# Patient Record
Sex: FEMALE | Race: WHITE | NOT HISPANIC OR LATINO | Employment: UNEMPLOYED | URBAN - METROPOLITAN AREA
[De-identification: names, ages, dates, MRNs, and addresses within clinical notes are randomized per-mention and may not be internally consistent; named-entity substitution may affect disease eponyms.]

---

## 2023-03-20 PROBLEM — M79.641 PAIN OF RIGHT HAND: Status: ACTIVE | Noted: 2023-03-20

## 2023-03-20 PROBLEM — N89.8 VAGINAL DISCHARGE: Status: ACTIVE | Noted: 2023-03-20

## 2023-03-20 PROBLEM — S83.90XA KNEE SPRAIN: Status: ACTIVE | Noted: 2023-03-20

## 2023-03-20 PROBLEM — M25.551 RIGHT HIP PAIN: Status: ACTIVE | Noted: 2023-03-20

## 2023-03-20 PROBLEM — R51.9 HEADACHE: Status: ACTIVE | Noted: 2023-03-20

## 2023-03-20 PROBLEM — R10.12 ABDOMINAL PAIN, LEFT UPPER QUADRANT: Status: ACTIVE | Noted: 2023-03-20

## 2023-03-20 PROBLEM — J30.9 ALLERGIC RHINITIS: Status: ACTIVE | Noted: 2023-03-20

## 2023-03-20 PROBLEM — J40 BRONCHITIS: Status: ACTIVE | Noted: 2023-03-20

## 2023-03-20 PROBLEM — R29.898 WEAKNESS OF RIGHT HAND: Status: ACTIVE | Noted: 2023-03-20

## 2023-03-20 PROBLEM — R05.3 CHRONIC COUGH: Status: ACTIVE | Noted: 2023-03-20

## 2023-03-20 PROBLEM — M25.531 RIGHT WRIST PAIN: Status: ACTIVE | Noted: 2023-03-20

## 2023-03-20 PROBLEM — G89.29 CHRONIC PAIN OF BOTH KNEES: Status: ACTIVE | Noted: 2023-03-20

## 2023-03-20 PROBLEM — T50.905A DRUG REACTION: Status: ACTIVE | Noted: 2023-03-20

## 2023-03-20 PROBLEM — R11.10 INTRACTABLE VOMITING: Status: ACTIVE | Noted: 2023-03-20

## 2023-03-20 PROBLEM — R10.84 GENERALIZED ABDOMINAL PAIN: Status: ACTIVE | Noted: 2023-03-20

## 2023-03-20 PROBLEM — I67.5 MOYAMOYA SYNDROME: Status: ACTIVE | Noted: 2023-03-20

## 2023-03-20 PROBLEM — L70.9 ACNE: Status: ACTIVE | Noted: 2023-03-20

## 2023-03-20 PROBLEM — M25.572 LEFT ANKLE PAIN: Status: ACTIVE | Noted: 2023-03-20

## 2023-03-20 PROBLEM — B35.6 TINEA CRURIS: Status: ACTIVE | Noted: 2023-03-20

## 2023-03-20 PROBLEM — M19.90 OSTEOARTHRITIS: Status: ACTIVE | Noted: 2023-03-20

## 2023-03-20 PROBLEM — G89.29 CHRONIC BACK PAIN: Status: ACTIVE | Noted: 2023-03-20

## 2023-03-20 PROBLEM — N93.8 DUB (DYSFUNCTIONAL UTERINE BLEEDING): Status: ACTIVE | Noted: 2023-03-20

## 2023-03-20 PROBLEM — R87.619 ATYPICAL GLANDULAR CELLS OF UNDETERMINED SIGNIFICANCE (AGUS) ON CERVICAL PAP SMEAR: Status: ACTIVE | Noted: 2023-03-20

## 2023-03-20 PROBLEM — S69.91XA INJURY OF FINGER OF RIGHT HAND: Status: ACTIVE | Noted: 2023-03-20

## 2023-03-20 PROBLEM — M54.9 BACK PAIN WITH SCIATICA: Status: ACTIVE | Noted: 2023-03-20

## 2023-03-20 PROBLEM — R10.13 EPIGASTRIC PAIN: Status: ACTIVE | Noted: 2023-03-20

## 2023-03-20 PROBLEM — R53.82 CHRONIC FATIGUE: Status: ACTIVE | Noted: 2023-03-20

## 2023-03-20 PROBLEM — F32.2 DEPRESSION, MAJOR, SINGLE EPISODE, SEVERE (MULTI): Status: ACTIVE | Noted: 2023-03-20

## 2023-03-20 PROBLEM — Z78.9 KNOWN MEDICAL PROBLEMS: Status: ACTIVE | Noted: 2023-03-20

## 2023-03-20 PROBLEM — S83.419A KNEE MCL SPRAIN: Status: ACTIVE | Noted: 2023-03-20

## 2023-03-20 PROBLEM — G89.29 CHRONIC RIGHT SHOULDER PAIN: Status: ACTIVE | Noted: 2023-03-20

## 2023-03-20 PROBLEM — R63.4 ABNORMAL WEIGHT LOSS: Status: ACTIVE | Noted: 2023-03-20

## 2023-03-20 PROBLEM — K90.829 SHORT BOWEL SYNDROME: Status: ACTIVE | Noted: 2023-03-20

## 2023-03-20 PROBLEM — R39.9 SYMPTOMS INVOLVING URINARY SYSTEM: Status: ACTIVE | Noted: 2023-03-20

## 2023-03-20 PROBLEM — E55.9 VITAMIN D DEFICIENCY: Status: ACTIVE | Noted: 2023-03-20

## 2023-03-20 PROBLEM — M25.361 OTHER INSTABILITY, RIGHT KNEE: Status: ACTIVE | Noted: 2023-03-20

## 2023-03-20 PROBLEM — M25.372 LEFT ANKLE INSTABILITY: Status: ACTIVE | Noted: 2023-03-20

## 2023-03-20 PROBLEM — K13.79 LESION OF PALATE: Status: ACTIVE | Noted: 2023-03-20

## 2023-03-20 PROBLEM — H54.62 VISION LOSS, LEFT EYE: Status: ACTIVE | Noted: 2023-03-20

## 2023-03-20 PROBLEM — M25.561 CHRONIC PAIN OF BOTH KNEES: Status: ACTIVE | Noted: 2023-03-20

## 2023-03-20 PROBLEM — G43.909 MIGRAINE WITHOUT STATUS MIGRAINOSUS, NOT INTRACTABLE: Status: ACTIVE | Noted: 2023-03-20

## 2023-03-20 PROBLEM — G47.00 INSOMNIA: Status: ACTIVE | Noted: 2023-03-20

## 2023-03-20 PROBLEM — M79.672 LEFT FOOT PAIN: Status: ACTIVE | Noted: 2023-03-20

## 2023-03-20 PROBLEM — R19.7 DIARRHEA: Status: ACTIVE | Noted: 2023-03-20

## 2023-03-20 PROBLEM — M54.9 CHRONIC BACK PAIN: Status: ACTIVE | Noted: 2023-03-20

## 2023-03-20 PROBLEM — K90.829 SHORT GUT SYNDROME: Status: ACTIVE | Noted: 2023-03-20

## 2023-03-20 PROBLEM — I69.951: Status: ACTIVE | Noted: 2023-03-20

## 2023-03-20 PROBLEM — R53.1 WEAKNESS GENERALIZED: Status: ACTIVE | Noted: 2023-03-20

## 2023-03-20 PROBLEM — N39.0 LOWER URINARY TRACT INFECTIOUS DISEASE: Status: ACTIVE | Noted: 2023-03-20

## 2023-03-20 PROBLEM — R30.0 DYSURIA: Status: ACTIVE | Noted: 2023-03-20

## 2023-03-20 PROBLEM — M25.562 CHRONIC PAIN OF BOTH KNEES: Status: ACTIVE | Noted: 2023-03-20

## 2023-03-20 PROBLEM — M54.30 BACK PAIN WITH SCIATICA: Status: ACTIVE | Noted: 2023-03-20

## 2023-03-20 PROBLEM — I69.30 HISTORY OF STROKE WITH CURRENT RESIDUAL EFFECTS: Status: ACTIVE | Noted: 2023-03-20

## 2023-03-20 PROBLEM — R53.1 WEAKNESS OF RIGHT SIDE OF BODY: Status: ACTIVE | Noted: 2023-03-20

## 2023-03-20 PROBLEM — M25.511 CHRONIC RIGHT SHOULDER PAIN: Status: ACTIVE | Noted: 2023-03-20

## 2023-03-20 PROBLEM — K62.5 RECTAL BLEEDING: Status: ACTIVE | Noted: 2023-03-20

## 2023-03-20 PROBLEM — D50.0 IRON DEFICIENCY ANEMIA DUE TO CHRONIC BLOOD LOSS: Status: ACTIVE | Noted: 2023-03-20

## 2023-03-20 PROBLEM — R10.2 PAIN, PELVIC, FEMALE: Status: ACTIVE | Noted: 2023-03-20

## 2023-03-20 PROBLEM — R21 RASH: Status: ACTIVE | Noted: 2023-03-20

## 2023-03-20 PROBLEM — K21.9 ESOPHAGEAL REFLUX: Status: ACTIVE | Noted: 2023-03-20

## 2023-03-20 RX ORDER — LEVONORGESTREL 52 MG/1
INTRAUTERINE DEVICE INTRAUTERINE
COMMUNITY

## 2023-03-20 RX ORDER — KETOCONAZOLE 20 MG/G
CREAM TOPICAL
COMMUNITY
End: 2023-12-08 | Stop reason: SDUPTHER

## 2023-03-20 RX ORDER — CHOLESTYRAMINE 4 G/9G
POWDER, FOR SUSPENSION ORAL
COMMUNITY
Start: 2021-03-26 | End: 2024-05-24 | Stop reason: WASHOUT

## 2023-03-20 RX ORDER — FLUTICASONE PROPIONATE AND SALMETEROL 100; 50 UG/1; UG/1
POWDER RESPIRATORY (INHALATION)
COMMUNITY
Start: 2022-04-20 | End: 2024-05-24 | Stop reason: SDUPTHER

## 2023-03-20 RX ORDER — TRAZODONE HYDROCHLORIDE 150 MG/1
1 TABLET ORAL DAILY
COMMUNITY
Start: 2016-05-02

## 2023-03-20 RX ORDER — ACETAMINOPHEN 325 MG/1
TABLET ORAL
COMMUNITY
Start: 2017-10-17 | End: 2023-12-08

## 2023-03-20 RX ORDER — IBUPROFEN 800 MG/1
1 TABLET ORAL 3 TIMES DAILY PRN
COMMUNITY
Start: 2022-03-25

## 2023-03-20 RX ORDER — SERTRALINE HYDROCHLORIDE 50 MG/1
50 TABLET, FILM COATED ORAL DAILY
COMMUNITY

## 2023-03-20 RX ORDER — ACETAMINOPHEN 500 MG
1 TABLET ORAL DAILY
COMMUNITY
Start: 2020-03-06 | End: 2023-07-24

## 2023-03-20 RX ORDER — FLUTICASONE PROPIONATE AND SALMETEROL 100; 50 UG/1; UG/1
1 POWDER RESPIRATORY (INHALATION) EVERY 12 HOURS
COMMUNITY
Start: 2022-02-10 | End: 2023-12-08

## 2023-03-20 RX ORDER — ASPIRIN 81 MG/1
1 TABLET ORAL DAILY
COMMUNITY
Start: 2021-03-26 | End: 2024-05-24 | Stop reason: SDUPTHER

## 2023-03-20 RX ORDER — FLUTICASONE PROPIONATE 50 MCG
SPRAY, SUSPENSION (ML) NASAL
COMMUNITY
Start: 2020-10-07

## 2023-03-28 ENCOUNTER — OFFICE VISIT (OUTPATIENT)
Dept: PRIMARY CARE | Facility: CLINIC | Age: 33
End: 2023-03-28
Payer: MEDICAID

## 2023-03-28 VITALS
BODY MASS INDEX: 38.32 KG/M2 | HEART RATE: 71 BPM | WEIGHT: 230 LBS | HEIGHT: 65 IN | DIASTOLIC BLOOD PRESSURE: 74 MMHG | SYSTOLIC BLOOD PRESSURE: 100 MMHG

## 2023-03-28 DIAGNOSIS — H69.91 EUSTACHIAN TUBE DYSFUNCTION, RIGHT: ICD-10-CM

## 2023-03-28 DIAGNOSIS — M54.50 CHRONIC BILATERAL LOW BACK PAIN WITHOUT SCIATICA: ICD-10-CM

## 2023-03-28 DIAGNOSIS — G89.29 CHRONIC BILATERAL LOW BACK PAIN WITHOUT SCIATICA: ICD-10-CM

## 2023-03-28 DIAGNOSIS — M25.561 PATELLOFEMORAL ARTHRALGIA OF RIGHT KNEE: Primary | ICD-10-CM

## 2023-03-28 DIAGNOSIS — R30.0 DYSURIA: ICD-10-CM

## 2023-03-28 LAB
POC APPEARANCE, URINE: ABNORMAL
POC BILIRUBIN, URINE: ABNORMAL
POC BLOOD, URINE: NEGATIVE
POC COLOR, URINE: YELLOW
POC GLUCOSE, URINE: NEGATIVE MG/DL
POC KETONES, URINE: ABNORMAL MG/DL
POC LEUKOCYTES, URINE: NEGATIVE
POC NITRITE,URINE: NEGATIVE
POC PH, URINE: 5.5 PH
POC PROTEIN, URINE: NEGATIVE MG/DL
POC SPECIFIC GRAVITY, URINE: >=1.03
POC UROBILINOGEN, URINE: 0.2 EU/DL

## 2023-03-28 PROCEDURE — 81003 URINALYSIS AUTO W/O SCOPE: CPT | Performed by: INTERNAL MEDICINE

## 2023-03-28 PROCEDURE — 99214 OFFICE O/P EST MOD 30 MIN: CPT | Performed by: INTERNAL MEDICINE

## 2023-03-28 PROCEDURE — 1036F TOBACCO NON-USER: CPT | Performed by: INTERNAL MEDICINE

## 2023-03-28 RX ORDER — METHYLPREDNISOLONE 4 MG/1
TABLET ORAL
Qty: 21 TABLET | Refills: 0 | Status: SHIPPED | OUTPATIENT
Start: 2023-03-28 | End: 2023-04-04

## 2023-03-28 RX ORDER — ARM BRACE
1 EACH MISCELLANEOUS DAILY
Qty: 1 EACH | Refills: 0 | Status: SHIPPED | OUTPATIENT
Start: 2023-03-28

## 2023-03-28 ASSESSMENT — ENCOUNTER SYMPTOMS
COUGH: 1
BACK PAIN: 1

## 2023-03-28 NOTE — PROGRESS NOTES
Subjective   Patient ID: Windy Lovelace is a 32 y.o. female who presents for Cough, right knee pain, and Back Pain.  Back pain, has been intermittent  She gets more pain when she sits on her couch, she cannot lean back  No injury, she fell in the shower, hit her back against the shower head/faucet, that was years ago  No numbness, just pain in the middle of her back and lower back  No urniary issues  Bowels have been regular.    Squeezing when she breaths or sleeps  Has noted the squeaking since her last cold 2/3/23      She is coughing a lot, she is hacking when she laughs  Cough is not productive  No hx of asthma  No fevers but had a cold a few months ago    Also with knee injury, right knee, her knee snapped, without injury  She was getting out of a car and heard and felt a snap in her knee, right  Was harder to walk after that, she has had a prior injury to the knee  She tore the medial muscle on the knee about 10 years ago.  She has a knee brace, but has trouble getting it on.      Cough    Back Pain        Review of Systems   Respiratory:  Positive for cough.    Musculoskeletal:  Positive for back pain.       Objective     Visit Vitals  /74   Pulse 71        Physical Exam  Constitutional:       Appearance: Normal appearance.   HENT:      Right Ear: Ear canal normal.      Ears:      Comments: Left TM partially occluded by cerumen    Right TM is dull and restracted, fluid/clear noted     Nose:      Comments: Turbs are congested and occlusive on the right  Cardiovascular:      Rate and Rhythm: Normal rate and regular rhythm.      Heart sounds: No murmur heard.  Pulmonary:      Effort: Pulmonary effort is normal. No respiratory distress.      Breath sounds: No stridor. No wheezing, rhonchi or rales.   Abdominal:      Palpations: Abdomen is soft.      Tenderness: There is no abdominal tenderness.   Musculoskeletal:      Comments: Right knee with some crepitans on extension  Neg kacie  No effusion or  baker cyst  Tender over the lateral knee  Negative drawer and no pain with MCL/LCL strain    Lower back with tender ropiness, more on the left lumbar paraspinals  Negative CVA tenderness  Tender in the lower lumber L4/L5 with percussion over the midline  No rash  Negative SLR but very tight hamstrings at 50 degrees and pain in lower back  No foot drop   Neurological:      Mental Status: She is alert.         Assessment/Plan   Problem List Items Addressed This Visit          Nervous    Dysuria    Relevant Orders    POCT UA Automated manually resulted (Completed)       Other    Chronic back pain    Relevant Medications    methylPREDNISolone (Medrol Dospak) 4 mg tablets    Other Relevant Orders    XR lumbar spine 6+ views including oblique flexion extension    Referral to Physical Therapy     Other Visit Diagnoses       Patellofemoral arthralgia of right knee    -  Primary    Relevant Medications    leg brace (Knee Support Brace) misc    methylPREDNISolone (Medrol Dospak) 4 mg tablets    Other Relevant Orders    XR knee right 1-2 views    Referral to Physical Therapy    Eustachian tube dysfunction, right        Relevant Medications    methylPREDNISolone (Medrol Dospak) 4 mg tablets          Problem List Items Addressed This Visit    None       Right knee pain, MRI in 2022 is consistent with patellofemoral disorder  Get knee brace from [facility]  Address: [address], [place] [address]  Hours:   Open · Closes 5 PM  Phone: [phone]  Chandu Brown is the orthotic specialist who can hopefully order a brace that you can get off and on  Go to physical therapy    Lower back pain, likely due to tight hamstrings, go to physical therapy (and continue home exercises when done)  Take medrol norbert  Get lower back xrays due to prior injury    Squeeking when laughing and at night  Oxygen in 100% and lungs are clear  You have right nasal congestion and blocked right ear  Take medrol norbert to decongest    There are no Patient  Instructions on file for this visit.     For all testing, if you have not received results within 5 business days, please call the office.    All results will be available as well on MyChart. Make sure you have signed up.

## 2023-03-28 NOTE — PATIENT INSTRUCTIONS
Right knee pain, MRI in 2022 is consistent with patellofemoral disorder  Get knee brace from [facility]  Address: [address], [place] [address]  Hours:   Open · Closes 5 PM  Phone: [phone]  Chandu Brown is the orthotic specialist who can hopefully order a brace that you can get off and on  Go to physical therapy    Lower back pain, likely due to tight hamstrings, go to physical therapy (and continue home exercises when done)  Take medrol norbert  Get lower back xrays due to prior injury    Squeeking when laughing and at night  Oxygen in 100% and lungs are clear  You have right nasal congestion and blocked right ear  Take medrol norbert to decongest

## 2023-07-24 DIAGNOSIS — R79.89 OTHER SPECIFIED ABNORMAL FINDINGS OF BLOOD CHEMISTRY: ICD-10-CM

## 2023-07-24 RX ORDER — ACETAMINOPHEN 500 MG
50 TABLET ORAL DAILY
Qty: 30 CAPSULE | Refills: 5 | Status: SHIPPED | OUTPATIENT
Start: 2023-07-24 | End: 2023-12-11 | Stop reason: SDUPTHER

## 2023-08-07 PROBLEM — H52.13 MYOPIA, BILATERAL: Status: ACTIVE | Noted: 2021-08-31

## 2023-08-07 PROBLEM — F43.10 POST TRAUMATIC STRESS DISORDER: Status: ACTIVE | Noted: 2021-05-28

## 2023-08-07 PROBLEM — H50.111 EXOTROPIA OF RIGHT EYE: Status: ACTIVE | Noted: 2021-08-31

## 2023-08-07 PROBLEM — F41.1 ANXIETY STATE: Status: ACTIVE | Noted: 2021-05-28

## 2023-08-07 PROBLEM — Z98.890 H/O ILEOSTOMY: Status: ACTIVE | Noted: 2019-09-11

## 2023-08-07 PROBLEM — H53.021 REFRACTIVE AMBLYOPIA OF RIGHT EYE: Status: ACTIVE | Noted: 2021-08-31

## 2023-08-07 PROBLEM — Z93.2 S/P ILEOSTOMY (MULTI): Status: ACTIVE | Noted: 2019-09-18

## 2023-08-07 PROBLEM — F70 MILD INTELLECTUAL DISABILITIES: Status: ACTIVE | Noted: 2021-05-28

## 2023-08-07 RX ORDER — PRAZOSIN HYDROCHLORIDE 1 MG/1
3 CAPSULE ORAL
COMMUNITY
Start: 2023-02-20

## 2023-08-07 NOTE — PROGRESS NOTES
Subjective   Patient ID: Windy Lovelace is a 32 y.o. female who presents for Knee pain    HPI     Review of Systems    Objective   There were no vitals taken for this visit.    Physical Exam    Assessment/Plan

## 2023-08-11 ENCOUNTER — OFFICE VISIT (OUTPATIENT)
Dept: PRIMARY CARE | Facility: CLINIC | Age: 33
End: 2023-08-11
Payer: MEDICAID

## 2023-08-11 VITALS
HEART RATE: 99 BPM | OXYGEN SATURATION: 96 % | DIASTOLIC BLOOD PRESSURE: 88 MMHG | WEIGHT: 238 LBS | SYSTOLIC BLOOD PRESSURE: 141 MMHG | BODY MASS INDEX: 39.61 KG/M2

## 2023-08-11 DIAGNOSIS — M25.561 PATELLOFEMORAL ARTHRALGIA OF RIGHT KNEE: Primary | ICD-10-CM

## 2023-08-11 DIAGNOSIS — M20.021 ACQUIRED BOUTONNIERE DEFORMITY OF FINGER OF RIGHT HAND: ICD-10-CM

## 2023-08-11 DIAGNOSIS — M79.641 PAIN OF RIGHT HAND: ICD-10-CM

## 2023-08-11 DIAGNOSIS — K21.9 GASTROESOPHAGEAL REFLUX DISEASE WITHOUT ESOPHAGITIS: ICD-10-CM

## 2023-08-11 DIAGNOSIS — R29.898 WEAKNESS OF RIGHT HAND: ICD-10-CM

## 2023-08-11 PROCEDURE — 1036F TOBACCO NON-USER: CPT | Performed by: INTERNAL MEDICINE

## 2023-08-11 PROCEDURE — 99214 OFFICE O/P EST MOD 30 MIN: CPT | Performed by: INTERNAL MEDICINE

## 2023-08-11 RX ORDER — OMEPRAZOLE 20 MG/1
20 TABLET, DELAYED RELEASE ORAL DAILY
Qty: 30 TABLET | Refills: 11 | Status: SHIPPED | COMMUNITY
Start: 2023-08-11 | End: 2024-05-24 | Stop reason: WASHOUT

## 2023-08-11 ASSESSMENT — ENCOUNTER SYMPTOMS: COUGH: 1

## 2023-08-11 NOTE — PROGRESS NOTES
Subjective   Patient ID: Windy Lovelace is a 32 y.o. female who presents for Knee Pain (Right knee pain ) and Cough.    She still needs a knee brace and the knee brace comes off and needs to have staff have her help getting it on  She cannot get over her calf and over her knee  Some of the staff does not know how to put it on.   Orthopedics did not get her a knee brace, she needs to be fitted for a brace and instruct her on how to put.   She has not been to PT in a long time.   She was going every week     Her right pinky got fractured it.     She has a cough, every so often she gets a cough, she starts hacking  She does cough at night  Does not get phlegm in her throat, dry cough  Has been almost 1/2 a year  No runny nose or congestion  She still gets heart burn,   No acid taste in her mouth, but very often  She does wheeze when she sleeps    Knee Pain     Cough         Review of Systems   Respiratory:  Positive for cough.        Objective   /88   Pulse 99   Wt 108 kg (238 lb)   SpO2 96%   BMI 39.61 kg/m²     Physical Exam  HENT:      Nose: Nose normal.   Pulmonary:      Effort: Pulmonary effort is normal.      Breath sounds: Normal breath sounds.   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 Nagel again do to increased laxity of the 5th finger    Cough is from reflux, restart omeprazole 20mg daily

## 2023-08-11 NOTE — PATIENT INSTRUCTIONS
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 Nagel again do to increased laxity of the 5th finger    Cough is from reflux, restart omeprazole 20mg daily

## 2023-12-08 ENCOUNTER — LAB (OUTPATIENT)
Dept: LAB | Facility: LAB | Age: 33
End: 2023-12-08
Payer: MEDICAID

## 2023-12-08 ENCOUNTER — OFFICE VISIT (OUTPATIENT)
Dept: PRIMARY CARE | Facility: CLINIC | Age: 33
End: 2023-12-08
Payer: MEDICAID

## 2023-12-08 VITALS
HEIGHT: 65 IN | BODY MASS INDEX: 38.32 KG/M2 | SYSTOLIC BLOOD PRESSURE: 150 MMHG | HEART RATE: 90 BPM | DIASTOLIC BLOOD PRESSURE: 105 MMHG | WEIGHT: 230 LBS

## 2023-12-08 DIAGNOSIS — Z00.00 HEALTH CARE MAINTENANCE: ICD-10-CM

## 2023-12-08 DIAGNOSIS — R10.84 GENERALIZED ABDOMINAL PAIN: ICD-10-CM

## 2023-12-08 DIAGNOSIS — K90.821 SHORT BOWEL SYNDROME WITH COLON IN CONTINUITY: ICD-10-CM

## 2023-12-08 DIAGNOSIS — B35.6 TINEA CRURIS: ICD-10-CM

## 2023-12-08 DIAGNOSIS — R10.84 GENERALIZED ABDOMINAL PAIN: Primary | ICD-10-CM

## 2023-12-08 LAB
ALBUMIN SERPL BCP-MCNC: 4.4 G/DL (ref 3.4–5)
ALP SERPL-CCNC: 83 U/L (ref 33–110)
ALT SERPL W P-5'-P-CCNC: 55 U/L (ref 7–45)
ANION GAP SERPL CALC-SCNC: 17 MMOL/L (ref 10–20)
APPEARANCE UR: ABNORMAL
AST SERPL W P-5'-P-CCNC: 26 U/L (ref 9–39)
BASOPHILS # BLD AUTO: 0.05 X10*3/UL (ref 0–0.1)
BASOPHILS NFR BLD AUTO: 0.5 %
BILIRUB SERPL-MCNC: 0.8 MG/DL (ref 0–1.2)
BILIRUB UR STRIP.AUTO-MCNC: NEGATIVE MG/DL
BUN SERPL-MCNC: 8 MG/DL (ref 6–23)
CALCIUM SERPL-MCNC: 9.1 MG/DL (ref 8.6–10.3)
CAOX CRY #/AREA UR COMP ASSIST: ABNORMAL /HPF
CHLORIDE SERPL-SCNC: 106 MMOL/L (ref 98–107)
CHOLEST SERPL-MCNC: 135 MG/DL (ref 0–199)
CHOLESTEROL/HDL RATIO: 3.9
CO2 SERPL-SCNC: 21 MMOL/L (ref 21–32)
COLOR UR: YELLOW
CREAT SERPL-MCNC: 0.94 MG/DL (ref 0.5–1.05)
EOSINOPHIL # BLD AUTO: 0.21 X10*3/UL (ref 0–0.7)
EOSINOPHIL NFR BLD AUTO: 2.1 %
ERYTHROCYTE [DISTWIDTH] IN BLOOD BY AUTOMATED COUNT: 16.1 % (ref 11.5–14.5)
FERRITIN SERPL-MCNC: 257 NG/ML (ref 8–150)
GFR SERPL CREATININE-BSD FRML MDRD: 81 ML/MIN/1.73M*2
GLUCOSE SERPL-MCNC: 73 MG/DL (ref 74–99)
GLUCOSE UR STRIP.AUTO-MCNC: NEGATIVE MG/DL
HCT VFR BLD AUTO: 45.2 % (ref 36–46)
HDLC SERPL-MCNC: 34.4 MG/DL
HGB BLD-MCNC: 15.2 G/DL (ref 12–16)
IMM GRANULOCYTES # BLD AUTO: 0.02 X10*3/UL (ref 0–0.7)
IMM GRANULOCYTES NFR BLD AUTO: 0.2 % (ref 0–0.9)
IRON SATN MFR SERPL: 16 % (ref 25–45)
IRON SERPL-MCNC: 75 UG/DL (ref 35–150)
KETONES UR STRIP.AUTO-MCNC: NEGATIVE MG/DL
LDLC SERPL CALC-MCNC: 67 MG/DL
LEUKOCYTE ESTERASE UR QL STRIP.AUTO: NEGATIVE
LYMPHOCYTES # BLD AUTO: 3.09 X10*3/UL (ref 1.2–4.8)
LYMPHOCYTES NFR BLD AUTO: 30.4 %
MCH RBC QN AUTO: 32.6 PG (ref 26–34)
MCHC RBC AUTO-ENTMCNC: 33.6 G/DL (ref 32–36)
MCV RBC AUTO: 97 FL (ref 80–100)
MONOCYTES # BLD AUTO: 0.51 X10*3/UL (ref 0.1–1)
MONOCYTES NFR BLD AUTO: 5 %
MUCOUS THREADS #/AREA URNS AUTO: ABNORMAL /LPF
NEUTROPHILS # BLD AUTO: 6.29 X10*3/UL (ref 1.2–7.7)
NEUTROPHILS NFR BLD AUTO: 61.8 %
NITRITE UR QL STRIP.AUTO: NEGATIVE
NON HDL CHOLESTEROL: 101 MG/DL (ref 0–149)
NRBC BLD-RTO: 0 /100 WBCS (ref 0–0)
PH UR STRIP.AUTO: 5 [PH]
PLATELET # BLD AUTO: 342 X10*3/UL (ref 150–450)
POC APPEARANCE, URINE: CLEAR
POC BILIRUBIN, URINE: ABNORMAL
POC BLOOD, URINE: NEGATIVE
POC COLOR, URINE: YELLOW
POC GLUCOSE, URINE: NEGATIVE MG/DL
POC KETONES, URINE: ABNORMAL MG/DL
POC LEUKOCYTES, URINE: NEGATIVE
POC NITRITE,URINE: NEGATIVE
POC PH, URINE: 5.5 PH
POC PROTEIN, URINE: ABNORMAL MG/DL
POC SPECIFIC GRAVITY, URINE: >=1.03
POC UROBILINOGEN, URINE: 0.2 EU/DL
POTASSIUM SERPL-SCNC: 3.8 MMOL/L (ref 3.5–5.3)
PROT SERPL-MCNC: 7.1 G/DL (ref 6.4–8.2)
PROT UR STRIP.AUTO-MCNC: ABNORMAL MG/DL
RBC # BLD AUTO: 4.66 X10*6/UL (ref 4–5.2)
RBC # UR STRIP.AUTO: NEGATIVE /UL
RBC #/AREA URNS AUTO: ABNORMAL /HPF
SODIUM SERPL-SCNC: 140 MMOL/L (ref 136–145)
SP GR UR STRIP.AUTO: 1.03
SQUAMOUS #/AREA URNS AUTO: ABNORMAL /HPF
TIBC SERPL-MCNC: 460 UG/DL (ref 240–445)
TRIGL SERPL-MCNC: 168 MG/DL (ref 0–149)
TSH SERPL-ACNC: 2.73 MIU/L (ref 0.44–3.98)
UIBC SERPL-MCNC: 385 UG/DL (ref 110–370)
UROBILINOGEN UR STRIP.AUTO-MCNC: <2 MG/DL
VLDL: 34 MG/DL (ref 0–40)
WBC # BLD AUTO: 10.2 X10*3/UL (ref 4.4–11.3)
WBC #/AREA URNS AUTO: ABNORMAL /HPF

## 2023-12-08 PROCEDURE — 83550 IRON BINDING TEST: CPT

## 2023-12-08 PROCEDURE — 83540 ASSAY OF IRON: CPT

## 2023-12-08 PROCEDURE — 84443 ASSAY THYROID STIM HORMONE: CPT

## 2023-12-08 PROCEDURE — 80061 LIPID PANEL: CPT

## 2023-12-08 PROCEDURE — 1036F TOBACCO NON-USER: CPT | Performed by: INTERNAL MEDICINE

## 2023-12-08 PROCEDURE — 82728 ASSAY OF FERRITIN: CPT

## 2023-12-08 PROCEDURE — 82607 VITAMIN B-12: CPT

## 2023-12-08 PROCEDURE — 80053 COMPREHEN METABOLIC PANEL: CPT

## 2023-12-08 PROCEDURE — 81001 URINALYSIS AUTO W/SCOPE: CPT

## 2023-12-08 PROCEDURE — 85025 COMPLETE CBC W/AUTO DIFF WBC: CPT

## 2023-12-08 PROCEDURE — 99214 OFFICE O/P EST MOD 30 MIN: CPT | Performed by: INTERNAL MEDICINE

## 2023-12-08 PROCEDURE — 82306 VITAMIN D 25 HYDROXY: CPT

## 2023-12-08 PROCEDURE — 81003 URINALYSIS AUTO W/O SCOPE: CPT | Performed by: INTERNAL MEDICINE

## 2023-12-08 PROCEDURE — 36415 COLL VENOUS BLD VENIPUNCTURE: CPT

## 2023-12-08 RX ORDER — KETOCONAZOLE 20 MG/G
CREAM TOPICAL 2 TIMES DAILY
Qty: 60 G | Refills: 1 | Status: SHIPPED | OUTPATIENT
Start: 2023-12-08

## 2023-12-08 NOTE — PROGRESS NOTES
"Subjective   Patient ID: Windy Lovelace is a 33 y.o. female who presents for Anorexia and medication compliance .    Sister Aracelis on phone for the visit  For the last 1 1/2 month  Has had a drastic change in habits, stopped all of her medications  She did so because she gags if she doesn't take with food  She used to eat 3 meals a day, in the last month she is only eating 1-2 meals per day and in excess  She drinks dr pepper, she drinks instead of water, she has 3-4 regular diet pepper  She doesn't drink water, thinks it tastes like pencil lead, she has filtered water where she lives, they got her spin drift, sparkling with natural juice.   She lives in a condo and has supervision with remote  She lost a lot of weight.   She has been gagging  She sleeps during the day and up all night.   She did sleep for about 8 hours.   She prefers to sleep during the day, because her flash backs are worse during the night and has night terrors, but is not taking her medicine for night terrors.     She is surprised by her change in her food intake, could there be any   Wants her checked her for diabetes.     She moved about 1 year.   She was doing very well for the first 10months and only in the last 2 months.  Bowels feel like rocks, and hurts all the time, doesn't take metamucil or miralax  It hurts /feels like rocks but her bowels are soft. And loose. She uses a bidet, since she cannot get her hand back to wipe  She is not taking any of her medication.     Her eyes bother her with flourenscent lights.     She has been eating in the middle of the night.   There is not a lot of food that she can eat, she eats chips and snacky stuff in the middle of the night.          Review of Systems    Objective   BP (!) 150/105   Pulse 90   Ht 1.651 m (5' 5\")   Wt 104 kg (230 lb)   BMI 38.27 kg/m²     Physical Exam  Constitutional:       Appearance: Normal appearance.   HENT:      Mouth/Throat:      Mouth: Mucous membranes are moist. "      Comments: Mucosa pink and moist, no lesions  Neck:      Vascular: No carotid bruit.   Cardiovascular:      Rate and Rhythm: Normal rate and regular rhythm.      Heart sounds: No murmur heard.  Pulmonary:      Effort: Pulmonary effort is normal.      Breath sounds: Normal breath sounds.   Abdominal:      Comments: Abdomen with normal bs, not distended  No pain when listening to bs/pressing with stethoscope but with palpation pt states pain is diffuse with no PMI. No masses. No guarding, but pt c/o rebound pain   No rigidity   Skin:     Coloration: Skin is not jaundiced or pale.   Neurological:      Mental Status: She is alert and oriented to person, place, and time.   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

## 2023-12-08 NOTE — PROGRESS NOTES
Subjective   Patient ID: Windy Lovelace is a 33 y.o. female who presents for Anorexia.    HPI     Review of Systems    Objective   There were no vitals taken for this visit.    Physical Exam    Assessment/Plan

## 2023-12-08 NOTE — PATIENT INSTRUCTIONS
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

## 2023-12-09 LAB
25(OH)D3 SERPL-MCNC: 10 NG/ML (ref 30–100)
VIT B12 SERPL-MCNC: 166 PG/ML (ref 211–911)

## 2023-12-11 DIAGNOSIS — R79.89 OTHER SPECIFIED ABNORMAL FINDINGS OF BLOOD CHEMISTRY: ICD-10-CM

## 2023-12-11 DIAGNOSIS — E55.9 VITAMIN D DEFICIENCY: Primary | ICD-10-CM

## 2023-12-11 DIAGNOSIS — E53.8 VITAMIN B12 DEFICIENCY: ICD-10-CM

## 2023-12-11 RX ORDER — ACETAMINOPHEN 500 MG
2000 TABLET ORAL DAILY
Qty: 30 CAPSULE | Refills: 3 | Status: SHIPPED | OUTPATIENT
Start: 2023-12-11 | End: 2024-05-24 | Stop reason: SDUPTHER

## 2023-12-14 ENCOUNTER — CLINICAL SUPPORT (OUTPATIENT)
Dept: PRIMARY CARE | Facility: CLINIC | Age: 33
End: 2023-12-14
Payer: MEDICAID

## 2023-12-14 DIAGNOSIS — E53.8 B12 DEFICIENCY: ICD-10-CM

## 2023-12-14 PROCEDURE — 96372 THER/PROPH/DIAG INJ SC/IM: CPT | Performed by: INTERNAL MEDICINE

## 2023-12-14 RX ORDER — CYANOCOBALAMIN 1000 UG/ML
1000 INJECTION, SOLUTION INTRAMUSCULAR; SUBCUTANEOUS ONCE
Status: COMPLETED | OUTPATIENT
Start: 2023-12-14 | End: 2023-12-14

## 2023-12-14 RX ADMIN — CYANOCOBALAMIN 1000 MCG: 1000 INJECTION, SOLUTION INTRAMUSCULAR; SUBCUTANEOUS at 10:06

## 2024-01-05 ENCOUNTER — CLINICAL SUPPORT (OUTPATIENT)
Dept: PRIMARY CARE | Facility: CLINIC | Age: 34
End: 2024-01-05
Payer: MEDICAID

## 2024-01-05 DIAGNOSIS — E53.8 B12 DEFICIENCY: ICD-10-CM

## 2024-01-05 PROCEDURE — 96372 THER/PROPH/DIAG INJ SC/IM: CPT | Performed by: INTERNAL MEDICINE

## 2024-01-05 RX ADMIN — CYANOCOBALAMIN 1000 MCG: 1000 INJECTION, SOLUTION INTRAMUSCULAR; SUBCUTANEOUS at 09:21

## 2024-01-08 ENCOUNTER — HOSPITAL ENCOUNTER (OUTPATIENT)
Dept: RADIOLOGY | Facility: HOSPITAL | Age: 34
Discharge: HOME | End: 2024-01-08
Payer: MEDICAID

## 2024-01-08 DIAGNOSIS — R10.84 GENERALIZED ABDOMINAL PAIN: ICD-10-CM

## 2024-01-08 PROCEDURE — 74177 CT ABD & PELVIS W/CONTRAST: CPT

## 2024-01-08 PROCEDURE — 74177 CT ABD & PELVIS W/CONTRAST: CPT | Performed by: RADIOLOGY

## 2024-01-08 PROCEDURE — 2550000001 HC RX 255 CONTRASTS: Performed by: INTERNAL MEDICINE

## 2024-01-08 RX ADMIN — IOHEXOL 75 ML: 350 INJECTION, SOLUTION INTRAVENOUS at 11:02

## 2024-01-15 ENCOUNTER — APPOINTMENT (OUTPATIENT)
Dept: PRIMARY CARE | Facility: CLINIC | Age: 34
End: 2024-01-15
Payer: MEDICAID

## 2024-01-16 ENCOUNTER — CLINICAL SUPPORT (OUTPATIENT)
Dept: PRIMARY CARE | Facility: CLINIC | Age: 34
End: 2024-01-16
Payer: MEDICAID

## 2024-01-16 DIAGNOSIS — E53.8 B12 DEFICIENCY: ICD-10-CM

## 2024-01-16 PROCEDURE — 96372 THER/PROPH/DIAG INJ SC/IM: CPT | Performed by: INTERNAL MEDICINE

## 2024-01-16 RX ADMIN — CYANOCOBALAMIN 1000 MCG: 1000 INJECTION, SOLUTION INTRAMUSCULAR; SUBCUTANEOUS at 12:44

## 2024-01-17 RX ORDER — CYANOCOBALAMIN 1000 UG/ML
1000 INJECTION, SOLUTION INTRAMUSCULAR; SUBCUTANEOUS ONCE
Status: COMPLETED | OUTPATIENT
Start: 2024-01-17 | End: 2024-01-16

## 2024-01-22 ENCOUNTER — CLINICAL SUPPORT (OUTPATIENT)
Dept: PRIMARY CARE | Facility: CLINIC | Age: 34
End: 2024-01-22
Payer: MEDICAID

## 2024-01-22 ENCOUNTER — TELEPHONE (OUTPATIENT)
Dept: PRIMARY CARE | Facility: CLINIC | Age: 34
End: 2024-01-22

## 2024-01-22 DIAGNOSIS — E53.8 B12 DEFICIENCY: ICD-10-CM

## 2024-01-22 PROCEDURE — 96372 THER/PROPH/DIAG INJ SC/IM: CPT | Performed by: INTERNAL MEDICINE

## 2024-01-22 RX ADMIN — CYANOCOBALAMIN 1000 MCG: 1000 INJECTION, SOLUTION INTRAMUSCULAR; SUBCUTANEOUS at 12:09

## 2024-01-29 ENCOUNTER — CLINICAL SUPPORT (OUTPATIENT)
Dept: PRIMARY CARE | Facility: CLINIC | Age: 34
End: 2024-01-29
Payer: MEDICAID

## 2024-01-29 DIAGNOSIS — E53.8 B12 DEFICIENCY: ICD-10-CM

## 2024-01-29 PROCEDURE — 96372 THER/PROPH/DIAG INJ SC/IM: CPT | Performed by: INTERNAL MEDICINE

## 2024-01-29 RX ADMIN — CYANOCOBALAMIN 1000 MCG: 1000 INJECTION, SOLUTION INTRAMUSCULAR; SUBCUTANEOUS at 12:05

## 2024-02-08 RX ORDER — CYANOCOBALAMIN 1000 UG/ML
1000 INJECTION, SOLUTION INTRAMUSCULAR; SUBCUTANEOUS ONCE
Status: COMPLETED | OUTPATIENT
Start: 2024-02-08 | End: 2024-01-22

## 2024-02-08 RX ORDER — CYANOCOBALAMIN 1000 UG/ML
1000 INJECTION, SOLUTION INTRAMUSCULAR; SUBCUTANEOUS ONCE
Status: COMPLETED | OUTPATIENT
Start: 2024-02-08 | End: 2024-03-18

## 2024-02-26 ENCOUNTER — CLINICAL SUPPORT (OUTPATIENT)
Dept: PRIMARY CARE | Facility: CLINIC | Age: 34
End: 2024-02-26
Payer: MEDICAID

## 2024-02-26 PROCEDURE — 96372 THER/PROPH/DIAG INJ SC/IM: CPT | Performed by: INTERNAL MEDICINE

## 2024-02-26 RX ADMIN — CYANOCOBALAMIN 1000 MCG: 1000 INJECTION, SOLUTION INTRAMUSCULAR; SUBCUTANEOUS at 11:20

## 2024-02-26 NOTE — PROGRESS NOTES
Subjective   Patient ID: Windy Lovelace is a 33 y.o. female who presents for B12 Injection.    HPI     Review of Systems    Objective   There were no vitals taken for this visit.    Physical Exam    Assessment/Plan

## 2024-03-18 ENCOUNTER — APPOINTMENT (OUTPATIENT)
Dept: OBSTETRICS AND GYNECOLOGY | Facility: CLINIC | Age: 34
End: 2024-03-18
Payer: MEDICAID

## 2024-03-18 ENCOUNTER — CLINICAL SUPPORT (OUTPATIENT)
Dept: PRIMARY CARE | Facility: CLINIC | Age: 34
End: 2024-03-18
Payer: MEDICAID

## 2024-03-18 PROCEDURE — 96372 THER/PROPH/DIAG INJ SC/IM: CPT | Performed by: INTERNAL MEDICINE

## 2024-03-18 RX ADMIN — CYANOCOBALAMIN 1000 MCG: 1000 INJECTION, SOLUTION INTRAMUSCULAR; SUBCUTANEOUS at 11:00

## 2024-03-21 ENCOUNTER — OFFICE VISIT (OUTPATIENT)
Dept: OBSTETRICS AND GYNECOLOGY | Facility: CLINIC | Age: 34
End: 2024-03-21
Payer: MEDICAID

## 2024-03-21 VITALS
DIASTOLIC BLOOD PRESSURE: 62 MMHG | SYSTOLIC BLOOD PRESSURE: 108 MMHG | HEIGHT: 65 IN | WEIGHT: 232 LBS | BODY MASS INDEX: 38.65 KG/M2

## 2024-03-21 DIAGNOSIS — Z12.4 CERVICAL CANCER SCREENING: ICD-10-CM

## 2024-03-21 DIAGNOSIS — Z01.419 WELL WOMAN EXAM: Primary | ICD-10-CM

## 2024-03-21 DIAGNOSIS — Z30.431 SURVEILLANCE OF (INTRAUTERINE) CONTRACEPTIVE DEVICE: ICD-10-CM

## 2024-03-21 DIAGNOSIS — R87.612 LGSIL OF CERVIX OF UNDETERMINED SIGNIFICANCE: ICD-10-CM

## 2024-03-21 PROBLEM — K90.821 SHORT BOWEL SYNDROME WITH COLON IN CONTINUITY: Status: ACTIVE | Noted: 2023-03-20

## 2024-03-21 PROBLEM — N64.4 BREAST PAIN: Status: RESOLVED | Noted: 2024-03-21 | Resolved: 2024-03-21

## 2024-03-21 PROBLEM — Z86.69 HISTORY OF NEURODEVELOPMENTAL DISORDER: Status: ACTIVE | Noted: 2024-03-21

## 2024-03-21 PROBLEM — F99 INSOMNIA DUE TO OTHER MENTAL DISORDER: Status: ACTIVE | Noted: 2022-01-24

## 2024-03-21 PROBLEM — M25.539 PAIN IN WRIST: Status: ACTIVE | Noted: 2023-03-20

## 2024-03-21 PROBLEM — M79.673 PAIN OF FOOT: Status: ACTIVE | Noted: 2023-03-20

## 2024-03-21 PROBLEM — N93.9 ABNORMAL UTERINE BLEEDING: Status: ACTIVE | Noted: 2023-03-20

## 2024-03-21 PROBLEM — K59.00 CONSTIPATION: Status: ACTIVE | Noted: 2023-03-20

## 2024-03-21 PROBLEM — F51.05 INSOMNIA DUE TO OTHER MENTAL DISORDER: Status: ACTIVE | Noted: 2022-01-24

## 2024-03-21 PROBLEM — M25.879 ANKLE IMPINGEMENT SYNDROME: Status: RESOLVED | Noted: 2024-03-21 | Resolved: 2024-03-21

## 2024-03-21 PROBLEM — R53.83 FATIGUE: Status: ACTIVE | Noted: 2023-03-20

## 2024-03-21 PROBLEM — H54.7 VISUAL IMPAIRMENT: Status: ACTIVE | Noted: 2023-03-20

## 2024-03-21 PROBLEM — R69 DISORDER: Status: ACTIVE | Noted: 2023-03-20

## 2024-03-21 PROBLEM — J02.9 PHARYNGITIS: Status: ACTIVE | Noted: 2023-03-20

## 2024-03-21 PROBLEM — M20.021 BOUTONNIERE DEFORMITY OF FINGER OF RIGHT HAND: Status: RESOLVED | Noted: 2024-03-21 | Resolved: 2024-03-21

## 2024-03-21 PROBLEM — J39.2 LESION OF OROPHARYNX: Status: ACTIVE | Noted: 2023-03-20

## 2024-03-21 PROBLEM — Z86.73 HISTORY OF CEREBROVASCULAR ACCIDENT: Status: RESOLVED | Noted: 2024-03-21 | Resolved: 2024-03-21

## 2024-03-21 PROBLEM — M25.369 INSTABILITY OF KNEE JOINT: Status: ACTIVE | Noted: 2023-03-20

## 2024-03-21 PROBLEM — Z97.5 PRESENCE OF INTRAUTERINE CONTRACEPTIVE DEVICE: Status: ACTIVE | Noted: 2024-03-21

## 2024-03-21 PROCEDURE — 88175 CYTOPATH C/V AUTO FLUID REDO: CPT

## 2024-03-21 PROCEDURE — 87624 HPV HI-RISK TYP POOLED RSLT: CPT

## 2024-03-21 PROCEDURE — 1036F TOBACCO NON-USER: CPT | Performed by: OBSTETRICS & GYNECOLOGY

## 2024-03-21 ASSESSMENT — ENCOUNTER SYMPTOMS
CARDIOVASCULAR NEGATIVE: 1
NEUROLOGICAL NEGATIVE: 1
RESPIRATORY NEGATIVE: 1
MUSCULOSKELETAL NEGATIVE: 1
GASTROINTESTINAL NEGATIVE: 1
CONSTITUTIONAL NEGATIVE: 1
PSYCHIATRIC NEGATIVE: 1

## 2024-03-21 NOTE — PROGRESS NOTES
"Subjective   Windy Lovelace is a 33 y.o. female who is here for a routine exam. Patient is amenorrheic on Mirena IUD. No intermenstrual bleeding, spotting, or discharge. Patient is not currently sexually active ( passed 2 years ago).    Current contraception: IUD  History of abnormal Pap smear: yes - LGSIL   Family history of uterine or ovarian cancer: no  Regular self breast exam: no  History of abnormal mammogram: no  Family history of breast cancer: no  History of abnormal lipids: no  Menstrual History:  OB History          0    Para   0    Term   0       0    AB   0    Living   0         SAB   0    IAB   0    Ectopic   0    Multiple   0    Live Births   0                No LMP recorded.       Review of Systems   Constitutional: Negative.    Respiratory: Negative.     Cardiovascular: Negative.    Gastrointestinal: Negative.    Genitourinary: Negative.    Musculoskeletal: Negative.    Neurological: Negative.    Psychiatric/Behavioral: Negative.         Objective   /62   Ht 1.651 m (5' 5\")   Wt 105 kg (232 lb)   BMI 38.61 kg/m²     General:   alert, appears stated age, and cooperative   Heart: regular rate and rhythm, S1, S2 normal, no murmur, click, rub or gallop   Lungs: clear to auscultation bilaterally   Abdomen: soft, non-tender, without masses or organomegaly   Pelvic: Vulva normal in appearance without discoloration, rashes, lesions. Vagina is negative for blood, discharge, lesions. Uterus is small, mobile, non tender. There is no cervical motion tenderness, adnexal masses/tenderness. IUD strings are visualized.     Breast: No masses, skin changes, discoloration, tenderness, or nipple drainage bilaterally     Neck: Normal ROM. Thyroid normal size. No masses/tenderness     Lymph: No LAD   Psych: Normal mood/affect     Assessment/Plan   Problem List Items Addressed This Visit    None  Visit Diagnoses       Well woman exam    -  Primary    Breast and pelvic exam " normal  Precautions given  RTO 1 year RA    Cervical cancer screening        Pap/cotest pending 3/21/24    LGSIL of cervix of undetermined significance        Repeat pap pending 3/21/24    Surveillance of (intrauterine) contraceptive device        IUD in correct position

## 2024-03-21 NOTE — PATIENT INSTRUCTIONS
Routine Gynecologic Visit:  You were seen today for a routine gyn visit with normal findings on exam  You were due for a pap smear today. You should still continue to get annual breast and pelvic exams in the office.  Continue self breast exams/monitoring at home and call for appointment in the office if there are ever masses, skin discoloration, dimpling/puckering of the skin, or nipple drainage when you are not pregnant  Your IUD is in the correct placement without complication. Continue to use condoms with any new partners to protect against STD's and have routine STD screening done at your gynecologic visits if you have had a new partner in the last year or have new symptoms of pelvic pain, discharge, vulvar rashes. STD screening was not done today  If you are having any gynecologic issues in the next year you should call the office. [phone] ([place]) or [phone] ([place])

## 2024-04-02 RX ORDER — CYANOCOBALAMIN 1000 UG/ML
1000 INJECTION, SOLUTION INTRAMUSCULAR; SUBCUTANEOUS ONCE
Status: COMPLETED | OUTPATIENT
Start: 2024-04-02 | End: 2024-01-05

## 2024-04-05 LAB

## 2024-04-22 ENCOUNTER — CLINICAL SUPPORT (OUTPATIENT)
Dept: PRIMARY CARE | Facility: CLINIC | Age: 34
End: 2024-04-22
Payer: MEDICAID

## 2024-04-22 DIAGNOSIS — E53.8 B12 DEFICIENCY: ICD-10-CM

## 2024-04-22 PROCEDURE — 96372 THER/PROPH/DIAG INJ SC/IM: CPT | Performed by: INTERNAL MEDICINE

## 2024-04-22 RX ORDER — CYANOCOBALAMIN 1000 UG/ML
1000 INJECTION, SOLUTION INTRAMUSCULAR; SUBCUTANEOUS ONCE
Status: COMPLETED | OUTPATIENT
Start: 2024-04-22 | End: 2024-04-22

## 2024-04-22 RX ADMIN — CYANOCOBALAMIN 1000 MCG: 1000 INJECTION, SOLUTION INTRAMUSCULAR; SUBCUTANEOUS at 10:53

## 2024-05-07 ENCOUNTER — LAB (OUTPATIENT)
Dept: LAB | Facility: LAB | Age: 34
End: 2024-05-07
Payer: MEDICARE

## 2024-05-07 DIAGNOSIS — K90.821 SHORT BOWEL SYNDROME WITH COLON IN CONTINUITY: ICD-10-CM

## 2024-05-07 DIAGNOSIS — Z86.69 HISTORY OF NEURODEVELOPMENTAL DISORDER: ICD-10-CM

## 2024-05-07 DIAGNOSIS — D50.0 IRON DEFICIENCY ANEMIA DUE TO CHRONIC BLOOD LOSS: ICD-10-CM

## 2024-05-07 DIAGNOSIS — E53.8 B12 DEFICIENCY: ICD-10-CM

## 2024-05-07 DIAGNOSIS — E55.9 VITAMIN D DEFICIENCY: ICD-10-CM

## 2024-05-07 NOTE — PROGRESS NOTES
Orders Placed This Encounter   Procedures    CBC and Auto Differential     Standing Status:   Future     Standing Expiration Date:   5/7/2025     Order Specific Question:   Release result to MyChart     Answer:   Immediate [1]    Comprehensive metabolic panel     Standing Status:   Future     Standing Expiration Date:   5/7/2025     Order Specific Question:   Release result to MyChart     Answer:   Immediate [1]    Lipid panel     Standing Status:   Future     Standing Expiration Date:   5/7/2025     Order Specific Question:   Release result to MyChart     Answer:   Immediate [1]    Tsh With Reflex To Free T4 If Abnormal     Standing Status:   Future     Standing Expiration Date:   5/7/2025     Order Specific Question:   Release result to MyChart     Answer:   Immediate [1]    Vitamin D 25-Hydroxy,Total (for eval of Vitamin D levels)     Standing Status:   Future     Standing Expiration Date:   5/7/2025     Order Specific Question:   Release result to MyChart     Answer:   Immediate [1]    Iron and TIBC     Standing Status:   Future     Standing Expiration Date:   5/7/2025     Order Specific Question:   Release result to MyChart     Answer:   Immediate [1]    Ferritin     Standing Status:   Future     Standing Expiration Date:   5/7/2025     Order Specific Question:   Release result to MyChart     Answer:   Immediate [1]    Vitamin B12     Standing Status:   Future     Standing Expiration Date:   5/7/2025     Order Specific Question:   Release result to MyChart     Answer:   Immediate [1]

## 2024-05-09 ENCOUNTER — LAB (OUTPATIENT)
Dept: LAB | Facility: LAB | Age: 34
End: 2024-05-09
Payer: MEDICAID

## 2024-05-09 ENCOUNTER — APPOINTMENT (OUTPATIENT)
Dept: PRIMARY CARE | Facility: CLINIC | Age: 34
End: 2024-05-09
Payer: MEDICAID

## 2024-05-09 DIAGNOSIS — E53.8 B12 DEFICIENCY: ICD-10-CM

## 2024-05-09 DIAGNOSIS — E55.9 VITAMIN D DEFICIENCY: ICD-10-CM

## 2024-05-09 DIAGNOSIS — D50.0 IRON DEFICIENCY ANEMIA DUE TO CHRONIC BLOOD LOSS: ICD-10-CM

## 2024-05-09 DIAGNOSIS — K90.821 SHORT BOWEL SYNDROME WITH COLON IN CONTINUITY: ICD-10-CM

## 2024-05-09 DIAGNOSIS — Z86.69 HISTORY OF NEURODEVELOPMENTAL DISORDER: ICD-10-CM

## 2024-05-09 LAB
25(OH)D3 SERPL-MCNC: 11 NG/ML (ref 30–100)
ALBUMIN SERPL BCP-MCNC: 4.6 G/DL (ref 3.4–5)
ALP SERPL-CCNC: 95 U/L (ref 33–110)
ALT SERPL W P-5'-P-CCNC: 93 U/L (ref 7–45)
ANION GAP SERPL CALC-SCNC: 15 MMOL/L (ref 10–20)
AST SERPL W P-5'-P-CCNC: 40 U/L (ref 9–39)
BASOPHILS # BLD AUTO: 0.05 X10*3/UL (ref 0–0.1)
BASOPHILS NFR BLD AUTO: 0.7 %
BILIRUB SERPL-MCNC: 0.8 MG/DL (ref 0–1.2)
BUN SERPL-MCNC: 11 MG/DL (ref 6–23)
CALCIUM SERPL-MCNC: 9.1 MG/DL (ref 8.6–10.3)
CHLORIDE SERPL-SCNC: 107 MMOL/L (ref 98–107)
CHOLEST SERPL-MCNC: 152 MG/DL (ref 0–199)
CHOLESTEROL/HDL RATIO: 4.5
CO2 SERPL-SCNC: 23 MMOL/L (ref 21–32)
CREAT SERPL-MCNC: 1.06 MG/DL (ref 0.5–1.05)
EGFRCR SERPLBLD CKD-EPI 2021: 70 ML/MIN/1.73M*2
EOSINOPHIL # BLD AUTO: 0.14 X10*3/UL (ref 0–0.7)
EOSINOPHIL NFR BLD AUTO: 2 %
ERYTHROCYTE [DISTWIDTH] IN BLOOD BY AUTOMATED COUNT: 15.5 % (ref 11.5–14.5)
FERRITIN SERPL-MCNC: 168 NG/ML (ref 8–150)
GLUCOSE SERPL-MCNC: 87 MG/DL (ref 74–99)
HCT VFR BLD AUTO: 47.7 % (ref 36–46)
HDLC SERPL-MCNC: 33.6 MG/DL
HGB BLD-MCNC: 15.3 G/DL (ref 12–16)
IMM GRANULOCYTES # BLD AUTO: 0.01 X10*3/UL (ref 0–0.7)
IMM GRANULOCYTES NFR BLD AUTO: 0.1 % (ref 0–0.9)
IRON SATN MFR SERPL: 20 % (ref 25–45)
IRON SERPL-MCNC: 97 UG/DL (ref 35–150)
LDLC SERPL CALC-MCNC: 83 MG/DL
LYMPHOCYTES # BLD AUTO: 2.17 X10*3/UL (ref 1.2–4.8)
LYMPHOCYTES NFR BLD AUTO: 31.3 %
MCH RBC QN AUTO: 27.3 PG (ref 26–34)
MCHC RBC AUTO-ENTMCNC: 32.1 G/DL (ref 32–36)
MCV RBC AUTO: 85 FL (ref 80–100)
MONOCYTES # BLD AUTO: 0.48 X10*3/UL (ref 0.1–1)
MONOCYTES NFR BLD AUTO: 6.9 %
NEUTROPHILS # BLD AUTO: 4.09 X10*3/UL (ref 1.2–7.7)
NEUTROPHILS NFR BLD AUTO: 59 %
NON HDL CHOLESTEROL: 118 MG/DL (ref 0–149)
NRBC BLD-RTO: 0 /100 WBCS (ref 0–0)
PLATELET # BLD AUTO: 320 X10*3/UL (ref 150–450)
POTASSIUM SERPL-SCNC: 4 MMOL/L (ref 3.5–5.3)
PROT SERPL-MCNC: 7.1 G/DL (ref 6.4–8.2)
RBC # BLD AUTO: 5.61 X10*6/UL (ref 4–5.2)
SODIUM SERPL-SCNC: 141 MMOL/L (ref 136–145)
T4 FREE SERPL-MCNC: 0.99 NG/DL (ref 0.61–1.12)
TIBC SERPL-MCNC: 478 UG/DL (ref 240–445)
TRIGL SERPL-MCNC: 178 MG/DL (ref 0–149)
TSH SERPL-ACNC: 5.14 MIU/L (ref 0.44–3.98)
UIBC SERPL-MCNC: 381 UG/DL (ref 110–370)
VIT B12 SERPL-MCNC: 344 PG/ML (ref 211–911)
VLDL: 36 MG/DL (ref 0–40)
WBC # BLD AUTO: 6.9 X10*3/UL (ref 4.4–11.3)

## 2024-05-09 PROCEDURE — 84443 ASSAY THYROID STIM HORMONE: CPT

## 2024-05-09 PROCEDURE — 84439 ASSAY OF FREE THYROXINE: CPT

## 2024-05-09 PROCEDURE — 80061 LIPID PANEL: CPT

## 2024-05-09 PROCEDURE — 82306 VITAMIN D 25 HYDROXY: CPT

## 2024-05-09 PROCEDURE — 82607 VITAMIN B-12: CPT

## 2024-05-09 PROCEDURE — 82728 ASSAY OF FERRITIN: CPT

## 2024-05-09 PROCEDURE — 36415 COLL VENOUS BLD VENIPUNCTURE: CPT

## 2024-05-09 PROCEDURE — 80053 COMPREHEN METABOLIC PANEL: CPT

## 2024-05-09 PROCEDURE — 83540 ASSAY OF IRON: CPT

## 2024-05-09 PROCEDURE — 83550 IRON BINDING TEST: CPT

## 2024-05-09 PROCEDURE — 85025 COMPLETE CBC W/AUTO DIFF WBC: CPT

## 2024-05-24 ENCOUNTER — OFFICE VISIT (OUTPATIENT)
Dept: PRIMARY CARE | Facility: CLINIC | Age: 34
End: 2024-05-24
Payer: MEDICARE

## 2024-05-24 ENCOUNTER — HOME HEALTH ADMISSION (OUTPATIENT)
Dept: HOME HEALTH SERVICES | Facility: HOME HEALTH | Age: 34
End: 2024-05-24
Payer: MEDICARE

## 2024-05-24 VITALS — WEIGHT: 232 LBS | BODY MASS INDEX: 38.65 KG/M2 | HEIGHT: 65 IN

## 2024-05-24 DIAGNOSIS — I69.30 HISTORY OF STROKE WITH CURRENT RESIDUAL EFFECTS: Primary | ICD-10-CM

## 2024-05-24 DIAGNOSIS — R05.3 CHRONIC COUGH: ICD-10-CM

## 2024-05-24 DIAGNOSIS — R79.89 OTHER SPECIFIED ABNORMAL FINDINGS OF BLOOD CHEMISTRY: ICD-10-CM

## 2024-05-24 DIAGNOSIS — E53.8 B12 DEFICIENCY: ICD-10-CM

## 2024-05-24 DIAGNOSIS — M25.561 PATELLOFEMORAL ARTHRALGIA OF RIGHT KNEE: ICD-10-CM

## 2024-05-24 DIAGNOSIS — E55.9 VITAMIN D DEFICIENCY: ICD-10-CM

## 2024-05-24 DIAGNOSIS — R74.8 ELEVATED LIVER ENZYMES: ICD-10-CM

## 2024-05-24 DIAGNOSIS — E03.8 SUBCLINICAL HYPOTHYROIDISM: ICD-10-CM

## 2024-05-24 PROBLEM — N87.0 CERVICAL INTRAEPITHELIAL NEOPLASIA GRADE 1: Status: ACTIVE | Noted: 2024-05-24

## 2024-05-24 PROCEDURE — 99214 OFFICE O/P EST MOD 30 MIN: CPT | Performed by: INTERNAL MEDICINE

## 2024-05-24 PROCEDURE — 1036F TOBACCO NON-USER: CPT | Performed by: INTERNAL MEDICINE

## 2024-05-24 PROCEDURE — 96372 THER/PROPH/DIAG INJ SC/IM: CPT | Performed by: INTERNAL MEDICINE

## 2024-05-24 RX ORDER — ASPIRIN 81 MG/1
81 TABLET ORAL DAILY
Qty: 30 TABLET | Refills: 5 | Status: SHIPPED | OUTPATIENT
Start: 2024-05-24

## 2024-05-24 RX ORDER — ACETAMINOPHEN 500 MG
2000 TABLET ORAL DAILY
Qty: 30 CAPSULE | Refills: 5 | Status: SHIPPED | OUTPATIENT
Start: 2024-05-24

## 2024-05-24 RX ORDER — FLUTICASONE PROPIONATE AND SALMETEROL 100; 50 UG/1; UG/1
1 POWDER RESPIRATORY (INHALATION) EVERY 12 HOURS
Qty: 60 EACH | Refills: 5 | Status: SHIPPED | OUTPATIENT
Start: 2024-05-24

## 2024-05-24 RX ORDER — CYANOCOBALAMIN 1000 UG/ML
1000 INJECTION, SOLUTION INTRAMUSCULAR; SUBCUTANEOUS ONCE
Status: COMPLETED | OUTPATIENT
Start: 2024-05-24 | End: 2024-05-24

## 2024-05-24 RX ADMIN — CYANOCOBALAMIN 1000 MCG: 1000 INJECTION, SOLUTION INTRAMUSCULAR; SUBCUTANEOUS at 11:43

## 2024-05-24 ASSESSMENT — PATIENT HEALTH QUESTIONNAIRE - PHQ9
2. FEELING DOWN, DEPRESSED OR HOPELESS: NOT AT ALL
1. LITTLE INTEREST OR PLEASURE IN DOING THINGS: NOT AT ALL
SUM OF ALL RESPONSES TO PHQ9 QUESTIONS 1 AND 2: 0

## 2024-05-24 NOTE — PROGRESS NOTES
"Subjective   Patient ID: Windy Lovelace is a 33 y.o. female who presents for Med Refill.    Has not been on the questran, only having diarrhea if drinks milk    She cannot open the back door to the patio, due to handle is too hard to open to her patio. It is a slider and the door is too hard to open. The door is heavy for her. Her sister is going to get the whole door changed out.   Just needs someone to see that she cannot get the door open   She goes out to her appt.   The staff does her groceries  Leaves the house for counseling and doctors appt, hair appts and music lessons and getting her nails done    Is now in a new place for her, has been there for 1 1/2 years  Her shower is a flat bed shower and has railings, but she cannot hold on the rails when she gets dizzy, needs a shower bench.         Med Refill         Review of Systems    Objective   Ht 1.651 m (5' 5\")   Wt 105 kg (232 lb)   BMI 38.61 kg/m²     Physical Exam  Constitutional:       Appearance: Normal appearance.   Neck:      Vascular: No carotid bruit.   Cardiovascular:      Rate and Rhythm: Normal rate and regular rhythm.      Heart sounds: No murmur heard.  Pulmonary:      Effort: Pulmonary effort is normal.      Breath sounds: Normal breath sounds.   Musculoskeletal:      Comments: Atrophy and spasticity noted in the right arm and forearm   Lymphadenopathy:      Cervical: No cervical adenopathy.   Neurological:      Mental Status: She is alert.         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  Continue monthly b12 shots  Recheck levels and appt in " 6 months    Vitamin d deficiency, levels still low- has not been taking, restart vitamin d3 2000units daily  Recheck level in 6months    Elevated liver functions,   Hepatitis studies negative for viral hepatitis   Will check celiac profile and other testing in 6 months  Ct shows no liver lesions, just fatty liver, work on weight loss  Does not drink alcohol    Restart aspirin 81mg daily    Recheck in 6 months, get labs 2-3 days prior to appt, so they can be reviewed     Chronic cough, was better when on advair, likely cough variant asthma, resume advair 1 puff twice daily

## 2024-05-24 NOTE — PROGRESS NOTES
"Subjective   Patient ID: Windy Lovelace is a 33 y.o. female who presents for Med Refill.    HPI     Review of Systems    Objective   Ht 1.651 m (5' 5\")   Wt 105 kg (232 lb)   BMI 38.61 kg/m²     Physical Exam    Assessment/Plan          "

## 2024-05-24 NOTE — PATIENT INSTRUCTIONS
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  Continue monthly b12 shots  Recheck levels and appt in 6 months    Vitamin d deficiency, levels still low- has not been taking, restart vitamin d3 2000units daily  Recheck level in 6months    Elevated liver functions,   Hepatitis studies negative for viral hepatitis   Will check celiac profile and other testing in 6 months  Ct shows no liver lesions, just fatty liver, work on weight loss  Does not drink alcohol    Restart aspirin 81mg daily    Recheck in 6 months, get labs 2-3 days prior to appt, so they can be reviewed

## 2024-05-25 ENCOUNTER — DOCUMENTATION (OUTPATIENT)
Dept: HOME HEALTH SERVICES | Facility: HOME HEALTH | Age: 34
End: 2024-05-25
Payer: MEDICARE

## 2024-05-25 NOTE — HH CARE COORDINATION
Home Care received a Referral for Physical Therapy and Occupational Therapy. We have processed the referral for a Start of Care on 24-48 HOURS .     If you have any questions or concerns, please feel free to contact us at [phone]. Follow the prompts, enter your five digit zip code, and you will be directed to your care team on [facility].

## 2024-05-29 ENCOUNTER — HOME CARE VISIT (OUTPATIENT)
Dept: HOME HEALTH SERVICES | Facility: HOME HEALTH | Age: 34
End: 2024-05-29
Payer: MEDICARE

## 2024-05-29 VITALS
DIASTOLIC BLOOD PRESSURE: 80 MMHG | HEART RATE: 72 BPM | SYSTOLIC BLOOD PRESSURE: 120 MMHG | TEMPERATURE: 98.8 F | OXYGEN SATURATION: 97 %

## 2024-05-29 PROCEDURE — 1090000002 HH PPS REVENUE DEBIT

## 2024-05-29 PROCEDURE — 1090000001 HH PPS REVENUE CREDIT

## 2024-05-29 PROCEDURE — 169592 NO-PAY CLAIM PROCEDURE

## 2024-05-29 PROCEDURE — G0151 HHCP-SERV OF PT,EA 15 MIN: HCPCS | Mod: HHH

## 2024-05-29 PROCEDURE — 0023 HH SOC

## 2024-05-29 SDOH — HEALTH STABILITY: PHYSICAL HEALTH: EXERCISE COMMENTS: INSTRUCTED IN QUAD SETS, SAQ FOR RIGHT KNEE 10 REPS 2X/DAY

## 2024-05-29 ASSESSMENT — ACTIVITIES OF DAILY LIVING (ADL)
AMBULATION ASSISTANCE: INDEPENDENT
PHYSICAL TRANSFERS ASSESSED: 1
CURRENT_FUNCTION: INDEPENDENT
AMBULATION ASSISTANCE ON FLAT SURFACES: 1
ENTERING_EXITING_HOME: SUPERVISION
AMBULATION ASSISTANCE: 1
AMBULATION_DISTANCE/DURATION_TOLERATED: HOUSHOLD

## 2024-05-29 ASSESSMENT — ENCOUNTER SYMPTOMS
HIGHEST PAIN SEVERITY IN PAST 24 HOURS: 5/10
MUSCLE WEAKNESS: 1
LOWEST PAIN SEVERITY IN PAST 24 HOURS: 0/10
PERSON REPORTING PAIN: PATIENT
OCCASIONAL FEELINGS OF UNSTEADINESS: 0
PAIN LOCATION: RIGHT HAND
PAIN: 1

## 2024-05-29 NOTE — HOME HEALTH
Patient is a 33 yr old who saw her Dr for check up and blood work per patient. Home PT/OT was ordered due to patient unable to open sliding door. Patient also complains of right wrist and knee pain. Patient states she cant hold her phone long because wrist begins to hurt. Patient is wearing a right knee brace and right wrist brace. PMH early life brain injury, patellofemoral arthralgia, poor vision, small bowel obstruction surgery approx 5 yrs ago, injury to right knee 10 yrs ago. PFS Patient lives in [facility] alone however has help from [facility] 8am - 4pm caregiver at home,  4 pm - 10 pm remote monitoring, 10pm to 8 am caregiver at home. Patient moved into this home about a 1.5 yrs ago and is unable to open patio door. Patient has case manage through [facility], Daja Stone [phone], ext [phone] in which she is aware of the need for different door. Aracelis, sister, states Daja needs evaluation stating why she needs a different door. PT 1w2 for HEP for right knee. OT to assess wrist and door.

## 2024-05-29 NOTE — Clinical Note
Morgan Zaidi    I saw patient today for homecare. Can you verify some meds. Sister stated patient is not taking Aspirin and what is correct TRazodone mg 50 or 150. Thank you

## 2024-05-30 ENCOUNTER — HOME CARE VISIT (OUTPATIENT)
Dept: HOME HEALTH SERVICES | Facility: HOME HEALTH | Age: 34
End: 2024-05-30
Payer: MEDICARE

## 2024-05-30 VITALS
HEART RATE: 63 BPM | SYSTOLIC BLOOD PRESSURE: 120 MMHG | TEMPERATURE: 97.1 F | OXYGEN SATURATION: 92 % | DIASTOLIC BLOOD PRESSURE: 64 MMHG

## 2024-05-30 PROCEDURE — G0152 HHCP-SERV OF OT,EA 15 MIN: HCPCS | Mod: HHH

## 2024-05-30 PROCEDURE — 1090000002 HH PPS REVENUE DEBIT

## 2024-05-30 PROCEDURE — 1090000001 HH PPS REVENUE CREDIT

## 2024-05-30 ASSESSMENT — ACTIVITIES OF DAILY LIVING (ADL)
PREPARING MEALS: NEEDS ASSISTANCE
BATHING_CURRENT_FUNCTION: STAND BY ASSIST
DRESSING_LB_CURRENT_FUNCTION: STAND BY ASSIST
CURRENT_FUNCTION: INDEPENDENT
TOILETING: 1
TOILETING: INDEPENDENT
PHYSICAL TRANSFERS ASSESSED: 1
BATHING ASSESSED: 1
DRESSING_UB_CURRENT_FUNCTION: STAND BY ASSIST

## 2024-05-30 ASSESSMENT — ENCOUNTER SYMPTOMS
PAIN LOCATION - PAIN SEVERITY: 5/10
PERSON REPORTING PAIN: PATIENT
PAIN LOCATION - PAIN QUALITY: ACHE
PAIN LOCATION - RELIEVING FACTORS: POSITIONING
PAIN LOCATION - PAIN DURATION: WITH ACTIVITY
PAIN: 1
PAIN LOCATION - PAIN FREQUENCY: WITH ACTIVITY
PAIN LOCATION: RIGHT HAND

## 2024-05-31 ENCOUNTER — HOME CARE VISIT (OUTPATIENT)
Dept: HOME HEALTH SERVICES | Facility: HOME HEALTH | Age: 34
End: 2024-05-31
Payer: MEDICARE

## 2024-05-31 PROCEDURE — 1090000002 HH PPS REVENUE DEBIT

## 2024-05-31 PROCEDURE — 1090000001 HH PPS REVENUE CREDIT

## 2024-06-01 PROCEDURE — 1090000001 HH PPS REVENUE CREDIT

## 2024-06-01 PROCEDURE — 1090000002 HH PPS REVENUE DEBIT

## 2024-06-02 PROCEDURE — 1090000001 HH PPS REVENUE CREDIT

## 2024-06-02 PROCEDURE — 1090000002 HH PPS REVENUE DEBIT

## 2024-06-03 PROCEDURE — 1090000001 HH PPS REVENUE CREDIT

## 2024-06-03 PROCEDURE — 1090000002 HH PPS REVENUE DEBIT

## 2024-06-03 ASSESSMENT — ACTIVITIES OF DAILY LIVING (ADL): OASIS_M1830: 03

## 2024-06-04 PROCEDURE — 1090000002 HH PPS REVENUE DEBIT

## 2024-06-04 PROCEDURE — 1090000001 HH PPS REVENUE CREDIT

## 2024-06-05 ENCOUNTER — APPOINTMENT (OUTPATIENT)
Dept: RADIOLOGY | Facility: HOSPITAL | Age: 34
End: 2024-06-05
Payer: MEDICARE

## 2024-06-05 ENCOUNTER — APPOINTMENT (OUTPATIENT)
Dept: CARDIOLOGY | Facility: HOSPITAL | Age: 34
End: 2024-06-05
Payer: MEDICARE

## 2024-06-05 ENCOUNTER — HOSPITAL ENCOUNTER (EMERGENCY)
Facility: HOSPITAL | Age: 34
Discharge: HOME | End: 2024-06-05
Attending: INTERNAL MEDICINE
Payer: MEDICARE

## 2024-06-05 VITALS
HEIGHT: 67 IN | SYSTOLIC BLOOD PRESSURE: 102 MMHG | DIASTOLIC BLOOD PRESSURE: 90 MMHG | WEIGHT: 232 LBS | TEMPERATURE: 97.6 F | RESPIRATION RATE: 17 BRPM | OXYGEN SATURATION: 98 % | BODY MASS INDEX: 36.41 KG/M2 | HEART RATE: 81 BPM

## 2024-06-05 DIAGNOSIS — R10.9 ABDOMINAL PAIN, UNSPECIFIED ABDOMINAL LOCATION: Primary | ICD-10-CM

## 2024-06-05 LAB
ABO GROUP (TYPE) IN BLOOD: NORMAL
ALBUMIN SERPL BCP-MCNC: 4.5 G/DL (ref 3.4–5)
ALP SERPL-CCNC: 106 U/L (ref 33–110)
ALT SERPL W P-5'-P-CCNC: 119 U/L (ref 7–45)
ANION GAP SERPL CALC-SCNC: 14 MMOL/L (ref 10–20)
ANTIBODY SCREEN: NORMAL
AST SERPL W P-5'-P-CCNC: 65 U/L (ref 9–39)
ATRIAL RATE: 64 BPM
B-HCG SERPL-ACNC: <2 MIU/ML
BASOPHILS # BLD AUTO: 0.05 X10*3/UL (ref 0–0.1)
BASOPHILS NFR BLD AUTO: 0.5 %
BILIRUB SERPL-MCNC: 0.8 MG/DL (ref 0–1.2)
BUN SERPL-MCNC: 9 MG/DL (ref 6–23)
CALCIUM SERPL-MCNC: 9.3 MG/DL (ref 8.6–10.3)
CHLORIDE SERPL-SCNC: 107 MMOL/L (ref 98–107)
CO2 SERPL-SCNC: 19 MMOL/L (ref 21–32)
CREAT SERPL-MCNC: 0.97 MG/DL (ref 0.5–1.05)
EGFRCR SERPLBLD CKD-EPI 2021: 78 ML/MIN/1.73M*2
EOSINOPHIL # BLD AUTO: 0.12 X10*3/UL (ref 0–0.7)
EOSINOPHIL NFR BLD AUTO: 1.3 %
ERYTHROCYTE [DISTWIDTH] IN BLOOD BY AUTOMATED COUNT: 15.1 % (ref 11.5–14.5)
GLUCOSE SERPL-MCNC: 110 MG/DL (ref 74–99)
HCT VFR BLD AUTO: 48.6 % (ref 36–46)
HGB BLD-MCNC: 16.1 G/DL (ref 12–16)
IMM GRANULOCYTES # BLD AUTO: 0.02 X10*3/UL (ref 0–0.7)
IMM GRANULOCYTES NFR BLD AUTO: 0.2 % (ref 0–0.9)
LACTATE SERPL-SCNC: 1.7 MMOL/L (ref 0.4–2)
LIPASE SERPL-CCNC: 13 U/L (ref 9–82)
LYMPHOCYTES # BLD AUTO: 1.91 X10*3/UL (ref 1.2–4.8)
LYMPHOCYTES NFR BLD AUTO: 20.7 %
MCH RBC QN AUTO: 27.9 PG (ref 26–34)
MCHC RBC AUTO-ENTMCNC: 33.1 G/DL (ref 32–36)
MCV RBC AUTO: 84 FL (ref 80–100)
MONOCYTES # BLD AUTO: 0.36 X10*3/UL (ref 0.1–1)
MONOCYTES NFR BLD AUTO: 3.9 %
NEUTROPHILS # BLD AUTO: 6.75 X10*3/UL (ref 1.2–7.7)
NEUTROPHILS NFR BLD AUTO: 73.4 %
NRBC BLD-RTO: 0 /100 WBCS (ref 0–0)
P OFFSET: 205 MS
P ONSET: 145 MS
PLATELET # BLD AUTO: 307 X10*3/UL (ref 150–450)
POTASSIUM SERPL-SCNC: 3.9 MMOL/L (ref 3.5–5.3)
PR INTERVAL: 148 MS
PROT SERPL-MCNC: 7.8 G/DL (ref 6.4–8.2)
Q ONSET: 219 MS
QRS COUNT: 11 BEATS
QRS DURATION: 86 MS
QT INTERVAL: 408 MS
QTC CALCULATION(BAZETT): 420 MS
QTC FREDERICIA: 416 MS
R AXIS: 52 DEGREES
RBC # BLD AUTO: 5.78 X10*6/UL (ref 4–5.2)
RH FACTOR (ANTIGEN D): NORMAL
SODIUM SERPL-SCNC: 136 MMOL/L (ref 136–145)
T AXIS: 25 DEGREES
T OFFSET: 423 MS
VENTRICULAR RATE: 64 BPM
WBC # BLD AUTO: 9.2 X10*3/UL (ref 4.4–11.3)

## 2024-06-05 PROCEDURE — 93005 ELECTROCARDIOGRAM TRACING: CPT

## 2024-06-05 PROCEDURE — 36415 COLL VENOUS BLD VENIPUNCTURE: CPT | Performed by: PHYSICIAN ASSISTANT

## 2024-06-05 PROCEDURE — 74177 CT ABD & PELVIS W/CONTRAST: CPT

## 2024-06-05 PROCEDURE — 1090000002 HH PPS REVENUE DEBIT

## 2024-06-05 PROCEDURE — 1090000001 HH PPS REVENUE CREDIT

## 2024-06-05 PROCEDURE — 96374 THER/PROPH/DIAG INJ IV PUSH: CPT | Mod: 59

## 2024-06-05 PROCEDURE — 83690 ASSAY OF LIPASE: CPT | Performed by: INTERNAL MEDICINE

## 2024-06-05 PROCEDURE — 96375 TX/PRO/DX INJ NEW DRUG ADDON: CPT

## 2024-06-05 PROCEDURE — 2550000001 HC RX 255 CONTRASTS: Performed by: INTERNAL MEDICINE

## 2024-06-05 PROCEDURE — 99285 EMERGENCY DEPT VISIT HI MDM: CPT | Mod: 25

## 2024-06-05 PROCEDURE — 80053 COMPREHEN METABOLIC PANEL: CPT | Performed by: INTERNAL MEDICINE

## 2024-06-05 PROCEDURE — 83605 ASSAY OF LACTIC ACID: CPT | Performed by: INTERNAL MEDICINE

## 2024-06-05 PROCEDURE — 85025 COMPLETE CBC W/AUTO DIFF WBC: CPT | Performed by: INTERNAL MEDICINE

## 2024-06-05 PROCEDURE — 84702 CHORIONIC GONADOTROPIN TEST: CPT | Performed by: INTERNAL MEDICINE

## 2024-06-05 PROCEDURE — 86901 BLOOD TYPING SEROLOGIC RH(D): CPT | Performed by: INTERNAL MEDICINE

## 2024-06-05 PROCEDURE — 74177 CT ABD & PELVIS W/CONTRAST: CPT | Performed by: RADIOLOGY

## 2024-06-05 PROCEDURE — 2500000004 HC RX 250 GENERAL PHARMACY W/ HCPCS (ALT 636 FOR OP/ED): Performed by: INTERNAL MEDICINE

## 2024-06-05 RX ORDER — FAMOTIDINE 10 MG/ML
20 INJECTION INTRAVENOUS ONCE
Status: COMPLETED | OUTPATIENT
Start: 2024-06-05 | End: 2024-06-05

## 2024-06-05 RX ORDER — MORPHINE SULFATE 4 MG/ML
4 INJECTION, SOLUTION INTRAMUSCULAR; INTRAVENOUS ONCE
Status: COMPLETED | OUTPATIENT
Start: 2024-06-05 | End: 2024-06-05

## 2024-06-05 RX ORDER — OMEPRAZOLE 20 MG/1
20 CAPSULE, DELAYED RELEASE ORAL DAILY
Qty: 30 CAPSULE | Refills: 0 | Status: SHIPPED | OUTPATIENT
Start: 2024-06-05 | End: 2024-06-12 | Stop reason: SDUPTHER

## 2024-06-05 RX ORDER — ONDANSETRON 4 MG/1
4 TABLET, ORALLY DISINTEGRATING ORAL EVERY 8 HOURS PRN
Qty: 15 TABLET | Refills: 0 | Status: SHIPPED | OUTPATIENT
Start: 2024-06-05

## 2024-06-05 RX ORDER — ONDANSETRON HYDROCHLORIDE 2 MG/ML
4 INJECTION, SOLUTION INTRAVENOUS ONCE
Status: COMPLETED | OUTPATIENT
Start: 2024-06-05 | End: 2024-06-05

## 2024-06-05 RX ADMIN — IOHEXOL 75 ML: 350 INJECTION, SOLUTION INTRAVENOUS at 15:14

## 2024-06-05 RX ADMIN — SODIUM CHLORIDE 1000 ML: 9 INJECTION, SOLUTION INTRAVENOUS at 12:12

## 2024-06-05 RX ADMIN — ONDANSETRON 4 MG: 2 INJECTION INTRAMUSCULAR; INTRAVENOUS at 12:13

## 2024-06-05 RX ADMIN — MORPHINE SULFATE 4 MG: 4 INJECTION, SOLUTION INTRAMUSCULAR; INTRAVENOUS at 12:12

## 2024-06-05 RX ADMIN — FAMOTIDINE 20 MG: 10 INJECTION, SOLUTION INTRAVENOUS at 12:13

## 2024-06-05 ASSESSMENT — PAIN SCALES - GENERAL
PAINLEVEL_OUTOF10: 5 - MODERATE PAIN
PAINLEVEL_OUTOF10: 9
PAINLEVEL_OUTOF10: 4

## 2024-06-05 ASSESSMENT — PAIN DESCRIPTION - DESCRIPTORS
DESCRIPTORS: ACHING
DESCRIPTORS: ACHING
DESCRIPTORS: ACHING;THROBBING;CRAMPING

## 2024-06-05 ASSESSMENT — PAIN DESCRIPTION - LOCATION
LOCATION: ABDOMEN

## 2024-06-05 ASSESSMENT — COLUMBIA-SUICIDE SEVERITY RATING SCALE - C-SSRS
2. HAVE YOU ACTUALLY HAD ANY THOUGHTS OF KILLING YOURSELF?: NO
6. HAVE YOU EVER DONE ANYTHING, STARTED TO DO ANYTHING, OR PREPARED TO DO ANYTHING TO END YOUR LIFE?: NO
1. IN THE PAST MONTH, HAVE YOU WISHED YOU WERE DEAD OR WISHED YOU COULD GO TO SLEEP AND NOT WAKE UP?: NO

## 2024-06-05 ASSESSMENT — PAIN - FUNCTIONAL ASSESSMENT
PAIN_FUNCTIONAL_ASSESSMENT: 0-10
PAIN_FUNCTIONAL_ASSESSMENT: 0-10

## 2024-06-05 ASSESSMENT — PAIN DESCRIPTION - PROGRESSION: CLINICAL_PROGRESSION: GRADUALLY IMPROVING

## 2024-06-05 NOTE — DISCHARGE INSTRUCTIONS
You were seen today for abdominal pain and vomiting.  Your evaluation was not concerning for an emergency at this time.  You should start taking a daily antacid.  We gave you an as needed nausea medicine.  Follow-up with GI if symptoms persist.  Please see the attached information sheet for information about your condition, how to care for your condition at home, and reasons to return to the emergency department. Take any prescriptions written today as prescribed. You should call your primary care provider within 24 hours to tell them about today's visit, including any new medications or medication changes, as he or she may want to see you in the office for further evaluation. If you do not have a primary care provider, call  [phone] for an appointment. We offer in-person office visits as well as virtual options. Please do not hesitate to call  515 or return to the emergency department with any new or unresolved concerns or symptoms. Thank you for choosing [facility] for your care.

## 2024-06-05 NOTE — ED PROVIDER NOTES
HPI     CC: Abdominal Pain     HPI: Windy Lovelace is a 33 y.o. 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  Symptoms started 3 days ago.  She has not vomited.  Last bowel movement was yesterday.  She denies urinary symptoms, fever, chest pain, or shortness of breath.    ROS: 10-point review of systems was performed and is otherwise negative except as noted in HPI.    Limitations to history: N/A    Independent Historians: N/A    External Records Reviewed: Multiple prior outside hospital notes    Past Medical History: Noncontributory except per HPI     Past Surgical History: Noncontributory except per HPI     Family History: Reviewed and noncontributory     Social History:  Denies tobacco. Denies ETOH. Denies illicit drugs.    Social Determinants Affecting Care: N/A    Allergies   Allergen Reactions    Bupropion Unknown and Hives    Ketamine Unknown, Anaphylaxis and Rash    Augmentin [Amoxicillin-Pot Clavulanate] Hives    Penicillin G Hives    Penicillins Unknown and Hives    Wellbutrin [Bupropion Hcl] Unknown       Home Meds:   Current Outpatient Medications   Medication Instructions    aspirin 81 mg, oral, Daily    cholecalciferol (VITAMIN D-3) 50 mcg, oral, Daily    fluticasone (Flonase) 50 mcg/actuation nasal spray USE 2 SPRAYS IN EACH NOSTRIL ONCE DAILY AS NEEDED FOR CONGESTION    fluticasone propion-salmeteroL (Advair Diskus) 100-50 mcg/dose diskus inhaler 1 puff, inhalation, Every 12 hours, Rinse mouth with water after use to reduce aftertaste and incidence of candidiasis. Do not swallow.    ibuprofen 800 mg tablet 1 tablet, oral, 3 times daily PRN     "ketoconazole (NIZOral) 2 % cream Topical, 2 times daily, Apply sparingly to affected areas twice daily (perirectal area)    leg brace (Knee Support Brace) misc 1 Units, miscellaneous, Daily    levonorgestrel (Mirena) 21 mcg/24 hours (8 yrs) 52 mg IUD Mirena (52 MG) 20 MCG/24HR IUD   Refills: 0       Active    omeprazole (PRILOSEC) 20 mg, oral, Daily, Do not crush or chew.    ondansetron ODT (ZOFRAN-ODT) 4 mg, oral, Every 8 hours PRN    prazosin (MINIPRESS) 3 mg, oral    sertraline (ZOLOFT) 50 mg, oral, Daily    traZODone (Desyrel) 150 mg tablet 1 tablet, oral, Daily, As directed        Physical Exam     ED Triage Vitals [06/05/24 1104]   Temperature Heart Rate Respirations BP   36.4 °C (97.6 °F) 88 18 109/65      Pulse Ox Temp src Heart Rate Source Patient Position   99 % -- -- --      BP Location FiO2 (%)     -- --         Heart Rate:  [66-88]   Temperature:  [36.4 °C (97.6 °F)]   Respirations:  [18]   BP: ()/(65-90)   Height:  [170 cm (5' 6.93\")]   Weight:  [105 kg (232 lb)]   Pulse Ox:  [96 %-99 %]      Physical Exam  Vitals and nursing note reviewed.     CONSTITUTIONAL: Well appearing, well nourished, in no acute distress.   HENMT: Head atraumatic. Airway patent. Nasal mucosa clear. Mouth with normal mucosa, clear oropharynx. Uvula midline. Neck supple.    EYES: Clear bilaterally, pupils equally round and reactive to light.   CARDIOVASCULAR: Normal rate, regular rhythm.  Heart sounds S1, S2.  No murmurs, rubs or gallops. Normal pulses. Capillary refill < 2 sec.   RESPIRATORY: No increased work of breathing. Breath sounds clear and equal bilaterally.  GASTROINTESTINAL: Abdomen soft, non-distended, TTP RUQ and epigastrium, no guarding or rebound. Normal bowel sounds. No palpable masses.  GENITOURINARY:  No CVA tenderness.  MUSCULOSKELETAL: Spine appears normal, range of motion is not limited, no muscle or joint tenderness. No edema.   NEUROLOGICAL: Alert and oriented, no asymmetry, moving all extremities " equally.  SKIN: Warm, dry and intact. No rash or notable lesions.  PSYCHIATRIC: Normal mood and affect.  HEME/LYMPH: No adenopathy or splenomegaly.    Diagnostic Results      ECG: ECGs read and interpreted by me. See ED Course, below, for interpretation.    Labs Reviewed   CBC WITH AUTO DIFFERENTIAL - Abnormal       Result Value    WBC 9.2      nRBC 0.0      RBC 5.78 (*)     Hemoglobin 16.1 (*)     Hematocrit 48.6 (*)     MCV 84      MCH 27.9      MCHC 33.1      RDW 15.1 (*)     Platelets 307      Neutrophils % 73.4      Immature Granulocytes %, Automated 0.2      Lymphocytes % 20.7      Monocytes % 3.9      Eosinophils % 1.3      Basophils % 0.5      Neutrophils Absolute 6.75      Immature Granulocytes Absolute, Automated 0.02      Lymphocytes Absolute 1.91      Monocytes Absolute 0.36      Eosinophils Absolute 0.12      Basophils Absolute 0.05     COMPREHENSIVE METABOLIC PANEL - Abnormal    Glucose 110 (*)     Sodium 136      Potassium 3.9      Chloride 107      Bicarbonate 19 (*)     Anion Gap 14      Urea Nitrogen 9      Creatinine 0.97      eGFR 78      Calcium 9.3      Albumin 4.5      Alkaline Phosphatase 106      Total Protein 7.8      AST 65 (*)     Bilirubin, Total 0.8       (*)    LIPASE - Normal    Lipase 13      Narrative:     Venipuncture immediately after or during the administration of Metamizole may lead to falsely low results. Testing should be performed immediately prior to Metamizole dosing.   LACTATE - Normal    Lactate 1.7      Narrative:     Venipuncture immediately after or during the administration of Metamizole may lead to falsely low results. Testing should be performed immediately  prior to Metamizole dosing.   TYPE AND SCREEN    ABO TYPE A      Rh TYPE NEG      ANTIBODY SCREEN NEG      Narrative:     Review your Rh Negative female patient's potential need for Rh Immune Globulin (RhIg)administration.   URINALYSIS WITH REFLEX MICROSCOPIC         CT abdomen pelvis w IV contrast    Final Result   1. Postoperative changes related to prior bowel surgery. Correlation   with surgical history recommended.   2. No CT evidence for acute intra-abdominal or intrapelvic   abnormalities are identified. No CT findings to explain patient's   symptoms.   3. Additional detailed findings as above.             Signed by: Demi Wayne 6/5/2024 3:50 PM   Dictation workstation:   [id number]                    Migue Coma Scale Score: 15                  Procedure  Procedures    ED Course & MDM   Assessment/Plan:   Windy Lovelace is a 33 y.o. 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  She is hemodynamically stable and well-appearing with tenderness in the right upper quadrant and epigastrium raising question of cholecystitis, gastritis, bowel obstruction, or other occult intra-abdominal process.  Workup was initiated with ECG, labs, CTAP.  Treatment was initiated with IV Zofran, fluids, Pepcid, and morphine. See below for details of ED course and ultimate disposition.    Medications   sodium chloride 0.9 % bolus 1,000 mL (0 mL intravenous Stopped 6/5/24 1242)   famotidine PF (Pepcid) injection 20 mg (20 mg intravenous Given 6/5/24 1213)   morphine injection 4 mg (4 mg intravenous Given 6/5/24 1212)   ondansetron (Zofran) injection 4 mg (4 mg intravenous Given 6/5/24 1213)   iohexol (OMNIPaque) 350 mg iodine/mL solution 75 mL (75 mL intravenous Given 6/5/24 1514)        ED Course as of 06/05/24 1627   Wed Jun 05, 2024   1306 ECG read interpreted by me.  Sinus rhythm with PACs, rate 64.  Normal axis.  Normal intervals.  No ST or T wave derangements. [CG]   1306 Labs are notable for CBC without leukocytosis, mildly elevated hemoglobin 16.1, normal platelets, CMP with low bicarb of 19 but normal anion gap and  normal lactate 1.7, mildly elevated AST 65 and , normal lipase. [CG]   1556 CTAP no acute process.  [CG]   1626 Patient reevaluated.  She is sitting up in bed eating a turkey sandwich.  She states that she feels better.  She was discharged with a prescription for an antacid and Zofran.  She was given a referral for gastroenterology.  Patient was discharged home with anticipatory guidance and strict return precautions. [CG]      ED Course User Index  [CG] Kaye Carter MD         Diagnoses as of 06/05/24 1627   Abdominal pain, unspecified abdominal location       Disposition:   DISCHARGE.  The patient was discharged with both verbal and written anticipatory guidance and strict return precautions. Discharge diagnosis, instructions and plan were discussed and understood. I emphasized the importance of following up with their primary care provider within 24-48 hours and with any referrals in the timeframe recommended. At the time of discharge the patient was comfortable and was in no apparent distress. Patient is aware of diagnostic uncertainty and was notified though testing is negative here, there is a very small chance that pathology may be missed.  The patient understands these risks and the patient/family understood to call 911 or return immediately to the emergency department if the symptoms worsen or if they have any additional concerns.      FOLLOW UP  Primary care provider in 1-2 days.      ED Prescriptions       Medication Sig Dispense Start Date End Date Auth. Provider    omeprazole (PriLOSEC) 20 mg DR capsule Take 1 capsule (20 mg) by mouth once daily. Do not crush or chew. 30 capsule 6/5/2024 7/5/2024 Kaye Carter MD    ondansetron ODT (Zofran-ODT) 4 mg disintegrating tablet Take 1 tablet (4 mg) by mouth every 8 hours if needed for nausea or vomiting. 15 tablet 6/5/2024 -- MD Kaye Dyson MD  EM/IM/Peds    This note was dictated by speech recognition.  Minor errors in transcription may be present.     Kaye Carter MD  06/05/24 5334

## 2024-06-05 NOTE — ED TRIAGE NOTES
TRIAGE NOTE   I saw the patient as the Clinician in Triage and performed a brief history and physical exam, established acuity, and ordered appropriate tests to develop basic plan of care. Patient will be seen by an SONNY, resident and/or physician who will independently evaluate the patient. Please see subsequent provider notes for further details and disposition.     Brief HPI: In brief, Windy Lovelace is a 33 y.o. female that presents for abdominal pain and nausea that started Saturday.  Patient is concerned because she has had a bowel obstructions in the past that required surgery.  She reports she has not vomited but has continued to have nausea and severe abdominal pain.  Had a bowel movement yesterday which was normal.  Reports no fevers, chills, or urinary symptoms.    Past medical history includes intractable vomiting, short-bowel syndrome with colon incontinuity, history of ileostomy, depression, anxiety, history of stroke with right-sided deficits, moyamoya disease, intellectual disability    Focused Physical exam:   Diffusely tender abdomen without rebound tenderness or guarding.    Plan/MDM:   Will obtain labs and CT scan.    Please see subsequent provider note for further details and disposition     Michelle Cameron PA-C

## 2024-06-06 ENCOUNTER — HOME CARE VISIT (OUTPATIENT)
Dept: HOME HEALTH SERVICES | Facility: HOME HEALTH | Age: 34
End: 2024-06-06
Payer: MEDICARE

## 2024-06-06 PROCEDURE — 1090000002 HH PPS REVENUE DEBIT

## 2024-06-06 PROCEDURE — G0152 HHCP-SERV OF OT,EA 15 MIN: HCPCS | Mod: HHH

## 2024-06-06 PROCEDURE — 1090000001 HH PPS REVENUE CREDIT

## 2024-06-07 ENCOUNTER — HOME CARE VISIT (OUTPATIENT)
Dept: HOME HEALTH SERVICES | Facility: HOME HEALTH | Age: 34
End: 2024-06-07
Payer: MEDICARE

## 2024-06-07 VITALS
RESPIRATION RATE: 16 BRPM | TEMPERATURE: 98 F | HEART RATE: 66 BPM | OXYGEN SATURATION: 95 % | SYSTOLIC BLOOD PRESSURE: 110 MMHG | DIASTOLIC BLOOD PRESSURE: 70 MMHG

## 2024-06-07 PROCEDURE — 1090000002 HH PPS REVENUE DEBIT

## 2024-06-07 PROCEDURE — 1090000001 HH PPS REVENUE CREDIT

## 2024-06-07 PROCEDURE — G0151 HHCP-SERV OF PT,EA 15 MIN: HCPCS | Mod: HHH

## 2024-06-07 SDOH — HEALTH STABILITY: PHYSICAL HEALTH
EXERCISE COMMENTS: SUPINE ANKLE PUMPS, QUAD SETS, HEEL SLIDES, HIP ABDUCTION 5 TO 10 REPS  SITTING LAQ, HIP FLEXION 10 REPS

## 2024-06-07 ASSESSMENT — ENCOUNTER SYMPTOMS
PERSON REPORTING PAIN: PATIENT
DENIES PAIN: 1

## 2024-06-07 ASSESSMENT — ACTIVITIES OF DAILY LIVING (ADL)
AMBULATION_DISTANCE/DURATION_TOLERATED: HOUSEHOLD DISTANCES
AMBULATION ASSISTANCE: 1
AMBULATION ASSISTANCE ON FLAT SURFACES: 1
AMBULATION ASSISTANCE: INDEPENDENT

## 2024-06-08 ENCOUNTER — HOME CARE VISIT (OUTPATIENT)
Dept: HOME HEALTH SERVICES | Facility: HOME HEALTH | Age: 34
End: 2024-06-08
Payer: MEDICARE

## 2024-06-08 PROCEDURE — 1090000002 HH PPS REVENUE DEBIT

## 2024-06-08 PROCEDURE — 1090000001 HH PPS REVENUE CREDIT

## 2024-06-09 PROCEDURE — 1090000001 HH PPS REVENUE CREDIT

## 2024-06-09 PROCEDURE — 1090000002 HH PPS REVENUE DEBIT

## 2024-06-09 ASSESSMENT — ENCOUNTER SYMPTOMS
PAIN LOCATION: BACK
PAIN LOCATION - PAIN FREQUENCY: INTERMITTENT
PAIN LOCATION - PAIN SEVERITY: 4/10
PERSON REPORTING PAIN: PATIENT
PAIN LOCATION - PAIN QUALITY: ACHE
PAIN LOCATION - PAIN DURATION: INTERMITTENT
PAIN LOCATION - RELIEVING FACTORS: REST
PAIN: 1

## 2024-06-10 PROCEDURE — 1090000001 HH PPS REVENUE CREDIT

## 2024-06-10 PROCEDURE — G0180 MD CERTIFICATION HHA PATIENT: HCPCS | Performed by: INTERNAL MEDICINE

## 2024-06-10 PROCEDURE — 1090000002 HH PPS REVENUE DEBIT

## 2024-06-12 ENCOUNTER — APPOINTMENT (OUTPATIENT)
Dept: PRIMARY CARE | Facility: CLINIC | Age: 34
End: 2024-06-12
Payer: MEDICARE

## 2024-06-12 VITALS — BODY MASS INDEX: 35.47 KG/M2 | WEIGHT: 226 LBS | HEIGHT: 67 IN

## 2024-06-12 DIAGNOSIS — Z00.00 ROUTINE GENERAL MEDICAL EXAMINATION AT HEALTH CARE FACILITY: Primary | ICD-10-CM

## 2024-06-12 DIAGNOSIS — Z93.2 S/P ILEOSTOMY (MULTI): ICD-10-CM

## 2024-06-12 DIAGNOSIS — I69.30 SEQUELA OF CEREBROVASCULAR ACCIDENT: ICD-10-CM

## 2024-06-12 DIAGNOSIS — R10.9 ABDOMINAL PAIN, UNSPECIFIED ABDOMINAL LOCATION: ICD-10-CM

## 2024-06-12 DIAGNOSIS — G40.909 NONINTRACTABLE EPILEPSY WITHOUT STATUS EPILEPTICUS, UNSPECIFIED EPILEPSY TYPE (MULTI): ICD-10-CM

## 2024-06-12 DIAGNOSIS — F32.2 MAJOR DEPRESSIVE DISORDER, SINGLE EPISODE, SEVERE (MULTI): ICD-10-CM

## 2024-06-12 PROBLEM — R19.7 DIARRHEA: Status: ACTIVE | Noted: 2024-06-12

## 2024-06-12 PROCEDURE — G0439 PPPS, SUBSEQ VISIT: HCPCS | Performed by: INTERNAL MEDICINE

## 2024-06-12 PROCEDURE — 99213 OFFICE O/P EST LOW 20 MIN: CPT | Performed by: INTERNAL MEDICINE

## 2024-06-12 PROCEDURE — 1036F TOBACCO NON-USER: CPT | Performed by: INTERNAL MEDICINE

## 2024-06-12 RX ORDER — OMEPRAZOLE 20 MG/1
20 CAPSULE, DELAYED RELEASE ORAL DAILY
Qty: 30 CAPSULE | Refills: 11 | Status: SHIPPED | OUTPATIENT
Start: 2024-06-12 | End: 2025-06-07

## 2024-06-12 ASSESSMENT — PATIENT HEALTH QUESTIONNAIRE - PHQ9
1. LITTLE INTEREST OR PLEASURE IN DOING THINGS: NOT AT ALL
SUM OF ALL RESPONSES TO PHQ9 QUESTIONS 1 AND 2: 0
2. FEELING DOWN, DEPRESSED OR HOPELESS: NOT AT ALL

## 2024-06-12 ASSESSMENT — ACTIVITIES OF DAILY LIVING (ADL)
MANAGING_FINANCES: TOTAL CARE
TAKING_MEDICATION: TOTAL CARE
DOING_HOUSEWORK: TOTAL CARE
BATHING: INDEPENDENT
GROCERY_SHOPPING: TOTAL CARE
DRESSING: INDEPENDENT

## 2024-06-12 NOTE — PROGRESS NOTES
"Subjective   Reason for Visit: Windy Lovelace is an 33 y.o. female here for a Medicare Wellness visit.     Past Medical, Surgical, and Family History reviewed and updated in chart.    Reviewed all medications by prescribing practitioner or clinical pharmacist (such as prescriptions, OTCs, herbal therapies and supplements) and documented in the medical record.    She went to the emergency room because for 3 days she was having stomach pain and nausea. And pain was in the middle.   Bowels have been normal. She was not taking the omeprazole in the past.   She started omeprazole and zofran from the ER and her pain is gone    PT did come out to her house to do an evaluation. Has to go through her health insurance to get her patio door fixed,     [facility].         Patient Care Team:  Astrid Terry DO as PCP - General  Astrid Terry DO as PCP - [facility] Medicaid PCP     Review of Systems    Objective   Vitals:  Ht 1.7 m (5' 6.93\")   Wt 103 kg (226 lb)   BMI 35.47 kg/m²       Physical Exam  Constitutional:       Appearance: Normal appearance.   Cardiovascular:      Rate and Rhythm: Normal rate and regular rhythm.      Heart sounds: No murmur heard.  Abdominal:      Comments: Mild diffuse tenderness, no guarding or rebound   Neurological:      Mental Status: She is alert.         Assessment/Plan   Problem List Items Addressed This Visit    None  Visit Diagnoses       Routine general medical examination at health care facility    -  Primary          Abdominal pain, reviewed ct, post op changes  Pt with history of gastritis symptoms and was on PPI in the past  Continue omeprazole 20mg daily  Continue zofran twice daily only if needed, d/w pt is not every day unless symptoms    [facility] ([facility])    Late effect of prior CVA from [place], PT eval done for getting patio door changed since pt could not open. Sister is submitting to insurance to have changed       "

## 2024-06-13 ENCOUNTER — HOME CARE VISIT (OUTPATIENT)
Dept: HOME HEALTH SERVICES | Facility: HOME HEALTH | Age: 34
End: 2024-06-13
Payer: MEDICARE

## 2024-06-13 PROCEDURE — G0152 HHCP-SERV OF OT,EA 15 MIN: HCPCS | Mod: HHH

## 2024-06-14 LAB
ATRIAL RATE: 64 BPM
P OFFSET: 205 MS
P ONSET: 145 MS
PR INTERVAL: 148 MS
Q ONSET: 219 MS
QRS COUNT: 11 BEATS
QRS DURATION: 86 MS
QT INTERVAL: 408 MS
QTC CALCULATION(BAZETT): 420 MS
QTC FREDERICIA: 416 MS
R AXIS: 52 DEGREES
T AXIS: 25 DEGREES
T OFFSET: 423 MS
VENTRICULAR RATE: 64 BPM

## 2024-06-14 ASSESSMENT — ENCOUNTER SYMPTOMS
PERSON REPORTING PAIN: PATIENT
DENIES PAIN: 1

## 2024-06-15 ENCOUNTER — HOME CARE VISIT (OUTPATIENT)
Dept: HOME HEALTH SERVICES | Facility: HOME HEALTH | Age: 34
End: 2024-06-15
Payer: MEDICARE

## 2024-06-18 ENCOUNTER — HOME CARE VISIT (OUTPATIENT)
Dept: HOME HEALTH SERVICES | Facility: HOME HEALTH | Age: 34
End: 2024-06-18
Payer: MEDICARE

## 2024-06-18 PROCEDURE — G0152 HHCP-SERV OF OT,EA 15 MIN: HCPCS | Mod: HHH

## 2024-06-20 ENCOUNTER — HOME CARE VISIT (OUTPATIENT)
Dept: HOME HEALTH SERVICES | Facility: HOME HEALTH | Age: 34
End: 2024-06-20
Payer: MEDICARE

## 2024-06-20 ASSESSMENT — ENCOUNTER SYMPTOMS
PERSON REPORTING PAIN: PATIENT
DENIES PAIN: 1

## 2024-06-25 ENCOUNTER — HOME CARE VISIT (OUTPATIENT)
Dept: HOME HEALTH SERVICES | Facility: HOME HEALTH | Age: 34
End: 2024-06-25
Payer: MEDICARE

## 2024-06-25 PROCEDURE — G0152 HHCP-SERV OF OT,EA 15 MIN: HCPCS | Mod: HHH

## 2024-06-26 DIAGNOSIS — Q17.8 SPLIT EAR LOBE: ICD-10-CM

## 2024-06-26 ASSESSMENT — ENCOUNTER SYMPTOMS
DENIES PAIN: 1
PERSON REPORTING PAIN: PATIENT

## 2024-06-28 ENCOUNTER — HOME CARE VISIT (OUTPATIENT)
Dept: HOME HEALTH SERVICES | Facility: HOME HEALTH | Age: 34
End: 2024-06-28
Payer: MEDICARE

## 2024-06-28 PROCEDURE — G0152 HHCP-SERV OF OT,EA 15 MIN: HCPCS | Mod: HHH

## 2024-06-29 ASSESSMENT — ENCOUNTER SYMPTOMS
PAIN LOCATION - EXACERBATING FACTORS: INCREASED USE
PAIN LOCATION - PAIN SEVERITY: 3/10
PAIN: 1
PAIN LOCATION - PAIN DURATION: INTERMITTENT
PAIN LOCATION: RIGHT WRIST
PERSON REPORTING PAIN: PATIENT
PAIN LOCATION - PAIN FREQUENCY: INTERMITTENT
PAIN LOCATION - PAIN QUALITY: ACHE

## 2024-06-29 ASSESSMENT — ACTIVITIES OF DAILY LIVING (ADL)
OASIS_M1830: 03
HOME_HEALTH_OASIS: 01

## 2024-07-03 ENCOUNTER — APPOINTMENT (OUTPATIENT)
Dept: OTOLARYNGOLOGY | Facility: CLINIC | Age: 34
End: 2024-07-03
Payer: MEDICARE

## 2024-07-03 VITALS — WEIGHT: 231 LBS | HEIGHT: 66 IN | BODY MASS INDEX: 37.12 KG/M2

## 2024-07-03 DIAGNOSIS — Q17.8 SPLIT EAR LOBE: Primary | ICD-10-CM

## 2024-07-03 DIAGNOSIS — H61.119 ACQUIRED EAR DEFORMITY: ICD-10-CM

## 2024-07-03 PROCEDURE — 99203 OFFICE O/P NEW LOW 30 MIN: CPT | Performed by: PHYSICIAN ASSISTANT

## 2024-07-03 NOTE — PROGRESS NOTES
Facial Plastic & Reconstructive Surgery      Chief Complaint: Split earlobe    Referring Provider: Astrid Terry DO PCP    33 y.o. female presents today for surgical evaluation of left split earlobe.  States her earring ripped through a couple weeks ago.  States she bled a lot at that time, and it stopped spontaneously a few hours later.     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), Breast pain (03/21/2024), Candidiasis of skin and nail (06/25/2014), Cellulitis of face (10/10/2013), Headache, unspecified (04/29/2015), Hypomagnesemia (01/23/2014), Other cysts of oral region, not elsewhere classified (06/10/2016), Other enthesopathies, not elsewhere classified (10/29/2015), Other specified abnormal findings of blood chemistry (03/06/2020), Other specified joint disorders, left ankle and foot (11/02/2016), Other specified noninflammatory disorders of vagina (03/20/2018), Pain in right knee (11/12/2018), Pain in right knee (02/19/2018), Pain in right knee (02/20/2019), Pain, unspecified (10/17/2017), Pelvic and perineal pain (01/24/2018), Personal history of diseases of the skin and subcutaneous tissue (02/24/2016), Personal history of other diseases of the digestive system (10/06/2017), Personal history of other diseases of the digestive system (03/17/2015), Personal history of other diseases of the digestive system (05/24/2016), Personal history of other diseases of the female genital tract (08/31/2015), Personal history of other diseases of the musculoskeletal system and connective tissue (02/04/2014), Personal history of other diseases of the nervous system and sense organs (01/05/2015),  Personal history of other diseases of the respiratory system (08/29/2013), Personal history of other diseases of the respiratory system (02/03/2017), Personal history of other drug therapy (12/11/2017), Personal history of other endocrine, nutritional and metabolic disease (05/09/2019), Personal history of other mental and behavioral disorders (10/06/2017), Personal history of transient ischemic attack (TIA), and cerebral infarction without residual deficits (10/06/2017), Pleurodynia (01/23/2014), Pyuria (06/13/2017), Sprain of medial collateral ligament of right knee, initial encounter (11/02/2016), Sprain of unspecified ligament of left ankle, subsequent encounter (10/29/2015), Strain of muscle and tendon of unspecified wall of thorax, initial encounter (02/04/2014), Syncope and collapse (06/13/2017), Unspecified fall, initial encounter (01/23/2014), and Unspecified symptoms and signs involving the genitourinary system (01/24/2018).    Surgical History  She has a past surgical history that includes Other surgical history (06/16/2015); Other surgical history (10/06/2017); Other surgical history (07/23/2019); MR head angio w IV contrast (8/18/2015); US guided percutaneous peritoneal or retroperitoneal fluid collection drainage (4/10/2019); and US guided abscess drain (4/10/2019).     Social History  She reports that she has never smoked. She has never used smokeless tobacco. She reports that she does not drink alcohol and does not use drugs.    Family History  Family History   Problem Relation Name Age of Onset    Kidney cancer Mother      Liver cancer Mother      Pancreatic cancer Father      Glaucoma Other grandmother     Diabetes Other Grandfather         other type with arthropathy, with long term curent use of insulin    Diabetes Other aunt         other type with arthropathy, with long term curent use of insulin        Allergies  Bupropion, Ketamine, Augmentin [amoxicillin-pot clavulanate], Penicillin g,  Penicillins, and Wellbutrin [bupropion hcl]    Past, family, and social history obtained but not pertinent to current problem unless documented above.    All other systems have been reviewed and are negative for complaint unless documented above.    Physical Exam:  General: Well-developed and well-nourished in appearance.  Skin: No rashes or concerning lesions on the visible portions of the skin.  Eyes: Extraocular movements intact. Visual fields grossly normal.  Ears: Pinna are normal in shape and position. External canals are patent.  Nose: Dorsum is midline. Septum is midline and turbinates are normal on anterior  rhinoscopy.  Oral Cavity/Oropharynx: Dentition is intact. Mucous membranes moist. No masses or  lesions. Tonsils are symmetric and non-obstructive.  Neck: Midline trachea without masses or lesions. Thyroid is normal in size.  Lymphatics: No palpable cervical lymphadenopathy  Respiratory: No respiratory distress. Quiet breathing without stertor or stridor.  Cardiovascular: Regular rate and rhythm. Warm extremities with equal pulses.  Psych: Normal mood and affect. Judgement and insight appropriate.  Neuro: Alert and oriented. CN II-XII grossly intact. No focal deficits.  Musculoskeletal: Gait intact. Moves all extremities well without apparent deformities.    Assessment: 32yo female with left earlobe laceration.    Plan: [facility] left earlobe repair date to be planned with sister/facility for future date.     Janine Nunez PA-C

## 2024-07-15 NOTE — PROGRESS NOTES
Windy Lovelace is a 33 y.o. 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 Kaye Carter for abdominal pain. She has history of perforated ileum with resection in 2019 (s/p exploratory laparotomy with SBR and primary anastomosis for small bowel perforation 2/2 foreign body on 3/18/19; s/p anastomosic resection, ileocecectomy, end ileostomy 2/2 to anastomotic leak). Grandfather with CRC and crohn's disease.     Colonoscopy at [facility] 9/2020 due to bloody diarrhea. Anatomy consistent with side to end ileal anastomosis. Normal other than internal hemorrhoids. Random colon biopsies ***. Instructed to start cholestyramine.    She reports a *** history of ***. Associated with ***. Worse with ***. CT showed nothing acute. There was post-operative changes noted with surgical anastomotic sutures in the bowel loops in the central abdomen anteriorly suggestive of prior partial bowel excision. Midline ventral abdominal wall scarring also noted.    She has tried *** with *** improvement.     Denies nausea, vomiting, heartburn, early satiety, and bloating. There has been no change in bowels. She has *** bowel movements per day. No rectal bleeding, hematochezia, or melena. No unintentional weight loss.     She does not take NSAIDS regularly.     No prior EGD or colonoscopy.    Social history: -    Family history: Denies family history of colon cancer or other GI disorders or malignancy.    PSHx:  3/18 OSH: exploratory laparotomy; Small bowel resection; Abdominal washout; for bowel perforation 2/2 foreign body  3/26: Exploratory laparotomy; Intestinal anastomosis resection; Ileocecectomy; End ileostomy; Abdomen wound closure (skin only); DAV block   3/28: excisional debridement of abdominal wall with penrose drain placement; for soft tissue infection of  abdominal wall      Past Medical History:   Diagnosis Date    Abnormal levels of other serum enzymes 02/10/2014    Elevated liver enzymes    Acute candidiasis of vulva and vagina 09/07/2017    Yeast vaginitis    Amaurosis fugax 10/06/2017    Amaurosis fugax of left eye    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    Breast pain 03/21/2024    Candidiasis of skin and nail 06/25/2014    Cutaneous candidiasis    Cellulitis of face 10/10/2013    Facial cellulitis    Headache, unspecified 04/29/2015    Severe headache    Hypomagnesemia 01/23/2014    Hypomagnesemia    Other cysts of oral region, not elsewhere classified 06/10/2016    Cyst of mouth    Other enthesopathies, not elsewhere classified 10/29/2015    Tendonitis of wrist, left    Other specified abnormal findings of blood chemistry 03/06/2020    Low vitamin D level    Other specified joint disorders, left ankle and foot 11/02/2016    Impingement syndrome of left ankle    Other specified noninflammatory disorders of vagina 03/20/2018    Vaginal odor    Pain in right knee 11/12/2018    Right knee pain    Pain in right knee 02/19/2018    Acute pain of right knee    Pain in right knee 02/20/2019    Acute pain of right knee    Pain, unspecified 10/17/2017    Acute pain    Pelvic and perineal pain 01/24/2018    Pelvic cramping    Personal history of diseases of the skin and subcutaneous tissue 02/24/2016    History of urticaria    Personal history of other diseases of the digestive system 10/06/2017    History of hiatal hernia    Personal history of other diseases of the digestive system 03/17/2015    History of gastritis    Personal history of other diseases of the digestive system 05/24/2016    History of constipation    Personal history of other diseases of the female genital tract 08/31/2015    History of breast pain    Personal history of other diseases of the musculoskeletal system and connective tissue 02/04/2014    History of low back pain     Personal history of other diseases of the nervous system and sense organs 01/05/2015    History of conjunctivitis    Personal history of other diseases of the respiratory system 08/29/2013    History of pharyngitis    Personal history of other diseases of the respiratory system 02/03/2017    History of acute bronchitis    Personal history of other drug therapy 12/11/2017    History of influenza vaccination    Personal history of other endocrine, nutritional and metabolic disease 05/09/2019    History of dehydration    Personal history of other mental and behavioral disorders 10/06/2017    History of mental retardation    Personal history of transient ischemic attack (TIA), and cerebral infarction without residual deficits 10/06/2017    History of stroke    Pleurodynia 01/23/2014    Rib pain on right side    Pyuria 06/13/2017    Pyuria    Sprain of medial collateral ligament of right knee, initial encounter 11/02/2016    Sprain of medial collateral ligament of right knee, initial encounter    Sprain of unspecified ligament of left ankle, subsequent encounter 10/29/2015    Severe sprain of left ankle, subsequent encounter    Strain of muscle and tendon of unspecified wall of thorax, initial encounter 02/04/2014    Strain of thoracic region    Syncope and collapse 06/13/2017    Syncope and collapse    Unspecified fall, initial encounter 01/23/2014    Fall at home    Unspecified symptoms and signs involving the genitourinary system 01/24/2018    UTI symptoms     Past Surgical History:   Procedure Laterality Date    MR HEAD ANGIO W IV CONTRAST  8/18/2015    MR HEAD ANGIO W IV CONTRAST 8/18/2015 [facility] [place] ANCILLARY LEGACY    OTHER SURGICAL HISTORY  06/16/2015    History Of Prior Surgery    OTHER SURGICAL HISTORY  10/06/2017    Excision Of Lesion Face    OTHER SURGICAL HISTORY  07/23/2019    Small bowel resection    US GUIDED ABSCESS DRAIN  4/10/2019    US GUIDED ABSCESS DRAIN 4/10/2019 [facility] [place] INPATIENT LEGACY    US GUIDED  PERCUTANEOUS PERITONEAL OR RETROPERITONEAL FLUID COLLECTION DRAINAGE  4/10/2019    US GUIDED PERCUTANEOUS PERITONEAL OR RETROPERITONEAL FLUID COLLECTION DRAINAGE [place]10[place] [facility] [place]     Current Outpatient Medications   Medication Sig Dispense Refill    aspirin 81 mg EC tablet Take 1 tablet (81 mg) by mouth once daily. 30 tablet 5    cholecalciferol (Vitamin D-3) 50 mcg (2,000 unit) capsule Take 1 capsule (50 mcg) by mouth once daily. 30 capsule 5    fluticasone (Flonase) 50 mcg/actuation nasal spray USE 2 SPRAYS IN EACH NOSTRIL ONCE DAILY AS NEEDED FOR CONGESTION      fluticasone propion-salmeteroL (Advair Diskus) 100-50 mcg/dose diskus inhaler Inhale 1 puff every 12 hours. Rinse mouth with water after use to reduce aftertaste and incidence of candidiasis. Do not swallow. 60 each 5    ibuprofen 800 mg tablet Take 1 tablet (800 mg) by mouth 3 times a day as needed.      ketoconazole (NIZOral) 2 % cream Apply topically 2 times a day. Apply sparingly to affected areas twice daily (perirectal area) (Patient not taking: Reported on 5/29/2024) 60 g 1    leg brace (Knee Support Brace) misc 1 Units once daily. 1 each 0    levonorgestrel (Mirena) 21 mcg/24 hours (8 yrs) 52 mg IUD Mirena (52 MG) 20 MCG/24HR IUD   Refills: 0       Active      omeprazole (PriLOSEC) 20 mg DR capsule Take 1 capsule (20 mg) by mouth once daily. Do not crush or chew. 30 capsule 11    ondansetron ODT (Zofran-ODT) 4 mg disintegrating tablet Take 1 tablet (4 mg) by mouth every 8 hours if needed for nausea or vomiting. 15 tablet 0    prazosin (Minipress) 1 mg capsule Take 3 capsules (3 mg) by mouth.      prazosin (Minipress) 5 mg capsule Take 1 capsule (5 mg) by mouth once daily at bedtime.      sertraline (Zoloft) 50 mg tablet Take 1 tablet (50 mg) by mouth once daily.      traZODone (Desyrel) 150 mg tablet Take 1 tablet (150 mg) by mouth once daily. As directed       No current facility-administered medications for this visit.      Allergies   Allergen Reactions    Bupropion Unknown and Hives    Ketamine Unknown, Anaphylaxis and Rash    Augmentin [Amoxicillin-Pot Clavulanate] Hives    Penicillin G Hives    Penicillins Unknown and Hives    Wellbutrin [Bupropion Hcl] Unknown       Family History   Problem Relation Name Age of Onset    Kidney cancer Mother      Liver cancer Mother      Pancreatic cancer Father      Glaucoma Other grandmother     Diabetes Other Grandfather         other type with arthropathy, with long term curent use of insulin    Diabetes Other aunt         other type with arthropathy, with long term curent use of insulin       Review of Systems  Review of Systems negative except as noted in HPI.    Objective     There were no vitals taken for this visit.     Physical Exam  Constitutional:  No acute distress. Normal appearance. Not ill-appearing.  HENT:  Head normocephalic and atraumatic. Conjunctivae normal.  Cardiovascular:  Normal rate. Regular rhythm.  Pulmonary:  Pulmonary effort normal. No respiratory distress. Breath sounds clear.  Abdominal:  Abdomen is flat and soft. There is no distension. No tenderness or guarding.  Skin: Dry.  Neurological:  Alert and oriented.  Psychiatric:  Mood and affect normal.    Assessment/Plan     33 y.o. female with history of *** who presents today for clinic visit for *** history of ***.    Recommendations  1.  2. Follow up    Electronically signed by: Idania Michel CNP on 7/15/2024 at 2:51 PM

## 2024-07-18 ENCOUNTER — CLINICAL SUPPORT (OUTPATIENT)
Dept: PRIMARY CARE | Facility: CLINIC | Age: 34
End: 2024-07-18
Payer: MEDICARE

## 2024-07-18 DIAGNOSIS — E53.8 B12 DEFICIENCY: Primary | ICD-10-CM

## 2024-07-18 PROCEDURE — 96372 THER/PROPH/DIAG INJ SC/IM: CPT | Performed by: INTERNAL MEDICINE

## 2024-07-18 RX ORDER — CYANOCOBALAMIN 1000 UG/ML
1000 INJECTION, SOLUTION INTRAMUSCULAR; SUBCUTANEOUS ONCE
Status: COMPLETED | OUTPATIENT
Start: 2024-07-18 | End: 2024-07-18

## 2024-07-23 ENCOUNTER — APPOINTMENT (OUTPATIENT)
Dept: GASTROENTEROLOGY | Facility: HOSPITAL | Age: 34
End: 2024-07-23
Payer: MEDICARE

## 2024-08-06 NOTE — PROGRESS NOTES
Facial Plastic & Reconstructive Surgery  MSO Earlobe repair    Ms. Lovelace is a 33 year old female with split left earlobe. Presents today for left earlobe repair.    Procedure Note:    Procedure: Ear Lobe Cleft Repair  Preop dx: Earlobe cleft  Postop dx: Same  Procedure:  Repair left earlobe cleft   Surgeon: Khanh Terry    Details:  Informed consent was obtained prior to procedure. Left earlobe injected with 1.5 cc 1 % lido with 1:100,000 epi. The cleft edges were freshened with sharp excision of scar tissue. A standard Z-plasty repair designed and executed meticulously over a 0.25 square cm defect.  5-0 fast was used for closure in a combination of running and interrupted fashion.  The patient tolerated the procedure well.     Plan: Rx Mupirocin TID for two weeks; may wash ear tomorrow.  Tylenol 1000mg every 6 hours as needed for pain.            Facial Plastic & Reconstructive Surgery    7/3/24  Chief Complaint: Split earlobe  Referring Provider: Astrid Terry DO PCP    33 y.o. female presents today for surgical evaluation of left split earlobe.  States her earring ripped through a couple weeks ago.  States she bled a lot at that time, and it stopped spontaneously a few hours later.     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), Breast pain (03/21/2024), Candidiasis of skin and nail (06/25/2014), Cellulitis of face (10/10/2013), Headache, unspecified (04/29/2015), Hypomagnesemia (01/23/2014), Other cysts of oral region, not elsewhere classified (06/10/2016), Other enthesopathies, not elsewhere classified (10/29/2015), Other specified abnormal findings of blood chemistry (03/06/2020),  Other specified joint disorders, left ankle and foot (11/02/2016), Other specified noninflammatory disorders of vagina (03/20/2018), Pain in right knee (11/12/2018), Pain in right knee (02/19/2018), Pain in right knee (02/20/2019), Pain, unspecified (10/17/2017), Pelvic and perineal pain (01/24/2018), Personal history of diseases of the skin and subcutaneous tissue (02/24/2016), Personal history of other diseases of the digestive system (10/06/2017), Personal history of other diseases of the digestive system (03/17/2015), Personal history of other diseases of the digestive system (05/24/2016), Personal history of other diseases of the female genital tract (08/31/2015), Personal history of other diseases of the musculoskeletal system and connective tissue (02/04/2014), Personal history of other diseases of the nervous system and sense organs (01/05/2015), Personal history of other diseases of the respiratory system (08/29/2013), Personal history of other diseases of the respiratory system (02/03/2017), Personal history of other drug therapy (12/11/2017), Personal history of other endocrine, nutritional and metabolic disease (05/09/2019), Personal history of other mental and behavioral disorders (10/06/2017), Personal history of transient ischemic attack (TIA), and cerebral infarction without residual deficits (10/06/2017), Pleurodynia (01/23/2014), Pyuria (06/13/2017), Sprain of medial collateral ligament of right knee, initial encounter (11/02/2016), Sprain of unspecified ligament of left ankle, subsequent encounter (10/29/2015), Strain of muscle and tendon of unspecified wall of thorax, initial encounter (02/04/2014), Syncope and collapse (06/13/2017), Unspecified fall, initial encounter (01/23/2014), and Unspecified symptoms and signs involving the genitourinary system (01/24/2018).    Surgical History  She has a past surgical history that includes Other surgical history (06/16/2015); Other surgical history  (10/06/2017); Other surgical history (07/23/2019); MR head angio w IV contrast (8/18/2015); US guided percutaneous peritoneal or retroperitoneal fluid collection drainage (4/10/2019); and US guided abscess drain (4/10/2019).     Social History  She reports that she has never smoked. She has never used smokeless tobacco. She reports that she does not drink alcohol and does not use drugs.    Family History  Family History   Problem Relation Name Age of Onset    Kidney cancer Mother      Liver cancer Mother      Pancreatic cancer Father      Glaucoma Other grandmother     Diabetes Other Grandfather         other type with arthropathy, with long term curent use of insulin    Diabetes Other aunt         other type with arthropathy, with long term curent use of insulin        Allergies  Bupropion, Ketamine, Augmentin [amoxicillin-pot clavulanate], Penicillin g, Penicillins, and Wellbutrin [bupropion hcl]    Past, family, and social history obtained but not pertinent to current problem unless documented above.    All other systems have been reviewed and are negative for complaint unless documented above.    Physical Exam:  General: Well-developed and well-nourished in appearance.  Skin: No rashes or concerning lesions on the visible portions of the skin.  Eyes: Extraocular movements intact. Visual fields grossly normal.  Ears: Pinna are normal in shape and position. External canals are patent.  Nose: Dorsum is midline. Septum is midline and turbinates are normal on anterior  rhinoscopy.  Oral Cavity/Oropharynx: Dentition is intact. Mucous membranes moist. No masses or  lesions. Tonsils are symmetric and non-obstructive.  Neck: Midline trachea without masses or lesions. Thyroid is normal in size.  Lymphatics: No palpable cervical lymphadenopathy  Respiratory: No respiratory distress. Quiet breathing without stertor or stridor.  Cardiovascular: Regular rate and rhythm. Warm extremities with equal pulses.  Psych: Normal mood  and affect. Judgement and insight appropriate.  Neuro: Alert and oriented. CN II-XII grossly intact. No focal deficits.  Musculoskeletal: Gait intact. Moves all extremities well without apparent deformities.    Assessment: 32yo female with left earlobe laceration.    Plan: MSO left earlobe repair date to be planned with sister/facility for future date.     Janine Nunez PA-C

## 2024-08-07 ENCOUNTER — APPOINTMENT (OUTPATIENT)
Dept: OTOLARYNGOLOGY | Facility: CLINIC | Age: 34
End: 2024-08-07
Payer: MEDICARE

## 2024-08-07 VITALS — BODY MASS INDEX: 38.47 KG/M2 | WEIGHT: 239.4 LBS | HEIGHT: 66 IN

## 2024-08-07 DIAGNOSIS — S01.312S EAR LOBE LACERATION, LEFT, SEQUELA: Primary | ICD-10-CM

## 2024-08-07 DIAGNOSIS — H61.23 EXCESSIVE CERUMEN IN BOTH EAR CANALS: ICD-10-CM

## 2024-08-07 PROCEDURE — 15004 WOUND PREP F/N/HF/G: CPT | Performed by: OTOLARYNGOLOGY

## 2024-08-07 PROCEDURE — 14060 TIS TRNFR E/N/E/L 10 SQ CM/<: CPT | Performed by: OTOLARYNGOLOGY

## 2024-08-15 ENCOUNTER — TELEPHONE (OUTPATIENT)
Dept: OTOLARYNGOLOGY | Facility: CLINIC | Age: 34
End: 2024-08-15
Payer: MEDICARE

## 2024-08-21 ENCOUNTER — APPOINTMENT (OUTPATIENT)
Dept: OTOLARYNGOLOGY | Facility: CLINIC | Age: 34
End: 2024-08-21
Payer: MEDICARE

## 2024-08-21 VITALS — WEIGHT: 239 LBS | HEIGHT: 66 IN | BODY MASS INDEX: 38.41 KG/M2

## 2024-08-21 DIAGNOSIS — H93.8X9 SWELLING OF EAR, UNSPECIFIED LATERALITY: Primary | ICD-10-CM

## 2024-08-21 PROCEDURE — 99024 POSTOP FOLLOW-UP VISIT: CPT | Performed by: OTOLARYNGOLOGY

## 2024-08-21 NOTE — PROGRESS NOTES
H/o of earlobe repair at last visit has opened up.    Will plan for repeat reconstruction following healing in 6-8 weeks

## 2024-08-22 ENCOUNTER — APPOINTMENT (OUTPATIENT)
Dept: PRIMARY CARE | Facility: CLINIC | Age: 34
End: 2024-08-22
Payer: MEDICARE

## 2024-08-22 DIAGNOSIS — E53.8 B12 DEFICIENCY: ICD-10-CM

## 2024-08-22 DIAGNOSIS — H61.22 IMPACTED CERUMEN OF LEFT EAR: ICD-10-CM

## 2024-08-22 PROCEDURE — 96372 THER/PROPH/DIAG INJ SC/IM: CPT | Performed by: INTERNAL MEDICINE

## 2024-08-22 PROCEDURE — 69209 REMOVE IMPACTED EAR WAX UNI: CPT | Performed by: INTERNAL MEDICINE

## 2024-08-22 RX ORDER — CYANOCOBALAMIN 1000 UG/ML
1000 INJECTION, SOLUTION INTRAMUSCULAR; SUBCUTANEOUS ONCE
Status: COMPLETED | OUTPATIENT
Start: 2024-08-22 | End: 2024-08-22

## 2024-08-22 NOTE — PROGRESS NOTES
Patient here today for B12 injection    Ear lavaged with warm water for cerumen impaction. A large amount of dark yellow/brown cerumen was removed using warm water in the left ear. TM's intact and WNL b/l. Patient tolerated procedure well without any complications.

## 2024-09-04 ENCOUNTER — OFFICE VISIT (OUTPATIENT)
Dept: GASTROENTEROLOGY | Facility: CLINIC | Age: 34
End: 2024-09-04
Payer: MEDICARE

## 2024-09-04 VITALS
SYSTOLIC BLOOD PRESSURE: 124 MMHG | TEMPERATURE: 96.8 F | HEART RATE: 94 BPM | DIASTOLIC BLOOD PRESSURE: 87 MMHG | WEIGHT: 237 LBS | HEIGHT: 66 IN | BODY MASS INDEX: 38.09 KG/M2

## 2024-09-04 DIAGNOSIS — R10.84 CHRONIC GENERALIZED ABDOMINAL PAIN: Primary | ICD-10-CM

## 2024-09-04 DIAGNOSIS — K62.89 RECTAL PAIN: ICD-10-CM

## 2024-09-04 DIAGNOSIS — G89.29 CHRONIC GENERALIZED ABDOMINAL PAIN: Primary | ICD-10-CM

## 2024-09-04 PROCEDURE — 99204 OFFICE O/P NEW MOD 45 MIN: CPT | Performed by: NURSE PRACTITIONER

## 2024-09-04 PROCEDURE — 99214 OFFICE O/P EST MOD 30 MIN: CPT | Performed by: NURSE PRACTITIONER

## 2024-09-04 PROCEDURE — 3008F BODY MASS INDEX DOCD: CPT | Performed by: NURSE PRACTITIONER

## 2024-09-04 RX ORDER — TRAZODONE HYDROCHLORIDE 100 MG/1
TABLET ORAL
COMMUNITY
Start: 2024-09-03 | End: 2024-09-04

## 2024-09-04 RX ORDER — IBUPROFEN 200 MG
200 TABLET ORAL AS NEEDED
COMMUNITY

## 2024-09-04 RX ORDER — SERTRALINE HYDROCHLORIDE 100 MG/1
1 TABLET, FILM COATED ORAL
COMMUNITY
Start: 2024-08-02

## 2024-09-04 RX ORDER — DICYCLOMINE HYDROCHLORIDE 10 MG/1
10 CAPSULE ORAL 4 TIMES DAILY PRN
Qty: 120 CAPSULE | Refills: 2 | Status: SHIPPED | OUTPATIENT
Start: 2024-09-04

## 2024-09-04 RX ORDER — SERTRALINE HYDROCHLORIDE 100 MG/1
TABLET, FILM COATED ORAL
COMMUNITY
Start: 2024-08-19 | End: 2024-09-04

## 2024-09-04 ASSESSMENT — ENCOUNTER SYMPTOMS
HEMATURIA: 0
FATIGUE: 0
FEVER: 0
COUGH: 0
MYALGIAS: 0
PHOTOPHOBIA: 0
DYSURIA: 0
ARTHRALGIAS: 0
AGITATION: 0
HALLUCINATIONS: 0
NERVOUS/ANXIOUS: 0
SORE THROAT: 0
HEADACHES: 0
WEAKNESS: 0
WHEEZING: 0
DIAPHORESIS: 0
CHILLS: 0
FREQUENCY: 0
PALPITATIONS: 0
ADENOPATHY: 0
BACK PAIN: 0
FLANK PAIN: 0
SHORTNESS OF BREATH: 0
JOINT SWELLING: 0
EYE PAIN: 0
LIGHT-HEADEDNESS: 0
NUMBNESS: 0
DIZZINESS: 0

## 2024-09-04 ASSESSMENT — PAIN SCALES - GENERAL: PAINLEVEL: 0-NO PAIN
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
## 2024-09-04 NOTE — PATIENT INSTRUCTIONS
Thanks for coming to the GI clinic.     Stop omeprazole.     Stop ondansetron.     Start dicyclomine 10 mg 4 times a day as needed for abdominal pain.     Follow up in 6-8 weeks.

## 2024-09-13 ENCOUNTER — APPOINTMENT (OUTPATIENT)
Dept: RADIOLOGY | Facility: HOSPITAL | Age: 34
End: 2024-09-13
Payer: MEDICARE

## 2024-09-13 ENCOUNTER — APPOINTMENT (OUTPATIENT)
Dept: CARDIOLOGY | Facility: HOSPITAL | Age: 34
End: 2024-09-13
Payer: MEDICARE

## 2024-09-13 ENCOUNTER — HOSPITAL ENCOUNTER (INPATIENT)
Facility: HOSPITAL | Age: 34
End: 2024-09-13
Attending: EMERGENCY MEDICINE | Admitting: INTERNAL MEDICINE
Payer: MEDICARE
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
## 2024-09-13 DIAGNOSIS — S82.891A CLOSED FRACTURE OF RIGHT ANKLE, INITIAL ENCOUNTER: Primary | ICD-10-CM

## 2024-09-13 DIAGNOSIS — S93.04XA ANKLE DISLOCATION, RIGHT, INITIAL ENCOUNTER: ICD-10-CM

## 2024-09-13 LAB
ABO GROUP (TYPE) IN BLOOD: NORMAL
ALBUMIN SERPL BCP-MCNC: 3.9 G/DL (ref 3.4–5)
ALP SERPL-CCNC: 90 U/L (ref 33–110)
ALT SERPL W P-5'-P-CCNC: 70 U/L (ref 7–45)
ANION GAP SERPL CALC-SCNC: 14 MMOL/L (ref 10–20)
ANTIBODY SCREEN: NORMAL
APTT PPP: 29 SECONDS (ref 27–38)
AST SERPL W P-5'-P-CCNC: 27 U/L (ref 9–39)
B-HCG SERPL-ACNC: <2 MIU/ML
BASOPHILS # BLD AUTO: 0.03 X10*3/UL (ref 0–0.1)
BASOPHILS NFR BLD AUTO: 0.2 %
BILIRUB SERPL-MCNC: 0.4 MG/DL (ref 0–1.2)
BUN SERPL-MCNC: 8 MG/DL (ref 6–23)
CALCIUM SERPL-MCNC: 8.8 MG/DL (ref 8.6–10.3)
CHLORIDE SERPL-SCNC: 107 MMOL/L (ref 98–107)
CO2 SERPL-SCNC: 21 MMOL/L (ref 21–32)
CREAT SERPL-MCNC: 0.93 MG/DL (ref 0.5–1.05)
EGFRCR SERPLBLD CKD-EPI 2021: 82 ML/MIN/1.73M*2
EOSINOPHIL # BLD AUTO: 0.02 X10*3/UL (ref 0–0.7)
EOSINOPHIL NFR BLD AUTO: 0.1 %
ERYTHROCYTE [DISTWIDTH] IN BLOOD BY AUTOMATED COUNT: 13.8 % (ref 11.5–14.5)
GLUCOSE SERPL-MCNC: 144 MG/DL (ref 74–99)
HCT VFR BLD AUTO: 47.3 % (ref 36–46)
HGB BLD-MCNC: 15.5 G/DL (ref 12–16)
IMM GRANULOCYTES # BLD AUTO: 0.06 X10*3/UL (ref 0–0.7)
IMM GRANULOCYTES NFR BLD AUTO: 0.4 % (ref 0–0.9)
INR PPP: 1 (ref 0.9–1.1)
LYMPHOCYTES # BLD AUTO: 1.26 X10*3/UL (ref 1.2–4.8)
LYMPHOCYTES NFR BLD AUTO: 8.7 %
MCH RBC QN AUTO: 27.8 PG (ref 26–34)
MCHC RBC AUTO-ENTMCNC: 32.8 G/DL (ref 32–36)
MCV RBC AUTO: 85 FL (ref 80–100)
MONOCYTES # BLD AUTO: 0.5 X10*3/UL (ref 0.1–1)
MONOCYTES NFR BLD AUTO: 3.4 %
NEUTROPHILS # BLD AUTO: 12.66 X10*3/UL (ref 1.2–7.7)
NEUTROPHILS NFR BLD AUTO: 87.2 %
NRBC BLD-RTO: 0 /100 WBCS (ref 0–0)
PLATELET # BLD AUTO: 279 X10*3/UL (ref 150–450)
POTASSIUM SERPL-SCNC: 3.8 MMOL/L (ref 3.5–5.3)
PROT SERPL-MCNC: 7.4 G/DL (ref 6.4–8.2)
PROTHROMBIN TIME: 11.2 SECONDS (ref 9.8–12.8)
RBC # BLD AUTO: 5.57 X10*6/UL (ref 4–5.2)
RH FACTOR (ANTIGEN D): NORMAL
SODIUM SERPL-SCNC: 138 MMOL/L (ref 136–145)
WBC # BLD AUTO: 14.5 X10*3/UL (ref 4.4–11.3)

## 2024-09-13 PROCEDURE — 72131 CT LUMBAR SPINE W/O DYE: CPT

## 2024-09-13 PROCEDURE — 72128 CT CHEST SPINE W/O DYE: CPT

## 2024-09-13 PROCEDURE — 73600 X-RAY EXAM OF ANKLE: CPT | Mod: RT

## 2024-09-13 PROCEDURE — 85025 COMPLETE CBC W/AUTO DIFF WBC: CPT

## 2024-09-13 PROCEDURE — 72125 CT NECK SPINE W/O DYE: CPT

## 2024-09-13 PROCEDURE — 73590 X-RAY EXAM OF LOWER LEG: CPT | Mod: RIGHT SIDE | Performed by: RADIOLOGY

## 2024-09-13 PROCEDURE — 70450 CT HEAD/BRAIN W/O DYE: CPT | Performed by: RADIOLOGY

## 2024-09-13 PROCEDURE — 2500000004 HC RX 250 GENERAL PHARMACY W/ HCPCS (ALT 636 FOR OP/ED)

## 2024-09-13 PROCEDURE — 74176 CT ABD & PELVIS W/O CONTRAST: CPT

## 2024-09-13 PROCEDURE — 73700 CT LOWER EXTREMITY W/O DYE: CPT | Mod: RIGHT SIDE | Performed by: RADIOLOGY

## 2024-09-13 PROCEDURE — 73620 X-RAY EXAM OF FOOT: CPT | Mod: RIGHT SIDE | Performed by: RADIOLOGY

## 2024-09-13 PROCEDURE — 2500000004 HC RX 250 GENERAL PHARMACY W/ HCPCS (ALT 636 FOR OP/ED): Performed by: INTERNAL MEDICINE

## 2024-09-13 PROCEDURE — 2500000005 HC RX 250 GENERAL PHARMACY W/O HCPCS

## 2024-09-13 PROCEDURE — 73590 X-RAY EXAM OF LOWER LEG: CPT | Mod: RT

## 2024-09-13 PROCEDURE — 74176 CT ABD & PELVIS W/O CONTRAST: CPT | Performed by: RADIOLOGY

## 2024-09-13 PROCEDURE — 72128 CT CHEST SPINE W/O DYE: CPT | Performed by: RADIOLOGY

## 2024-09-13 PROCEDURE — 96376 TX/PRO/DX INJ SAME DRUG ADON: CPT

## 2024-09-13 PROCEDURE — 84702 CHORIONIC GONADOTROPIN TEST: CPT

## 2024-09-13 PROCEDURE — 86901 BLOOD TYPING SEROLOGIC RH(D): CPT

## 2024-09-13 PROCEDURE — 2500000001 HC RX 250 WO HCPCS SELF ADMINISTERED DRUGS (ALT 637 FOR MEDICARE OP): Performed by: INTERNAL MEDICINE

## 2024-09-13 PROCEDURE — 96375 TX/PRO/DX INJ NEW DRUG ADDON: CPT

## 2024-09-13 PROCEDURE — 73600 X-RAY EXAM OF ANKLE: CPT | Mod: RIGHT SIDE | Performed by: RADIOLOGY

## 2024-09-13 PROCEDURE — 85730 THROMBOPLASTIN TIME PARTIAL: CPT

## 2024-09-13 PROCEDURE — 99223 1ST HOSP IP/OBS HIGH 75: CPT | Performed by: INTERNAL MEDICINE

## 2024-09-13 PROCEDURE — 72125 CT NECK SPINE W/O DYE: CPT | Performed by: RADIOLOGY

## 2024-09-13 PROCEDURE — 99221 1ST HOSP IP/OBS SF/LOW 40: CPT | Performed by: STUDENT IN AN ORGANIZED HEALTH CARE EDUCATION/TRAINING PROGRAM

## 2024-09-13 PROCEDURE — 71250 CT THORAX DX C-: CPT | Performed by: RADIOLOGY

## 2024-09-13 PROCEDURE — 36415 COLL VENOUS BLD VENIPUNCTURE: CPT

## 2024-09-13 PROCEDURE — 0QSJXZZ REPOSITION RIGHT FIBULA, EXTERNAL APPROACH: ICD-10-PCS

## 2024-09-13 PROCEDURE — 93005 ELECTROCARDIOGRAM TRACING: CPT

## 2024-09-13 PROCEDURE — 73620 X-RAY EXAM OF FOOT: CPT | Mod: RT

## 2024-09-13 PROCEDURE — 2500000004 HC RX 250 GENERAL PHARMACY W/ HCPCS (ALT 636 FOR OP/ED): Performed by: HOSPITALIST

## 2024-09-13 PROCEDURE — 73700 CT LOWER EXTREMITY W/O DYE: CPT | Mod: RT

## 2024-09-13 PROCEDURE — 99285 EMERGENCY DEPT VISIT HI MDM: CPT

## 2024-09-13 PROCEDURE — 70450 CT HEAD/BRAIN W/O DYE: CPT

## 2024-09-13 PROCEDURE — 80053 COMPREHEN METABOLIC PANEL: CPT

## 2024-09-13 PROCEDURE — 96374 THER/PROPH/DIAG INJ IV PUSH: CPT

## 2024-09-13 PROCEDURE — 72131 CT LUMBAR SPINE W/O DYE: CPT | Performed by: RADIOLOGY

## 2024-09-13 PROCEDURE — 1100000001 HC PRIVATE ROOM DAILY

## 2024-09-13 PROCEDURE — 71045 X-RAY EXAM CHEST 1 VIEW: CPT

## 2024-09-13 RX ORDER — PANTOPRAZOLE SODIUM 40 MG/1
40 TABLET, DELAYED RELEASE ORAL
Status: DISCONTINUED | OUTPATIENT
Start: 2024-09-14 | End: 2024-09-19 | Stop reason: HOSPADM

## 2024-09-13 RX ORDER — MORPHINE SULFATE 2 MG/ML
2 INJECTION, SOLUTION INTRAMUSCULAR; INTRAVENOUS EVERY 4 HOURS PRN
Status: DISCONTINUED | OUTPATIENT
Start: 2024-09-13 | End: 2024-09-19 | Stop reason: HOSPADM

## 2024-09-13 RX ORDER — FENTANYL CITRATE 50 UG/ML
INJECTION, SOLUTION INTRAMUSCULAR; INTRAVENOUS
Status: COMPLETED
Start: 2024-09-13 | End: 2024-09-13

## 2024-09-13 RX ORDER — ONDANSETRON HYDROCHLORIDE 2 MG/ML
4 INJECTION, SOLUTION INTRAVENOUS ONCE
Status: COMPLETED | OUTPATIENT
Start: 2024-09-13 | End: 2024-09-13

## 2024-09-13 RX ORDER — SERTRALINE HYDROCHLORIDE 50 MG/1
100 TABLET, FILM COATED ORAL
Status: DISCONTINUED | OUTPATIENT
Start: 2024-09-14 | End: 2024-09-19 | Stop reason: HOSPADM

## 2024-09-13 RX ORDER — FORMOTEROL FUMARATE DIHYDRATE 20 UG/2ML
20 SOLUTION RESPIRATORY (INHALATION)
Status: DISCONTINUED | OUTPATIENT
Start: 2024-09-13 | End: 2024-09-13

## 2024-09-13 RX ORDER — ASPIRIN 81 MG/1
81 TABLET ORAL DAILY
Status: DISCONTINUED | OUTPATIENT
Start: 2024-09-13 | End: 2024-09-19 | Stop reason: HOSPADM

## 2024-09-13 RX ORDER — PRAZOSIN HYDROCHLORIDE 1 MG/1
5 CAPSULE ORAL NIGHTLY
Status: DISCONTINUED | OUTPATIENT
Start: 2024-09-13 | End: 2024-09-19 | Stop reason: HOSPADM

## 2024-09-13 RX ORDER — FENTANYL CITRATE 50 UG/ML
50 INJECTION, SOLUTION INTRAMUSCULAR; INTRAVENOUS ONCE
Status: COMPLETED | OUTPATIENT
Start: 2024-09-13 | End: 2024-09-13

## 2024-09-13 RX ORDER — ONDANSETRON HYDROCHLORIDE 2 MG/ML
4 INJECTION, SOLUTION INTRAVENOUS EVERY 6 HOURS PRN
Status: DISCONTINUED | OUTPATIENT
Start: 2024-09-13 | End: 2024-09-19 | Stop reason: HOSPADM

## 2024-09-13 RX ORDER — HYDROMORPHONE HYDROCHLORIDE 1 MG/ML
1 INJECTION, SOLUTION INTRAMUSCULAR; INTRAVENOUS; SUBCUTANEOUS ONCE
Status: COMPLETED | OUTPATIENT
Start: 2024-09-13 | End: 2024-09-13

## 2024-09-13 RX ORDER — LIDOCAINE HYDROCHLORIDE 10 MG/ML
10 INJECTION, SOLUTION INFILTRATION; PERINEURAL ONCE
Status: COMPLETED | OUTPATIENT
Start: 2024-09-13 | End: 2024-09-13

## 2024-09-13 RX ORDER — OMEPRAZOLE 20 MG/1
20 CAPSULE, DELAYED RELEASE ORAL DAILY
COMMUNITY

## 2024-09-13 RX ORDER — ONDANSETRON HYDROCHLORIDE 2 MG/ML
4 INJECTION, SOLUTION INTRAVENOUS ONCE
Status: DISCONTINUED | OUTPATIENT
Start: 2024-09-13 | End: 2024-09-13

## 2024-09-13 RX ORDER — ENOXAPARIN SODIUM 100 MG/ML
40 INJECTION SUBCUTANEOUS EVERY 24 HOURS
Status: DISCONTINUED | OUTPATIENT
Start: 2024-09-13 | End: 2024-09-19 | Stop reason: HOSPADM

## 2024-09-13 RX ORDER — ACETAMINOPHEN 325 MG/1
975 TABLET ORAL ONCE
Status: COMPLETED | OUTPATIENT
Start: 2024-09-13 | End: 2024-09-13

## 2024-09-13 RX ORDER — FLUTICASONE FUROATE AND VILANTEROL 100; 25 UG/1; UG/1
1 POWDER RESPIRATORY (INHALATION)
Status: DISCONTINUED | OUTPATIENT
Start: 2024-09-13 | End: 2024-09-13 | Stop reason: CLARIF

## 2024-09-13 RX ORDER — FORMOTEROL FUMARATE DIHYDRATE 20 UG/2ML
20 SOLUTION RESPIRATORY (INHALATION) 2 TIMES DAILY PRN
Status: DISCONTINUED | OUTPATIENT
Start: 2024-09-13 | End: 2024-09-15

## 2024-09-13 RX ORDER — BUDESONIDE 0.5 MG/2ML
0.5 INHALANT ORAL 2 TIMES DAILY PRN
Status: DISCONTINUED | OUTPATIENT
Start: 2024-09-13 | End: 2024-09-15

## 2024-09-13 RX ORDER — TRAZODONE HYDROCHLORIDE 50 MG/1
100 TABLET ORAL NIGHTLY
Status: DISCONTINUED | OUTPATIENT
Start: 2024-09-13 | End: 2024-09-19 | Stop reason: HOSPADM

## 2024-09-13 RX ORDER — BUDESONIDE 0.5 MG/2ML
0.5 INHALANT ORAL
Status: DISCONTINUED | OUTPATIENT
Start: 2024-09-13 | End: 2024-09-13

## 2024-09-13 RX ORDER — OXYCODONE AND ACETAMINOPHEN 5; 325 MG/1; MG/1
1 TABLET ORAL EVERY 6 HOURS PRN
Status: DISCONTINUED | OUTPATIENT
Start: 2024-09-13 | End: 2024-09-14

## 2024-09-13 RX ORDER — FLUTICASONE PROPIONATE 50 MCG
2 SPRAY, SUSPENSION (ML) NASAL DAILY PRN
Status: DISCONTINUED | OUTPATIENT
Start: 2024-09-13 | End: 2024-09-19 | Stop reason: HOSPADM

## 2024-09-13 RX ORDER — METHOCARBAMOL 100 MG/ML
1000 INJECTION, SOLUTION INTRAMUSCULAR; INTRAVENOUS ONCE
Status: COMPLETED | OUTPATIENT
Start: 2024-09-13 | End: 2024-09-13

## 2024-09-13 SDOH — SOCIAL STABILITY: SOCIAL INSECURITY: DO YOU FEEL UNSAFE GOING BACK TO THE PLACE WHERE YOU ARE LIVING?: NO

## 2024-09-13 SDOH — SOCIAL STABILITY: SOCIAL INSECURITY: HAVE YOU HAD THOUGHTS OF HARMING ANYONE ELSE?: NO

## 2024-09-13 SDOH — SOCIAL STABILITY: SOCIAL INSECURITY: WERE YOU ABLE TO COMPLETE ALL THE BEHAVIORAL HEALTH SCREENINGS?: YES

## 2024-09-13 SDOH — SOCIAL STABILITY: SOCIAL INSECURITY: ABUSE: ADULT

## 2024-09-13 SDOH — SOCIAL STABILITY: SOCIAL INSECURITY: HAS ANYONE EVER THREATENED TO HURT YOUR FAMILY OR YOUR PETS?: NO

## 2024-09-13 SDOH — SOCIAL STABILITY: SOCIAL INSECURITY: ARE YOU OR HAVE YOU BEEN THREATENED OR ABUSED PHYSICALLY, EMOTIONALLY, OR SEXUALLY BY ANYONE?: NO

## 2024-09-13 SDOH — SOCIAL STABILITY: SOCIAL INSECURITY: ARE THERE ANY APPARENT SIGNS OF INJURIES/BEHAVIORS THAT COULD BE RELATED TO ABUSE/NEGLECT?: NO

## 2024-09-13 SDOH — SOCIAL STABILITY: SOCIAL INSECURITY: DOES ANYONE TRY TO KEEP YOU FROM HAVING/CONTACTING OTHER FRIENDS OR DOING THINGS OUTSIDE YOUR HOME?: NO

## 2024-09-13 SDOH — SOCIAL STABILITY: SOCIAL INSECURITY: DO YOU FEEL ANYONE HAS EXPLOITED OR TAKEN ADVANTAGE OF YOU FINANCIALLY OR OF YOUR PERSONAL PROPERTY?: NO

## 2024-09-13 ASSESSMENT — ENCOUNTER SYMPTOMS
CHILLS: 0
SHORTNESS OF BREATH: 0
CONSTIPATION: 0
APPETITE CHANGE: 0
NUMBNESS: 0
DIARRHEA: 0
AGITATION: 0
SEIZURES: 0
VOMITING: 0
COLOR CHANGE: 0
DIZZINESS: 0
EYE PAIN: 0
BACK PAIN: 0
CHEST TIGHTNESS: 0
ACTIVITY CHANGE: 0
MYALGIAS: 0
FEVER: 0
SPEECH DIFFICULTY: 0
JOINT SWELLING: 1
COUGH: 0
DYSURIA: 0
NECK PAIN: 0
NAUSEA: 0
PALPITATIONS: 0
HEADACHES: 0
ABDOMINAL PAIN: 0
DIFFICULTY URINATING: 0
CONFUSION: 0
EYE DISCHARGE: 0

## 2024-09-13 ASSESSMENT — ACTIVITIES OF DAILY LIVING (ADL)
HEARING - LEFT EAR: FUNCTIONAL
TOILETING: INDEPENDENT
GROOMING: INDEPENDENT
FEEDING YOURSELF: INDEPENDENT
ADEQUATE_TO_COMPLETE_ADL: YES
WALKS IN HOME: INDEPENDENT
PATIENT'S MEMORY ADEQUATE TO SAFELY COMPLETE DAILY ACTIVITIES?: YES
HEARING - RIGHT EAR: FUNCTIONAL
JUDGMENT_ADEQUATE_SAFELY_COMPLETE_DAILY_ACTIVITIES: YES
BATHING: INDEPENDENT
LACK_OF_TRANSPORTATION: NO
DRESSING YOURSELF: INDEPENDENT

## 2024-09-13 ASSESSMENT — COGNITIVE AND FUNCTIONAL STATUS - GENERAL
DAILY ACTIVITIY SCORE: 18
MOBILITY SCORE: 12
PERSONAL GROOMING: A LITTLE
HELP NEEDED FOR BATHING: A LITTLE
WALKING IN HOSPITAL ROOM: TOTAL
MOVING FROM LYING ON BACK TO SITTING ON SIDE OF FLAT BED WITH BEDRAILS: A LITTLE
MOVING TO AND FROM BED TO CHAIR: A LITTLE
MOVING FROM LYING ON BACK TO SITTING ON SIDE OF FLAT BED WITH BEDRAILS: A LITTLE
HELP NEEDED FOR BATHING: A LITTLE
PATIENT BASELINE BEDBOUND: NO
MOBILITY SCORE: 12
TURNING FROM BACK TO SIDE WHILE IN FLAT BAD: A LITTLE
TOILETING: A LOT
WALKING IN HOSPITAL ROOM: TOTAL
TOILETING: A LOT
STANDING UP FROM CHAIR USING ARMS: TOTAL
TURNING FROM BACK TO SIDE WHILE IN FLAT BAD: A LITTLE
CLIMB 3 TO 5 STEPS WITH RAILING: TOTAL
STANDING UP FROM CHAIR USING ARMS: TOTAL
CLIMB 3 TO 5 STEPS WITH RAILING: TOTAL
DRESSING REGULAR LOWER BODY CLOTHING: A LITTLE
MOVING TO AND FROM BED TO CHAIR: A LITTLE
DRESSING REGULAR UPPER BODY CLOTHING: A LITTLE
DAILY ACTIVITIY SCORE: 18
PERSONAL GROOMING: A LITTLE
DRESSING REGULAR LOWER BODY CLOTHING: A LITTLE
DRESSING REGULAR UPPER BODY CLOTHING: A LITTLE

## 2024-09-13 ASSESSMENT — PAIN DESCRIPTION - LOCATION
LOCATION: ANKLE
LOCATION: FOOT
LOCATION: HEAD

## 2024-09-13 ASSESSMENT — LIFESTYLE VARIABLES
AUDIT-C TOTAL SCORE: 0
HOW MANY STANDARD DRINKS CONTAINING ALCOHOL DO YOU HAVE ON A TYPICAL DAY: PATIENT DOES NOT DRINK
HOW OFTEN DO YOU HAVE A DRINK CONTAINING ALCOHOL: NEVER
HOW OFTEN DO YOU HAVE 6 OR MORE DRINKS ON ONE OCCASION: NEVER
HOW MANY STANDARD DRINKS CONTAINING ALCOHOL DO YOU HAVE ON A TYPICAL DAY: PATIENT DOES NOT DRINK
HOW OFTEN DO YOU HAVE 6 OR MORE DRINKS ON ONE OCCASION: NEVER
HOW OFTEN DO YOU HAVE A DRINK CONTAINING ALCOHOL: NEVER
SKIP TO QUESTIONS 9-10: 1
AUDIT-C TOTAL SCORE: 0
SKIP TO QUESTIONS 9-10: 1
AUDIT-C TOTAL SCORE: 0
AUDIT-C TOTAL SCORE: 0

## 2024-09-13 ASSESSMENT — PAIN DESCRIPTION - PROGRESSION
CLINICAL_PROGRESSION: NOT CHANGED
CLINICAL_PROGRESSION: RAPIDLY IMPROVING

## 2024-09-13 ASSESSMENT — PAIN SCALES - GENERAL
PAINLEVEL_OUTOF10: 10 - WORST POSSIBLE PAIN
PAINLEVEL_OUTOF10: 10 - WORST POSSIBLE PAIN
PAINLEVEL_OUTOF10: 4
PAINLEVEL_OUTOF10: 2
PAINLEVEL_OUTOF10: 3
PAINLEVEL_OUTOF10: 3

## 2024-09-13 ASSESSMENT — PAIN DESCRIPTION - FREQUENCY: FREQUENCY: CONSTANT/CONTINUOUS

## 2024-09-13 ASSESSMENT — PATIENT HEALTH QUESTIONNAIRE - PHQ9
2. FEELING DOWN, DEPRESSED OR HOPELESS: NOT AT ALL
SUM OF ALL RESPONSES TO PHQ9 QUESTIONS 1 & 2: 0
1. LITTLE INTEREST OR PLEASURE IN DOING THINGS: NOT AT ALL

## 2024-09-13 ASSESSMENT — PAIN - FUNCTIONAL ASSESSMENT
PAIN_FUNCTIONAL_ASSESSMENT: 0-10
PAIN_FUNCTIONAL_ASSESSMENT: 0-10

## 2024-09-13 ASSESSMENT — PAIN DESCRIPTION - DESCRIPTORS: DESCRIPTORS: ACHING

## 2024-09-13 ASSESSMENT — COLUMBIA-SUICIDE SEVERITY RATING SCALE - C-SSRS
1. IN THE PAST MONTH, HAVE YOU WISHED YOU WERE DEAD OR WISHED YOU COULD GO TO SLEEP AND NOT WAKE UP?: NO
6. HAVE YOU EVER DONE ANYTHING, STARTED TO DO ANYTHING, OR PREPARED TO DO ANYTHING TO END YOUR LIFE?: NO
2. HAVE YOU ACTUALLY HAD ANY THOUGHTS OF KILLING YOURSELF?: NO

## 2024-09-13 ASSESSMENT — PAIN DESCRIPTION - ORIENTATION
ORIENTATION: RIGHT

## 2024-09-13 ASSESSMENT — PAIN DESCRIPTION - PAIN TYPE: TYPE: ACUTE PAIN

## 2024-09-13 NOTE — H&P
History Of Present Illness  Windy Lovelace is a 33 y.o. female   With past medical history of strokes presented with Ankle Fracture.  Patient was found on the ground in the bathroom and stool.  Patient was trying to get into the shower, stumbled and had a fall.  Patient denies hitting head or any loss of consciousness. she denies having fever, chills or any other symptoms.     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), Breast pain (03/21/2024), Candidiasis of skin and nail (06/25/2014), Cellulitis of face (10/10/2013), Headache, unspecified (04/29/2015), Hypomagnesemia (01/23/2014), Other cysts of oral region, not elsewhere classified (06/10/2016), Other enthesopathies, not elsewhere classified (10/29/2015), Other specified abnormal findings of blood chemistry (03/06/2020), Other specified joint disorders, left ankle and foot (11/02/2016), Other specified noninflammatory disorders of vagina (03/20/2018), Pain in right knee (11/12/2018), Pain in right knee (02/19/2018), Pain in right knee (02/20/2019), Pain, unspecified (10/17/2017), Pelvic and perineal pain (01/24/2018), Personal history of diseases of the skin and subcutaneous tissue (02/24/2016), Personal history of other diseases of the digestive system (10/06/2017), Personal history of other diseases of the digestive system (03/17/2015), Personal history of other diseases of the digestive system (05/24/2016), Personal history of other diseases of the female genital tract (08/31/2015), Personal history of other diseases of the musculoskeletal system and connective tissue (02/04/2014), Personal history of other diseases of the nervous system and sense organs (01/05/2015), Personal history of other diseases of the respiratory system (08/29/2013), Personal  history of other diseases of the respiratory system (02/03/2017), Personal history of other drug therapy (12/11/2017), Personal history of other endocrine, nutritional and metabolic disease (05/09/2019), Personal history of other mental and behavioral disorders (10/06/2017), Personal history of transient ischemic attack (TIA), and cerebral infarction without residual deficits (10/06/2017), Pleurodynia (01/23/2014), Pyuria (06/13/2017), Sprain of medial collateral ligament of right knee, initial encounter (11/02/2016), Sprain of unspecified ligament of left ankle, subsequent encounter (10/29/2015), Strain of muscle and tendon of unspecified wall of thorax, initial encounter (02/04/2014), Syncope and collapse (06/13/2017), Unspecified fall, initial encounter (01/23/2014), and Unspecified symptoms and signs involving the genitourinary system (01/24/2018).    Surgical History  She has a past surgical history that includes Other surgical history (06/16/2015); Other surgical history (10/06/2017); Other surgical history (07/23/2019); MR head angio w IV contrast (8/18/2015); US guided percutaneous peritoneal or retroperitoneal fluid collection drainage (4/10/2019); and US guided abscess drain (4/10/2019).     Social History  She reports that she has never smoked. She has never used smokeless tobacco. She reports that she does not drink alcohol and does not use drugs.    Family History  Family History   Problem Relation Name Age of Onset    Kidney cancer Mother      Liver cancer Mother      Pancreatic cancer Father      Glaucoma Other grandmother     Diabetes Other Grandfather         other type with arthropathy, with long term curent use of insulin    Diabetes Other aunt         other type with arthropathy, with long term curent use of insulin        Allergies  Bupropion, Ketamine, Augmentin [amoxicillin-pot clavulanate], Penicillin g, and Penicillins    Review of Systems   Constitutional:  Negative for activity change,  appetite change, chills and fever.   HENT:  Negative for congestion.    Eyes:  Negative for pain and discharge.   Respiratory:  Negative for cough, chest tightness and shortness of breath.    Cardiovascular:  Negative for chest pain, palpitations and leg swelling.   Gastrointestinal:  Negative for abdominal pain, constipation, diarrhea, nausea and vomiting.   Endocrine: Negative for cold intolerance and heat intolerance.   Genitourinary:  Negative for difficulty urinating and dysuria.   Musculoskeletal:  Positive for joint swelling. Negative for back pain, myalgias and neck pain.   Skin:  Negative for color change and rash.   Neurological:  Negative for dizziness, seizures, speech difficulty, numbness and headaches.   Psychiatric/Behavioral:  Negative for agitation, behavioral problems and confusion.         Physical Exam   Constitutional: NAD, resting comfortably in bed  Skin: Warm and dry, no rashes   Eyes: EOMI, clear sclera   ENMT: MMM   HEENT: Neck supple without apparent injury, EOMI, MMM  Respiratory: NWOB on RA   CV: RRR per peripheral pulses, limbs wwp  GI: soft, non-distended   Lymph: No apparent LAD  Neuro: MCGARRY spontaneously, CNs II - XII grossly intact   Psych: Appropriate mood and behavior   Last Recorded Vitals  /83   Pulse 95   Temp 36.5 °C (97.7 °F) (Temporal)   Resp 18   Wt 108 kg (239 lb)   SpO2 92%     Relevant Results      Results for orders placed or performed during the hospital encounter of 09/13/24 (from the past 24 hour(s))   CBC and Auto Differential   Result Value Ref Range    WBC 14.5 (H) 4.4 - 11.3 x10*3/uL    nRBC 0.0 0.0 - 0.0 /100 WBCs    RBC 5.57 (H) 4.00 - 5.20 x10*6/uL    Hemoglobin 15.5 12.0 - 16.0 g/dL    Hematocrit 47.3 (H) 36.0 - 46.0 %    MCV 85 80 - 100 fL    MCH 27.8 26.0 - 34.0 pg    MCHC 32.8 32.0 - 36.0 g/dL    RDW 13.8 11.5 - 14.5 %    Platelets 279 150 - 450 x10*3/uL    Neutrophils % 87.2 40.0 - 80.0 %    Immature Granulocytes %, Automated 0.4 0.0 - 0.9 %     Lymphocytes % 8.7 13.0 - 44.0 %    Monocytes % 3.4 2.0 - 10.0 %    Eosinophils % 0.1 0.0 - 6.0 %    Basophils % 0.2 0.0 - 2.0 %    Neutrophils Absolute 12.66 (H) 1.20 - 7.70 x10*3/uL    Immature Granulocytes Absolute, Automated 0.06 0.00 - 0.70 x10*3/uL    Lymphocytes Absolute 1.26 1.20 - 4.80 x10*3/uL    Monocytes Absolute 0.50 0.10 - 1.00 x10*3/uL    Eosinophils Absolute 0.02 0.00 - 0.70 x10*3/uL    Basophils Absolute 0.03 0.00 - 0.10 x10*3/uL   Comprehensive metabolic panel   Result Value Ref Range    Glucose 144 (H) 74 - 99 mg/dL    Sodium 138 136 - 145 mmol/L    Potassium 3.8 3.5 - 5.3 mmol/L    Chloride 107 98 - 107 mmol/L    Bicarbonate 21 21 - 32 mmol/L    Anion Gap 14 10 - 20 mmol/L    Urea Nitrogen 8 6 - 23 mg/dL    Creatinine 0.93 0.50 - 1.05 mg/dL    eGFR 82 >60 mL/min/1.73m*2    Calcium 8.8 8.6 - 10.3 mg/dL    Albumin 3.9 3.4 - 5.0 g/dL    Alkaline Phosphatase 90 33 - 110 U/L    Total Protein 7.4 6.4 - 8.2 g/dL    AST 27 9 - 39 U/L    Bilirubin, Total 0.4 0.0 - 1.2 mg/dL    ALT 70 (H) 7 - 45 U/L   Type And Screen   Result Value Ref Range    ABO TYPE A     Rh TYPE NEG     ANTIBODY SCREEN NEG    Coagulation Screen   Result Value Ref Range    Protime 11.2 9.8 - 12.8 seconds    INR 1.0 0.9 - 1.1    aPTT 29 27 - 38 seconds   hCG, quantitative, pregnancy   Result Value Ref Range    HCG, Beta-Quantitative <2 <5 mIU/mL   ECG 12 Lead   Result Value Ref Range    Ventricular Rate 100 BPM    Atrial Rate 100 BPM    MT Interval 122 ms    QRS Duration 78 ms    QT Interval 364 ms    QTC Calculation(Bazett) 469 ms    P Axis 94 degrees    R Axis 59 degrees    T Axis 35 degrees    QRS Count 16 beats    Q Onset 218 ms    P Onset 157 ms    P Offset 178 ms    T Offset 400 ms    QTC Fredericia 431 ms     XR ankle right 2 views    Result Date: 9/13/2024  Interpreted By:  Braydon Conklin, STUDY: XR ANKLE RIGHT 2 VIEWS; ;  9/13/2024 4:54 pm   INDICATION: Signs/Symptoms:reduction.     COMPARISON: Right ankle radiographs  09/13/2024.   ACCESSION NUMBER(S): [id number]   ORDERING CLINICIAN: SHERMAN ALEXANDRE   FINDINGS: There has been interval reduction of the ankle fracture dislocation with ankle mortise now in anatomic alignment. Overlying casting material limits evaluation of fine osseous detail. There is markedly improved alignment of the previously seen distal fibula metaphysis fracture. There is minimally displaced posterior malleolus fracture.       Interval reduction of the ankle fracture/dislocation with ankle mortise now in anatomic alignment. Improved alignment of the distal fibula and posterior malleolus fractures.     MACRO: None   Signed by: Braydon Conklin 9/13/2024 5:11 PM Dictation workstation:   [id number]    ECG 12 Lead    Result Date: 9/13/2024  Normal sinus rhythm Normal ECG When compared with ECG of 05-JUN-2024 12:28, Premature atrial complexes are no longer Present Vent. rate has increased BY  36 BPM ST elevation now present in Inferior leads QT has lengthened    XR chest 1 view    Result Date: 9/13/2024  Interpreted By:  Blair Dennis, STUDY: XR CHEST 1 VIEW  9/13/2024 1:15 pm   INDICATION: Signs/Symptoms:preop   COMPARISON: 02/07/2022   ACCESSION NUMBER(S): [id number]   ORDERING CLINICIAN: SHERMAN ALEXANDRE   TECHNIQUE: A single AP portable radiograph of the chest was obtained.   FINDINGS: Multiple cardiac monitoring leads are seen over the chest.   No focal infiltrate, pleural effusion or pneumothorax is identified. The cardiac silhouette is within normal limits for size.       No focal infiltrate or pneumothorax is identified.   MACRO: None.   Signed by: Blair Dennis 9/13/2024 1:30 PM Dictation workstation:   [id number]    CT thoracic spine wo IV contrast    Result Date: 9/13/2024  Interpreted By:  Blair Dennis, STUDY: CT CHEST ABDOMEN PELVIS WO CONTRAST; CT LUMBAR SPINE WO IV CONTRAST; CT THORACIC SPINE WO IV CONTRAST; 9/13/2024 12:33 pm   INDICATION: Signs/Symptoms:fall, abdo pain and diarrhea;  Signs/Symptoms:fall; Signs/Symptoms:fall, upper t spine tenderness.   COMPARISON: None.   ACCESSION NUMBER(S): [id number]; [id number]; [id number]   ORDERING CLINICIAN: SHERMAN ALEXANDRE   TECHNIQUE: Contiguous axial CT images were obtained at 3mm slice thickness through the chest, abdomen and pelvis without intravenous contrast administration. Coronal and sagittal reconstructions at 3 mm slice thickness were then performed. Intravenous contrast was not given per the referring physician's request.   FINDINGS: The study is markedly limited by the lack of intravenous contrast and is essentially nondiagnostic for vascular solid organ injury.   CHEST:   CHEST WALL AND LOWER NECK: Evaluation of the visualized neck base demonstrates no gross mass or lymphadenopathy.   MEDIASTINUM AND ALLEY: There is no gross axillary or mediastinal lymphadenopathy identified. Evaluation of the alley is limited by the lack of intravenous contrast.   HEART: The heart is within normal limits for size. No pericardial effusion is identified.   VESSELS: The thoracic aorta is within normal limits for course and caliber, without evidence of aneurysm.   LUNG/PLEURA/LARGE AIRWAYS: There is no focal infiltrate, pleural effusion or pneumothorax identified. No discrete pulmonary nodules or masses are seen.   ABDOMEN:   LIVER: The liver is within normal limits for appearance, without evidence of focal masses.   BILE DUCTS: No definite intra or extrahepatic biliary dilatation is identified.   GALLBLADDER: The gallbladder is nondilated. No definite calcified gallstones are seen.   PANCREAS: The pancreas is within normal limits for appearance, without evidence of focal masses.   SPLEEN: The spleen is within normal limits for size. No focal splenic mass is seen.   ADRENAL GLANDS: No definite adrenal nodules or masses are seen bilaterally.   KIDNEYS AND URETERS: There is no hydronephrosis, hydroureter or renal/ ureteral calculus identified bilaterally. No  definite focal renal mass is seen, though evaluation is severely limited by the lack of intravenous contrast..   PELVIS:   BLADDER: The urinary bladder is grossly unremarkable for CT appearance.   REPRODUCTIVE ORGANS: A T-shaped intrauterine device is identified in situ within the endometrial canal. The uterus and ovaries are otherwise grossly unremarkable for CT appearance.   BOWEL: The colon and small bowel are within normal limits for course, caliber and appearance, without evidence of wall thickening or obstruction. The appendix is not visualized. No CT evidence of acute diverticulitis or appendicitis is seen.   VESSELS: The abdominal aorta is within normal limits for course, caliber and appearance, without evidence of aneurysm.   PERITONEUM/RETROPERITONEUM/LYMPH NODES: There is no free intraperitoneal air or free fluid identified. No gross mesenteric or retroperitoneal lymphadenopathy is identified.   BONE AND SOFT TISSUE: There is no evidence of acute fracture identified. No evidence of abdominal wall mass or hernia is identified.   THORACIC/LUMBAR SPINE: No CT evidence of acute fracture is seen.   Vertebral bodies are well aligned without evidence of subluxation.   Mild disc space narrowing and small marginal osteophytes are seen in the midthoracic spine.       CHEST: 1. No focal infiltrate, pulmonary nodule or lymphadenopathy identified.   ABDOMEN-PELVIS: 1. No evidence of bowel obstruction, free intraperitoneal air or abnormal intra-abdominal fluid collection.   THORACIC/LUMBAR SPINE: 1. No evidence of acute fracture.   MACRO: None   Signed by: Blair Dennis 9/13/2024 1:09 PM Dictation workstation:   [id number]    CT lumbar spine wo IV contrast    Result Date: 9/13/2024  Interpreted By:  Blair Dennis, STUDY: CT CHEST ABDOMEN PELVIS WO CONTRAST; CT LUMBAR SPINE WO IV CONTRAST; CT THORACIC SPINE WO IV CONTRAST; 9/13/2024 12:33 pm   INDICATION: Signs/Symptoms:fall, abdo pain and diarrhea;  Signs/Symptoms:fall; Signs/Symptoms:fall, upper t spine tenderness.   COMPARISON: None.   ACCESSION NUMBER(S): [id number]; [id number]; [id number]   ORDERING CLINICIAN: SHERMAN ALEXANDRE   TECHNIQUE: Contiguous axial CT images were obtained at 3mm slice thickness through the chest, abdomen and pelvis without intravenous contrast administration. Coronal and sagittal reconstructions at 3 mm slice thickness were then performed. Intravenous contrast was not given per the referring physician's request.   FINDINGS: The study is markedly limited by the lack of intravenous contrast and is essentially nondiagnostic for vascular solid organ injury.   CHEST:   CHEST WALL AND LOWER NECK: Evaluation of the visualized neck base demonstrates no gross mass or lymphadenopathy.   MEDIASTINUM AND ALLEY: There is no gross axillary or mediastinal lymphadenopathy identified. Evaluation of the alley is limited by the lack of intravenous contrast.   HEART: The heart is within normal limits for size. No pericardial effusion is identified.   VESSELS: The thoracic aorta is within normal limits for course and caliber, without evidence of aneurysm.   LUNG/PLEURA/LARGE AIRWAYS: There is no focal infiltrate, pleural effusion or pneumothorax identified. No discrete pulmonary nodules or masses are seen.   ABDOMEN:   LIVER: The liver is within normal limits for appearance, without evidence of focal masses.   BILE DUCTS: No definite intra or extrahepatic biliary dilatation is identified.   GALLBLADDER: The gallbladder is nondilated. No definite calcified gallstones are seen.   PANCREAS: The pancreas is within normal limits for appearance, without evidence of focal masses.   SPLEEN: The spleen is within normal limits for size. No focal splenic mass is seen.   ADRENAL GLANDS: No definite adrenal nodules or masses are seen bilaterally.   KIDNEYS AND URETERS: There is no hydronephrosis, hydroureter or renal/ ureteral calculus identified bilaterally. No  definite focal renal mass is seen, though evaluation is severely limited by the lack of intravenous contrast..   PELVIS:   BLADDER: The urinary bladder is grossly unremarkable for CT appearance.   REPRODUCTIVE ORGANS: A T-shaped intrauterine device is identified in situ within the endometrial canal. The uterus and ovaries are otherwise grossly unremarkable for CT appearance.   BOWEL: The colon and small bowel are within normal limits for course, caliber and appearance, without evidence of wall thickening or obstruction. The appendix is not visualized. No CT evidence of acute diverticulitis or appendicitis is seen.   VESSELS: The abdominal aorta is within normal limits for course, caliber and appearance, without evidence of aneurysm.   PERITONEUM/RETROPERITONEUM/LYMPH NODES: There is no free intraperitoneal air or free fluid identified. No gross mesenteric or retroperitoneal lymphadenopathy is identified.   BONE AND SOFT TISSUE: There is no evidence of acute fracture identified. No evidence of abdominal wall mass or hernia is identified.   THORACIC/LUMBAR SPINE: No CT evidence of acute fracture is seen.   Vertebral bodies are well aligned without evidence of subluxation.   Mild disc space narrowing and small marginal osteophytes are seen in the midthoracic spine.       CHEST: 1. No focal infiltrate, pulmonary nodule or lymphadenopathy identified.   ABDOMEN-PELVIS: 1. No evidence of bowel obstruction, free intraperitoneal air or abnormal intra-abdominal fluid collection.   THORACIC/LUMBAR SPINE: 1. No evidence of acute fracture.   MACRO: None   Signed by: Blair Dennis 9/13/2024 1:09 PM Dictation workstation:   [id number]    CT chest abdomen pelvis wo IV contrast    Result Date: 9/13/2024  Interpreted By:  Blair Dennis, STUDY: CT CHEST ABDOMEN PELVIS WO CONTRAST; CT LUMBAR SPINE WO IV CONTRAST; CT THORACIC SPINE WO IV CONTRAST; 9/13/2024 12:33 pm   INDICATION: Signs/Symptoms:fall, abdo pain and diarrhea;  Signs/Symptoms:fall; Signs/Symptoms:fall, upper t spine tenderness.   COMPARISON: None.   ACCESSION NUMBER(S): [id number]; [id number]; [id number]   ORDERING CLINICIAN: SHERMAN ALEXANDRE   TECHNIQUE: Contiguous axial CT images were obtained at 3mm slice thickness through the chest, abdomen and pelvis without intravenous contrast administration. Coronal and sagittal reconstructions at 3 mm slice thickness were then performed. Intravenous contrast was not given per the referring physician's request.   FINDINGS: The study is markedly limited by the lack of intravenous contrast and is essentially nondiagnostic for vascular solid organ injury.   CHEST:   CHEST WALL AND LOWER NECK: Evaluation of the visualized neck base demonstrates no gross mass or lymphadenopathy.   MEDIASTINUM AND ALLEY: There is no gross axillary or mediastinal lymphadenopathy identified. Evaluation of the alley is limited by the lack of intravenous contrast.   HEART: The heart is within normal limits for size. No pericardial effusion is identified.   VESSELS: The thoracic aorta is within normal limits for course and caliber, without evidence of aneurysm.   LUNG/PLEURA/LARGE AIRWAYS: There is no focal infiltrate, pleural effusion or pneumothorax identified. No discrete pulmonary nodules or masses are seen.   ABDOMEN:   LIVER: The liver is within normal limits for appearance, without evidence of focal masses.   BILE DUCTS: No definite intra or extrahepatic biliary dilatation is identified.   GALLBLADDER: The gallbladder is nondilated. No definite calcified gallstones are seen.   PANCREAS: The pancreas is within normal limits for appearance, without evidence of focal masses.   SPLEEN: The spleen is within normal limits for size. No focal splenic mass is seen.   ADRENAL GLANDS: No definite adrenal nodules or masses are seen bilaterally.   KIDNEYS AND URETERS: There is no hydronephrosis, hydroureter or renal/ ureteral calculus identified bilaterally. No  definite focal renal mass is seen, though evaluation is severely limited by the lack of intravenous contrast..   PELVIS:   BLADDER: The urinary bladder is grossly unremarkable for CT appearance.   REPRODUCTIVE ORGANS: A T-shaped intrauterine device is identified in situ within the endometrial canal. The uterus and ovaries are otherwise grossly unremarkable for CT appearance.   BOWEL: The colon and small bowel are within normal limits for course, caliber and appearance, without evidence of wall thickening or obstruction. The appendix is not visualized. No CT evidence of acute diverticulitis or appendicitis is seen.   VESSELS: The abdominal aorta is within normal limits for course, caliber and appearance, without evidence of aneurysm.   PERITONEUM/RETROPERITONEUM/LYMPH NODES: There is no free intraperitoneal air or free fluid identified. No gross mesenteric or retroperitoneal lymphadenopathy is identified.   BONE AND SOFT TISSUE: There is no evidence of acute fracture identified. No evidence of abdominal wall mass or hernia is identified.   THORACIC/LUMBAR SPINE: No CT evidence of acute fracture is seen.   Vertebral bodies are well aligned without evidence of subluxation.   Mild disc space narrowing and small marginal osteophytes are seen in the midthoracic spine.       CHEST: 1. No focal infiltrate, pulmonary nodule or lymphadenopathy identified.   ABDOMEN-PELVIS: 1. No evidence of bowel obstruction, free intraperitoneal air or abnormal intra-abdominal fluid collection.   THORACIC/LUMBAR SPINE: 1. No evidence of acute fracture.   MACRO: None   Signed by: Blair Dennis 9/13/2024 1:09 PM Dictation workstation:   [id number]    CT cervical spine wo IV contrast    Result Date: 9/13/2024  Interpreted By:  Blair Dennis, STUDY: CT CERVICAL SPINE WO IV CONTRAST; 9/13/2024 12:33 pm   INDICATION: Signs/Symptoms:fall.   COMPARISON: None.   ACCESSION NUMBER(S): [id number]   ORDERING CLINICIAN: SHERMAN ALEXANDRE   TECHNIQUE:  Contiguous axial CT images were obtained at  2 mm slice thickness through the cervical spine without contrast administration. The images were then reconstructed in the coronal and sagittal planes.   FINDINGS: There is no CT evidence of acute fracture identified. No prevertebral soft tissue swelling is identified.   ALIGNMENT: The vertebral bodies are well aligned without evidence of subluxation.   VERTEBRAE/DISC SPACES: The disc spaces and posterior facet joints are well preserved throughout without significant degenerative changes.   ADDITIONAL FINDINGS: Evaluation of the visualized soft tissues of the neck is limited by the lack of intravenous contrast.  Within this limitation, no gross mass or lymphadenopathy is identified.       1.  No CT evidence of acute fracture.   Signed by: Blair Dennis 9/13/2024 1:01 PM Dictation workstation:   [id number]    CT head wo IV contrast    Result Date: 9/13/2024  Interpreted By:  Blair Dennis, STUDY: CT HEAD WO IV CONTRAST; 9/13/2024 12:33 pm   INDICATION: Signs/Symptoms:fall.   COMPARISON: None.   ACCESSION NUMBER(S): [id number]   ORDERING CLINICIAN: SHERMAN ALEXANDRE   TECHNIQUE: Contiguous axial CT images were obtained through the head at 5 mm slice thickness without contrast administration.   FINDINGS: INTRACRANIAL: The ventricles, sulci and basal cisterns are within normal limits for size and configuration. The grey-white differentiation is intact. There is no mass effect or midline shift. There is no extraaxial fluid collection. There is no intracranial hemorrhage.  The calvarium is unremarkable.   EXTRACRANIAL: Visualized paranasal sinuses and mastoids are clear.       No evidence of acute cortical infarct or intracranial hemorrhage.   MACRO: None     Signed by: Blair Dennis 9/13/2024 12:59 PM Dictation workstation:   [id number]    XR foot right 1-2 views    Result Date: 9/13/2024  Interpreted By:  Gabo Rollins, STUDY: XR FOOT RIGHT 1-2 VIEWS; 9/13/2024 11:10 am    INDICATION: Signs/Symptoms:ankle fx. Found on ground. Ankle deformity.   COMPARISON: None.   ACCESSION NUMBER(S): [id number]   ORDERING CLINICIAN: SHERMAN ALEXANDRE   TECHNIQUE: Right foot two views, portable   FINDINGS: Partially included within the field of view is the fracture dislocation of the right ankle described on the tib fib and ankle series. Please see those reports. The osseous structures of the foot are intact.       Fracture dislocation right ankle. No fracture of the osseous structures of the foot.   MACRO: none   Signed by: Gabo Rollins 9/13/2024 11:52 AM Dictation workstation:   [id number]    XR tibia fibula right 2 views    Result Date: 9/13/2024  Interpreted By:  Gabo Rollins, STUDY: XR TIBIA FIBULA RIGHT 2 VIEWS; 9/13/2024 11:10 am   INDICATION: Signs/Symptoms:ankle fx. Found on ground. Ankle deformity.   COMPARISON: None.   ACCESSION NUMBER(S): [id number]   ORDERING CLINICIAN: SHERMAN ALEXANDRE   TECHNIQUE: Right tib fib two views   FINDINGS: Please see the separate ankle series report for further assessment.   The proximal and mid shaft of the tibia and fibula are intact. There is a spiral fracture of the distal fibula with medial angulation at the apex of the fracture site. There is an avulsed fracture fragment involving the medial malleolus. The tibia and fibula are displaced medially and anteriorly. There is suspicion of a vertical fracture through the posterior malleolus.       Trimalleolar and possible trimalleolar fracture dislocation of the right ankle as above.   MACRO: none   Signed by: Gabo Rollins 9/13/2024 11:51 AM Dictation workstation:   [id number]    XR ankle right 2 views    Result Date: 9/13/2024  Interpreted By:  Gabo Rollins, STUDY: XR ANKLE RIGHT 2 VIEWS; 9/13/2024 11:10 am   INDICATION: Signs/Symptoms:ankle fx. Ankle deformity. Found on ground.   COMPARISON: None.   ACCESSION NUMBER(S): [id number]   ORDERING CLINICIAN: SHERMAN ALEXANDRE   TECHNIQUE: Right ankle two  views   FINDINGS: There is a spiral fracture of the distal fibula with marked medial angulation at the apex of the fracture site and overriding of the fracture fragments. There is an associated avulsion fracture of the medial malleolus. There is marked widening of the ankle mortise medially with medial displacement of the tibia and fibula relative to the talar dome. There is also anterior angulation at the fibular fracture site with anterior subluxation of the tibia relative to the talar dome. There is suspicion of a vertical fracture through the posterior malleolus but this is difficult to assess definitively due to bony overlap.       Bimalleolar and possible trimalleolar fracture dislocation of the right ankle as described above.   MACRO: none   Signed by: Gabo Rollins 9/13/2024 11:50 AM Dictation workstation:   [id number]        Assessment/Plan   Assessment & Plan  Closed fracture of right ankle, initial encounter      # Right ankle fracture  -s/p closed reduction   -ortho following  -pain control  -PT/OT    # H/o strokes  -continue aspirin    # Anxiety  -continue home meds    # Insomnia  -continue trazodone    # DVT ppx       Shemar Diaz MD

## 2024-09-13 NOTE — ED TRIAGE NOTES
Pt arrives via EMS from home. Per EMS pt was found on the ground in the bathroom in stool. Pt fell trying to get into the shower, stumbled, and landed on the R ankle. Obvious ankle deformity placed in splint. Pt denies hitting head or LOC. Pt has hx of MRDD as well as past strokes. Her right side is weak d/t the stroke hx. 50 mcg of fentanyl was given by EMS. Caregiver at bedside. Caregiver informed us that she has GI issues and had a surgery in the past.

## 2024-09-13 NOTE — CARE PLAN
The patient's goals for the shift include      The clinical goals for the shift include to remain comfortable throughout the shift.    Over the shift, the patient did not make progress toward the following goals. Barriers to progression include right ankle fracture from s/p fall at home. Recommendations to address these barriers include pain meds as ordered, bedrest and .

## 2024-09-13 NOTE — PROGRESS NOTES
Pharmacy Medication History Review    Per [facility] pharmacy    Windy Lovelace is a 33 y.o. female admitted for No Principal Problem: There is no principal problem currently on the Problem List. Please update the Problem List and refresh.. Pharmacy reviewed the patient's [id number] medications and allergies for accuracy.    The list below reflectives the updated PTA list. Please review each medication in order reconciliation for additional clarification and justification.       The list below reflectives the updated allergy list. Please review each documented allergy for additional clarification and justification.  Allergies  Reviewed by Elena Sargent RN on 9/13/2024        Severity Reactions Comments    Bupropion High Unknown, Hives     Ketamine High Unknown, Anaphylaxis, Rash     Augmentin [amoxicillin-pot Clavulanate] Not Specified Hives     Penicillin G Not Specified Hives     Penicillins Not Specified Unknown, Hives             Below are additional concerns with the patient's PTA list.  Prior to Admission Medications   Prescriptions Last Dose Informant   aspirin 81 mg EC tablet 9/12/2024    Sig: Take 1 tablet (81 mg) by mouth once daily.   cholecalciferol (Vitamin D-3) 50 mcg (2,000 unit) capsule 9/12/2024    Sig: Take 1 capsule (50 mcg) by mouth once daily.   dicyclomine (Bentyl) 10 mg capsule     Sig: Take 1 capsule (10 mg) by mouth 4 times a day as needed (abdominal pain).   fluticasone (Flonase) 50 mcg/actuation nasal spray     Sig: Administer 2 sprays into each nostril once daily as needed for allergies.   fluticasone propion-salmeteroL (Advair Diskus) 100-50 mcg/dose diskus inhaler     Sig: Inhale 1 puff every 12 hours. Rinse mouth with water after use to reduce aftertaste and incidence of candidiasis. Do not swallow.   Patient taking differently: Inhale 1 puff 2 times a day as needed (wheezing/SOB). Rinse mouth with water after use to reduce aftertaste and incidence of  candidiasis. Do not swallow.    Takes as needed   ibuprofen 200 mg tablet     Sig: Take 1 tablet (200 mg) by mouth every 6 hours if needed for mild pain (1 - 3).   leg brace (Knee Support Brace) misc     Si Units once daily.   levonorgestrel (Mirena) 21 mcg/24 hours (8 yrs) 52 mg IUD     Sig: Mirena (52 MG) 20 MCG/24HR IUD   Refills: 0       Active   omeprazole (PriLOSEC) 20 mg DR capsule 2024    Sig: Take 1 capsule (20 mg) by mouth once daily. Do not crush or chew.   prazosin (Minipress) 5 mg capsule 2024    Sig: Take 1 capsule (5 mg) by mouth once daily at bedtime. Dosage unknown   sertraline (Zoloft) 100 mg tablet 2024    Sig: Take 1 tablet (100 mg) by mouth early in the morning..   traZODone (Desyrel) 100 mg tablet 2024    Sig: Take 1 tablet (100 mg) by mouth once daily at bedtime. Dosage unknown      Facility-Administered Medications: None        Osiris Sanchez

## 2024-09-13 NOTE — H&P (VIEW-ONLY)
History Of Present Illness  Windy Lovelace is a 33 y.o. female   With past medical history of strokes presented with Ankle Fracture.  Patient was found on the ground in the bathroom and stool.  Patient was trying to get into the shower, stumbled and had a fall.  Patient denies hitting head or any loss of consciousness. she denies having fever, chills or any other symptoms.     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), Breast pain (03/21/2024), Candidiasis of skin and nail (06/25/2014), Cellulitis of face (10/10/2013), Headache, unspecified (04/29/2015), Hypomagnesemia (01/23/2014), Other cysts of oral region, not elsewhere classified (06/10/2016), Other enthesopathies, not elsewhere classified (10/29/2015), Other specified abnormal findings of blood chemistry (03/06/2020), Other specified joint disorders, left ankle and foot (11/02/2016), Other specified noninflammatory disorders of vagina (03/20/2018), Pain in right knee (11/12/2018), Pain in right knee (02/19/2018), Pain in right knee (02/20/2019), Pain, unspecified (10/17/2017), Pelvic and perineal pain (01/24/2018), Personal history of diseases of the skin and subcutaneous tissue (02/24/2016), Personal history of other diseases of the digestive system (10/06/2017), Personal history of other diseases of the digestive system (03/17/2015), Personal history of other diseases of the digestive system (05/24/2016), Personal history of other diseases of the female genital tract (08/31/2015), Personal history of other diseases of the musculoskeletal system and connective tissue (02/04/2014), Personal history of other diseases of the nervous system and sense organs (01/05/2015), Personal history of other diseases of the respiratory system (08/29/2013), Personal  history of other diseases of the respiratory system (02/03/2017), Personal history of other drug therapy (12/11/2017), Personal history of other endocrine, nutritional and metabolic disease (05/09/2019), Personal history of other mental and behavioral disorders (10/06/2017), Personal history of transient ischemic attack (TIA), and cerebral infarction without residual deficits (10/06/2017), Pleurodynia (01/23/2014), Pyuria (06/13/2017), Sprain of medial collateral ligament of right knee, initial encounter (11/02/2016), Sprain of unspecified ligament of left ankle, subsequent encounter (10/29/2015), Strain of muscle and tendon of unspecified wall of thorax, initial encounter (02/04/2014), Syncope and collapse (06/13/2017), Unspecified fall, initial encounter (01/23/2014), and Unspecified symptoms and signs involving the genitourinary system (01/24/2018).    Surgical History  She has a past surgical history that includes Other surgical history (06/16/2015); Other surgical history (10/06/2017); Other surgical history (07/23/2019); MR head angio w IV contrast (8/18/2015); US guided percutaneous peritoneal or retroperitoneal fluid collection drainage (4/10/2019); and US guided abscess drain (4/10/2019).     Social History  She reports that she has never smoked. She has never used smokeless tobacco. She reports that she does not drink alcohol and does not use drugs.    Family History  Family History   Problem Relation Name Age of Onset    Kidney cancer Mother      Liver cancer Mother      Pancreatic cancer Father      Glaucoma Other grandmother     Diabetes Other Grandfather         other type with arthropathy, with long term curent use of insulin    Diabetes Other aunt         other type with arthropathy, with long term curent use of insulin        Allergies  Bupropion, Ketamine, Augmentin [amoxicillin-pot clavulanate], Penicillin g, and Penicillins    Review of Systems   Constitutional:  Negative for activity change,  appetite change, chills and fever.   HENT:  Negative for congestion.    Eyes:  Negative for pain and discharge.   Respiratory:  Negative for cough, chest tightness and shortness of breath.    Cardiovascular:  Negative for chest pain, palpitations and leg swelling.   Gastrointestinal:  Negative for abdominal pain, constipation, diarrhea, nausea and vomiting.   Endocrine: Negative for cold intolerance and heat intolerance.   Genitourinary:  Negative for difficulty urinating and dysuria.   Musculoskeletal:  Positive for joint swelling. Negative for back pain, myalgias and neck pain.   Skin:  Negative for color change and rash.   Neurological:  Negative for dizziness, seizures, speech difficulty, numbness and headaches.   Psychiatric/Behavioral:  Negative for agitation, behavioral problems and confusion.         Physical Exam   Constitutional: NAD, resting comfortably in bed  Skin: Warm and dry, no rashes   Eyes: EOMI, clear sclera   ENMT: MMM   HEENT: Neck supple without apparent injury, EOMI, MMM  Respiratory: NWOB on RA   CV: RRR per peripheral pulses, limbs wwp  GI: soft, non-distended   Lymph: No apparent LAD  Neuro: MCGARRY spontaneously, CNs II - XII grossly intact   Psych: Appropriate mood and behavior   Last Recorded Vitals  /83   Pulse 95   Temp 36.5 °C (97.7 °F) (Temporal)   Resp 18   Wt 108 kg (239 lb)   SpO2 92%     Relevant Results      Results for orders placed or performed during the hospital encounter of 09/13/24 (from the past 24 hour(s))   CBC and Auto Differential   Result Value Ref Range    WBC 14.5 (H) 4.4 - 11.3 x10*3/uL    nRBC 0.0 0.0 - 0.0 /100 WBCs    RBC 5.57 (H) 4.00 - 5.20 x10*6/uL    Hemoglobin 15.5 12.0 - 16.0 g/dL    Hematocrit 47.3 (H) 36.0 - 46.0 %    MCV 85 80 - 100 fL    MCH 27.8 26.0 - 34.0 pg    MCHC 32.8 32.0 - 36.0 g/dL    RDW 13.8 11.5 - 14.5 %    Platelets 279 150 - 450 x10*3/uL    Neutrophils % 87.2 40.0 - 80.0 %    Immature Granulocytes %, Automated 0.4 0.0 - 0.9 %     Lymphocytes % 8.7 13.0 - 44.0 %    Monocytes % 3.4 2.0 - 10.0 %    Eosinophils % 0.1 0.0 - 6.0 %    Basophils % 0.2 0.0 - 2.0 %    Neutrophils Absolute 12.66 (H) 1.20 - 7.70 x10*3/uL    Immature Granulocytes Absolute, Automated 0.06 0.00 - 0.70 x10*3/uL    Lymphocytes Absolute 1.26 1.20 - 4.80 x10*3/uL    Monocytes Absolute 0.50 0.10 - 1.00 x10*3/uL    Eosinophils Absolute 0.02 0.00 - 0.70 x10*3/uL    Basophils Absolute 0.03 0.00 - 0.10 x10*3/uL   Comprehensive metabolic panel   Result Value Ref Range    Glucose 144 (H) 74 - 99 mg/dL    Sodium 138 136 - 145 mmol/L    Potassium 3.8 3.5 - 5.3 mmol/L    Chloride 107 98 - 107 mmol/L    Bicarbonate 21 21 - 32 mmol/L    Anion Gap 14 10 - 20 mmol/L    Urea Nitrogen 8 6 - 23 mg/dL    Creatinine 0.93 0.50 - 1.05 mg/dL    eGFR 82 >60 mL/min/1.73m*2    Calcium 8.8 8.6 - 10.3 mg/dL    Albumin 3.9 3.4 - 5.0 g/dL    Alkaline Phosphatase 90 33 - 110 U/L    Total Protein 7.4 6.4 - 8.2 g/dL    AST 27 9 - 39 U/L    Bilirubin, Total 0.4 0.0 - 1.2 mg/dL    ALT 70 (H) 7 - 45 U/L   Type And Screen   Result Value Ref Range    ABO TYPE A     Rh TYPE NEG     ANTIBODY SCREEN NEG    Coagulation Screen   Result Value Ref Range    Protime 11.2 9.8 - 12.8 seconds    INR 1.0 0.9 - 1.1    aPTT 29 27 - 38 seconds   hCG, quantitative, pregnancy   Result Value Ref Range    HCG, Beta-Quantitative <2 <5 mIU/mL   ECG 12 Lead   Result Value Ref Range    Ventricular Rate 100 BPM    Atrial Rate 100 BPM    KS Interval 122 ms    QRS Duration 78 ms    QT Interval 364 ms    QTC Calculation(Bazett) 469 ms    P Axis 94 degrees    R Axis 59 degrees    T Axis 35 degrees    QRS Count 16 beats    Q Onset 218 ms    P Onset 157 ms    P Offset 178 ms    T Offset 400 ms    QTC Fredericia 431 ms     XR ankle right 2 views    Result Date: 9/13/2024  Interpreted By:  Braydon Conklin, STUDY: XR ANKLE RIGHT 2 VIEWS; ;  9/13/2024 4:54 pm   INDICATION: Signs/Symptoms:reduction.     COMPARISON: Right ankle radiographs  09/13/2024.   ACCESSION NUMBER(S): [id number]   ORDERING CLINICIAN: SHERMAN ALEXANDRE   FINDINGS: There has been interval reduction of the ankle fracture dislocation with ankle mortise now in anatomic alignment. Overlying casting material limits evaluation of fine osseous detail. There is markedly improved alignment of the previously seen distal fibula metaphysis fracture. There is minimally displaced posterior malleolus fracture.       Interval reduction of the ankle fracture/dislocation with ankle mortise now in anatomic alignment. Improved alignment of the distal fibula and posterior malleolus fractures.     MACRO: None   Signed by: Braydon Conklin 9/13/2024 5:11 PM Dictation workstation:   [id number]    ECG 12 Lead    Result Date: 9/13/2024  Normal sinus rhythm Normal ECG When compared with ECG of 05-JUN-2024 12:28, Premature atrial complexes are no longer Present Vent. rate has increased BY  36 BPM ST elevation now present in Inferior leads QT has lengthened    XR chest 1 view    Result Date: 9/13/2024  Interpreted By:  Blair Dennis, STUDY: XR CHEST 1 VIEW  9/13/2024 1:15 pm   INDICATION: Signs/Symptoms:preop   COMPARISON: 02/07/2022   ACCESSION NUMBER(S): [id number]   ORDERING CLINICIAN: SHERMAN ALEXANDRE   TECHNIQUE: A single AP portable radiograph of the chest was obtained.   FINDINGS: Multiple cardiac monitoring leads are seen over the chest.   No focal infiltrate, pleural effusion or pneumothorax is identified. The cardiac silhouette is within normal limits for size.       No focal infiltrate or pneumothorax is identified.   MACRO: None.   Signed by: Blair Dennis 9/13/2024 1:30 PM Dictation workstation:   [id number]    CT thoracic spine wo IV contrast    Result Date: 9/13/2024  Interpreted By:  Blair Dennis, STUDY: CT CHEST ABDOMEN PELVIS WO CONTRAST; CT LUMBAR SPINE WO IV CONTRAST; CT THORACIC SPINE WO IV CONTRAST; 9/13/2024 12:33 pm   INDICATION: Signs/Symptoms:fall, abdo pain and diarrhea;  Signs/Symptoms:fall; Signs/Symptoms:fall, upper t spine tenderness.   COMPARISON: None.   ACCESSION NUMBER(S): [id number]; [id number]; [id number]   ORDERING CLINICIAN: SHERMAN ALEXANDRE   TECHNIQUE: Contiguous axial CT images were obtained at 3mm slice thickness through the chest, abdomen and pelvis without intravenous contrast administration. Coronal and sagittal reconstructions at 3 mm slice thickness were then performed. Intravenous contrast was not given per the referring physician's request.   FINDINGS: The study is markedly limited by the lack of intravenous contrast and is essentially nondiagnostic for vascular solid organ injury.   CHEST:   CHEST WALL AND LOWER NECK: Evaluation of the visualized neck base demonstrates no gross mass or lymphadenopathy.   MEDIASTINUM AND ALLEY: There is no gross axillary or mediastinal lymphadenopathy identified. Evaluation of the alley is limited by the lack of intravenous contrast.   HEART: The heart is within normal limits for size. No pericardial effusion is identified.   VESSELS: The thoracic aorta is within normal limits for course and caliber, without evidence of aneurysm.   LUNG/PLEURA/LARGE AIRWAYS: There is no focal infiltrate, pleural effusion or pneumothorax identified. No discrete pulmonary nodules or masses are seen.   ABDOMEN:   LIVER: The liver is within normal limits for appearance, without evidence of focal masses.   BILE DUCTS: No definite intra or extrahepatic biliary dilatation is identified.   GALLBLADDER: The gallbladder is nondilated. No definite calcified gallstones are seen.   PANCREAS: The pancreas is within normal limits for appearance, without evidence of focal masses.   SPLEEN: The spleen is within normal limits for size. No focal splenic mass is seen.   ADRENAL GLANDS: No definite adrenal nodules or masses are seen bilaterally.   KIDNEYS AND URETERS: There is no hydronephrosis, hydroureter or renal/ ureteral calculus identified bilaterally. No  definite focal renal mass is seen, though evaluation is severely limited by the lack of intravenous contrast..   PELVIS:   BLADDER: The urinary bladder is grossly unremarkable for CT appearance.   REPRODUCTIVE ORGANS: A T-shaped intrauterine device is identified in situ within the endometrial canal. The uterus and ovaries are otherwise grossly unremarkable for CT appearance.   BOWEL: The colon and small bowel are within normal limits for course, caliber and appearance, without evidence of wall thickening or obstruction. The appendix is not visualized. No CT evidence of acute diverticulitis or appendicitis is seen.   VESSELS: The abdominal aorta is within normal limits for course, caliber and appearance, without evidence of aneurysm.   PERITONEUM/RETROPERITONEUM/LYMPH NODES: There is no free intraperitoneal air or free fluid identified. No gross mesenteric or retroperitoneal lymphadenopathy is identified.   BONE AND SOFT TISSUE: There is no evidence of acute fracture identified. No evidence of abdominal wall mass or hernia is identified.   THORACIC/LUMBAR SPINE: No CT evidence of acute fracture is seen.   Vertebral bodies are well aligned without evidence of subluxation.   Mild disc space narrowing and small marginal osteophytes are seen in the midthoracic spine.       CHEST: 1. No focal infiltrate, pulmonary nodule or lymphadenopathy identified.   ABDOMEN-PELVIS: 1. No evidence of bowel obstruction, free intraperitoneal air or abnormal intra-abdominal fluid collection.   THORACIC/LUMBAR SPINE: 1. No evidence of acute fracture.   MACRO: None   Signed by: Blair Dennis 9/13/2024 1:09 PM Dictation workstation:   [id number]    CT lumbar spine wo IV contrast    Result Date: 9/13/2024  Interpreted By:  Blair Dennis, STUDY: CT CHEST ABDOMEN PELVIS WO CONTRAST; CT LUMBAR SPINE WO IV CONTRAST; CT THORACIC SPINE WO IV CONTRAST; 9/13/2024 12:33 pm   INDICATION: Signs/Symptoms:fall, abdo pain and diarrhea;  Signs/Symptoms:fall; Signs/Symptoms:fall, upper t spine tenderness.   COMPARISON: None.   ACCESSION NUMBER(S): [id number]; [id number]; [id number]   ORDERING CLINICIAN: SHERMAN ALEXANDRE   TECHNIQUE: Contiguous axial CT images were obtained at 3mm slice thickness through the chest, abdomen and pelvis without intravenous contrast administration. Coronal and sagittal reconstructions at 3 mm slice thickness were then performed. Intravenous contrast was not given per the referring physician's request.   FINDINGS: The study is markedly limited by the lack of intravenous contrast and is essentially nondiagnostic for vascular solid organ injury.   CHEST:   CHEST WALL AND LOWER NECK: Evaluation of the visualized neck base demonstrates no gross mass or lymphadenopathy.   MEDIASTINUM AND ALLEY: There is no gross axillary or mediastinal lymphadenopathy identified. Evaluation of the alley is limited by the lack of intravenous contrast.   HEART: The heart is within normal limits for size. No pericardial effusion is identified.   VESSELS: The thoracic aorta is within normal limits for course and caliber, without evidence of aneurysm.   LUNG/PLEURA/LARGE AIRWAYS: There is no focal infiltrate, pleural effusion or pneumothorax identified. No discrete pulmonary nodules or masses are seen.   ABDOMEN:   LIVER: The liver is within normal limits for appearance, without evidence of focal masses.   BILE DUCTS: No definite intra or extrahepatic biliary dilatation is identified.   GALLBLADDER: The gallbladder is nondilated. No definite calcified gallstones are seen.   PANCREAS: The pancreas is within normal limits for appearance, without evidence of focal masses.   SPLEEN: The spleen is within normal limits for size. No focal splenic mass is seen.   ADRENAL GLANDS: No definite adrenal nodules or masses are seen bilaterally.   KIDNEYS AND URETERS: There is no hydronephrosis, hydroureter or renal/ ureteral calculus identified bilaterally. No  definite focal renal mass is seen, though evaluation is severely limited by the lack of intravenous contrast..   PELVIS:   BLADDER: The urinary bladder is grossly unremarkable for CT appearance.   REPRODUCTIVE ORGANS: A T-shaped intrauterine device is identified in situ within the endometrial canal. The uterus and ovaries are otherwise grossly unremarkable for CT appearance.   BOWEL: The colon and small bowel are within normal limits for course, caliber and appearance, without evidence of wall thickening or obstruction. The appendix is not visualized. No CT evidence of acute diverticulitis or appendicitis is seen.   VESSELS: The abdominal aorta is within normal limits for course, caliber and appearance, without evidence of aneurysm.   PERITONEUM/RETROPERITONEUM/LYMPH NODES: There is no free intraperitoneal air or free fluid identified. No gross mesenteric or retroperitoneal lymphadenopathy is identified.   BONE AND SOFT TISSUE: There is no evidence of acute fracture identified. No evidence of abdominal wall mass or hernia is identified.   THORACIC/LUMBAR SPINE: No CT evidence of acute fracture is seen.   Vertebral bodies are well aligned without evidence of subluxation.   Mild disc space narrowing and small marginal osteophytes are seen in the midthoracic spine.       CHEST: 1. No focal infiltrate, pulmonary nodule or lymphadenopathy identified.   ABDOMEN-PELVIS: 1. No evidence of bowel obstruction, free intraperitoneal air or abnormal intra-abdominal fluid collection.   THORACIC/LUMBAR SPINE: 1. No evidence of acute fracture.   MACRO: None   Signed by: Blair Dennis 9/13/2024 1:09 PM Dictation workstation:   [id number]    CT chest abdomen pelvis wo IV contrast    Result Date: 9/13/2024  Interpreted By:  Blair Dennis, STUDY: CT CHEST ABDOMEN PELVIS WO CONTRAST; CT LUMBAR SPINE WO IV CONTRAST; CT THORACIC SPINE WO IV CONTRAST; 9/13/2024 12:33 pm   INDICATION: Signs/Symptoms:fall, abdo pain and diarrhea;  Signs/Symptoms:fall; Signs/Symptoms:fall, upper t spine tenderness.   COMPARISON: None.   ACCESSION NUMBER(S): [id number]; [id number]; [id number]   ORDERING CLINICIAN: SHERMAN ALEXANDRE   TECHNIQUE: Contiguous axial CT images were obtained at 3mm slice thickness through the chest, abdomen and pelvis without intravenous contrast administration. Coronal and sagittal reconstructions at 3 mm slice thickness were then performed. Intravenous contrast was not given per the referring physician's request.   FINDINGS: The study is markedly limited by the lack of intravenous contrast and is essentially nondiagnostic for vascular solid organ injury.   CHEST:   CHEST WALL AND LOWER NECK: Evaluation of the visualized neck base demonstrates no gross mass or lymphadenopathy.   MEDIASTINUM AND ALLEY: There is no gross axillary or mediastinal lymphadenopathy identified. Evaluation of the alley is limited by the lack of intravenous contrast.   HEART: The heart is within normal limits for size. No pericardial effusion is identified.   VESSELS: The thoracic aorta is within normal limits for course and caliber, without evidence of aneurysm.   LUNG/PLEURA/LARGE AIRWAYS: There is no focal infiltrate, pleural effusion or pneumothorax identified. No discrete pulmonary nodules or masses are seen.   ABDOMEN:   LIVER: The liver is within normal limits for appearance, without evidence of focal masses.   BILE DUCTS: No definite intra or extrahepatic biliary dilatation is identified.   GALLBLADDER: The gallbladder is nondilated. No definite calcified gallstones are seen.   PANCREAS: The pancreas is within normal limits for appearance, without evidence of focal masses.   SPLEEN: The spleen is within normal limits for size. No focal splenic mass is seen.   ADRENAL GLANDS: No definite adrenal nodules or masses are seen bilaterally.   KIDNEYS AND URETERS: There is no hydronephrosis, hydroureter or renal/ ureteral calculus identified bilaterally. No  definite focal renal mass is seen, though evaluation is severely limited by the lack of intravenous contrast..   PELVIS:   BLADDER: The urinary bladder is grossly unremarkable for CT appearance.   REPRODUCTIVE ORGANS: A T-shaped intrauterine device is identified in situ within the endometrial canal. The uterus and ovaries are otherwise grossly unremarkable for CT appearance.   BOWEL: The colon and small bowel are within normal limits for course, caliber and appearance, without evidence of wall thickening or obstruction. The appendix is not visualized. No CT evidence of acute diverticulitis or appendicitis is seen.   VESSELS: The abdominal aorta is within normal limits for course, caliber and appearance, without evidence of aneurysm.   PERITONEUM/RETROPERITONEUM/LYMPH NODES: There is no free intraperitoneal air or free fluid identified. No gross mesenteric or retroperitoneal lymphadenopathy is identified.   BONE AND SOFT TISSUE: There is no evidence of acute fracture identified. No evidence of abdominal wall mass or hernia is identified.   THORACIC/LUMBAR SPINE: No CT evidence of acute fracture is seen.   Vertebral bodies are well aligned without evidence of subluxation.   Mild disc space narrowing and small marginal osteophytes are seen in the midthoracic spine.       CHEST: 1. No focal infiltrate, pulmonary nodule or lymphadenopathy identified.   ABDOMEN-PELVIS: 1. No evidence of bowel obstruction, free intraperitoneal air or abnormal intra-abdominal fluid collection.   THORACIC/LUMBAR SPINE: 1. No evidence of acute fracture.   MACRO: None   Signed by: Blair Dennis 9/13/2024 1:09 PM Dictation workstation:   [id number]    CT cervical spine wo IV contrast    Result Date: 9/13/2024  Interpreted By:  Blair Dennis, STUDY: CT CERVICAL SPINE WO IV CONTRAST; 9/13/2024 12:33 pm   INDICATION: Signs/Symptoms:fall.   COMPARISON: None.   ACCESSION NUMBER(S): [id number]   ORDERING CLINICIAN: SHERMAN ALEXANDRE   TECHNIQUE:  Contiguous axial CT images were obtained at  2 mm slice thickness through the cervical spine without contrast administration. The images were then reconstructed in the coronal and sagittal planes.   FINDINGS: There is no CT evidence of acute fracture identified. No prevertebral soft tissue swelling is identified.   ALIGNMENT: The vertebral bodies are well aligned without evidence of subluxation.   VERTEBRAE/DISC SPACES: The disc spaces and posterior facet joints are well preserved throughout without significant degenerative changes.   ADDITIONAL FINDINGS: Evaluation of the visualized soft tissues of the neck is limited by the lack of intravenous contrast.  Within this limitation, no gross mass or lymphadenopathy is identified.       1.  No CT evidence of acute fracture.   Signed by: Blair Dennis 9/13/2024 1:01 PM Dictation workstation:   [id number]    CT head wo IV contrast    Result Date: 9/13/2024  Interpreted By:  Blair Dennis, STUDY: CT HEAD WO IV CONTRAST; 9/13/2024 12:33 pm   INDICATION: Signs/Symptoms:fall.   COMPARISON: None.   ACCESSION NUMBER(S): [id number]   ORDERING CLINICIAN: SHERMAN ALEXANDRE   TECHNIQUE: Contiguous axial CT images were obtained through the head at 5 mm slice thickness without contrast administration.   FINDINGS: INTRACRANIAL: The ventricles, sulci and basal cisterns are within normal limits for size and configuration. The grey-white differentiation is intact. There is no mass effect or midline shift. There is no extraaxial fluid collection. There is no intracranial hemorrhage.  The calvarium is unremarkable.   EXTRACRANIAL: Visualized paranasal sinuses and mastoids are clear.       No evidence of acute cortical infarct or intracranial hemorrhage.   MACRO: None     Signed by: Blair Dennis 9/13/2024 12:59 PM Dictation workstation:   [id number]    XR foot right 1-2 views    Result Date: 9/13/2024  Interpreted By:  Gabo Rollins, STUDY: XR FOOT RIGHT 1-2 VIEWS; 9/13/2024 11:10 am    INDICATION: Signs/Symptoms:ankle fx. Found on ground. Ankle deformity.   COMPARISON: None.   ACCESSION NUMBER(S): [id number]   ORDERING CLINICIAN: SHERMAN ALEXANDRE   TECHNIQUE: Right foot two views, portable   FINDINGS: Partially included within the field of view is the fracture dislocation of the right ankle described on the tib fib and ankle series. Please see those reports. The osseous structures of the foot are intact.       Fracture dislocation right ankle. No fracture of the osseous structures of the foot.   MACRO: none   Signed by: Gabo Rollins 9/13/2024 11:52 AM Dictation workstation:   [id number]    XR tibia fibula right 2 views    Result Date: 9/13/2024  Interpreted By:  Gabo Rollins, STUDY: XR TIBIA FIBULA RIGHT 2 VIEWS; 9/13/2024 11:10 am   INDICATION: Signs/Symptoms:ankle fx. Found on ground. Ankle deformity.   COMPARISON: None.   ACCESSION NUMBER(S): [id number]   ORDERING CLINICIAN: SHERMAN ALEXANDRE   TECHNIQUE: Right tib fib two views   FINDINGS: Please see the separate ankle series report for further assessment.   The proximal and mid shaft of the tibia and fibula are intact. There is a spiral fracture of the distal fibula with medial angulation at the apex of the fracture site. There is an avulsed fracture fragment involving the medial malleolus. The tibia and fibula are displaced medially and anteriorly. There is suspicion of a vertical fracture through the posterior malleolus.       Trimalleolar and possible trimalleolar fracture dislocation of the right ankle as above.   MACRO: none   Signed by: Gabo Rollins 9/13/2024 11:51 AM Dictation workstation:   [id number]    XR ankle right 2 views    Result Date: 9/13/2024  Interpreted By:  Gabo Rollins, STUDY: XR ANKLE RIGHT 2 VIEWS; 9/13/2024 11:10 am   INDICATION: Signs/Symptoms:ankle fx. Ankle deformity. Found on ground.   COMPARISON: None.   ACCESSION NUMBER(S): [id number]   ORDERING CLINICIAN: SHERMAN ALEXANDRE   TECHNIQUE: Right ankle two  views   FINDINGS: There is a spiral fracture of the distal fibula with marked medial angulation at the apex of the fracture site and overriding of the fracture fragments. There is an associated avulsion fracture of the medial malleolus. There is marked widening of the ankle mortise medially with medial displacement of the tibia and fibula relative to the talar dome. There is also anterior angulation at the fibular fracture site with anterior subluxation of the tibia relative to the talar dome. There is suspicion of a vertical fracture through the posterior malleolus but this is difficult to assess definitively due to bony overlap.       Bimalleolar and possible trimalleolar fracture dislocation of the right ankle as described above.   MACRO: none   Signed by: Gabo Rollins 9/13/2024 11:50 AM Dictation workstation:   [id number]        Assessment/Plan   Assessment & Plan  Closed fracture of right ankle, initial encounter      # Right ankle fracture  -s/p closed reduction   -ortho following  -pain control  -PT/OT    # H/o strokes  -continue aspirin    # Anxiety  -continue home meds    # Insomnia  -continue trazodone    # DVT ppx       Shemar Diaz MD

## 2024-09-13 NOTE — ED PROVIDER NOTES
History of Present Illness     History provided by: Patient  Limitations to History:  Developmentally delayed  External Records Reviewed with Brief Summary: None    HPI:  Windy Lovelace is a 33 y.o. female who presents for evaluation after a fall at her group home.  She was in the bathroom when she slipped unwitnessed fell against the door and when Aikman and she was covered in feces, she had had what appeared to be a diarrheal bowel movement slipped in it.  She is unsure if she hit her head does have some abdominal tenderness and a right ankle deformity that is visible.  She is able to see feel and gently move her toes though she does have significant pain.  She is otherwise in her normal state of health with the exception of the diarrheal episode however she does have at baseline intermittent diarrhea.  No fevers chills chest pain.    Physical Exam   Triage vitals:  T 36.1 °C (97 °F)  HR 97  /52  RR 18  O2 99 % None (Room air)    General: Awake, alert, in no acute distress  Eyes: Gaze conjugate.  No scleral icterus or injection  HENT: Normo-cephalic, atraumatic. No stridor  CV: Regular rate, regular rhythm. Radial DP and PT pulses 2+ bilaterally  Resp: Breathing non-labored, speaking in full sentences.  Clear to auscultation bilaterally  GI: Soft, non-distended, mildly tender, surgical scars visible. No rebound or guarding.  : Deferred  MSK/Extremities: Gross bony deformity of the right ankle with intact movement of the toes, bilateral upper and left lower extremity with full range of motion.  No point C or L-spine tenderness of patient does have some mild T-spine tenderness.    Skin: Warm. Appropriate color  Neuro: Alert. Oriented. Face symmetric. Speech is fluent.  Gross strength and sensation intact in b/l UE and LEs  Psych: Appropriate mood and affect      Medical Decision Making & ED Course   Medical Decision Makin y.o. female presents for evaluation after a fall with diarrhea.  She is a  history of diarrhea, overall her CT imaging revealed no acute injuries of the head CTL spine chest abdomen pelvis and no signs of any colitis on imaging.  Patient does have a Tri malleoli right ankle fracture.  This was reduced at bedside with orthopedics.  Patient had pain control with IV analgesia, will obtain CT imaging of the right ankle at this time for further surgical planning.  Patient remains neurovascularly intact.  Labs overall for this patient were nonconcerning for any underlying metabolic process.  Patient lives in assisted living with minimal support will not be able to assist with any mobility restrictions and patient is nonweightbearing in the right lower extremity.  Patient admitted for PT OT and orthopedics will continue to follow.  ----       Social Determinants of Health which Significantly Impact Care:  Patient lives in group home with minimal assistance and not able to manage mobility limitations with ankle fracture  The following actions were taken to address these social determinants: Patient given referral to orthopedic    EKG Independent Interpretation: EKG interpreted by myself.  Sinus rhythm with heart rate of 100 borderline tachycardia NJ interval 122 MS QRS 78 MS QTc 469 MS within normal limits no acute ST segment elevations or T wave inversions concerning for acute ischemia  Independent Result Review and Interpretation: Results were independently reviewed and interpreted by myself. Please see ED course and MDM for full interpretation.    Chronic conditions affecting the patient's care: As documented in the MDM    The patient was discussed with the following consultants/services:  Orthopedics and hospitalist for admission    Care Considerations: As per [facility]    ED Course:  ED Course as of 09/13/24 1633   Fri Sep 13, 2024   1246 CBC and Auto Differential(!)  Patient has a reactive leukocytosis without anemia or thrombocytopenia [SC]   1246 Coagulation Screen  No coagulation abnormalities  [SC]   1246 Comprehensive metabolic panel(!)  No acute renal or electrolyte abnormality, patient does have an elevated ALT without a AST or bilirubin elevation. [SC]   1247 hCG, quantitative, pregnancy  Patient is not pregnant. [SC]   1247 XR foot right 1-2 views  X-rays of the right lower extremity concerning for trimalleolar fracture dislocation, orthopedics was consulted and will evaluate at bedside for support and dislocation reduction and splinting as well as operative management [SC]   1303 CT head wo IV contrast  No acute intracranial hemorrhage or skull or cervical fracture or malalignment [SC]   1403 CT chest abdomen pelvis wo IV contrast  No acute intrathoracic or intra-abdominal pathology no T or L-spine fracture or malalignment [SC]      ED Course User Index  [SC] Chasity Cooper DO         Diagnoses as of 09/13/24 1633   Closed fracture of right ankle, initial encounter     Disposition   Patient was admitted to medicine for PT OT and additional support in the setting of a right ankle fracture,  for which orthopedics will be following while hospitalized.    Procedures   Procedures    Patient seen and discussed with ED attending physician.    Chasity Cooper DO  Emergency Medicine     Chasity Cooper DO  Resident  09/15/24 [id number]

## 2024-09-13 NOTE — CONSULTS
Orthopaedic Problems/Injuries: R trimal ankle fx dislocation    33F (MRDD, prior CVA) p/w above after GLF.     Location: Painful at R ankle  Duration: Pain has been persistent since fall  Severity: 6/10  Worsened by movement/Palpation, improved with rest and pain medication  Open/Closed: closed, NVI: intact  Associated symptoms: no associated numbness/tingling/weakness    Other Injuries: none    Past medical history: per HPI; no history of blood clots  Past surgical history: per HPI, rest reviewed in EMR  Allergies: per EMR  Medications: per EMR  Anticoagulation: ASA  Social History: no smoking, no drinking, denies IVDU  Ambulatory status: independent  Family History:  Non-contributory to this patient's acute surgical issue.    12-point review of systems is negative other than what is mentioned above.    Constitutional: NAD, resting comfortably in bed  Skin: Warm and dry, no rashes   Eyes: EOMI, clear sclera   ENMT: MMM   HEENT: Neck supple without apparent injury, EOMI, MMM  Respiratory: NWOB on RA   CV: RRR per peripheral pulses, limbs wwp  GI: soft, non-distended   Lymph: No apparent LAD  Neuro: MCGARRY spontaneously, CNs II - XII grossly intact   Psych: Appropriate mood and behavior   MSK:   RLE:   -Skin intact, tender at site of injury with painful ROM.  -Motor intact in DF/PF/EHL/FHL  -SILT in saph/sural/SPN/DPN distributions  -Foot wwp, 2+ DP/PT pulse, brisk cap refill  -Compartments soft and compressible, no pain with passive dorsiflexion      A full secondary survey was conducted. Patient did not have any acute pain with ROM or palpation of other extremities other than that which is mentioned below.    Imaging:  XR: trimalleolar ankle fx dislocation s/p closed reduction with concentric reduction    CT: pending    33F w R trimal fx dislocation s/p closed reduction    Plan:  - Being admitted to Medicine for placement  - No acute orthopaedic surgical intervention indicated at this time.  - WB status: NWB RLE in  SLS  - Antibiotics: No indication for ABx from an orthopedic perspective  - Pain management per primary team  - Okay for Chemoppx per primary   - Okay for diet from orthopedic perspective   - Patient will require operative management. Pending SNF placement will consider fixation while admitted vs outpt  - Please page with any questions/concerns.    This consult was seen and staffed with attending surgeon, Dr. Murrieta.     Jairon Mauricio MD  Orthopaedic Surgery PGY-3    This patient will be followed by the orthopaedic on-call SONNY (please refer to Qgenda)

## 2024-09-14 LAB
ALBUMIN SERPL BCP-MCNC: 3.6 G/DL (ref 3.4–5)
ALP SERPL-CCNC: 81 U/L (ref 33–110)
ALT SERPL W P-5'-P-CCNC: 51 U/L (ref 7–45)
ANION GAP SERPL CALC-SCNC: 14 MMOL/L (ref 10–20)
AST SERPL W P-5'-P-CCNC: 18 U/L (ref 9–39)
ATRIAL RATE: 100 BPM
BILIRUB SERPL-MCNC: 0.5 MG/DL (ref 0–1.2)
BUN SERPL-MCNC: 10 MG/DL (ref 6–23)
CALCIUM SERPL-MCNC: 8.2 MG/DL (ref 8.6–10.3)
CHLORIDE SERPL-SCNC: 104 MMOL/L (ref 98–107)
CO2 SERPL-SCNC: 22 MMOL/L (ref 21–32)
CREAT SERPL-MCNC: 0.93 MG/DL (ref 0.5–1.05)
EGFRCR SERPLBLD CKD-EPI 2021: 82 ML/MIN/1.73M*2
ERYTHROCYTE [DISTWIDTH] IN BLOOD BY AUTOMATED COUNT: 14 % (ref 11.5–14.5)
GLUCOSE SERPL-MCNC: 102 MG/DL (ref 74–99)
HCT VFR BLD AUTO: 41.9 % (ref 36–46)
HGB BLD-MCNC: 13.5 G/DL (ref 12–16)
MCH RBC QN AUTO: 27.3 PG (ref 26–34)
MCHC RBC AUTO-ENTMCNC: 32.2 G/DL (ref 32–36)
MCV RBC AUTO: 85 FL (ref 80–100)
NRBC BLD-RTO: 0 /100 WBCS (ref 0–0)
P AXIS: 94 DEGREES
P OFFSET: 178 MS
P ONSET: 157 MS
PLATELET # BLD AUTO: 288 X10*3/UL (ref 150–450)
POTASSIUM SERPL-SCNC: 3.8 MMOL/L (ref 3.5–5.3)
PR INTERVAL: 122 MS
PROT SERPL-MCNC: 6.2 G/DL (ref 6.4–8.2)
Q ONSET: 218 MS
QRS COUNT: 16 BEATS
QRS DURATION: 78 MS
QT INTERVAL: 364 MS
QTC CALCULATION(BAZETT): 469 MS
QTC FREDERICIA: 431 MS
R AXIS: 59 DEGREES
RBC # BLD AUTO: 4.94 X10*6/UL (ref 4–5.2)
SODIUM SERPL-SCNC: 136 MMOL/L (ref 136–145)
T AXIS: 35 DEGREES
T OFFSET: 400 MS
VENTRICULAR RATE: 100 BPM
WBC # BLD AUTO: 11.2 X10*3/UL (ref 4.4–11.3)

## 2024-09-14 PROCEDURE — 2500000004 HC RX 250 GENERAL PHARMACY W/ HCPCS (ALT 636 FOR OP/ED): Performed by: HOSPITALIST

## 2024-09-14 PROCEDURE — 99232 SBSQ HOSP IP/OBS MODERATE 35: CPT | Performed by: INTERNAL MEDICINE

## 2024-09-14 PROCEDURE — 2500000004 HC RX 250 GENERAL PHARMACY W/ HCPCS (ALT 636 FOR OP/ED): Performed by: INTERNAL MEDICINE

## 2024-09-14 PROCEDURE — 97161 PT EVAL LOW COMPLEX 20 MIN: CPT | Mod: GP

## 2024-09-14 PROCEDURE — 2500000002 HC RX 250 W HCPCS SELF ADMINISTERED DRUGS (ALT 637 FOR MEDICARE OP, ALT 636 FOR OP/ED): Performed by: INTERNAL MEDICINE

## 2024-09-14 PROCEDURE — 2500000002 HC RX 250 W HCPCS SELF ADMINISTERED DRUGS (ALT 637 FOR MEDICARE OP, ALT 636 FOR OP/ED): Performed by: HOSPITALIST

## 2024-09-14 PROCEDURE — 80053 COMPREHEN METABOLIC PANEL: CPT | Performed by: INTERNAL MEDICINE

## 2024-09-14 PROCEDURE — 94640 AIRWAY INHALATION TREATMENT: CPT

## 2024-09-14 PROCEDURE — 97165 OT EVAL LOW COMPLEX 30 MIN: CPT | Mod: GO

## 2024-09-14 PROCEDURE — 36415 COLL VENOUS BLD VENIPUNCTURE: CPT | Performed by: INTERNAL MEDICINE

## 2024-09-14 PROCEDURE — 85027 COMPLETE CBC AUTOMATED: CPT | Performed by: INTERNAL MEDICINE

## 2024-09-14 PROCEDURE — 1100000001 HC PRIVATE ROOM DAILY

## 2024-09-14 PROCEDURE — 2500000001 HC RX 250 WO HCPCS SELF ADMINISTERED DRUGS (ALT 637 FOR MEDICARE OP): Performed by: INTERNAL MEDICINE

## 2024-09-14 RX ORDER — OXYCODONE AND ACETAMINOPHEN 5; 325 MG/1; MG/1
1 TABLET ORAL EVERY 4 HOURS PRN
Status: DISCONTINUED | OUTPATIENT
Start: 2024-09-14 | End: 2024-09-18

## 2024-09-14 RX ORDER — HYDROMORPHONE HYDROCHLORIDE 0.2 MG/ML
0.2 INJECTION INTRAMUSCULAR; INTRAVENOUS; SUBCUTANEOUS ONCE
Status: COMPLETED | OUTPATIENT
Start: 2024-09-14 | End: 2024-09-14

## 2024-09-14 RX ORDER — CHOLECALCIFEROL (VITAMIN D3) 25 MCG
2000 TABLET ORAL DAILY
Status: DISCONTINUED | OUTPATIENT
Start: 2024-09-14 | End: 2024-09-19 | Stop reason: HOSPADM

## 2024-09-14 ASSESSMENT — PAIN SCALES - GENERAL
PAINLEVEL_OUTOF10: 7
PAINLEVEL_OUTOF10: 2
PAINLEVEL_OUTOF10: 4
PAINLEVEL_OUTOF10: 2
PAINLEVEL_OUTOF10: 5 - MODERATE PAIN
PAINLEVEL_OUTOF10: 8
PAINLEVEL_OUTOF10: 10 - WORST POSSIBLE PAIN
PAINLEVEL_OUTOF10: 2
PAINLEVEL_OUTOF10: 10 - WORST POSSIBLE PAIN

## 2024-09-14 ASSESSMENT — COGNITIVE AND FUNCTIONAL STATUS - GENERAL
PERSONAL GROOMING: A LITTLE
DRESSING REGULAR UPPER BODY CLOTHING: A LITTLE
PERSONAL GROOMING: A LITTLE
HELP NEEDED FOR BATHING: A LOT
CLIMB 3 TO 5 STEPS WITH RAILING: TOTAL
MOBILITY SCORE: 11
WALKING IN HOSPITAL ROOM: TOTAL
MOVING FROM LYING ON BACK TO SITTING ON SIDE OF FLAT BED WITH BEDRAILS: A LITTLE
MOVING TO AND FROM BED TO CHAIR: TOTAL
TOILETING: TOTAL
DAILY ACTIVITIY SCORE: 17
TURNING FROM BACK TO SIDE WHILE IN FLAT BAD: A LITTLE
MOBILITY SCORE: 10
CLIMB 3 TO 5 STEPS WITH RAILING: TOTAL
DAILY ACTIVITIY SCORE: 17
WALKING IN HOSPITAL ROOM: TOTAL
TOILETING: A LOT
MOVING TO AND FROM BED TO CHAIR: TOTAL
DRESSING REGULAR LOWER BODY CLOTHING: A LOT
MOVING FROM LYING ON BACK TO SITTING ON SIDE OF FLAT BED WITH BEDRAILS: A LITTLE
STANDING UP FROM CHAIR USING ARMS: TOTAL
TURNING FROM BACK TO SIDE WHILE IN FLAT BAD: A LITTLE
DAILY ACTIVITIY SCORE: 14
MOBILITY SCORE: 10
HELP NEEDED FOR BATHING: A LITTLE
WALKING IN HOSPITAL ROOM: TOTAL
DRESSING REGULAR LOWER BODY CLOTHING: A LOT
DRESSING REGULAR UPPER BODY CLOTHING: A LITTLE
MOVING FROM LYING ON BACK TO SITTING ON SIDE OF FLAT BED WITH BEDRAILS: A LITTLE
HELP NEEDED FOR BATHING: A LITTLE
STANDING UP FROM CHAIR USING ARMS: A LOT
TURNING FROM BACK TO SIDE WHILE IN FLAT BAD: A LITTLE
TOILETING: A LOT
STANDING UP FROM CHAIR USING ARMS: TOTAL
DRESSING REGULAR UPPER BODY CLOTHING: A LITTLE
MOVING TO AND FROM BED TO CHAIR: TOTAL
PERSONAL GROOMING: A LITTLE
DRESSING REGULAR LOWER BODY CLOTHING: TOTAL
CLIMB 3 TO 5 STEPS WITH RAILING: TOTAL

## 2024-09-14 ASSESSMENT — ACTIVITIES OF DAILY LIVING (ADL)
ADL_ASSISTANCE: INDEPENDENT
ADL_ASSISTANCE: INDEPENDENT

## 2024-09-14 ASSESSMENT — PAIN DESCRIPTION - ORIENTATION
ORIENTATION: RIGHT
ORIENTATION: RIGHT

## 2024-09-14 ASSESSMENT — PAIN - FUNCTIONAL ASSESSMENT
PAIN_FUNCTIONAL_ASSESSMENT: 0-10

## 2024-09-14 ASSESSMENT — PAIN DESCRIPTION - LOCATION
LOCATION: ANKLE
LOCATION: ANKLE
LOCATION: FOOT

## 2024-09-14 ASSESSMENT — PAIN SCALES - WONG BAKER: WONGBAKER_NUMERICALRESPONSE: HURTS EVEN MORE

## 2024-09-14 NOTE — CARE PLAN
The patient's goals for the shift include      The clinical goals for the shift include Patient will remain comfortable throughout the shift.    Over the shift, the patient did not make progress toward the following goals. Barriers to progression include right ankle fracture. Recommendations to address these barriers include pain meds as needed.

## 2024-09-14 NOTE — PROGRESS NOTES
Physical Therapy    Physical Therapy Evaluation    Patient Name: Windy Lovelace  MRN: [id number]  Department: [place] [id number]  Room: [facility]  Today's Date: 9/14/2024   Time Calculation  Start Time: 1302  Stop Time: 1322  Time Calculation (min): 20 min    Assessment/Plan   PT Assessment  PT Assessment Results: Decreased strength, Decreased range of motion, Impaired balance, Decreased endurance, Decreased mobility, Orthopedic restrictions, Pain  Rehab Prognosis: Good  Barriers to Discharge: Ability to maintain weight bearing status  Evaluation/Treatment Tolerance: Patient limited by pain  Medical Staff Made Aware: Yes  Strengths: Ability to acquire knowledge, Support of Caregivers, Housing layout  Barriers to Participation: Comorbidities  End of Session Communication: Bedside nurse  Assessment Comment: Pt presents with decreased BLE strength, balance, activity tolerance, and elevated R ankle pain. Currently, pt is below the reported PLOF requiring min to mod assist with bed mobility, mod assist of 2 people for transfers, and being unable to tolerate ambulation today. PT also demonstrates difficulty maintaining weight bearing status on the RLE. Continued PT would benefit the pt to address the above factors to bring the pt closer to the PLOF while reducing fall risk. At D/C, pt would benefit from moderate intensity therapy.  End of Session Patient Position: Bed, 3 rail up, Alarm on  IP OR SWING BED PT PLAN  Inpatient or Swing Bed: Inpatient  PT Plan  Treatment/Interventions: Bed mobility, Transfer training, Gait training, Balance training, Strengthening, Endurance training, Therapeutic exercise, Therapeutic activity, Home exercise program, Positioning, Postural re-education  PT Plan: Ongoing PT  PT Frequency: 4 times per week  PT Discharge Recommendations: Moderate intensity level of continued care  Equipment Recommended upon Discharge: Wheeled walker  PT Recommended Transfer Status: Assist x2, Assistive device  PT - OK to  Discharge: Yes (PT POC intiated today)    Subjective   General Visit Information:  General  Reason for Referral: 32 y/o F presenting with R ankle fx  Referred By: MOON Diaz  Past Medical History Relevant to Rehab: Stroke  Family/Caregiver Present: Yes  Caregiver Feedback:  (Francesca) present and supportive  Co-Treatment: OT  Co-Treatment Reason: Co-evaluation with OT to maximize pt safety, transfer ability and participation.  Prior to Session Communication: Bedside nurse  Patient Position Received: Bed, 3 rail up, Alarm on  Preferred Learning Style: auditory, kinesthetic, visual  General Comment: Pt supine in bed upon PT arrival. Cleared to participate with RN, and agreeable to co-evaluation with PT/OT.  Home Living:  Home Living  Type of Home: Condo  Lives With: Alone (Per pt and , pt has caregivers throughout the day)  Home Adaptive Equipment: Cane  Home Layout: One level  Home Access: Level entry  Bathroom Shower/Tub: Walk-in shower  Bathroom Toilet: Standard  Bathroom Equipment: Grab bars in shower, Grab bars around toilet, Shower chair with back  Prior Level of Function:  Prior Function Per Pt/Caregiver Report  Level of [place]: Independent with ADLs and functional transfers, Needs assistance with homemaking  ADL Assistance: Independent  Homemaking Assistance: Needs assistance  Shopping:  (Shopping completed by caregivers)  Ambulatory Assistance: Independent (With cane)  Precautions:  Precautions  LE Weight Bearing Status: Right Non-Weight Bearing (In SLS)  Medical Precautions: Fall precautions    Objective   Pain:  Pain Assessment  Pain Assessment: 0-10  0-10 (Numeric) Pain Score: 2  Pain Location: Ankle  Pain Orientation: Right  Response to Interventions: 10/10 pain reported during activity  Cognition:  Cognition  Orientation Level:  (Oriented to person, place, and time)  Insight: Mild  Impulsive: Mildly    General Assessments:  Activity  Tolerance  Endurance: Tolerates 10 - 20 min exercise with multiple rests  Activity Tolerance Comments: Elevated R ankle pain with activity    Sensation  Light Touch: No apparent deficits    Coordination  Coordination Comment: Appears intact    Postural Control  Head Control: Forward head posture in standing  Trunk Control: Trunk flexion in standing with FWW support  Posture Comment: Rounded shoulders in sitting    Static Sitting Balance  Static Sitting-Balance Support: Bilateral upper extremity supported, Feet supported  Static Sitting-Level of Assistance: Close supervision  Dynamic Sitting Balance  Dynamic Sitting-Balance Support: Bilateral upper extremity supported (LLE supported on the ground)  Dynamic Sitting-Level of Assistance: Moderate assistance  Dynamic Sitting-Comments: During EOB bed mobility (scooting)    Static Standing Balance  Static Standing-Balance Support: Bilateral upper extremity supported  Static Standing-Level of Assistance: Moderate assistance (2-person)  Static Standing-Comment/Number of Minutes: With FWW support. Pt unable to maintain NWB on RLE in standing  Dynamic Standing Balance  Dynamic Standing-Comments: Unable to maintain dynamic standing balance today d/t pt demonstrating difficulty maintaining NWB status on the RLE. Not safe to attempt dynamic standing activity  Functional Assessments:  Bed Mobility  Bed Mobility: Yes  Bed Mobility 1  Bed Mobility 1: Supine to sitting  Level of Assistance 1: Minimum assistance, Minimal verbal cues, Minimal tactile cues  Bed Mobility Comments 1: HOB elevated. Assist provided for RLE progression off the EOB. VC/TC for UE reach across body for grab on bed rail to assist trunk into upright sitting.  Bed Mobility 2  Bed Mobility  2: Sitting to supine  Level of Assistance 2: Moderate assistance  Bed Mobility Comments 2: HOB flat. Assist provided for BLE lift into bed. Decreased trunk control observed on descent to bed.  Bed Mobility 3  Bed Mobility 3:  Scooting  Level of Assistance 3: Moderate assistance, +2  Bed Mobility Comments 3: Assist provided for wieght shift at hips to clear hips from bed. Assist also provided to support RLE to maintain NWB. Pt achieves minimal hip clearance with 3-4 small scoots right.    Transfers  Transfer: Yes  Transfer 1  Transfer From 1: Bed to  Transfer to 1: Stand  Technique 1: Sit to stand  Transfer Device 1: Walker, Gait belt  Transfer Level of Assistance 1: Moderate assistance, +2, Moderate verbal cues  Trials/Comments 1: VC for RLE extension in front of pt's body to encourage NWB. VC for BUE push from the bed to stand instead of pulling from FWW. Pt opted to rest RUE on FWW, and push from bed with LUE. Pt unable to maintain NWB on RLE during transfer with report of increased R ankle pain. VC for increased weight bearing through walker to off load painful RLE into NWB. Pt unable to return demonstate. Standing transfer trial terminated d/t pt report of elevated pain and inability to maintain NWB on the RLE  Transfers 2  Transfer From 2: Stand to  Transfer to 2: Bed  Technique 2: Stand to sit  Transfer Device 2: Walker, Gait belt  Transfer Level of Assistance 2: Moderate assistance, +2, Minimal verbal cues  Trials/Comments 2: VC for BUE reach for the bed when sitting. Assist provided for controlled descent to the bed.    Ambulation/Gait Training  Ambulation/Gait Training Performed: No (See static dynamic standing balance comments)    Stairs  Stairs: No  Extremity/Trunk Assessments:  RLE   RLE : Exceptions to WFL  Strength RLE  R Hip Flexion: 2/5  R Hip ABduction: 2/5  R Knee Flexion: 2-/5  R Knee Extension: 3-/5  LLE   LLE : Exceptions to WFL  Strength LLE  L Hip Flexion: 3+/5  L Hip ADduction: 2/5  L Knee Flexion: 2/5  L Ankle Dorsiflexion: 2+/5  L Ankle Plantar Flexion: 2+/5  Outcome Measures:  [facility] Basic Mobility  Turning from your back to your side while in a flat bed without using bedrails: A little  Moving from lying on  your back to sitting on the side of a flat bed without using bedrails: A little  Moving to and from bed to chair (including a wheelchair): Total  Standing up from a chair using your arms (e.g. wheelchair or bedside chair): A lot  To walk in hospital room: Total  Climbing 3-5 steps with railing: Total  Basic Mobility - Total Score: 11    Encounter Problems       Encounter Problems (Active)       Balance       Goal 1       Start:  09/14/24    Expected End:  09/28/24       Pt performs all sitting balance with supervision and standing balance with min assist x 1 using FWW            Mobility       STG - Patient will ambulate       Start:  09/14/24    Expected End:  09/28/24       15 ft with min assist x 1 using FWW            PT Transfers       STG - Patient to transfer to and from sit to supine       Start:  09/14/24    Expected End:  09/28/24       With CGA         STG - Patient will transfer sit to and from stand       Start:  09/14/24    Expected End:  09/28/24       With min assist x 1 using FWW              Education Documentation  Precautions, taught by Job San PT at 9/14/2024  2:31 PM.  Learner: Patient  Readiness: Acceptance  Method: Explanation, Demonstration  Response: Needs Reinforcement    Body Mechanics, taught by Job San PT at 9/14/2024  2:31 PM.  Learner: Patient  Readiness: Acceptance  Method: Explanation, Demonstration  Response: Needs Reinforcement    Mobility Training, taught by Job San PT at 9/14/2024  2:31 PM.  Learner: Patient  Readiness: Acceptance  Method: Explanation, Demonstration  Response: Needs Reinforcement    Education Comments  No comments found.

## 2024-09-14 NOTE — PROGRESS NOTES
Windy Lovelace is a 33 y.o. female on day 1 of admission presenting with Closed fracture of right ankle, initial encounter.      Subjective   Pt seen and examined.        Objective     Last Recorded Vitals  BP 88/59 (BP Location: Right arm)   Pulse 99   Temp 36.4 °C (97.5 °F) (Temporal)   Resp 20   Wt 108 kg (239 lb)   SpO2 96%   Intake/Output last 3 Shifts:  No intake or output data in the 24 hours ending 09/14/24 1037    Admission Weight  Weight: 108 kg (239 lb) (09/13/24 0943)    Daily Weight  09/13/24 : 108 kg (239 lb)      Physical Exam  Constitutional: NAD, resting comfortably in bed  Skin: Warm and dry, no rashes   Eyes: EOMI, clear sclera   ENMT: MMM   HEENT: Neck supple without apparent injury, EOMI, MMM  Respiratory: NWOB on RA   CV: RRR per peripheral pulses, limbs wwp  GI: soft, non-distended   Lymph: No apparent LAD  Neuro: MCGARRY spontaneously, CNs II - XII grossly intact   Psych: Appropriate mood and behavior  Relevant Results  Results for orders placed or performed during the hospital encounter of 09/13/24 (from the past 24 hour(s))   CBC and Auto Differential   Result Value Ref Range    WBC 14.5 (H) 4.4 - 11.3 x10*3/uL    nRBC 0.0 0.0 - 0.0 /100 WBCs    RBC 5.57 (H) 4.00 - 5.20 x10*6/uL    Hemoglobin 15.5 12.0 - 16.0 g/dL    Hematocrit 47.3 (H) 36.0 - 46.0 %    MCV 85 80 - 100 fL    MCH 27.8 26.0 - 34.0 pg    MCHC 32.8 32.0 - 36.0 g/dL    RDW 13.8 11.5 - 14.5 %    Platelets 279 150 - 450 x10*3/uL    Neutrophils % 87.2 40.0 - 80.0 %    Immature Granulocytes %, Automated 0.4 0.0 - 0.9 %    Lymphocytes % 8.7 13.0 - 44.0 %    Monocytes % 3.4 2.0 - 10.0 %    Eosinophils % 0.1 0.0 - 6.0 %    Basophils % 0.2 0.0 - 2.0 %    Neutrophils Absolute 12.66 (H) 1.20 - 7.70 x10*3/uL    Immature Granulocytes Absolute, Automated 0.06 0.00 - 0.70 x10*3/uL    Lymphocytes Absolute 1.26 1.20 - 4.80 x10*3/uL    Monocytes Absolute 0.50 0.10 - 1.00 x10*3/uL    Eosinophils Absolute 0.02 0.00 - 0.70 x10*3/uL     Basophils Absolute 0.03 0.00 - 0.10 x10*3/uL   Comprehensive metabolic panel   Result Value Ref Range    Glucose 144 (H) 74 - 99 mg/dL    Sodium 138 136 - 145 mmol/L    Potassium 3.8 3.5 - 5.3 mmol/L    Chloride 107 98 - 107 mmol/L    Bicarbonate 21 21 - 32 mmol/L    Anion Gap 14 10 - 20 mmol/L    Urea Nitrogen 8 6 - 23 mg/dL    Creatinine 0.93 0.50 - 1.05 mg/dL    eGFR 82 >60 mL/min/1.73m*2    Calcium 8.8 8.6 - 10.3 mg/dL    Albumin 3.9 3.4 - 5.0 g/dL    Alkaline Phosphatase 90 33 - 110 U/L    Total Protein 7.4 6.4 - 8.2 g/dL    AST 27 9 - 39 U/L    Bilirubin, Total 0.4 0.0 - 1.2 mg/dL    ALT 70 (H) 7 - 45 U/L   Type And Screen   Result Value Ref Range    ABO TYPE A     Rh TYPE NEG     ANTIBODY SCREEN NEG    Coagulation Screen   Result Value Ref Range    Protime 11.2 9.8 - 12.8 seconds    INR 1.0 0.9 - 1.1    aPTT 29 27 - 38 seconds   hCG, quantitative, pregnancy   Result Value Ref Range    HCG, Beta-Quantitative <2 <5 mIU/mL   ECG 12 Lead   Result Value Ref Range    Ventricular Rate 100 BPM    Atrial Rate 100 BPM    MD Interval 122 ms    QRS Duration 78 ms    QT Interval 364 ms    QTC Calculation(Bazett) 469 ms    P Axis 94 degrees    R Axis 59 degrees    T Axis 35 degrees    QRS Count 16 beats    Q Onset 218 ms    P Onset 157 ms    P Offset 178 ms    T Offset 400 ms    QTC Fredericia 431 ms   CBC   Result Value Ref Range    WBC 11.2 4.4 - 11.3 x10*3/uL    nRBC 0.0 0.0 - 0.0 /100 WBCs    RBC 4.94 4.00 - 5.20 x10*6/uL    Hemoglobin 13.5 12.0 - 16.0 g/dL    Hematocrit 41.9 36.0 - 46.0 %    MCV 85 80 - 100 fL    MCH 27.3 26.0 - 34.0 pg    MCHC 32.2 32.0 - 36.0 g/dL    RDW 14.0 11.5 - 14.5 %    Platelets 288 150 - 450 x10*3/uL   Comprehensive metabolic panel   Result Value Ref Range    Glucose 102 (H) 74 - 99 mg/dL    Sodium 136 136 - 145 mmol/L    Potassium 3.8 3.5 - 5.3 mmol/L    Chloride 104 98 - 107 mmol/L    Bicarbonate 22 21 - 32 mmol/L    Anion Gap 14 10 - 20 mmol/L    Urea Nitrogen 10 6 - 23 mg/dL     Creatinine 0.93 0.50 - 1.05 mg/dL    eGFR 82 >60 mL/min/1.73m*2    Calcium 8.2 (L) 8.6 - 10.3 mg/dL    Albumin 3.6 3.4 - 5.0 g/dL    Alkaline Phosphatase 81 33 - 110 U/L    Total Protein 6.2 (L) 6.4 - 8.2 g/dL    AST 18 9 - 39 U/L    Bilirubin, Total 0.5 0.0 - 1.2 mg/dL    ALT 51 (H) 7 - 45 U/L        CT ankle right wo IV contrast   Final Result   Comminuted, mildly displaced trimalleolar fractures status post   closed reduction and casting.             MACRO:   None        Signed by: Gibson Perez 9/14/2024 7:43 AM   Dictation workstation:   [id number]      XR ankle right 2 views   Final Result   Interval reduction of the ankle fracture/dislocation with ankle   mortise now in anatomic alignment. Improved alignment of the distal   fibula and posterior malleolus fractures.             MACRO:   None        Signed by: Braydon Conklin 9/13/2024 5:11 PM   Dictation workstation:   [id number]      XR chest 1 view   Final Result   No focal infiltrate or pneumothorax is identified.        MACRO:   None.        Signed by: Blair Dennis 9/13/2024 1:30 PM   Dictation workstation:   [id number]      CT head wo IV contrast   Final Result   No evidence of acute cortical infarct or intracranial hemorrhage.        MACRO:   None             Signed by: Blair Dennis 9/13/2024 12:59 PM   Dictation workstation:   [id number]      CT cervical spine wo IV contrast   Final Result   1.  No CT evidence of acute fracture.        Signed by: Blair Dennis 9/13/2024 1:01 PM   Dictation workstation:   [id number]      CT thoracic spine wo IV contrast   Final Result   CHEST:   1. No focal infiltrate, pulmonary nodule or lymphadenopathy   identified.        ABDOMEN-PELVIS:   1. No evidence of bowel obstruction, free intraperitoneal air or   abnormal intra-abdominal fluid collection.        THORACIC/LUMBAR SPINE:   1. No evidence of acute fracture.        MACRO:   None        Signed by: Blair Dennis 9/13/2024 1:09 PM   Dictation workstation:    [id number]      CT lumbar spine wo IV contrast   Final Result   CHEST:   1. No focal infiltrate, pulmonary nodule or lymphadenopathy   identified.        ABDOMEN-PELVIS:   1. No evidence of bowel obstruction, free intraperitoneal air or   abnormal intra-abdominal fluid collection.        THORACIC/LUMBAR SPINE:   1. No evidence of acute fracture.        MACRO:   None        Signed by: Blair Dennis 9/13/2024 1:09 PM   Dictation workstation:   [id number]      CT chest abdomen pelvis wo IV contrast   Final Result   CHEST:   1. No focal infiltrate, pulmonary nodule or lymphadenopathy   identified.        ABDOMEN-PELVIS:   1. No evidence of bowel obstruction, free intraperitoneal air or   abnormal intra-abdominal fluid collection.        THORACIC/LUMBAR SPINE:   1. No evidence of acute fracture.        MACRO:   None        Signed by: Blair Dennis 9/13/2024 1:09 PM   Dictation workstation:   [id number]      XR tibia fibula right 2 views   Final Result   Trimalleolar and possible trimalleolar fracture dislocation of the   right ankle as above.        MACRO:   none        Signed by: Gabo Rollins 9/13/2024 11:51 AM   Dictation workstation:   [id number]      XR ankle right 2 views   Final Result   Bimalleolar and possible trimalleolar fracture dislocation of the   right ankle as described above.        MACRO:   none        Signed by: Gabo Rollins 9/13/2024 11:50 AM   Dictation workstation:   [id number]      XR foot right 1-2 views   Final Result   Fracture dislocation right ankle.   No fracture of the osseous structures of the foot.        MACRO:   none        Signed by: Gabo Rollins 9/13/2024 11:52 AM   Dictation workstation:   [id number]          Scheduled medications  aspirin, 81 mg, oral, Daily  cholecalciferol, 2,000 Units, oral, Daily  enoxaparin, 40 mg, subcutaneous, q24h  HYDROmorphone, 0.2 mg, intravenous, Once  pantoprazole, 40 mg, oral, Daily before breakfast  prazosin, 5 mg, oral,  Nightly  sertraline, 100 mg, oral, Daily  traZODone, 100 mg, oral, Nightly      Continuous medications     PRN medications  PRN medications: budesonide, fluticasone, formoterol, morphine, ondansetron, oxyCODONE-acetaminophen         Assessment/Plan                  Principal Problem:    Closed fracture of right ankle, initial encounter    # Right ankle fracture  -s/p closed reduction   -ortho following  -pain control  -PT/OT     # H/o strokes  -continue aspirin     # Anxiety  -continue home meds     # Insomnia  -continue trazodone     # DVT ppx              Shemar Diaz MD

## 2024-09-14 NOTE — PROGRESS NOTES
Occupational Therapy    Evaluation    Patient Name: Windy Lovelace  MRN: [id number]  Department: [facility] A 7  Room: 73/733-  Today's Date: 9/14/2024  Time Calculation  Start Time: 1303  Stop Time: 1323  Time Calculation (min): 20 min        Assessment:  OT Assessment: OT eval completed, pt admitted with right ankle fx, s/p closed reduction. Currently below baseline for ADLs and functional mobility. Pt limited by pain this date and by inability to maintain NWB RLE. Pt typically lives alone, and has caregivers to assist with IADLs, independent in ADLs and uses cane for mobility. Currently pt only able to stand briefly due to inability to maintain NWB RLE, anticipate SBA-total assist for ADLs. Recommend mod intensity therapies.  Prognosis: Good  Evaluation/Treatment Tolerance: Patient limited by pain, Patient tolerated treatment well  Medical Staff Made Aware: Yes  End of Session Communication: Bedside nurse  End of Session Patient Position: Bed, 3 rail up, Alarm on  OT Assessment Results: Decreased ADL status, Decreased cognition, Decreased safe judgment during ADL, Decreased functional mobility, Decreased IADLs  Prognosis: Good  Evaluation/Treatment Tolerance: Patient limited by pain, Patient tolerated treatment well  Medical Staff Made Aware: Yes  Strengths: Living arrangement secure, Premorbid level of function  Barriers to Participation: Comorbidities  Plan:  Treatment Interventions: ADL retraining, Functional transfer training, Patient/family training, Equipment evaluation/education, Compensatory technique education  OT Frequency: 3 times per week  OT Discharge Recommendations: Moderate intensity level of continued care  OT - OK to Discharge: Yes  Treatment Interventions: ADL retraining, Functional transfer training, Patient/family training, Equipment evaluation/education, Compensatory technique education    Subjective   Current Problem:  1. 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    Breast pain 03/21/2024    Candidiasis of skin and nail 06/25/2014    Cutaneous candidiasis    Cellulitis of face 10/10/2013    Facial cellulitis    Headache, unspecified 04/29/2015    Severe headache    Hypomagnesemia 01/23/2014    Hypomagnesemia    Other cysts of oral region, not elsewhere classified 06/10/2016    Cyst of mouth    Other enthesopathies, not elsewhere classified 10/29/2015    Tendonitis of wrist, left    Other specified abnormal findings of blood chemistry 03/06/2020    Low vitamin D level    Other specified joint disorders, left ankle and foot 11/02/2016    Impingement syndrome of left ankle    Other specified noninflammatory disorders of vagina 03/20/2018    Vaginal odor    Pain in right knee 11/12/2018    Right knee pain    Pain in right knee 02/19/2018    Acute pain of right knee    Pain in right knee 02/20/2019    Acute pain of right knee    Pain, unspecified 10/17/2017    Acute pain    Pelvic and perineal pain 01/24/2018    Pelvic cramping    Personal history of diseases of the skin and subcutaneous tissue 02/24/2016    History of urticaria    Personal history of other diseases of the digestive system 10/06/2017    History of hiatal hernia    Personal history of other diseases of the digestive system 03/17/2015    History of gastritis    Personal history of other diseases of the digestive system 05/24/2016    History of constipation    Personal history of other diseases of the female genital tract 08/31/2015    History of breast pain    Personal  history of other diseases of the musculoskeletal system and connective tissue 02/04/2014    History of low back pain    Personal history of other diseases of the nervous system and sense organs 01/05/2015    History of conjunctivitis    Personal history of other diseases of the respiratory system 08/29/2013    History of pharyngitis    Personal history of other diseases of the respiratory system 02/03/2017    History of acute bronchitis    Personal history of other drug therapy 12/11/2017    History of influenza vaccination    Personal history of other endocrine, nutritional and metabolic disease 05/09/2019    History of dehydration    Personal history of other mental and behavioral disorders 10/06/2017    History of mental retardation    Personal history of transient ischemic attack (TIA), and cerebral infarction without residual deficits 10/06/2017    History of stroke    Pleurodynia 01/23/2014    Rib pain on right side    Pyuria 06/13/2017    Pyuria    Sprain of medial collateral ligament of right knee, initial encounter 11/02/2016    Sprain of medial collateral ligament of right knee, initial encounter    Sprain of unspecified ligament of left ankle, subsequent encounter 10/29/2015    Severe sprain of left ankle, subsequent encounter    Strain of muscle and tendon of unspecified wall of thorax, initial encounter 02/04/2014    Strain of thoracic region    Syncope and collapse 06/13/2017    Syncope and collapse    Unspecified fall, initial encounter 01/23/2014    Fall at home    Unspecified symptoms and signs involving the genitourinary system 01/24/2018    UTI symptoms       Family/Caregiver Present: Yes  Caregiver Feedback:  (Francesca) present and supportive  Co-Treatment: PT  Co-Treatment Reason: Co-evaluation with PT to maximize pt safety, transfer ability and participation.  Prior to Session Communication: Bedside nurse  Patient Position Received: Bed, 3 rail up, Alarm on  Preferred  Learning Style: auditory, kinesthetic, visual  General Comment: Pt supine in bed upon entry, agreeable to therapy  Precautions:  LE Weight Bearing Status: Right Non-Weight Bearing (in SLS)  Medical Precautions: Fall precautions            Pain:  Pain Assessment  Pain Assessment: 0-10  0-10 (Numeric) Pain Score: 2    Objective   Cognition:  Overall Cognitive Status: Within Functional Limits  Orientation Level: Oriented X4  Attention: Within Functional Limits  Memory: Within Funtional Limits  Safety/Judgement: Exceptions to WFL  Insight: Mild  Impulsive: Mildly           Home Living:  Type of Home: [place]  Lives With: Alone (has caregivers throughout day)  Home Adaptive Equipment: Cane  Home Layout: One level  Home Access: Level entry  Bathroom Shower/Tub: Walk-in shower  Bathroom Toilet: Standard  Bathroom Equipment: Grab bars in shower, Grab bars around toilet, Shower chair with back  Prior Function:  Level of [place]: Independent with ADLs and functional transfers, Needs assistance with homemaking  Receives Help From:  (caregivers)  ADL Assistance: Independent  Homemaking Assistance: Needs assistance  Ambulatory Assistance: Independent (with cane)  IADL History:  Homemaking Responsibilities: No  ADL:  Grooming Assistance: Stand by  Grooming Deficit:  (simulated- in sitting)  LE Dressing Assistance: Maximal  LE Dressing Deficit:  (anticipate)  Activity Tolerance:  Endurance: Tolerates 10 - 20 min exercise with multiple rests  Bed Mobility/Transfers: Bed Mobility  Bed Mobility: Yes  Bed Mobility 1  Bed Mobility 1: Supine to sitting  Level of Assistance 1: Minimum assistance, Minimal verbal cues, Minimal tactile cues  Bed Mobility Comments 1: HOB elevated, assist for advancing RLE towards EOB. VC for hand placement and use of bedrail to assist  Bed Mobility 2  Bed Mobility  2: Sitting to supine  Level of Assistance 2: Moderate assistance  Bed Mobility Comments 2: HOB flat, VC for technique, assist to bring BLE  back into bed  Bed Mobility 3  Bed Mobility 3: Scooting  Level of Assistance 3: Moderate assistance, +2  Bed Mobility Comments 3: assist provided to scoot towards HOB, assist also provided at RLE to keep NWB.    Transfers  Transfer: Yes  Transfer 1  Technique 1: Sit to stand, Stand to sit  Transfer Device 1: Walker, Gait belt  Transfer Level of Assistance 1: Moderate assistance, +2, Moderate verbal cues  Trials/Comments 1: VC for body mechanics and to maintain NWB RLE, pt unable to maintain RLE NWB. cues for hand placement on EOB to assist with sit<>stand, only able to stand briefly due to pain and inability to maintain NWB      Sensation:  Sensation Comment: pt denies numbness/tingling  Strength:  Strength Comments: WFL BUE       Coordination:  Movements are Fluid and Coordinated: Yes   Hand Function:  Gross Grasp: Functional  Coordination: Functional  Extremities: RUE   RUE : Within Functional Limits and LUE   LUE: Within Functional Limits        Outcome Measures:[facility] Daily Activity  Putting on and taking off regular lower body clothing: Total  Bathing (including washing, rinsing, drying): A lot  Putting on and taking off regular upper body clothing: A little  Toileting, which includes using toilet, bedpan or urinal: Total  Taking care of personal grooming such as brushing teeth: A little  Eating Meals: None  Daily Activity - Total Score: 14        Education Documentation  Body Mechanics, taught by Lavinia Nicolas OT at 9/14/2024  3:19 PM.  Learner: Patient  Readiness: Acceptance  Method: Explanation  Response: Needs Reinforcement    Precautions, taught by Lavinia Nicolas OT at 9/14/2024  3:19 PM.  Learner: Patient  Readiness: Acceptance  Method: Explanation  Response: Needs Reinforcement    ADL Training, taught by Lavinia Nicolas OT at 9/14/2024  3:19 PM.  Learner: Patient  Readiness: Acceptance  Method: Explanation  Response: Needs Reinforcement    Education Comments  No comments found.        OP  EDUCATION:       Goals:  Encounter Problems       Encounter Problems (Active)       ADLs       Patient will perform UB and LB bathing with minimal assist  level of assistance and shower chair.       Start:  09/14/24    Expected End:  09/28/24            Patient with complete lower body dressing with minimal assist  level of assistance donning and doffing all LE clothes  with PRN adaptive equipment while edge of bed        Start:  09/14/24    Expected End:  09/28/24            Patient will complete daily grooming tasks brushing teeth and washing face/hair with supervision level of assistance and PRN adaptive equipment while edge of bed .       Start:  09/14/24    Expected End:  09/28/24            Patient will complete toileting including hygiene clothing management/hygiene with moderate assist level of assistance and bedside commode.       Start:  09/14/24    Expected End:  09/28/24               COGNITION/SAFETY       Patient will recall and adhere to weight bearing and /or ROM restrictions with all ADL and functional mobility in order to promote healing and safety with functional tasks       Start:  09/14/24    Expected End:  09/28/24               TRANSFERS       Patient will complete functional transfer to [facility] with least restrictive device with minimal assist  level of assistance.       Start:  09/14/24    Expected End:  09/28/24

## 2024-09-15 ENCOUNTER — PREP FOR PROCEDURE (OUTPATIENT)
Dept: ORTHOPEDIC SURGERY | Facility: HOSPITAL | Age: 34
End: 2024-09-15
Payer: MEDICARE

## 2024-09-15 VITALS
SYSTOLIC BLOOD PRESSURE: 114 MMHG | DIASTOLIC BLOOD PRESSURE: 79 MMHG | WEIGHT: 239 LBS | BODY MASS INDEX: 38.41 KG/M2 | OXYGEN SATURATION: 97 % | HEART RATE: 94 BPM | RESPIRATION RATE: 18 BRPM | TEMPERATURE: 97.1 F | HEIGHT: 66 IN

## 2024-09-15 DIAGNOSIS — S93.04XA ANKLE DISLOCATION, RIGHT, INITIAL ENCOUNTER: ICD-10-CM

## 2024-09-15 DIAGNOSIS — S82.851A CLOSED DISPLACED TRIMALLEOLAR FRACTURE OF RIGHT ANKLE, INITIAL ENCOUNTER: Primary | ICD-10-CM

## 2024-09-15 LAB
ANION GAP SERPL CALC-SCNC: 10 MMOL/L (ref 10–20)
BUN SERPL-MCNC: 10 MG/DL (ref 6–23)
CALCIUM SERPL-MCNC: 8.5 MG/DL (ref 8.6–10.3)
CHLORIDE SERPL-SCNC: 106 MMOL/L (ref 98–107)
CO2 SERPL-SCNC: 24 MMOL/L (ref 21–32)
CREAT SERPL-MCNC: 0.75 MG/DL (ref 0.5–1.05)
EGFRCR SERPLBLD CKD-EPI 2021: >90 ML/MIN/1.73M*2
ERYTHROCYTE [DISTWIDTH] IN BLOOD BY AUTOMATED COUNT: 13.8 % (ref 11.5–14.5)
GLUCOSE SERPL-MCNC: 85 MG/DL (ref 74–99)
HCT VFR BLD AUTO: 41 % (ref 36–46)
HGB BLD-MCNC: 13.2 G/DL (ref 12–16)
MCH RBC QN AUTO: 27.3 PG (ref 26–34)
MCHC RBC AUTO-ENTMCNC: 32.2 G/DL (ref 32–36)
MCV RBC AUTO: 85 FL (ref 80–100)
NRBC BLD-RTO: 0 /100 WBCS (ref 0–0)
PLATELET # BLD AUTO: 253 X10*3/UL (ref 150–450)
POTASSIUM SERPL-SCNC: 3.8 MMOL/L (ref 3.5–5.3)
RBC # BLD AUTO: 4.83 X10*6/UL (ref 4–5.2)
SODIUM SERPL-SCNC: 136 MMOL/L (ref 136–145)
WBC # BLD AUTO: 9.2 X10*3/UL (ref 4.4–11.3)

## 2024-09-15 PROCEDURE — 2500000004 HC RX 250 GENERAL PHARMACY W/ HCPCS (ALT 636 FOR OP/ED): Performed by: INTERNAL MEDICINE

## 2024-09-15 PROCEDURE — 2500000002 HC RX 250 W HCPCS SELF ADMINISTERED DRUGS (ALT 637 FOR MEDICARE OP, ALT 636 FOR OP/ED): Performed by: HOSPITALIST

## 2024-09-15 PROCEDURE — 36415 COLL VENOUS BLD VENIPUNCTURE: CPT | Performed by: INTERNAL MEDICINE

## 2024-09-15 PROCEDURE — 99232 SBSQ HOSP IP/OBS MODERATE 35: CPT | Performed by: INTERNAL MEDICINE

## 2024-09-15 PROCEDURE — 2500000001 HC RX 250 WO HCPCS SELF ADMINISTERED DRUGS (ALT 637 FOR MEDICARE OP): Performed by: INTERNAL MEDICINE

## 2024-09-15 PROCEDURE — 85027 COMPLETE CBC AUTOMATED: CPT | Performed by: INTERNAL MEDICINE

## 2024-09-15 PROCEDURE — 94640 AIRWAY INHALATION TREATMENT: CPT

## 2024-09-15 PROCEDURE — 2500000004 HC RX 250 GENERAL PHARMACY W/ HCPCS (ALT 636 FOR OP/ED): Performed by: HOSPITALIST

## 2024-09-15 PROCEDURE — 2500000002 HC RX 250 W HCPCS SELF ADMINISTERED DRUGS (ALT 637 FOR MEDICARE OP, ALT 636 FOR OP/ED): Performed by: INTERNAL MEDICINE

## 2024-09-15 PROCEDURE — 1100000001 HC PRIVATE ROOM DAILY

## 2024-09-15 PROCEDURE — 80048 BASIC METABOLIC PNL TOTAL CA: CPT | Performed by: INTERNAL MEDICINE

## 2024-09-15 RX ORDER — FORMOTEROL FUMARATE DIHYDRATE 20 UG/2ML
20 SOLUTION RESPIRATORY (INHALATION) 2 TIMES DAILY
Status: DISCONTINUED | OUTPATIENT
Start: 2024-09-16 | End: 2024-09-19 | Stop reason: HOSPADM

## 2024-09-15 RX ORDER — BUDESONIDE 0.5 MG/2ML
0.5 INHALANT ORAL 2 TIMES DAILY
Status: DISCONTINUED | OUTPATIENT
Start: 2024-09-16 | End: 2024-09-19 | Stop reason: HOSPADM

## 2024-09-15 RX ORDER — SODIUM CHLORIDE, SODIUM LACTATE, POTASSIUM CHLORIDE, CALCIUM CHLORIDE 600; 310; 30; 20 MG/100ML; MG/100ML; MG/100ML; MG/100ML
20 INJECTION, SOLUTION INTRAVENOUS CONTINUOUS
OUTPATIENT
Start: 2024-09-15

## 2024-09-15 RX ORDER — CEFAZOLIN SODIUM 2 G/50ML
2 SOLUTION INTRAVENOUS ONCE
OUTPATIENT
Start: 2024-09-15 | End: 2024-09-15

## 2024-09-15 ASSESSMENT — COGNITIVE AND FUNCTIONAL STATUS - GENERAL
CLIMB 3 TO 5 STEPS WITH RAILING: TOTAL
DAILY ACTIVITIY SCORE: 17
PERSONAL GROOMING: A LITTLE
WALKING IN HOSPITAL ROOM: TOTAL
DRESSING REGULAR UPPER BODY CLOTHING: A LITTLE
MOVING TO AND FROM BED TO CHAIR: TOTAL
MOBILITY SCORE: 9
MOVING FROM LYING ON BACK TO SITTING ON SIDE OF FLAT BED WITH BEDRAILS: A LITTLE
TOILETING: A LOT
TURNING FROM BACK TO SIDE WHILE IN FLAT BAD: A LOT
DRESSING REGULAR LOWER BODY CLOTHING: A LOT
STANDING UP FROM CHAIR USING ARMS: TOTAL
HELP NEEDED FOR BATHING: A LITTLE

## 2024-09-15 ASSESSMENT — PAIN DESCRIPTION - ORIENTATION
ORIENTATION: RIGHT
ORIENTATION: RIGHT

## 2024-09-15 ASSESSMENT — PAIN SCALES - GENERAL
PAINLEVEL_OUTOF10: 7
PAINLEVEL_OUTOF10: 6
PAINLEVEL_OUTOF10: 5 - MODERATE PAIN
PAINLEVEL_OUTOF10: 8

## 2024-09-15 ASSESSMENT — PAIN DESCRIPTION - LOCATION
LOCATION: ANKLE

## 2024-09-15 ASSESSMENT — PAIN SCALES - WONG BAKER: WONGBAKER_NUMERICALRESPONSE: HURTS WHOLE LOT

## 2024-09-15 NOTE — CARE PLAN
The patient's goals for the shift include      The clinical goals for the shift include increased movement in room and in hallway    Over the shift, the patient did not make progress toward the following goals. Barriers to progression include right ankle fracture. Recommendations to address these barriers include reduction complete and splinted with pain meds as needed.

## 2024-09-15 NOTE — CARE PLAN
Over the shift, the patient did make progress toward the following goals.     Problem: Pain - Adult  Goal: Verbalizes/displays adequate comfort level or baseline comfort level  Outcome: Progressing     Problem: Safety - Adult  Goal: Free from fall injury  Outcome: Progressing   The patient's goals for the shift include      The clinical goals for the shift include increased movement in room and in hallway      Problem: Pain  Goal: Walks with improved pain control throughout the shift  Outcome: Progressing     Problem: Pain  Goal: Performs ADL's with improved pain control throughout shift  Outcome: Progressing

## 2024-09-15 NOTE — CARE PLAN
The patient's goals for the shift include      The clinical goals for the shift include pain managed, safety, hds

## 2024-09-15 NOTE — PROGRESS NOTES
Windy Lovelace is a 33 y.o. female on day 2 of admission presenting with Closed fracture of right ankle, initial encounter.      Subjective   Pt seen and examined.        Objective     Last Recorded Vitals  BP 93/71   Pulse (!) 116 Comment: RN informed  Temp 37.1 °C (98.7 °F)   Resp 16   Wt 108 kg (239 lb)   SpO2 99%   Intake/Output last 3 Shifts:  No intake or output data in the 24 hours ending 09/15/24 1208    Admission Weight  Weight: 108 kg (239 lb) (09/13/24 0943)    Daily Weight  09/13/24 : 108 kg (239 lb)      Physical Exam  Constitutional: NAD, resting comfortably in bed  Skin: Warm and dry, no rashes   Eyes: EOMI, clear sclera   ENMT: MMM   HEENT: Neck supple without apparent injury, EOMI, MMM  Respiratory: NWOB on RA   CV: RRR per peripheral pulses, limbs wwp  GI: soft, non-distended   Lymph: No apparent LAD  Neuro: MCGARRY spontaneously, CNs II - XII grossly intact   Psych: Appropriate mood and behavior  Relevant Results  Results for orders placed or performed during the hospital encounter of 09/13/24 (from the past 24 hour(s))   CBC   Result Value Ref Range    WBC 9.2 4.4 - 11.3 x10*3/uL    nRBC 0.0 0.0 - 0.0 /100 WBCs    RBC 4.83 4.00 - 5.20 x10*6/uL    Hemoglobin 13.2 12.0 - 16.0 g/dL    Hematocrit 41.0 36.0 - 46.0 %    MCV 85 80 - 100 fL    MCH 27.3 26.0 - 34.0 pg    MCHC 32.2 32.0 - 36.0 g/dL    RDW 13.8 11.5 - 14.5 %    Platelets 253 150 - 450 x10*3/uL   Basic Metabolic Panel   Result Value Ref Range    Glucose 85 74 - 99 mg/dL    Sodium 136 136 - 145 mmol/L    Potassium 3.8 3.5 - 5.3 mmol/L    Chloride 106 98 - 107 mmol/L    Bicarbonate 24 21 - 32 mmol/L    Anion Gap 10 10 - 20 mmol/L    Urea Nitrogen 10 6 - 23 mg/dL    Creatinine 0.75 0.50 - 1.05 mg/dL    eGFR >90 >60 mL/min/1.73m*2    Calcium 8.5 (L) 8.6 - 10.3 mg/dL        CT ankle right wo IV contrast   Final Result   Comminuted, mildly displaced trimalleolar fractures status post   closed reduction and casting.             MACRO:   None         Signed by: Gibson Perez 9/14/2024 7:43 AM   Dictation workstation:   [id number]      XR ankle right 2 views   Final Result   Interval reduction of the ankle fracture/dislocation with ankle   mortise now in anatomic alignment. Improved alignment of the distal   fibula and posterior malleolus fractures.             MACRO:   None        Signed by: Braydon Conklin 9/13/2024 5:11 PM   Dictation workstation:   [id number]      XR chest 1 view   Final Result   No focal infiltrate or pneumothorax is identified.        MACRO:   None.        Signed by: Blair Dennis 9/13/2024 1:30 PM   Dictation workstation:   [id number]      CT head wo IV contrast   Final Result   No evidence of acute cortical infarct or intracranial hemorrhage.        MACRO:   None             Signed by: Blair Dennis 9/13/2024 12:59 PM   Dictation workstation:   [id number]      CT cervical spine wo IV contrast   Final Result   1.  No CT evidence of acute fracture.        Signed by: Blair Dennis 9/13/2024 1:01 PM   Dictation workstation:   [id number]      CT thoracic spine wo IV contrast   Final Result   CHEST:   1. No focal infiltrate, pulmonary nodule or lymphadenopathy   identified.        ABDOMEN-PELVIS:   1. No evidence of bowel obstruction, free intraperitoneal air or   abnormal intra-abdominal fluid collection.        THORACIC/LUMBAR SPINE:   1. No evidence of acute fracture.        MACRO:   None        Signed by: Blair Dennis 9/13/2024 1:09 PM   Dictation workstation:   [id number]      CT lumbar spine wo IV contrast   Final Result   CHEST:   1. No focal infiltrate, pulmonary nodule or lymphadenopathy   identified.        ABDOMEN-PELVIS:   1. No evidence of bowel obstruction, free intraperitoneal air or   abnormal intra-abdominal fluid collection.        THORACIC/LUMBAR SPINE:   1. No evidence of acute fracture.        MACRO:   None        Signed by: Blair Dennis 9/13/2024 1:09 PM   Dictation workstation:   [id number]      CT chest  abdomen pelvis wo IV contrast   Final Result   CHEST:   1. No focal infiltrate, pulmonary nodule or lymphadenopathy   identified.        ABDOMEN-PELVIS:   1. No evidence of bowel obstruction, free intraperitoneal air or   abnormal intra-abdominal fluid collection.        THORACIC/LUMBAR SPINE:   1. No evidence of acute fracture.        MACRO:   None        Signed by: Blair Dennis 9/13/2024 1:09 PM   Dictation workstation:   [id number]      XR tibia fibula right 2 views   Final Result   Trimalleolar and possible trimalleolar fracture dislocation of the   right ankle as above.        MACRO:   none        Signed by: Gabo Rollins 9/13/2024 11:51 AM   Dictation workstation:   [id number]      XR ankle right 2 views   Final Result   Bimalleolar and possible trimalleolar fracture dislocation of the   right ankle as described above.        MACRO:   none        Signed by: Gabo Rollins 9/13/2024 11:50 AM   Dictation workstation:   [id number]      XR foot right 1-2 views   Final Result   Fracture dislocation right ankle.   No fracture of the osseous structures of the foot.        MACRO:   none        Signed by: Gabo Rollins 9/13/2024 11:52 AM   Dictation workstation:   [id number]          Scheduled medications  aspirin, 81 mg, oral, Daily  cholecalciferol, 2,000 Units, oral, Daily  enoxaparin, 40 mg, subcutaneous, q24h  pantoprazole, 40 mg, oral, Daily before breakfast  prazosin, 5 mg, oral, Nightly  sertraline, 100 mg, oral, Daily  traZODone, 100 mg, oral, Nightly      Continuous medications     PRN medications  PRN medications: budesonide, fluticasone, formoterol, morphine, ondansetron, oxyCODONE-acetaminophen         Assessment/Plan                  Principal Problem:    Closed fracture of right ankle, initial encounter    # Right ankle fracture  -s/p closed reduction   -ortho following  -pain control  -PT/OT     # H/o strokes  -continue aspirin     # Anxiety  -continue home meds     # Insomnia  -continue  trazodone     # DVT ppx    Dispo: SNF?              Shemar Diaz MD

## 2024-09-16 PROBLEM — S93.04XA ANKLE DISLOCATION, RIGHT, INITIAL ENCOUNTER: Status: ACTIVE | Noted: 2024-09-15

## 2024-09-16 PROBLEM — S82.851A CLOSED DISPLACED TRIMALLEOLAR FRACTURE OF RIGHT LOWER LEG: Status: ACTIVE | Noted: 2024-09-15

## 2024-09-16 PROCEDURE — 97110 THERAPEUTIC EXERCISES: CPT | Mod: GP,CQ

## 2024-09-16 PROCEDURE — 2500000004 HC RX 250 GENERAL PHARMACY W/ HCPCS (ALT 636 FOR OP/ED): Performed by: INTERNAL MEDICINE

## 2024-09-16 PROCEDURE — 97535 SELF CARE MNGMENT TRAINING: CPT | Mod: GO

## 2024-09-16 PROCEDURE — 2500000001 HC RX 250 WO HCPCS SELF ADMINISTERED DRUGS (ALT 637 FOR MEDICARE OP): Performed by: INTERNAL MEDICINE

## 2024-09-16 PROCEDURE — 97530 THERAPEUTIC ACTIVITIES: CPT | Mod: GP,CQ

## 2024-09-16 PROCEDURE — 2500000002 HC RX 250 W HCPCS SELF ADMINISTERED DRUGS (ALT 637 FOR MEDICARE OP, ALT 636 FOR OP/ED): Performed by: INTERNAL MEDICINE

## 2024-09-16 PROCEDURE — 1100000001 HC PRIVATE ROOM DAILY

## 2024-09-16 PROCEDURE — 94640 AIRWAY INHALATION TREATMENT: CPT

## 2024-09-16 PROCEDURE — 99232 SBSQ HOSP IP/OBS MODERATE 35: CPT | Performed by: INTERNAL MEDICINE

## 2024-09-16 PROCEDURE — 97530 THERAPEUTIC ACTIVITIES: CPT | Mod: GO

## 2024-09-16 RX ORDER — ACETAMINOPHEN 325 MG/1
650 TABLET ORAL EVERY 6 HOURS PRN
Status: DISCONTINUED | OUTPATIENT
Start: 2024-09-16 | End: 2024-09-18

## 2024-09-16 RX ORDER — IBUPROFEN 400 MG/1
800 TABLET ORAL ONCE
Status: COMPLETED | OUTPATIENT
Start: 2024-09-16 | End: 2024-09-16

## 2024-09-16 ASSESSMENT — ACTIVITIES OF DAILY LIVING (ADL)
LACK_OF_TRANSPORTATION: NO
HOME_MANAGEMENT_TIME_ENTRY: 24

## 2024-09-16 ASSESSMENT — COGNITIVE AND FUNCTIONAL STATUS - GENERAL
MOBILITY SCORE: 12
CLIMB 3 TO 5 STEPS WITH RAILING: TOTAL
TOILETING: A LOT
DRESSING REGULAR UPPER BODY CLOTHING: A LITTLE
DAILY ACTIVITIY SCORE: 14
TURNING FROM BACK TO SIDE WHILE IN FLAT BAD: A LITTLE
PERSONAL GROOMING: A LITTLE
MOBILITY SCORE: 12
DRESSING REGULAR UPPER BODY CLOTHING: A LITTLE
PERSONAL GROOMING: A LOT
MOVING FROM LYING ON BACK TO SITTING ON SIDE OF FLAT BED WITH BEDRAILS: A LITTLE
WALKING IN HOSPITAL ROOM: A LOT
MOVING TO AND FROM BED TO CHAIR: A LOT
TURNING FROM BACK TO SIDE WHILE IN FLAT BAD: A LOT
WALKING IN HOSPITAL ROOM: TOTAL
TOILETING: A LOT
HELP NEEDED FOR BATHING: A LITTLE
DAILY ACTIVITIY SCORE: 17
DRESSING REGULAR LOWER BODY CLOTHING: A LOT
HELP NEEDED FOR BATHING: A LOT
MOVING TO AND FROM BED TO CHAIR: A LOT
STANDING UP FROM CHAIR USING ARMS: A LOT
MOVING FROM LYING ON BACK TO SITTING ON SIDE OF FLAT BED WITH BEDRAILS: A LITTLE
DRESSING REGULAR LOWER BODY CLOTHING: TOTAL
CLIMB 3 TO 5 STEPS WITH RAILING: TOTAL
STANDING UP FROM CHAIR USING ARMS: A LOT

## 2024-09-16 ASSESSMENT — PAIN DESCRIPTION - LOCATION
LOCATION: ANKLE
LOCATION: ANKLE
LOCATION: HEAD
LOCATION: ANKLE

## 2024-09-16 ASSESSMENT — PAIN SCALES - GENERAL
PAINLEVEL_OUTOF10: 8
PAINLEVEL_OUTOF10: 5 - MODERATE PAIN
PAINLEVEL_OUTOF10: 10 - WORST POSSIBLE PAIN
PAINLEVEL_OUTOF10: 9
PAINLEVEL_OUTOF10: 8
PAINLEVEL_OUTOF10: 7

## 2024-09-16 ASSESSMENT — PAIN DESCRIPTION - ORIENTATION
ORIENTATION: RIGHT

## 2024-09-16 ASSESSMENT — PAIN - FUNCTIONAL ASSESSMENT
PAIN_FUNCTIONAL_ASSESSMENT: 0-10
PAIN_FUNCTIONAL_ASSESSMENT: 0-10

## 2024-09-16 NOTE — PROGRESS NOTES
09/16/24 1042   Discharge Planning   Living Arrangements Alone   Support Systems Family members;Home care staff   Assistance Needed Independent with ADLS, uses a cane with ambulation, assistance required for home making, has home care staff   Type of Residence Private residence   Number of Stairs to Enter Residence 0   Do you have animals or pets at home? No   Who is requesting discharge planning? Provider   Home or Post Acute Services Post acute facilities (Rehab/SNF/etc)   Type of Post Acute Facility Services Skilled nursing   Expected Discharge Disposition SNF   Does the patient need discharge transport arranged? Yes   RoundTrip coordination needed? Yes   Housing Stability   In the last 12 months, was there a time when you were not able to pay the mortgage or rent on time? Pt Declined   At any time in the past 12 months, were you homeless or living in a shelter (including now)? N   Transportation Needs   In the past 12 months, has lack of transportation kept you from medical appointments or from getting medications? no   In the past 12 months, has lack of transportation kept you from meetings, work, or from getting things needed for daily living? No   Patient Choice   Provider Choice list and CMS website (https://medicare.gov/care-compare#search) for post-acute Quality and Resource Measure Data were provided and reviewed with: Representative   Patient / Family choosing to utilize agency / facility established prior to hospitalization Yes     PLAN/BARRIER: waiting on ortho plan and SNF choices from POA.   DISP: SNF  ADOD: 1-3 days pending ortho plan  Patient will not need auth due to having Medicare primary.    Bertha Templeton RN

## 2024-09-16 NOTE — PROGRESS NOTES
Occupational Therapy    Occupational Therapy Treatment    Name: Windy Lovelace  MRN: [id number]  Department: [facility] A 7  Room: 73/733-A  Date: 09/16/24  Time Calculation  Start Time: 1333  Stop Time: 1422  Time Calculation (min): 49 min    Assessment:  OT Assessment: Pt seen for OT tx. Pt demonstrates wioth assist with mobility and ADLS  Prognosis: Good  Barriers to Discharge: None  Evaluation/Treatment Tolerance: Patient limited by pain  Medical Staff Made Aware: Yes  End of Session Communication: Bedside nurse  End of Session Patient Position: Bed, 3 rail up, Alarm on  Plan:  Treatment Interventions: ADL retraining, Functional transfer training, Endurance training, Equipment evaluation/education  OT Frequency: 3 times per week  OT Discharge Recommendations: Moderate intensity level of continued care  Equipment Recommended upon Discharge: Wheeled walker  OT - OK to Discharge: Yes    Subjective   Previous Visit Info:  OT Last Visit  OT Received On: 09/16/24  General:  General  Reason for Referral: Rt ankle fx s/p closed reduction  Co-Treatment: PT  Co-Treatment Reason: partial co tx with PT to maximize pt mobility, safety and participation  Prior to Session Communication: Bedside nurse  Patient Position Received:  (Pt sitting on EOB with PT)  Preferred Learning Style: auditory, visual, verbal, kinesthetic  General Comment: pt agreeable to therapy  Precautions:  LE Weight Bearing Status: Right Non-Weight Bearing  Medical Precautions: Fall precautions    Vital Signs (Past 2hrs)                Pain Assessment:  Pain Assessment  Pain Assessment: 0-10  0-10 (Numeric) Pain Score: 5 - Moderate pain (8/10 at end of seeasion. Nursing made aware and medicated pt)  Pain Location: Ankle  Pain Orientation: Right  Pain Interventions: Medication (See MAR), Repositioned, Ambulation/increased activity, Distraction     Objective   Cognition:  Overall Cognitive Status: Within Functional Limits  Activities of Daily Living:       Grooming  Grooming Level of Assistance: Maximum assistance  Grooming Where Assessed: Chair  Grooming Comments: Pt required max asssit to wash hair with shampoo cap and comb hair. excessive tangles back of head    LE Dressing  LE Dressing: Yes  Adult Briefs Level of Assistance: Dependent  LE Dressing Where Assessed: Chair (pt sttod with Mod x2 and walker. Dependent to pull brief up)     Bed Mobility/Transfers: Bed Mobility  Bed Mobility: Yes  Bed Mobility 1  Bed Mobility 1: Sitting to supine  Level of Assistance 1: Minimum assistance  Bed Mobility Comments 1: Min A to bring legs onto bed. Pt able to initiate but unable to full bring onto bed  Bed Mobility 2  Bed Mobility  2: Scooting  Level of Assistance 2: Moderate assistance  Bed Mobility Comments 2: Mod assist to scoot hips over in bed away from the edge    Transfers  Transfer: Yes  Transfer 1  Technique 1: Sit to stand (EOB and chair with arm rests)  Transfer Device 1: Walker, Gait belt  Transfer Level of Assistance 1: Moderate assistance, +2, Moderate verbal cues, Moderate tactile cues  Trials/Comments 1: VCs for hand placement to push up from chair  Transfers 2  Transfer From 2: Bed to, Chair with drop arm to  Technique 2: Stand to sit, Stand pivot  Transfer Device 2: Walker, Gait belt  Transfer Level of Assistance 2: Moderate assistance, +2, Moderate tactile cues, Moderate verbal cues  Trials/Comments 2: Assit provided under R foot to assure NWB  Transfers 3  Transfer From 3: Chair with drop arm to  Transfer to 3: Bed  Technique 3: Stand pivot, Sit to stand, Stand to sit  Transfer Device 3: Walker, Gait belt  Transfer Level of Assistance 3: Moderate assistance, +2, Moderate verbal cues, Moderate tactile cues  Trials/Comments 3: QAsssit to assure NWB R foot    Sitting Balance:  Static Sitting Balance  Static Sitting-Balance Support: Feet supported    Outcome Measures:  [facility] Daily Activity  Putting on and taking off regular lower body clothing:  Total  Bathing (including washing, rinsing, drying): A lot  Putting on and taking off regular upper body clothing: A little  Toileting, which includes using toilet, bedpan or urinal: A lot  Taking care of personal grooming such as brushing teeth: A lot  Eating Meals: None  Daily Activity - Total Score: 14        Education Documentation  Handouts, taught by Odilia Templeton OT at 9/16/2024  3:19 PM.  Learner: Patient  Readiness: Acceptance  Method: Explanation, Demonstration  Response: Verbalizes Understanding, Needs Reinforcement    Body Mechanics, taught by Odilia Templeton OT at 9/16/2024  3:19 PM.  Learner: Patient  Readiness: Acceptance  Method: Explanation, Demonstration  Response: Verbalizes Understanding, Needs Reinforcement    Precautions, taught by Odilia Templeton OT at 9/16/2024  3:19 PM.  Learner: Patient  Readiness: Acceptance  Method: Explanation, Demonstration  Response: Verbalizes Understanding, Needs Reinforcement    Home Exercise Program, taught by Odilia Templeton OT at 9/16/2024  3:19 PM.  Learner: Patient  Readiness: Acceptance  Method: Explanation, Demonstration  Response: Verbalizes Understanding, Needs Reinforcement    ADL Training, taught by Odilia Templeton OT at 9/16/2024  3:19 PM.  Learner: Patient  Readiness: Acceptance  Method: Explanation, Demonstration  Response: Verbalizes Understanding, Needs Reinforcement    Education Comments  No comments found.      Goals:  Encounter Problems       Encounter Problems (Active)       ADLs       Patient will perform UB and LB bathing with minimal assist  level of assistance and shower chair. (Progressing)       Start:  09/14/24    Expected End:  09/28/24            Patient with complete lower body dressing with minimal assist  level of assistance donning and doffing all LE clothes  with PRN adaptive equipment while edge of bed  (Progressing)       Start:  09/14/24    Expected End:  09/28/24            Patient will complete daily  grooming tasks brushing teeth and washing face/hair with supervision level of assistance and PRN adaptive equipment while edge of bed . (Progressing)       Start:  09/14/24    Expected End:  09/28/24            Patient will complete toileting including hygiene clothing management/hygiene with moderate assist level of assistance and bedside commode. (Progressing)       Start:  09/14/24    Expected End:  09/28/24               COGNITION/SAFETY       Patient will recall and adhere to weight bearing and /or ROM restrictions with all ADL and functional mobility in order to promote healing and safety with functional tasks (Progressing)       Start:  09/14/24    Expected End:  09/28/24               TRANSFERS       [place] [facility] [place] with minimal assist  level of assistance. (Progressing)       Start:  09/14/24    Expected End:  09/28/24

## 2024-09-16 NOTE — PROGRESS NOTES
Windy Lovelace is a 33 y.o. female on day 3 of admission presenting with Closed fracture of right ankle, initial encounter.      Subjective   Pt seen and examined.        Objective     Last Recorded Vitals  /64 (BP Location: Right arm, Patient Position: Sitting)   Pulse 94   Temp 36.7 °C (98 °F) (Temporal)   Resp 18   Wt 108 kg (239 lb)   SpO2 97%   Intake/Output last 3 Shifts:    Intake/Output Summary (Last 24 hours) at 9/16/2024 1143  Last data filed at 9/15/2024 2339  Gross per 24 hour   Intake 240 ml   Output 0 ml   Net 240 ml       Admission Weight  Weight: 108 kg (239 lb) (09/13/24 0943)    Daily Weight  09/13/24 : 108 kg (239 lb)      Physical Exam  Constitutional: NAD, resting comfortably in bed  Skin: Warm and dry, no rashes   Eyes: EOMI, clear sclera   ENMT: MMM   HEENT: Neck supple without apparent injury, EOMI, MMM  Respiratory: NWOB on RA   CV: RRR per peripheral pulses, limbs wwp  GI: soft, non-distended   Lymph: No apparent LAD  Neuro: MCGARRY spontaneously, CNs II - XII grossly intact   Psych: Appropriate mood and behavior  Relevant Results  No results found for this or any previous visit (from the past 24 hour(s)).       CT ankle right wo IV contrast   Final Result   Comminuted, mildly displaced trimalleolar fractures status post   closed reduction and casting.             MACRO:   None        Signed by: Gibson Perez 9/14/2024 7:43 AM   Dictation workstation:   [id number]      XR ankle right 2 views   Final Result   Interval reduction of the ankle fracture/dislocation with ankle   mortise now in anatomic alignment. Improved alignment of the distal   fibula and posterior malleolus fractures.             MACRO:   None        Signed by: Braydon Conklin 9/13/2024 5:11 PM   Dictation workstation:   [id number]      XR chest 1 view   Final Result   No focal infiltrate or pneumothorax is identified.        MACRO:   None.        Signed by: Blair Dennis 9/13/2024 1:30 PM   Dictation  workstation:   [id number]      CT head wo IV contrast   Final Result   No evidence of acute cortical infarct or intracranial hemorrhage.        MACRO:   None             Signed by: Blair Dennis 9/13/2024 12:59 PM   Dictation workstation:   [id number]      CT cervical spine wo IV contrast   Final Result   1.  No CT evidence of acute fracture.        Signed by: Blair Dennis 9/13/2024 1:01 PM   Dictation workstation:   [id number]      CT thoracic spine wo IV contrast   Final Result   CHEST:   1. No focal infiltrate, pulmonary nodule or lymphadenopathy   identified.        ABDOMEN-PELVIS:   1. No evidence of bowel obstruction, free intraperitoneal air or   abnormal intra-abdominal fluid collection.        THORACIC/LUMBAR SPINE:   1. No evidence of acute fracture.        MACRO:   None        Signed by: Blair Dennis 9/13/2024 1:09 PM   Dictation workstation:   [id number]      CT lumbar spine wo IV contrast   Final Result   CHEST:   1. No focal infiltrate, pulmonary nodule or lymphadenopathy   identified.        ABDOMEN-PELVIS:   1. No evidence of bowel obstruction, free intraperitoneal air or   abnormal intra-abdominal fluid collection.        THORACIC/LUMBAR SPINE:   1. No evidence of acute fracture.        MACRO:   None        Signed by: Blair Dennis 9/13/2024 1:09 PM   Dictation workstation:   [id number]      CT chest abdomen pelvis wo IV contrast   Final Result   CHEST:   1. No focal infiltrate, pulmonary nodule or lymphadenopathy   identified.        ABDOMEN-PELVIS:   1. No evidence of bowel obstruction, free intraperitoneal air or   abnormal intra-abdominal fluid collection.        THORACIC/LUMBAR SPINE:   1. No evidence of acute fracture.        MACRO:   None        Signed by: Blair Dennis 9/13/2024 1:09 PM   Dictation workstation:   [id number]      XR tibia fibula right 2 views   Final Result   Trimalleolar and possible trimalleolar fracture dislocation of the   right ankle as above.        MACRO:    none        Signed by: Gabo Rollins 9/13/2024 11:51 AM   Dictation workstation:   [id number]      XR ankle right 2 views   Final Result   Bimalleolar and possible trimalleolar fracture dislocation of the   right ankle as described above.        MACRO:   none        Signed by: Gabo Rollins 9/13/2024 11:50 AM   Dictation workstation:   [id number]      XR foot right 1-2 views   Final Result   Fracture dislocation right ankle.   No fracture of the osseous structures of the foot.        MACRO:   none        Signed by: Gabo Rollins 9/13/2024 11:52 AM   Dictation workstation:   [id number]          Scheduled medications  aspirin, 81 mg, oral, Daily  budesonide, 0.5 mg, nebulization, BID  cholecalciferol, 2,000 Units, oral, Daily  enoxaparin, 40 mg, subcutaneous, q24h  formoterol, 20 mcg, nebulization, BID  pantoprazole, 40 mg, oral, Daily before breakfast  prazosin, 5 mg, oral, Nightly  sertraline, 100 mg, oral, Daily  traZODone, 100 mg, oral, Nightly      Continuous medications     PRN medications  PRN medications: fluticasone, morphine, ondansetron, oxyCODONE-acetaminophen         Assessment/Plan                  Principal Problem:    Closed fracture of right ankle, initial encounter    # Right ankle fracture  -s/p closed reduction   -ortho following  -pain control  -PT/OT  -plan for surgery on Thursday     # H/o strokes  -continue aspirin     # Anxiety  -continue home meds     # Insomnia  -continue trazodone     # DVT ppx    Dispo: SNF, pending surgery              Shemar Diaz MD

## 2024-09-16 NOTE — PROGRESS NOTES
Physical Therapy    Physical Therapy Treatment    Patient Name: Windy Lovelace  MRN: [id number]  Department: [place] [id number]  Room: [address] [place] [address]  Today's Date: 9/16/2024  Time Calculation  Start Time: 1321  Stop Time: 1350  Time Calculation (min): 29 min         Assessment/Plan   PT Assessment  End of Session Communication: Bedside nurse (discussed pt's progress and current mobility as described in mobility section)  Assessment Comment: Pt completes stand pivot transfer from EOB to bedside chair and demo's good compliance with NWB on R LE throughout transfer. Pt fatigues quickly and demo's limited standing tolerance and poor eccentric control with stand>sit.  End of Session Patient Position: Up in chair, Alarm off, caregiver present (R LE elevated on foot rest, OT present)  PT Plan  Inpatient/Swing Bed or Outpatient: Inpatient  PT Plan  Treatment/Interventions: Bed mobility, Transfer training, Therapeutic exercise  PT Plan: Ongoing PT  PT Frequency: 4 times per week  PT Discharge Recommendations: Moderate intensity level of continued care  Equipment Recommended upon Discharge: Wheeled walker  PT Recommended Transfer Status: Assist x2  PT - OK to Discharge: Yes (per POC)      General Visit Information:   PT  Visit  PT Received On: 09/16/24  General  Reason for Referral: Rt ankle fx s/p closed reduction  Referred By: Emily  Past Medical History Relevant to Rehab:   Past Medical History:   Diagnosis Date    Abnormal levels of other serum enzymes 02/10/2014    Elevated liver enzymes    Acute candidiasis of vulva and vagina 09/07/2017    Yeast vaginitis    Amaurosis fugax 10/06/2017    Amaurosis fugax of left eye    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    Breast pain 03/21/2024    Candidiasis of skin and nail 06/25/2014    Cutaneous candidiasis    Cellulitis of face 10/10/2013    Facial cellulitis    Headache, unspecified 04/29/2015    Severe headache    Hypomagnesemia 01/23/2014    Hypomagnesemia    Other  cysts of oral region, not elsewhere classified 06/10/2016    Cyst of mouth    Other enthesopathies, not elsewhere classified 10/29/2015    Tendonitis of wrist, left    Other specified abnormal findings of blood chemistry 03/06/2020    Low vitamin D level    Other specified joint disorders, left ankle and foot 11/02/2016    Impingement syndrome of left ankle    Other specified noninflammatory disorders of vagina 03/20/2018    Vaginal odor    Pain in right knee 11/12/2018    Right knee pain    Pain in right knee 02/19/2018    Acute pain of right knee    Pain in right knee 02/20/2019    Acute pain of right knee    Pain, unspecified 10/17/2017    Acute pain    Pelvic and perineal pain 01/24/2018    Pelvic cramping    Personal history of diseases of the skin and subcutaneous tissue 02/24/2016    History of urticaria    Personal history of other diseases of the digestive system 10/06/2017    History of hiatal hernia    Personal history of other diseases of the digestive system 03/17/2015    History of gastritis    Personal history of other diseases of the digestive system 05/24/2016    History of constipation    Personal history of other diseases of the female genital tract 08/31/2015    History of breast pain    Personal history of other diseases of the musculoskeletal system and connective tissue 02/04/2014    History of low back pain    Personal history of other diseases of the nervous system and sense organs 01/05/2015    History of conjunctivitis    Personal history of other diseases of the respiratory system 08/29/2013    History of pharyngitis    Personal history of other diseases of the respiratory system 02/03/2017    History of acute bronchitis    Personal history of other drug therapy 12/11/2017    History of influenza vaccination    Personal history of other endocrine, nutritional and metabolic disease 05/09/2019    History of dehydration    Personal history of other mental and behavioral disorders 10/06/2017     History of mental retardation    Personal history of transient ischemic attack (TIA), and cerebral infarction without residual deficits 10/06/2017    History of stroke    Pleurodynia 01/23/2014    Rib pain on right side    Pyuria 06/13/2017    Pyuria    Sprain of medial collateral ligament of right knee, initial encounter 11/02/2016    Sprain of medial collateral ligament of right knee, initial encounter    Sprain of unspecified ligament of left ankle, subsequent encounter 10/29/2015    Severe sprain of left ankle, subsequent encounter    Strain of muscle and tendon of unspecified wall of thorax, initial encounter 02/04/2014    Strain of thoracic region    Syncope and collapse 06/13/2017    Syncope and collapse    Unspecified fall, initial encounter 01/23/2014    Fall at home    Unspecified symptoms and signs involving the genitourinary system 01/24/2018    UTI symptoms       Family/Caregiver Present: No  Co-Treatment: OT  Co-Treatment Reason: Partial cotx with OT required for skilled technique needed for safe functional transfers and to facilitate maximum participation with skilled intervention.  Prior to Session Communication: Bedside nurse  Patient Position Received: Bed, 3 rail up, Alarm on  Preferred Learning Style: auditory, kinesthetic, visual  General Comment: Pt semi-supine in bed on arrival, pleasant and fully participatory in PT tx    Subjective   Precautions:  Precautions  LE Weight Bearing Status: Right Non-Weight Bearing (in SLS)  Medical Precautions: Fall precautions    Objective   Pain:  Pain Assessment  Pain Assessment: 0-10  0-10 (Numeric) Pain Score: 5 - Moderate pain  Pain Location: Ankle  Pain Orientation: Right  Cognition:  Cognition  Overall Cognitive Status: Within Functional Limits  Coordination:  Movements are Fluid and Coordinated: Yes  Postural Control:  Postural Control  Postural Control: Within Functional Limits  Static Sitting Balance  Static Sitting-Balance Support: Bilateral upper  extremity supported (R foot supported, L foot on floor)  Static Sitting-Level of Assistance: Modified independent  Static Sitting-Comment/Number of Minutes: unsupported sitting at EOB x5 min duration    Activity Tolerance:  Activity Tolerance  Endurance: Tolerates 10 - 20 min exercise with multiple rests  Treatments:  Therapeutic Exercise  Therapeutic Exercise Performed: Yes  Therapeutic Exercise Activity 1: Pt instructed in L LE ther-ex: seated  AP, LAQ, Hip flexion x10 reps. Rest as needed to manage fatigue. Done to improve muscular strength and endurance to facilitate increased independence with balance, transfers, ambulation, and participation in ADLs.  Verbal/nonverbal (demo/gestures) cuing for execution of exercises and for proper form to obtain maximal benefits.    Therapeutic Activity  Therapeutic Activity Performed: Yes  Therapeutic Activity 1: Static standing trial done to increase balance and standing tolerance needed for increased independence with functional mobility with John x2 for balance and max safety x20s duration.    Bed Mobility  Bed Mobility: Yes  Bed Mobility 1  Bed Mobility 1: Supine to sitting  Level of Assistance 1: Contact guard  Bed Mobility Comments 1: HOB elevated  Bed Mobility 2  Bed Mobility  2: Scooting  Level of Assistance 2: Modified independent  Bed Mobility Comments 2: in sitting at EOB       Transfers  Transfer: Yes  Transfer 1  Transfer From 1:  (sit<>stand from EOB and chair with arm rests)  Transfer Device 1: Walker, Gait belt  Transfer Level of Assistance 1: Moderate assistance, +2  Trials/Comments 1: Verbal cuing for strategic hand placement with sit<>stand, however pt is unable to bring L hand to chair arm rest to assist with eccentric control  Transfers 2  Transfer From 2:  (stand-pivot from EOB to bedside chair)  Transfer Device 2: Walker, Gait belt  Transfer Level of Assistance 2: Moderate assistance, +2  Trials/Comments 2: Assistance provided under R foot to ensure NWB  status    Outcome Measures:  [facility] Basic Mobility  Turning from your back to your side while in a flat bed without using bedrails: A little  Moving from lying on your back to sitting on the side of a flat bed without using bedrails: A little  Moving to and from bed to chair (including a wheelchair): A lot  Standing up from a chair using your arms (e.g. wheelchair or bedside chair): A lot  To walk in hospital room: Total  Climbing 3-5 steps with railing: Total  Basic Mobility - Total Score: 12      Encounter Problems       Encounter Problems (Active)       Balance       Goal 1 (Progressing)       Start:  09/14/24    Expected End:  09/28/24       Pt performs all sitting balance with supervision and standing balance with min assist x 1 using FWW            Mobility       STG - Patient will ambulate (Progressing)       Start:  09/14/24    Expected End:  09/28/24       15 ft with min assist x 1 using FWW            PT Transfers       STG - Patient to transfer to and from sit to supine (Progressing)       Start:  09/14/24    Expected End:  09/28/24       With CGA         STG - Patient will transfer sit to and from stand (Progressing)       Start:  09/14/24    Expected End:  09/28/24       With min assist x 1 using FWW            Pain - Adult

## 2024-09-16 NOTE — CARE PLAN
The patient's goals for the shift include      The clinical goals for the shift include increased movement in room and in hallway      Problem: Pain - Adult  Goal: Verbalizes/displays adequate comfort level or baseline comfort level  Outcome: Progressing     Problem: Safety - Adult  Goal: Free from fall injury  Outcome: Progressing     Problem: Discharge Planning  Goal: Discharge to home or other facility with appropriate resources  Outcome: Progressing     Problem: Chronic Conditions and Co-morbidities  Goal: Patient's chronic conditions and co-morbidity symptoms are monitored and maintained or improved  Outcome: Progressing     Problem: Skin  Goal: Decreased wound size/increased tissue granulation at next dressing change  Outcome: Progressing  Goal: Participates in plan/prevention/treatment measures  Outcome: Progressing  Goal: Prevent/manage excess moisture  Outcome: Progressing  Goal: Prevent/minimize sheer/friction injuries  Outcome: Progressing  Goal: Promote/optimize nutrition  Outcome: Progressing  Goal: Promote skin healing  Outcome: Progressing     Problem: Fall/Injury  Goal: Not fall by end of shift  Outcome: Progressing  Goal: Be free from injury by end of the shift  Outcome: Progressing  Goal: Verbalize understanding of personal risk factors for fall in the hospital  Outcome: Progressing  Goal: Verbalize understanding of risk factor reduction measures to prevent injury from fall in the home  Outcome: Progressing  Goal: Use assistive devices by end of the shift  Outcome: Progressing  Goal: Pace activities to prevent fatigue by end of the shift  Outcome: Progressing     Problem: Pain  Goal: Takes deep breaths with improved pain control throughout the shift  Outcome: Progressing  Goal: Turns in bed with improved pain control throughout the shift  Outcome: Progressing  Goal: Walks with improved pain control throughout the shift  Outcome: Progressing  Goal: Performs ADL's with improved pain control  throughout shift  Outcome: Progressing  Goal: Participates in PT with improved pain control throughout the shift  Outcome: Progressing  Goal: Free from opioid side effects throughout the shift  Outcome: Progressing  Goal: Free from acute confusion related to pain meds throughout the shift  Outcome: Progressing

## 2024-09-16 NOTE — PROGRESS NOTES
09/16/24 0930   Current Planned Discharge Disposition   Current Planned Discharge Disposition SNF     Sw  spoke to  guardian who agrees  to   snf  List  sent to  her at   [email or web address]    Pt is inpatient  Will  not  trip    screen  ANDRÉS Rivera, BRYANW          9-16-24 1500  Sw spoke to guardian who is in NICU with her dtr. She is still reviewing  SNF list, does not  want pt dc without  speaking to  ortho  team    Ortho  team aware    Pt has  trad medicare    SW will follow up  with guardian in the  AM    ANDRÉS Rivera, LSW

## 2024-09-17 ENCOUNTER — TELEPHONE (OUTPATIENT)
Dept: ORTHOPEDIC SURGERY | Facility: HOSPITAL | Age: 34
End: 2024-09-17
Payer: MEDICARE

## 2024-09-17 LAB
ANION GAP SERPL CALC-SCNC: 11 MMOL/L (ref 10–20)
BUN SERPL-MCNC: 11 MG/DL (ref 6–23)
CALCIUM SERPL-MCNC: 8.4 MG/DL (ref 8.6–10.3)
CHLORIDE SERPL-SCNC: 104 MMOL/L (ref 98–107)
CO2 SERPL-SCNC: 27 MMOL/L (ref 21–32)
CREAT SERPL-MCNC: 0.65 MG/DL (ref 0.5–1.05)
EGFRCR SERPLBLD CKD-EPI 2021: >90 ML/MIN/1.73M*2
ERYTHROCYTE [DISTWIDTH] IN BLOOD BY AUTOMATED COUNT: 13.6 % (ref 11.5–14.5)
GLUCOSE SERPL-MCNC: 88 MG/DL (ref 74–99)
HCT VFR BLD AUTO: 40 % (ref 36–46)
HGB BLD-MCNC: 12.8 G/DL (ref 12–16)
MCH RBC QN AUTO: 27.1 PG (ref 26–34)
MCHC RBC AUTO-ENTMCNC: 32 G/DL (ref 32–36)
MCV RBC AUTO: 85 FL (ref 80–100)
NRBC BLD-RTO: 0 /100 WBCS (ref 0–0)
PLATELET # BLD AUTO: 285 X10*3/UL (ref 150–450)
POTASSIUM SERPL-SCNC: 3.9 MMOL/L (ref 3.5–5.3)
RBC # BLD AUTO: 4.72 X10*6/UL (ref 4–5.2)
SODIUM SERPL-SCNC: 138 MMOL/L (ref 136–145)
WBC # BLD AUTO: 8.7 X10*3/UL (ref 4.4–11.3)

## 2024-09-17 PROCEDURE — 97530 THERAPEUTIC ACTIVITIES: CPT | Mod: GP,CQ

## 2024-09-17 PROCEDURE — 1100000001 HC PRIVATE ROOM DAILY

## 2024-09-17 PROCEDURE — 94664 DEMO&/EVAL PT USE INHALER: CPT

## 2024-09-17 PROCEDURE — 94640 AIRWAY INHALATION TREATMENT: CPT

## 2024-09-17 PROCEDURE — 82374 ASSAY BLOOD CARBON DIOXIDE: CPT | Performed by: INTERNAL MEDICINE

## 2024-09-17 PROCEDURE — 36415 COLL VENOUS BLD VENIPUNCTURE: CPT | Performed by: INTERNAL MEDICINE

## 2024-09-17 PROCEDURE — 85027 COMPLETE CBC AUTOMATED: CPT | Performed by: INTERNAL MEDICINE

## 2024-09-17 PROCEDURE — 2500000004 HC RX 250 GENERAL PHARMACY W/ HCPCS (ALT 636 FOR OP/ED): Performed by: HOSPITALIST

## 2024-09-17 PROCEDURE — 99232 SBSQ HOSP IP/OBS MODERATE 35: CPT | Performed by: STUDENT IN AN ORGANIZED HEALTH CARE EDUCATION/TRAINING PROGRAM

## 2024-09-17 PROCEDURE — 2500000002 HC RX 250 W HCPCS SELF ADMINISTERED DRUGS (ALT 637 FOR MEDICARE OP, ALT 636 FOR OP/ED): Performed by: INTERNAL MEDICINE

## 2024-09-17 PROCEDURE — 2500000001 HC RX 250 WO HCPCS SELF ADMINISTERED DRUGS (ALT 637 FOR MEDICARE OP): Performed by: INTERNAL MEDICINE

## 2024-09-17 PROCEDURE — 2500000004 HC RX 250 GENERAL PHARMACY W/ HCPCS (ALT 636 FOR OP/ED): Performed by: INTERNAL MEDICINE

## 2024-09-17 RX ORDER — ONDANSETRON 4 MG/1
4 TABLET, ORALLY DISINTEGRATING ORAL EVERY 8 HOURS PRN
Status: DISCONTINUED | OUTPATIENT
Start: 2024-09-17 | End: 2024-09-19 | Stop reason: HOSPADM

## 2024-09-17 ASSESSMENT — PAIN SCALES - GENERAL
PAINLEVEL_OUTOF10: 7
PAINLEVEL_OUTOF10: 4
PAINLEVEL_OUTOF10: 6
PAINLEVEL_OUTOF10: 7
PAINLEVEL_OUTOF10: 6
PAINLEVEL_OUTOF10: 5 - MODERATE PAIN
PAINLEVEL_OUTOF10: 2

## 2024-09-17 ASSESSMENT — COGNITIVE AND FUNCTIONAL STATUS - GENERAL
CLIMB 3 TO 5 STEPS WITH RAILING: TOTAL
DRESSING REGULAR LOWER BODY CLOTHING: A LITTLE
CLIMB 3 TO 5 STEPS WITH RAILING: TOTAL
WALKING IN HOSPITAL ROOM: TOTAL
MOVING FROM LYING ON BACK TO SITTING ON SIDE OF FLAT BED WITH BEDRAILS: A LITTLE
STANDING UP FROM CHAIR USING ARMS: A LOT
PERSONAL GROOMING: A LITTLE
MOVING TO AND FROM BED TO CHAIR: A LOT
TOILETING: A LOT
TURNING FROM BACK TO SIDE WHILE IN FLAT BAD: A LITTLE
TURNING FROM BACK TO SIDE WHILE IN FLAT BAD: A LITTLE
MOBILITY SCORE: 13
DRESSING REGULAR UPPER BODY CLOTHING: A LITTLE
MOBILITY SCORE: 12
CLIMB 3 TO 5 STEPS WITH RAILING: TOTAL
TURNING FROM BACK TO SIDE WHILE IN FLAT BAD: A LOT
MOVING TO AND FROM BED TO CHAIR: A LOT
MOVING FROM LYING ON BACK TO SITTING ON SIDE OF FLAT BED WITH BEDRAILS: A LITTLE
HELP NEEDED FOR BATHING: A LITTLE
DAILY ACTIVITIY SCORE: 20
WALKING IN HOSPITAL ROOM: A LOT
MOVING FROM LYING ON BACK TO SITTING ON SIDE OF FLAT BED WITH BEDRAILS: A LITTLE
WALKING IN HOSPITAL ROOM: A LOT
DRESSING REGULAR LOWER BODY CLOTHING: A LOT
TOILETING: A LITTLE
STANDING UP FROM CHAIR USING ARMS: A LOT
STANDING UP FROM CHAIR USING ARMS: A LOT
DAILY ACTIVITIY SCORE: 17
MOVING TO AND FROM BED TO CHAIR: A LOT
HELP NEEDED FOR BATHING: A LITTLE
MOBILITY SCORE: 12
DRESSING REGULAR UPPER BODY CLOTHING: A LITTLE

## 2024-09-17 ASSESSMENT — PAIN - FUNCTIONAL ASSESSMENT
PAIN_FUNCTIONAL_ASSESSMENT: 0-10

## 2024-09-17 ASSESSMENT — PAIN DESCRIPTION - LOCATION
LOCATION: FOOT
LOCATION: ANKLE

## 2024-09-17 ASSESSMENT — PAIN DESCRIPTION - DESCRIPTORS
DESCRIPTORS: ACHING
DESCRIPTORS: ACHING

## 2024-09-17 ASSESSMENT — PAIN DESCRIPTION - ORIENTATION
ORIENTATION: RIGHT

## 2024-09-17 NOTE — CARE PLAN
Per Ke  guardian  wanted      1 - [facility]  2 - [facility]  3 -  [facility]  [place]    [facility] sent  referral    ANDRÉS Rivera, OCTAVIO          9-17-24          1102  [facility]  cannot  accept    [facility]  can    SW  called  guardian to  confirm - she  said she is dealing with a  family emergency, will call this writer  back in an  hour    Per OT note, pt is having  nausea and vomiting    ANDRÉS Rivera, OCTAVIO          9-17-24 1235  Aracelis  called back and said that  [facility] is  okay    DC orders needed    ANDRÉS Rivera, OCTAVIO      9-17-24     [id number]  Pt updated  ANDRÉS Rivera, OCTAVIO

## 2024-09-17 NOTE — PROGRESS NOTES
Occupational Therapy                 Therapy Communication Note    Patient Name: Windy Lovelace  MRN: [id number]  Department: [place] [address]  Room: [facility]  Today's Date: 9/17/2024     Discipline: Occupational Therapy    Missed Visit Reason: Missed Visit Reason: Other (Comment)    Missed Time: Attempt    Comment: Attempted to see pt for OT, pt reports increased nausea and vomiting at this time, would like to hold off on therapy and try later. Will re-attempt as able.

## 2024-09-17 NOTE — PROGRESS NOTES
Physical Therapy    Physical Therapy Treatment    Patient Name: Windy Lovelace  MRN: [id number]  Department: [place] [id number]  Room: [address] [place] [address]  Today's Date: 9/17/2024  Time Calculation  Start Time: 1305  Stop Time: 1320  Time Calculation (min): 15 min         Assessment/Plan   PT Assessment  End of Session Communication: Bedside nurse  Assessment Comment: Pt completes squat pivot transfer from EOB to bedside chair and demo's good compliance with NWB on R LE throughout transfer.  End of Session Patient Position: Up in chair, Alarm on (R LE elevated on foot rest, call light and needs in reach)  PT Plan  Inpatient/Swing Bed or Outpatient: Inpatient  PT Plan  Treatment/Interventions: Bed mobility, Transfer training  PT Plan: Ongoing PT  PT Frequency: 4 times per week  PT Discharge Recommendations: Moderate intensity level of continued care  Equipment Recommended upon Discharge: Wheeled walker  PT Recommended Transfer Status: Assist x2  PT - OK to Discharge: Yes (per POC)      General Visit Information:   PT  Visit  PT Received On: 09/17/24  General  Reason for Referral: Rt ankle fx s/p closed reduction  Referred By: Emily  Past Medical History Relevant to Rehab:   Past Medical History:   Diagnosis Date    Abnormal levels of other serum enzymes 02/10/2014    Elevated liver enzymes    Acute candidiasis of vulva and vagina 09/07/2017    Yeast vaginitis    Amaurosis fugax 10/06/2017    Amaurosis fugax of left eye    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    Breast pain 03/21/2024    Candidiasis of skin and nail 06/25/2014    Cutaneous candidiasis    Cellulitis of face 10/10/2013    Facial cellulitis    Headache, unspecified 04/29/2015    Severe headache    Hypomagnesemia 01/23/2014    Hypomagnesemia    Other cysts of oral region, not elsewhere classified 06/10/2016    Cyst of mouth    Other enthesopathies, not elsewhere classified 10/29/2015    Tendonitis of wrist, left    Other specified abnormal findings of  blood chemistry 03/06/2020    Low vitamin D level    Other specified joint disorders, left ankle and foot 11/02/2016    Impingement syndrome of left ankle    Other specified noninflammatory disorders of vagina 03/20/2018    Vaginal odor    Pain in right knee 11/12/2018    Right knee pain    Pain in right knee 02/19/2018    Acute pain of right knee    Pain in right knee 02/20/2019    Acute pain of right knee    Pain, unspecified 10/17/2017    Acute pain    Pelvic and perineal pain 01/24/2018    Pelvic cramping    Personal history of diseases of the skin and subcutaneous tissue 02/24/2016    History of urticaria    Personal history of other diseases of the digestive system 10/06/2017    History of hiatal hernia    Personal history of other diseases of the digestive system 03/17/2015    History of gastritis    Personal history of other diseases of the digestive system 05/24/2016    History of constipation    Personal history of other diseases of the female genital tract 08/31/2015    History of breast pain    Personal history of other diseases of the musculoskeletal system and connective tissue 02/04/2014    History of low back pain    Personal history of other diseases of the nervous system and sense organs 01/05/2015    History of conjunctivitis    Personal history of other diseases of the respiratory system 08/29/2013    History of pharyngitis    Personal history of other diseases of the respiratory system 02/03/2017    History of acute bronchitis    Personal history of other drug therapy 12/11/2017    History of influenza vaccination    Personal history of other endocrine, nutritional and metabolic disease 05/09/2019    History of dehydration    Personal history of other mental and behavioral disorders 10/06/2017    History of mental retardation    Personal history of transient ischemic attack (TIA), and cerebral infarction without residual deficits 10/06/2017    History of stroke    Pleurodynia 01/23/2014    Rib  pain on right side    Pyuria 06/13/2017    Pyuria    Sprain of medial collateral ligament of right knee, initial encounter 11/02/2016    Sprain of medial collateral ligament of right knee, initial encounter    Sprain of unspecified ligament of left ankle, subsequent encounter 10/29/2015    Severe sprain of left ankle, subsequent encounter    Strain of muscle and tendon of unspecified wall of thorax, initial encounter 02/04/2014    Strain of thoracic region    Syncope and collapse 06/13/2017    Syncope and collapse    Unspecified fall, initial encounter 01/23/2014    Fall at home    Unspecified symptoms and signs involving the genitourinary system 01/24/2018    UTI symptoms       Family/Caregiver Present: No  Prior to Session Communication: Bedside nurse  Patient Position Received: Bed, 3 rail up, Alarm on  Preferred Learning Style: auditory, visual, verbal, kinesthetic  General Comment: Pt semi-supine in bed on arrival, pleasant and agreeable for PT tx with encouragement    Subjective   Precautions:  Precautions  LE Weight Bearing Status: Right Non-Weight Bearing  Medical Precautions: Fall precautions    Objective   Pain:  Pain Assessment  Pain Assessment: 0-10  0-10 (Numeric) Pain Score:  (Pain in R ankle, unrated intesity, PT to pt. tolerance)  Cognition:  Cognition  Overall Cognitive Status: Within Functional Limits  Coordination:  Movements are Fluid and Coordinated: Yes  Postural Control:  Postural Control  Postural Control: Within Functional Limits  Static Sitting Balance  Static Sitting-Balance Support: Bilateral upper extremity supported (R foot supported, L foot on floor)  Static Sitting-Level of Assistance: Modified independent  Static Sitting-Comment/Number of Minutes: x5 min duration    Activity Tolerance:  Activity Tolerance  Endurance: Tolerates 10 - 20 min exercise with multiple rests    Therapeutic Activity  Therapeutic Activity Performed: Yes  Therapeutic Activity 1: Pt performs static sitting at  EOB x5 min duration as modified independent. Pt politely declines L LE ther-ex at this time, citing headache and L LE discomfort    Bed Mobility  Bed Mobility: Yes  Bed Mobility 1  Bed Mobility 1: Sitting to supine  Level of Assistance 1: Contact guard  Bed Mobility Comments 1: HOB elevated and use of bed accessories    Transfers  Transfer: Yes  Transfer 1  Transfer From 1:  (Squat pivot fromEOB to bedside chair)  Transfer Level of Assistance 1: Contact guard  Trials/Comments 1: Verbal cuing for strategic hand placement and continued effort. Increased time and effort needed to complete    Outcome Measures:  [facility] Basic Mobility  Turning from your back to your side while in a flat bed without using bedrails: A little  Moving from lying on your back to sitting on the side of a flat bed without using bedrails: A little  Moving to and from bed to chair (including a wheelchair): A lot  Standing up from a chair using your arms (e.g. wheelchair or bedside chair): A lot  To walk in hospital room: Total  Climbing 3-5 steps with railing: Total  Basic Mobility - Total Score: 12      Encounter Problems       Encounter Problems (Active)       Balance       Goal 1 (Progressing)       Start:  09/14/24    Expected End:  09/28/24       Pt performs all sitting balance with supervision and standing balance with min assist x 1 using FWW            Mobility       STG - Patient will ambulate (Not Progressing)       Start:  09/14/24    Expected End:  09/28/24       15 ft with min assist x 1 using FWW            PT Transfers       STG - Patient to transfer to and from sit to supine (Progressing)       Start:  09/14/24    Expected End:  09/28/24       With CGA         STG - Patient will transfer sit to and from stand (Progressing)       Start:  09/14/24    Expected End:  09/28/24       With min assist x 1 using FWW            Pain - Adult

## 2024-09-17 NOTE — CARE PLAN
The patient's goals for the shift include      The clinical goals for the shift include Pt pain will be managed throughout shift and safety maintained.    Problem: Pain - Adult  Goal: Verbalizes/displays adequate comfort level or baseline comfort level  Outcome: Progressing     Problem: Safety - Adult  Goal: Free from fall injury  Outcome: Progressing

## 2024-09-17 NOTE — PROGRESS NOTES
Windy Lovelace is a 33 y.o. female on day 4 of admission presenting with Closed fracture of right ankle, initial encounter.      Subjective    NAEO. States that her ankle is still in pain. In particular, her boot is uncomfortable because her toes are cramped. Other than that, no new complaints or questions. She requests that I communicate all aspects of her medical care to her sister, Aracelis, who is her legal guardian. She does express frustration and wonder when her surgery will be.       Objective     Last Recorded Vitals  /84 (BP Location: Right arm, Patient Position: Lying)   Pulse 73   Temp 36.3 °C (97.3 °F) (Temporal)   Resp 18   Wt 108 kg (239 lb)   SpO2 96%   Intake/Output last 3 Shifts:    Intake/Output Summary (Last 24 hours) at 9/17/2024 1026  Last data filed at 9/17/2024 0900  Gross per 24 hour   Intake 840 ml   Output --   Net 840 ml       Admission Weight  Weight: 108 kg (239 lb) (09/13/24 0943)    Daily Weight  09/13/24 : 108 kg (239 lb)    Image Results  ECG 12 Lead  Normal sinus rhythm  Normal ECG  When compared with ECG of 05-JUN-2024 12:28,  Premature atrial complexes are no longer Present  Vent. rate has increased BY  36 BPM  ST elevation now present in Inferior leads  QT has lengthened  See ED provider note for full interpretation and clinical correlation  Confirmed by Padmaja Benton ([id number]) on 9/14/2024 2:09:21 PM  CT ankle right wo IV contrast  Narrative: Interpreted By:  Gibson Perez,   STUDY:  CT ANKLE RIGHT WO IV CONTRAST;  9/13/2024 8:40 pm      INDICATION:  Signs/Symptoms:s/p reduction trimal.      COMPARISON:  Radiographs from 09/30/2024      ACCESSION NUMBER(S):  [id number]      ORDERING CLINICIAN:  VENITA LINDSAY      TECHNIQUE:  CT imaging of the right ankle was obtained  without administration of  intravenous contrast medium. Coronal and sagittal reformatted images  were performed.      FINDINGS:  OSSEOUS STRUCTURES:  There is a comminuted, mildly displaced and  mildly angulated fracture  of the distal fibula at the level of the syndesmosis. There is a  comminuted, mildly displaced fracture through the medial malleolus.  There is a comminuted, mildly displaced fracture of the posterior  malleolus. There is mild cortical step-off at the posterior tibial  plafond articular surface. No other fracture seen in the remainder of  the osseous structures. There is no dislocation with the ankle  mortise appearing intact status post closed reduction.      SOFT TISSUES:  There is moderate soft tissue edema about the ankle. There is no  muscle edema or atrophy.      Impression: Comminuted, mildly displaced trimalleolar fractures status post  closed reduction and casting.          MACRO:  None      Signed by: Gibson Perez 9/14/2024 7:43 AM  Dictation workstation:   [id number]    Physical Exam  Constitutional: NAD, resting comfortably in bed  Skin: Warm and dry, no rashes   Eyes: EOMI, clear sclera   ENMT: MMM   HEENT: Neck supple without apparent injury, EOMI, MMM  Respiratory: NWOB on RA   CV: RRR per peripheral pulses, limbs wwp  GI: soft, non-distended   MSK: RLE in boot  Lymph: No apparent LAD  Neuro: MCGARRY spontaneously, CNs II - XII grossly intact   Psych: Appropriate mood and behavior    Relevant Results  Scheduled medications  aspirin, 81 mg, oral, Daily  budesonide, 0.5 mg, nebulization, BID  cholecalciferol, 2,000 Units, oral, Daily  enoxaparin, 40 mg, subcutaneous, q24h  formoterol, 20 mcg, nebulization, BID  pantoprazole, 40 mg, oral, Daily before breakfast  prazosin, 5 mg, oral, Nightly  sertraline, 100 mg, oral, Daily  traZODone, 100 mg, oral, Nightly      Continuous medications     PRN medications  PRN medications: acetaminophen, fluticasone, morphine, ondansetron, oxyCODONE-acetaminophen    Results for orders placed or performed during the hospital encounter of 09/13/24 (from the past 24 hour(s))   CBC   Result Value Ref Range    WBC 8.7 4.4 - 11.3 x10*3/uL    nRBC 0.0  0.0 - 0.0 /100 WBCs    RBC 4.72 4.00 - 5.20 x10*6/uL    Hemoglobin 12.8 12.0 - 16.0 g/dL    Hematocrit 40.0 36.0 - 46.0 %    MCV 85 80 - 100 fL    MCH 27.1 26.0 - 34.0 pg    MCHC 32.0 32.0 - 36.0 g/dL    RDW 13.6 11.5 - 14.5 %    Platelets 285 150 - 450 x10*3/uL   Basic Metabolic Panel   Result Value Ref Range    Glucose 88 74 - 99 mg/dL    Sodium 138 136 - 145 mmol/L    Potassium 3.9 3.5 - 5.3 mmol/L    Chloride 104 98 - 107 mmol/L    Bicarbonate 27 21 - 32 mmol/L    Anion Gap 11 10 - 20 mmol/L    Urea Nitrogen 11 6 - 23 mg/dL    Creatinine 0.65 0.50 - 1.05 mg/dL    eGFR >90 >60 mL/min/1.73m*2    Calcium 8.4 (L) 8.6 - 10.3 mg/dL       Assessment & Plan  Closed fracture of right ankle, initial encounter    Update 9/17:  -I spoke with and updated pt's sister and legal guardian, Aracelis. Aracelis is aware that surgery must happen next Thursday, and she is under the impression that  is arranging for a short term care facility for pt before surgery, as she does not have the support she needs at home.  -Fu with social work in the AM for dispo updates  -Aracelis is also wondering how her sister fell in the first place. Review of H&P shows that pt did not lose consciousness and fell in her bathroom, will review history with pt tomrw to ensure that there is not need for syncope workup  -Will discuss with ortho in the AM about more ?comfortable boot    # Right ankle fracture  -s/p closed reduction   -ortho following  -pain control  -PT/OT  -plan for surgery on Thursday 9/26     # H/o strokes  -continue aspirin     # Anxiety  -continue home meds     # Insomnia  -continue trazodone     # DVT ppx    No need for AM labs, as they have been stable. Pt otherwise medically stable for dispo while awaiting surgery     Dispo: short term care facility,  coordinating        Pau Butler MD

## 2024-09-17 NOTE — TELEPHONE ENCOUNTER
I contacted the patient's healthcare power of  via the number listed in the chart.  I reviewed the patient's clinical diagnosis as well as her treatment to date.  She has a trimalleolar ankle fracture with a dislocation that has been appropriately reduced and temporized in well molded splint.  I reviewed that typically these injuries are able to be managed as an outpatient when the soft tissue envelope would permit.  Understandably, the patient's social circumstances are complex and consideration could be made for adding the patient to the add-on surgery list to be completed while inpatient.      Aracelis acknowledged that this may provide more confusion for the patient which would be difficult for her to manage from afar.    Presently, we will maintain the plan to go forward with surgery on 09/26/2024.  I have encouraged Aracelis to contact the office if she has any further questions or concerns.    Timothy Castro MD, MACI  Department of Orthopaedic Surgery  [facility]

## 2024-09-18 PROCEDURE — 97530 THERAPEUTIC ACTIVITIES: CPT | Mod: GP,CQ

## 2024-09-18 PROCEDURE — 94640 AIRWAY INHALATION TREATMENT: CPT

## 2024-09-18 PROCEDURE — 97110 THERAPEUTIC EXERCISES: CPT | Mod: GP,CQ

## 2024-09-18 PROCEDURE — 2500000002 HC RX 250 W HCPCS SELF ADMINISTERED DRUGS (ALT 637 FOR MEDICARE OP, ALT 636 FOR OP/ED): Performed by: INTERNAL MEDICINE

## 2024-09-18 PROCEDURE — 2500000004 HC RX 250 GENERAL PHARMACY W/ HCPCS (ALT 636 FOR OP/ED): Performed by: INTERNAL MEDICINE

## 2024-09-18 PROCEDURE — 2500000005 HC RX 250 GENERAL PHARMACY W/O HCPCS: Performed by: STUDENT IN AN ORGANIZED HEALTH CARE EDUCATION/TRAINING PROGRAM

## 2024-09-18 PROCEDURE — 2500000001 HC RX 250 WO HCPCS SELF ADMINISTERED DRUGS (ALT 637 FOR MEDICARE OP): Performed by: INTERNAL MEDICINE

## 2024-09-18 PROCEDURE — 1100000001 HC PRIVATE ROOM DAILY

## 2024-09-18 PROCEDURE — 2500000001 HC RX 250 WO HCPCS SELF ADMINISTERED DRUGS (ALT 637 FOR MEDICARE OP): Performed by: STUDENT IN AN ORGANIZED HEALTH CARE EDUCATION/TRAINING PROGRAM

## 2024-09-18 PROCEDURE — 99232 SBSQ HOSP IP/OBS MODERATE 35: CPT | Performed by: STUDENT IN AN ORGANIZED HEALTH CARE EDUCATION/TRAINING PROGRAM

## 2024-09-18 RX ORDER — LIDOCAINE 560 MG/1
1 PATCH PERCUTANEOUS; TOPICAL; TRANSDERMAL DAILY
Start: 2024-09-19

## 2024-09-18 RX ORDER — OXYCODONE HYDROCHLORIDE 5 MG/1
5 TABLET ORAL EVERY 6 HOURS PRN
Start: 2024-09-18

## 2024-09-18 RX ORDER — OXYCODONE HYDROCHLORIDE 5 MG/1
5 TABLET ORAL EVERY 6 HOURS PRN
Status: DISCONTINUED | OUTPATIENT
Start: 2024-09-18 | End: 2024-09-19 | Stop reason: HOSPADM

## 2024-09-18 RX ORDER — ONDANSETRON HYDROCHLORIDE 2 MG/ML
4 INJECTION, SOLUTION INTRAVENOUS EVERY 6 HOURS PRN
Start: 2024-09-18

## 2024-09-18 RX ORDER — ACETAMINOPHEN 325 MG/1
975 TABLET ORAL 3 TIMES DAILY
Start: 2024-09-18 | End: 2024-10-18

## 2024-09-18 RX ORDER — LIDOCAINE 560 MG/1
1 PATCH PERCUTANEOUS; TOPICAL; TRANSDERMAL DAILY
Status: DISCONTINUED | OUTPATIENT
Start: 2024-09-18 | End: 2024-09-19 | Stop reason: HOSPADM

## 2024-09-18 RX ORDER — ACETAMINOPHEN 325 MG/1
975 TABLET ORAL 3 TIMES DAILY
Status: DISCONTINUED | OUTPATIENT
Start: 2024-09-18 | End: 2024-09-19 | Stop reason: HOSPADM

## 2024-09-18 ASSESSMENT — COGNITIVE AND FUNCTIONAL STATUS - GENERAL
MOVING TO AND FROM BED TO CHAIR: A LOT
HELP NEEDED FOR BATHING: A LITTLE
WALKING IN HOSPITAL ROOM: A LOT
WALKING IN HOSPITAL ROOM: TOTAL
MOVING TO AND FROM BED TO CHAIR: A LOT
MOVING FROM LYING ON BACK TO SITTING ON SIDE OF FLAT BED WITH BEDRAILS: A LITTLE
TURNING FROM BACK TO SIDE WHILE IN FLAT BAD: A LITTLE
CLIMB 3 TO 5 STEPS WITH RAILING: A LOT
DRESSING REGULAR LOWER BODY CLOTHING: A LOT
MOBILITY SCORE: 11
STANDING UP FROM CHAIR USING ARMS: A LOT
DAILY ACTIVITIY SCORE: 18
DRESSING REGULAR UPPER BODY CLOTHING: A LITTLE
CLIMB 3 TO 5 STEPS WITH RAILING: A LOT
PERSONAL GROOMING: A LITTLE
DRESSING REGULAR LOWER BODY CLOTHING: A LOT
CLIMB 3 TO 5 STEPS WITH RAILING: TOTAL
HELP NEEDED FOR BATHING: A LITTLE
STANDING UP FROM CHAIR USING ARMS: A LOT
STANDING UP FROM CHAIR USING ARMS: A LOT
TURNING FROM BACK TO SIDE WHILE IN FLAT BAD: A LITTLE
WALKING IN HOSPITAL ROOM: A LOT
TURNING FROM BACK TO SIDE WHILE IN FLAT BAD: A LITTLE
TOILETING: A LITTLE
PERSONAL GROOMING: A LITTLE
MOVING TO AND FROM BED TO CHAIR: TOTAL
MOBILITY SCORE: 14
MOVING FROM LYING ON BACK TO SITTING ON SIDE OF FLAT BED WITH BEDRAILS: A LITTLE
MOBILITY SCORE: 14
MOVING FROM LYING ON BACK TO SITTING ON SIDE OF FLAT BED WITH BEDRAILS: A LITTLE
DRESSING REGULAR UPPER BODY CLOTHING: A LITTLE
TOILETING: A LITTLE
DAILY ACTIVITIY SCORE: 18

## 2024-09-18 ASSESSMENT — PAIN SCALES - GENERAL
PAINLEVEL_OUTOF10: 2
PAINLEVEL_OUTOF10: 6
PAINLEVEL_OUTOF10: 2
PAINLEVEL_OUTOF10: 5 - MODERATE PAIN
PAINLEVEL_OUTOF10: 5 - MODERATE PAIN
PAINLEVEL_OUTOF10: 6
PAINLEVEL_OUTOF10: 5 - MODERATE PAIN

## 2024-09-18 ASSESSMENT — PAIN - FUNCTIONAL ASSESSMENT
PAIN_FUNCTIONAL_ASSESSMENT: 0-10

## 2024-09-18 ASSESSMENT — PAIN DESCRIPTION - LOCATION
LOCATION: LEG
LOCATION: LEG

## 2024-09-18 ASSESSMENT — PAIN DESCRIPTION - ORIENTATION
ORIENTATION: RIGHT
ORIENTATION: RIGHT

## 2024-09-18 NOTE — CARE PLAN
SW   spoke to  guardian Aracelis who is in agreement with plan for pt to go  to [facility] tomorrow. Aracelis knows  surgery is next  week. Questions answered about  dc, given liaison's number for [facility] in case of future  questions.     Maryann Shane, MSW, LSW

## 2024-09-18 NOTE — PROGRESS NOTES
Windy Lovelace is a 33 y.o. female on day 5 of admission presenting with Closed fracture of right ankle, initial encounter.      Subjective   NAEO. In better spirits today. Saying that her pain is more manageable. Wondering if she can have medication for her knee pain. No other concerns today.       Objective     Last Recorded Vitals  /88   Pulse 84   Temp 36.6 °C (97.8 °F) (Temporal)   Resp 18   Wt 108 kg (239 lb)   SpO2 98%   Intake/Output last 3 Shifts:    Intake/Output Summary (Last 24 hours) at 9/18/2024 0901  Last data filed at 9/18/2024 0600  Gross per 24 hour   Intake 720 ml   Output 400 ml   Net 320 ml       Admission Weight  Weight: 108 kg (239 lb) (09/13/24 0943)    Daily Weight  09/13/24 : 108 kg (239 lb)    Image Results  ECG 12 Lead  Normal sinus rhythm  Normal ECG  When compared with ECG of 05-JUN-2024 12:28,  Premature atrial complexes are no longer Present  Vent. rate has increased BY  36 BPM  ST elevation now present in Inferior leads  QT has lengthened  See ED provider note for full interpretation and clinical correlation  Confirmed by Padmaja Benton ([id number]) on 9/14/2024 2:09:21 PM  CT ankle right wo IV contrast  Narrative: Interpreted By:  Gibson Perez,   STUDY:  CT ANKLE RIGHT WO IV CONTRAST;  9/13/2024 8:40 pm      INDICATION:  Signs/Symptoms:s/p reduction trimal.      COMPARISON:  Radiographs from 09/30/2024      ACCESSION NUMBER(S):  [id number]      ORDERING CLINICIAN:  VENITA LINDSAY      TECHNIQUE:  CT imaging of the right ankle was obtained  without administration of  intravenous contrast medium. Coronal and sagittal reformatted images  were performed.      FINDINGS:  OSSEOUS STRUCTURES:  There is a comminuted, mildly displaced and mildly angulated fracture  of the distal fibula at the level of the syndesmosis. There is a  comminuted, mildly displaced fracture through the medial malleolus.  There is a comminuted, mildly displaced fracture of the posterior  malleolus.  There is mild cortical step-off at the posterior tibial  plafond articular surface. No other fracture seen in the remainder of  the osseous structures. There is no dislocation with the ankle  mortise appearing intact status post closed reduction.      SOFT TISSUES:  There is moderate soft tissue edema about the ankle. There is no  muscle edema or atrophy.      Impression: Comminuted, mildly displaced trimalleolar fractures status post  closed reduction and casting.          MACRO:  None      Signed by: Gibson Perez 9/14/2024 7:43 AM  Dictation workstation:   [id number]    Physical Exam  Constitutional: NAD, resting comfortably in bed  Skin: Warm and dry, no rashes   Eyes: EOMI, clear sclera   ENMT: MMM   HEENT: Neck supple without apparent injury, EOMI, MMM  Respiratory: NWOB on RA   CV: RRR per peripheral pulses, limbs wwp  GI: soft, non-distended   MSK: RLE in boot  Lymph: No apparent LAD  Neuro: MCGARRY spontaneously, CNs II - XII grossly intact   Psych: Appropriate mood and behavior    Relevant Results  Scheduled medications  aspirin, 81 mg, oral, Daily  budesonide, 0.5 mg, nebulization, BID  cholecalciferol, 2,000 Units, oral, Daily  enoxaparin, 40 mg, subcutaneous, q24h  formoterol, 20 mcg, nebulization, BID  pantoprazole, 40 mg, oral, Daily before breakfast  prazosin, 5 mg, oral, Nightly  sertraline, 100 mg, oral, Daily  traZODone, 100 mg, oral, Nightly      Continuous medications     PRN medications  PRN medications: acetaminophen, fluticasone, morphine, ondansetron, ondansetron ODT, oxyCODONE-acetaminophen      Assessment & Plan  Closed fracture of right ankle, initial encounter    Update 9/18:  -Contacted SW/TCC this morning to discuss short term care facility as was legal guardian sister Aracelis's impression from our phone call yesterday  -Accepted to [facility], set for transport in the AM  -Sister Aracelis updated by SW  -Plan for DC in the early AM     # Right ankle fracture  -s/p closed reduction    -ortho following  -pain control  -PT/OT  -plan for surgery on Thursday 9/26     # H/o strokes  -continue aspirin     # Anxiety  -continue home meds     # Insomnia  -continue trazodone     # DVT ppx     No need for AM labs, as they have been stable. Pt otherwise medically stable for dispo while awaiting surgery     Dispo: short term care facility,  coordinating              Pau Butler MD

## 2024-09-18 NOTE — PROGRESS NOTES
09/18/24 0745   Discharge Planning   Home or Post Acute Services Post acute facilities (Rehab/SNF/etc)   Type of Post Acute Facility Services Skilled nursing   Expected Discharge Disposition SNF  ([facility])   Does the patient need discharge transport arranged? Yes   RoundTrip coordination needed? Yes     If medically ready, [facility] can accept today.  Bertha Templeton RN     9/18/24 @ 1507  Plan is to discharge tomorrow to [facility].  Bertha Templeton RN

## 2024-09-18 NOTE — PROGRESS NOTES
Physical Therapy    Physical Therapy Treatment    Patient Name: Windy Lovelace  MRN: [id number]  Department: [facility]   Room: [facility]  Today's Date: 9/18/2024  Time Calculation  Start Time: 1028  Stop Time: 1054  Time Calculation (min): 26 min         Assessment/Plan   PT Assessment  PT Assessment Results: Decreased strength, Decreased range of motion, Impaired balance, Decreased endurance, Decreased mobility, Orthopedic restrictions, Pain  Rehab Prognosis: Good  Barriers to Discharge: ability to maintain weight beraing status.  Evaluation/Treatment Tolerance: Patient limited by pain  Medical Staff Made Aware: Yes  Strengths: Rehab experience  Barriers to Participation:  (none)  End of Session Communication: Bedside nurse  Assessment Comment: PT eager to participate though limited by pain and inablity to maintain NWB status at this time. Pt will bennefit from ongoing PT to progress to highest level of [place].  End of Session Patient Position: Bed, 3 rail up, Alarm on  PT Plan  Inpatient/Swing Bed or Outpatient: Inpatient  PT Plan  Treatment/Interventions: Bed mobility, Transfer training, Strengthening, Endurance training, Therapeutic exercise, Therapeutic activity  PT Plan: Ongoing PT  PT Frequency: 4 times per week  PT Discharge Recommendations: Moderate intensity level of continued care  Equipment Recommended upon Discharge: Wheeled walker  PT Recommended Transfer Status: Assist x2  PT - OK to Discharge: Yes (per PT POC)      General Visit Information:   PT  Visit  PT Received On: 09/18/24  General  Reason for Referral: Rt ankle fx s/p closed reduction  Referred By: Emily  Family/Caregiver Present: No  Prior to Session Communication: Bedside nurse  Patient Position Received: Bed, 3 rail up, Alarm on  Preferred Learning Style: auditory, verbal  General Comment: Pt pleasant and limited by pain R ankle.    Subjective   Precautions:  Precautions  LE Weight Bearing Status: Right Non-Weight Bearing  Medical  Precautions: Fall precautions    Vital Signs (Past 2hrs)                 Objective   Pain:  Pain Assessment  Pain Assessment: 0-10  0-10 (Numeric) Pain Score: 6  Pain Type: Acute pain  Pain Location: Ankle  Pain Orientation: Right  Cognition:  Cognition  Overall Cognitive Status: Within Functional Limits  Coordination:  Movements are Fluid and Coordinated: Yes  Postural Control:  Postural Control  Postural Control: Within Functional Limits  Static Sitting Balance  Static Sitting-Balance Support: No upper extremity supported, Feet unsupported  Static Sitting-Level of Assistance: Independent  Static Sitting-Comment/Number of Minutes: EOB  Dynamic Sitting Balance  Dynamic Sitting-Balance Support: Bilateral upper extremity supported, Feet unsupported  Dynamic Sitting-Level of Assistance: Modified independent  Dynamic Sitting-Balance:  (wit LE movements/ex.)  Static Standing Balance  Static Standing-Balance Support: Bilateral upper extremity supported  Static Standing-Level of Assistance: Contact guard  Static Standing-Comment/Number of Minutes: x 5 minutes, while maintaining NWB R LE.  Dynamic Standing Balance  Dynamic Standing-Balance Support: Bilateral upper extremity supported  Dynamic Standing-Level of Assistance: Minimum assistance  Dynamic Standing-Balance: Turning  Dynamic Standing-Comments: with attempt to transfer to bed side chair.  transfer not completed.    Activity Tolerance:  Activity Tolerance  Endurance: Tolerates 10 - 20 min exercise with multiple rests  Treatments:  Therapeutic Exercise  Therapeutic Exercise Performed: Yes  Therapeutic Exercise Activity 1: Pt instructed in B LE ther ex supine and seated EOB. R LE: SLR, Hip/Knee Flex, HA, all AAROM, x 15 reps total. L LE: AP, HS, HA, SLR, X 15 reps.  B LE: GS, LAQ, HF, X 15 reps total each. Rest as needed due to pain and fatigue.  Cues needed for correct technique and focus on task at hand.         Bed Mobility  Bed Mobility: Yes  Bed Mobility 1  Bed  Mobility 1: Supine to sitting, Sitting to supine  Level of Assistance 1: Close supervision  Bed Mobility Comments 1: R ankle pain increasing with movement though no physical assist needed to complete.  Bed Mobility 2  Bed Mobility  2: Scooting  Level of Assistance 2: Close supervision  Bed Mobility Comments 2: on L side EOB, toward HOB.    Transfers  Transfer: Yes  Transfer 1  Technique 1: Sit to stand, Stand to sit  Transfer Device 1: Walker, Gait belt  Transfer Level of Assistance 1: Minimal verbal cues, Moderate assistance  Trials/Comments 1: from EOB. Cues needed for safety, NWB, hand and foot placement.  Transfers 2  Transfer From 2: Bed to  Transfer to 2: Chair with arms  Transfer Device 2: Walker, Gait belt  Trials/Comments 2: Attempted transfer but not able to complete. Pt not able to maintain R LE NWB. Pt instructed in Stand pivot/hop technique.    Outcome Measures:  [facility] Basic Mobility  Turning from your back to your side while in a flat bed without using bedrails: A little  Moving from lying on your back to sitting on the side of a flat bed without using bedrails: A little  Moving to and from bed to chair (including a wheelchair): Total  Standing up from a chair using your arms (e.g. wheelchair or bedside chair): A lot  To walk in hospital room: Total  Climbing 3-5 steps with railing: Total  Basic Mobility - Total Score: 11    Education Documentation  Precautions, taught by Quinten Haas PTA at 9/18/2024 11:41 AM.  Learner: Patient  Readiness: Acceptance  Method: Explanation, Demonstration  Response: Verbalizes Understanding, Demonstrated Understanding    Body Mechanics, taught by Quinten Haas PTA at 9/18/2024 11:41 AM.  Learner: Patient  Readiness: Acceptance  Method: Explanation, Demonstration  Response: Verbalizes Understanding, Demonstrated Understanding    Mobility Training, taught by Quinten Haas PTA at 9/18/2024 11:41 AM.  Learner: Patient  Readiness: Acceptance  Method: Explanation,  Demonstration  Response: Verbalizes Understanding, Demonstrated Understanding    Education Comments  No comments found.        OP EDUCATION:       Encounter Problems       Encounter Problems (Active)       Balance       Goal 1 (Progressing)       Start:  09/14/24    Expected End:  09/28/24       Pt performs all sitting balance with supervision and standing balance with min assist x 1 using FWW            Mobility       STG - Patient will ambulate (Not Progressing)       Start:  09/14/24    Expected End:  09/28/24       15 ft with min assist x 1 using FWW            PT Transfers       STG - Patient to transfer to and from sit to supine (Progressing)       Start:  09/14/24    Expected End:  09/28/24       With CGA         STG - Patient will transfer sit to and from stand (Progressing)       Start:  09/14/24    Expected End:  09/28/24       With min assist x 1 using FWW            Pain - Adult

## 2024-09-18 NOTE — CARE PLAN
The patient's goals for the shift include      The clinical goals for the shift include Pt pain will be managed throughout shift and safety maintained    Problem: Pain - Adult  Goal: Verbalizes/displays adequate comfort level or baseline comfort level  Outcome: Progressing     Problem: Safety - Adult  Goal: Free from fall injury  Outcome: Progressing

## 2024-09-19 ENCOUNTER — APPOINTMENT (OUTPATIENT)
Dept: PRIMARY CARE | Facility: CLINIC | Age: 34
End: 2024-09-19
Payer: MEDICARE

## 2024-09-19 ENCOUNTER — APPOINTMENT (OUTPATIENT)
Dept: CARDIOLOGY | Facility: HOSPITAL | Age: 34
End: 2024-09-19
Payer: MEDICARE

## 2024-09-19 VITALS
DIASTOLIC BLOOD PRESSURE: 88 MMHG | HEART RATE: 89 BPM | WEIGHT: 239 LBS | TEMPERATURE: 98.3 F | HEIGHT: 66 IN | OXYGEN SATURATION: 95 % | SYSTOLIC BLOOD PRESSURE: 121 MMHG | BODY MASS INDEX: 38.41 KG/M2 | RESPIRATION RATE: 18 BRPM

## 2024-09-19 PROCEDURE — 2500000002 HC RX 250 W HCPCS SELF ADMINISTERED DRUGS (ALT 637 FOR MEDICARE OP, ALT 636 FOR OP/ED): Performed by: INTERNAL MEDICINE

## 2024-09-19 PROCEDURE — 2500000001 HC RX 250 WO HCPCS SELF ADMINISTERED DRUGS (ALT 637 FOR MEDICARE OP): Performed by: INTERNAL MEDICINE

## 2024-09-19 PROCEDURE — 2500000001 HC RX 250 WO HCPCS SELF ADMINISTERED DRUGS (ALT 637 FOR MEDICARE OP): Performed by: STUDENT IN AN ORGANIZED HEALTH CARE EDUCATION/TRAINING PROGRAM

## 2024-09-19 PROCEDURE — 94640 AIRWAY INHALATION TREATMENT: CPT

## 2024-09-19 PROCEDURE — 2500000005 HC RX 250 GENERAL PHARMACY W/O HCPCS: Performed by: STUDENT IN AN ORGANIZED HEALTH CARE EDUCATION/TRAINING PROGRAM

## 2024-09-19 PROCEDURE — 99239 HOSP IP/OBS DSCHRG MGMT >30: CPT | Performed by: STUDENT IN AN ORGANIZED HEALTH CARE EDUCATION/TRAINING PROGRAM

## 2024-09-19 PROCEDURE — 93005 ELECTROCARDIOGRAM TRACING: CPT

## 2024-09-19 ASSESSMENT — COGNITIVE AND FUNCTIONAL STATUS - GENERAL
TURNING FROM BACK TO SIDE WHILE IN FLAT BAD: A LITTLE
TOILETING: A LITTLE
DAILY ACTIVITIY SCORE: 18
CLIMB 3 TO 5 STEPS WITH RAILING: TOTAL
STANDING UP FROM CHAIR USING ARMS: A LOT
HELP NEEDED FOR BATHING: A LITTLE
WALKING IN HOSPITAL ROOM: A LOT
MOVING TO AND FROM BED TO CHAIR: A LITTLE
MOBILITY SCORE: 14
DRESSING REGULAR LOWER BODY CLOTHING: A LOT
MOVING FROM LYING ON BACK TO SITTING ON SIDE OF FLAT BED WITH BEDRAILS: A LITTLE
PERSONAL GROOMING: A LITTLE
DRESSING REGULAR UPPER BODY CLOTHING: A LITTLE

## 2024-09-19 ASSESSMENT — PAIN SCALES - GENERAL
PAINLEVEL_OUTOF10: 0 - NO PAIN
PAINLEVEL_OUTOF10: 8

## 2024-09-19 ASSESSMENT — PAIN DESCRIPTION - ORIENTATION: ORIENTATION: RIGHT

## 2024-09-19 ASSESSMENT — PAIN - FUNCTIONAL ASSESSMENT: PAIN_FUNCTIONAL_ASSESSMENT: 0-10

## 2024-09-19 NOTE — PROGRESS NOTES
09/19/24 0828   Discharge Planning   Home or Post Acute Services Post acute facilities (Rehab/SNF/etc)   Type of Post Acute Facility Services Skilled nursing   Expected Discharge Disposition SNF  ([facility])   Does the patient need discharge transport arranged? Yes   RoundTrip coordination needed? Yes   Has discharge transport been arranged? Yes   What day is the transport expected? 09/19/24   What time is the transport expected? 1100     Patient is medically ready for discharge to [facility].  Requested [facility] set up transport.  SW to inform family.  Nurse to call report to [phone]  Bertha Templeton RN     9/19/24 @ 7170  The [employer] Vehicle you requested for Windy HOWARD in unit/room 733 on 09/19/2024 is scheduled to arrive at 11:00am EDT! [facility] is handling this ride and you can contact them at [phone].  Bertha Templeton RN

## 2024-09-19 NOTE — DISCHARGE SUMMARY
Discharge Diagnosis  Closed fracture of right ankle, initial encounter    Issues Requiring Follow-Up  To return for ankle surgery next Thursday 9/26    Discharge Meds     Medication List      START taking these medications     acetaminophen 325 mg tablet; Commonly known as: Tylenol; Take 3 tablets   (975 mg) by mouth 3 times a day.   lidocaine 4 % patch; Place 1 patch over 12 hours on the skin once daily.   Remove & discard patch within 12 hours or as directed by MD.   ondansetron 4 mg/2 mL injection; Commonly known as: Zofran; Infuse 2 mL   (4 mg) into a venous catheter every 6 hours if needed for nausea or   vomiting.   oxyCODONE 5 mg immediate release tablet; Commonly known as: Roxicodone;   Take 1 tablet (5 mg) by mouth every 6 hours if needed for severe pain (7 -   10).     CONTINUE taking these medications     aspirin 81 mg EC tablet; Take 1 tablet (81 mg) by mouth once daily.   cholecalciferol 50 mcg (2,000 unit) capsule; Commonly known as: Vitamin   D-3; Take 1 capsule (50 mcg) by mouth once daily.   dicyclomine 10 mg capsule; Commonly known as: Bentyl; Take 1 capsule (10   mg) by mouth 4 times a day as needed (abdominal pain).   fluticasone 50 mcg/actuation nasal spray; Commonly known as: Flonase   fluticasone propion-salmeteroL 100-50 mcg/dose diskus inhaler; Commonly   known as: Advair Diskus; Inhale 1 puff every 12 hours. Rinse mouth with   water after use to reduce aftertaste and incidence of candidiasis. Do not   swallow.   ibuprofen 200 mg tablet   Knee Support Brace misc; Generic drug: leg brace; 1 Units once daily.   Mirena 21 mcg/24 hr (8 yrs) 52 mg IUD; Generic drug: levonorgestrel   omeprazole 20 mg DR capsule; Commonly known as: PriLOSEC   prazosin 5 mg capsule; Commonly known as: Minipress   sertraline 100 mg tablet; Commonly known as: Zoloft   traZODone 100 mg tablet; Commonly known as: Desyrel       Test Results Pending At Discharge  Pending Labs       No current pending labs.       "      Hospital Course   Per admission HPI: \"Windy Lovelace is a 33 y.o. female   With past medical history of strokes presented with Ankle Fracture.  Patient was found on the ground in the bathroom and stool.  Patient was trying to get into the shower, stumbled and had a fall.  Patient denies hitting head or any loss of consciousness. she denies having fever, chills or any other symptoms.\"    S/p closed reduction. Pain control during admission. PT/OT. Plans were arranged for surgery on 9/26 due to OR availability. Pt unable to care for herself at home, so short term facility was arranged. Pt will discharge to short term facility and then return for admission in preparation for OR on 9/26. Surgery to be done by Dr. Castro, ortho surg. Pt's labs remained stable throughout admission. She was discharged to the facility on 9/19.    Pertinent Physical Exam At Time of Discharge  Constitutional: NAD, resting comfortably in bed  Skin: Warm and dry, no rashes   Eyes: EOMI, clear sclera   ENMT: MMM   HEENT: Neck supple without apparent injury, EOMI, MMM  Respiratory: NWOB on RA   CV: RRR per peripheral pulses, limbs wwp  GI: soft, non-distended   MSK: RLE in boot  Lymph: No apparent LAD  Neuro: MCGARRY spontaneously, CNs II - XII grossly intact   Psych: Appropriate mood and behavior    Outpatient Follow-Up  Future Appointments   Date Time Provider Department Center   9/19/2024 12:00 PM CELSO ARNOLD NEWBRY1 PC1 MA 1 [id number] [facility]   10/16/2024 12:45 PM Khanh Terry MD [id number] [facility]         Pau Butler MD    I spent >30mins in the discharge planning and overall care of this pt.  "

## 2024-09-19 NOTE — CARE PLAN
The patient's goals for the shift include      The clinical goals for the shift include patient to be discharged      Problem: Safety - Adult  Goal: Free from fall injury  Outcome: Met     Problem: Discharge Planning  Goal: Discharge to home or other facility with appropriate resources  Outcome: Met     Problem: Chronic Conditions and Co-morbidities  Goal: Patient's chronic conditions and co-morbidity symptoms are monitored and maintained or improved  Outcome: Met     Problem: Skin  Goal: Decreased wound size/increased tissue granulation at next dressing change  Outcome: Met

## 2024-09-19 NOTE — PROGRESS NOTES
9/19/2024 9:51 AM Sister notified of 11:00 AM discharge transport to [facility]. Kaylene CUNNINGHAM

## 2024-09-19 NOTE — DISCHARGE INSTRUCTIONS
[facility] Discharge Instructions  You came to the hospital with ankle fracture and were admitted for observation and further care.   You were treated with brace while awaiting surgery.   A orthopedic specialist saw you while admitted and helped manage your care.   Your discharge plan is to go to short term facility, come back admitted for surgery, and then return to rehab.    For any issues or concerns with appointments, call the  scheduling line at [phone] or the providers office directly.       See the attached information for education about any new medications and the condition(s) you were treated for.  Your medications may have changed so pay close attention to the list given to you at discharge and ask any questions you have before leaving the hospital.

## 2024-09-23 ENCOUNTER — NURSING HOME VISIT (OUTPATIENT)
Dept: POST ACUTE CARE | Facility: EXTERNAL LOCATION | Age: 34
End: 2024-09-23
Payer: MEDICARE

## 2024-09-23 DIAGNOSIS — R11.2 NAUSEA AND VOMITING, UNSPECIFIED VOMITING TYPE: ICD-10-CM

## 2024-09-23 DIAGNOSIS — M62.81 GENERALIZED MUSCLE WEAKNESS: ICD-10-CM

## 2024-09-23 DIAGNOSIS — K58.9 IRRITABLE BOWEL SYNDROME, UNSPECIFIED TYPE: ICD-10-CM

## 2024-09-23 DIAGNOSIS — I69.351 HEMIPLEGIA AND HEMIPARESIS FOLLOWING CEREBRAL INFARCTION AFFECTING RIGHT DOMINANT SIDE (MULTI): ICD-10-CM

## 2024-09-23 DIAGNOSIS — R26.89 IMPAIRED GAIT AND MOBILITY: ICD-10-CM

## 2024-09-23 DIAGNOSIS — M25.571 ACUTE RIGHT ANKLE PAIN: ICD-10-CM

## 2024-09-23 DIAGNOSIS — K21.9 GASTROESOPHAGEAL REFLUX DISEASE, UNSPECIFIED WHETHER ESOPHAGITIS PRESENT: ICD-10-CM

## 2024-09-23 DIAGNOSIS — J30.9 ALLERGIC RHINITIS, UNSPECIFIED SEASONALITY, UNSPECIFIED TRIGGER: ICD-10-CM

## 2024-09-23 DIAGNOSIS — G47.00 INSOMNIA, UNSPECIFIED TYPE: ICD-10-CM

## 2024-09-23 DIAGNOSIS — I10 BENIGN ESSENTIAL HTN: ICD-10-CM

## 2024-09-23 DIAGNOSIS — F41.1 GENERALIZED ANXIETY DISORDER: ICD-10-CM

## 2024-09-23 DIAGNOSIS — S82.851S CLOSED DISPLACED TRIMALLEOLAR FRACTURE OF RIGHT ANKLE, SEQUELA: ICD-10-CM

## 2024-09-23 DIAGNOSIS — Z87.81 H/O REDUCTION OF CLOSED FRACTURE: ICD-10-CM

## 2024-09-23 DIAGNOSIS — Z79.899 ON DEEP VEIN THROMBOSIS (DVT) PROPHYLAXIS: ICD-10-CM

## 2024-09-23 DIAGNOSIS — F32.9 MAJOR DEPRESSIVE DISORDER WITH CURRENT ACTIVE EPISODE, UNSPECIFIED DEPRESSION EPISODE SEVERITY, UNSPECIFIED WHETHER RECURRENT: ICD-10-CM

## 2024-09-23 DIAGNOSIS — S82.891S CLOSED RIGHT ANKLE FRACTURE, SEQUELA: Primary | ICD-10-CM

## 2024-09-23 DIAGNOSIS — I69.30 HISTORY OF STROKE WITH CURRENT RESIDUAL EFFECTS: ICD-10-CM

## 2024-09-23 PROCEDURE — 99310 SBSQ NF CARE HIGH MDM 45: CPT | Performed by: NURSE PRACTITIONER

## 2024-09-23 NOTE — LETTER
Patient: Windy Lovelace  : 1990    Encounter Date: 2024    Name: Windy Lovelace    YOB: 1990    Code Status: FULL CODE    Chief Complaint:  New patient; initial visit;  Right ankle fracture (right trimalleolar ankle fracture dislocation)   S/p closed reduction with concentric reduction; right ankle pain; etc......       HPI   33 year old female with past medical history of strokes.  Other extensive medical history listed below.      HOSPITAL COURSE:   Patient presented to [facility] on 24 with an Right Ankle Fracture.    Patient was found on the ground in the bathroom and stool.    Patient was trying to get into the shower, stumbled and had a fall.    Patient denies hitting head or any loss of consciousness.   She denies having fever, chills or any other symptoms.  Patient was evaluated by orthopedics.  Diagnosed with right trimalleolar ankle fracture dislocation.   S/p closed reduction with concentric reduction.  Pain was controlled during admission.   Patient was not having acute pain with ROM or palpation of other extremities other than that which is mentioned below  Evaluated by PT/OT.   No acute orthopaedic surgical intervention was indicated.   Therefore, plans were arranged for surgery on  due to OR availability.   Pt unable to care for herself at home, so plans to transfer to short term facility.  Pt will  return for admission in preparation for OR on .   Surgery to be done by Dr. Castro, orthopedic surgery.   Pt's labs remained stable throughout admission.    Patient was admitted to [facility] from 24 to 24 (6 days)    Patient admitted to the [facility] on 24 for skilled services.    Hospital and chronic diagnoses as follows:    Right ankle fracture (right trimalleolar ankle fracture dislocation)   S/p closed reduction with concentric reduction; right ankle pain:  Evaluated by orthopedics in the hospital.  Evaluated by PT/OT.   No acute  orthopaedic surgical intervention was indicated.   Therefore, plans were arranged for surgery on 9/26 due to OR availability.   Pt unable to care so patient sent to the [facility] for skilled services.  On routine acetaminophen 975 mg PO TID.  On ibuprofen PRN and oxycodone PRN for pain.   Pain is around a 7.  The oxycodone helps to decrease the pain.  Patient seen today.  She is in her bed.  Cooperative, but anxious.  Uncomfortable.  Anxious about her ankle fracture and upcoming surgery.  No complaints of headaches or dizziness.  No chest pain.     Benign essential HTN:  On Prazosin.  Recent BP: 115/87.  No complaints of     Generalized anxiety disorder; major depressive disorder; insomnia:  On sertraline and trazodone.  No complaints of insomnia.      GERD:  On Omeprazole.   No complaints of reflux or N/V.    Irritable bowel syndrome:  On dicyclomine PRN and Pepto-Bismol PRN.  Has chronic issues with IBS.   No complaints of abdominal pain today.    Hemiplegia and hemiparesis following cerebral infarction affecting right dominant side; History of strokes:  On aspirin.      DVT ppx:   On aspirin    Nausea and/ or vomiting:   On ondansetron.  No complaints of N/V today.    Impaired gait and mobility; generalized muscle weakness:  Patient is non-weight bearing has splint on right lower leg.  Patient at the [facility] for skilled services/PT/OT.     Allergic rhinitis:  On fluticasone nasal spray.  No complaints of allergic rhinitis today.  No headaches or dizziness.              Reviewed [facility] records from recent hospitalization.  Reviewed  EMR  Reviewed medical, social, surgical and family history.  Reviewed all current medications and performed medication reconciliation.  Performed prescription drug management.  Reviewed vital signs AND lab results  Reviewed [facility] Documentation  Discussed patient with nursing.   Spent greater than 50 minutes on patient visit.  Ordered RX  for oxycodone.                ROS: 10 point ROS performed; Negative unless noted in HPI.      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), Breast pain (03/21/2024), Candidiasis of skin and nail (06/25/2014), Cellulitis of face (10/10/2013), Headache, unspecified (04/29/2015), Hypomagnesemia (01/23/2014), Other cysts of oral region, not elsewhere classified (06/10/2016), Other enthesopathies, not elsewhere classified (10/29/2015), Other specified abnormal findings of blood chemistry (03/06/2020), Other specified joint disorders, left ankle and foot (11/02/2016), Other specified noninflammatory disorders of vagina (03/20/2018), Pain in right knee (11/12/2018), Pain in right knee (02/19/2018), Pain in right knee (02/20/2019), Pain, unspecified (10/17/2017), Pelvic and perineal pain (01/24/2018), Personal history of diseases of the skin and subcutaneous tissue (02/24/2016), Personal history of other diseases of the digestive system (10/06/2017), Personal history of other diseases of the digestive system (03/17/2015), Personal history of other diseases of the digestive system (05/24/2016), Personal history of other diseases of the female genital tract (08/31/2015), Personal history of other diseases of the musculoskeletal system and connective tissue (02/04/2014), Personal history of other diseases of the nervous system and sense organs (01/05/2015), Personal history of other diseases of the respiratory system (08/29/2013), Personal history of other diseases of the respiratory system (02/03/2017), Personal history of other drug therapy (12/11/2017), Personal history of other endocrine, nutritional and metabolic disease (05/09/2019), Personal history of other mental and behavioral disorders (10/06/2017), Personal history of  "transient ischemic attack (TIA), and cerebral infarction without residual deficits (10/06/2017), Pleurodynia (01/23/2014), Pyuria (06/13/2017), Sprain of medial collateral ligament of right knee, initial encounter (11/02/2016), Sprain of unspecified ligament of left ankle, subsequent encounter (10/29/2015), Strain of muscle and tendon of unspecified wall of thorax, initial encounter (02/04/2014), Syncope and collapse (06/13/2017), Unspecified fall, initial encounter (01/23/2014), and Unspecified symptoms and signs involving the genitourinary system (01/24/2018).    SURGICAL HISTORY:   MR head angio w IV contrast (8/18/2015);   US guided percutaneous peritoneal or retroperitoneal fluid collection drainage (4/10/2019);   and US guided abscess drain (4/10/2019).     SOCIAL HISTORY:  She reports that she has never smoked.   She has never used smokeless tobacco.   She reports that she does not drink alcohol and does not use drugs.    Family History   Problem Relation Name Age of Onset   • Kidney cancer Mother       • Liver cancer Mother       • Pancreatic cancer Father       • Glaucoma Other grandmother     • Diabetes Other Grandfather           other type with arthropathy, with long term curent use of insulin   • Diabetes Other aunt         ALLERGIES:  Bupropion  Ketamine  Augmentin [amoxicillin-pot clavulanate]  Penicillin g,   Penicillins        Lab Results   Component Value Date    WBC 8.7 09/17/2024    HGB 12.8 09/17/2024    HCT 40.0 09/17/2024     09/17/2024    CHOL 152 05/09/2024    TRIG 178 (H) 05/09/2024    HDL 33.6 05/09/2024    ALT 51 (H) 09/14/2024    AST 18 09/14/2024     09/17/2024    K 3.9 09/17/2024     09/17/2024    CREATININE 0.65 09/17/2024    BUN 11 09/17/2024    CO2 27 09/17/2024    TSH 5.14 (H) 05/09/2024    INR 1.0 09/13/2024             /87   Pulse 88   Temp 36.7 °C (98 °F)   Resp 18   Ht 1.676 m (5' 6\")   Wt 108 kg (237 lb 9.6 oz)   SpO2 98%   BMI 38.35 kg/m²  "     Physical Exam  Vitals and nursing note reviewed.   Constitutional:       Appearance: She is obese.   HENT:      Head: Normocephalic.      Right Ear: External ear normal.      Left Ear: External ear normal.      Nose: Nose normal.      Mouth/Throat:      Mouth: Mucous membranes are moist.      Pharynx: Oropharynx is clear.   Eyes:      Extraocular Movements: Extraocular movements intact.      Conjunctiva/sclera: Conjunctivae normal.      Pupils: Pupils are equal, round, and reactive to light.   Cardiovascular:      Rate and Rhythm: Normal rate and regular rhythm.      Pulses: Normal pulses.      Heart sounds: Normal heart sounds.   Pulmonary:      Effort: Pulmonary effort is normal.      Breath sounds: Normal breath sounds.   Abdominal:      General: Bowel sounds are normal.      Palpations: Abdomen is soft.   Musculoskeletal:         General: Normal range of motion.      Cervical back: Normal range of motion and neck supple.      Comments: Right lower extremity: splint/ACE wrap intact.   Feet:      Comments: Bilateral feet--adequate movement and sensation;  Cap refill < 3 seconds.   Skin:     General: Skin is warm and dry.   Neurological:      General: No focal deficit present.      Mental Status: She is alert and oriented to person, place, and time. Mental status is at baseline.      Motor: Weakness present.      Gait: Gait abnormal.   Psychiatric:         Mood and Affect: Mood is anxious and depressed.         Behavior: Behavior normal. Behavior is cooperative.          Assessment/Plan   Ordered CMP, CBC with diff. For tomorrow.    Right ankle fracture (right trimalleolar ankle fracture dislocation)   S/p closed reduction with concentric reduction; right ankle pain:  Evaluated by orthopedics in the hospital.  Evaluated by PT/OT.   No acute orthopaedic surgical intervention was indicated.   Surgery scheduled at [facility]  9/26/24.  Patient at the [facility] for skilled services.  Continue routine  acetaminophen 975 mg PO TID.  Continue ibuprofen PRN and oxycodone PRN for pain.     Benign essential HTN:  Continue Prazosin.  Monitor Bps.    Generalized anxiety disorder; major depressive disorder; insomnia:  Continue sertraline and trazodone.  Monitor for effectiveness of medications.  Follow up with psych PRN.     GERD:  Continue Omeprazole.   Monitor for reflux or N/V.  Sit upright for 2-3 hours after meals.    Irritable bowel syndrome:  Continue dicyclomine PRN and Pepto-Bismol PRN.  Has chronic issues with IBS.   Monitor for abdominal pain.    Hemiplegia and hemiparesis following cerebral infarction affecting right dominant side; History of strokes:  Continue aspirin.      DVT ppx:   Continue aspirin    Nausea and/ or vomiting:   On ondansetron.  No complaints of N/V today.    Impaired gait and mobility; generalized muscle weakness:  Patient is non-weight bearing has splint on right lower leg.  Continue at the [facility] for skilled services/PT/OT.   Fall prevention strategies.    Allergic rhinitis:  Continue fluticasone nasal spray.        Problem List Items Addressed This Visit       Allergic rhinitis    Esophageal reflux    History of stroke with current residual effects    Closed displaced trimalleolar fracture of right lower leg     Other Visit Diagnoses       Closed right ankle fracture, sequela    -  Primary    H/O reduction of closed fracture        Acute right ankle pain        Benign essential HTN        Generalized anxiety disorder        Major depressive disorder with current active episode, unspecified depression episode severity, unspecified whether recurrent        Insomnia, unspecified type        Irritable bowel syndrome, unspecified type        Hemiplegia and hemiparesis following cerebral infarction affecting right dominant side (Multi)        On deep vein thrombosis (DVT) prophylaxis        Nausea and vomiting, unspecified vomiting type        Impaired gait and mobility         Generalized muscle weakness                NADEEM Clement       Electronically Signed By: NADEEM Clement   9/26/24  7:29 PM

## 2024-09-25 ENCOUNTER — ANESTHESIA EVENT (OUTPATIENT)
Dept: OPERATING ROOM | Facility: HOSPITAL | Age: 34
End: 2024-09-25
Payer: MEDICARE

## 2024-09-25 PROBLEM — F70 MILD INTELLECTUAL DISABILITY: Status: ACTIVE | Noted: 2021-05-28

## 2024-09-25 NOTE — ANESTHESIA PREPROCEDURE EVALUATION
Patient: Windy Lovelace    Procedure Information       Date/Time: 09/26/24 0730    Procedure: Right Ankle ORIF (Right: Ankle)    Location: [facility] A OR 04 / [facility] OR    Surgeons: Timothy Castro MD                                                         Pre- Anesthesia Evaluation                                            Windy Lovelace is a 33 y.o. female  with Saavedra Saavedra disease, 3 CVAs as an infant who had a recent syncopal episode on 9/13 and was found on the bathroom floor covered with stool. CT imaging in ER was negative for head injury. 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    Headache, unspecified 04/29/2015    Severe headache    Hiatal hernia     LFTs abnormal     Obesity (BMI 30-39.9)     Personal history of other mental and behavioral disorders 10/06/2017    History of mental retardation    Personal history of transient ischemic attack (TIA), and cerebral infarction with right sided weakness 10/06/2017    History of stroke    Syncope and collapse 06/13/2017    Syncope and collapse     Past Surgical History:   Procedure Laterality Date    MR HEAD ANGIO W IV CONTRAST  8/18/2015    MR HEAD ANGIO W IV CONTRAST [place] [facility]    OTHER SURGICAL HISTORY  06/16/2015    History Of Prior Surgery    OTHER SURGICAL HISTORY  10/06/2017    Excision Of Lesion Face    OTHER SURGICAL HISTORY  07/23/2019    Small bowel resection    US GUIDED ABSCESS DRAIN  4/10/2019    US GUIDED ABSCESS DRAIN [place]10/[place] [facility]    US GUIDED PERCUTANEOUS PERITONEAL OR RETROPERITONEAL FLUID COLLECTION DRAINAGE  4/10/2019    US GUIDED PERCUTANEOUS PERITONEAL OR RETROPERITONEAL FLUID  COLLECTION DRAINAGE 4[place]10[place]2019 [facility]     Social History   She reports that she has never smoked. She has never used smokeless tobacco. She reports that she does not drink alcohol and does not use drugs.    Allergies and Medications   Allergies   Allergen Reactions    Bupropion Unknown and Hives    Ketamine Unknown, Anaphylaxis and Rash    Augmentin [Amoxicillin-Pot Clavulanate] Hives    Penicillin G Hives    Penicillins Unknown and Hives     Current Outpatient Medications   Medication Instructions    acetaminophen (TYLENOL) 975 mg, oral, 3 times daily    aspirin 81 mg, oral, Daily    cholecalciferol (VITAMIN D-3) 50 mcg, oral, Daily    dicyclomine (BENTYL) 10 mg, oral, 4 times daily PRN    fluticasone (Flonase) 50 mcg/actuation nasal spray Administer 2 sprays into each nostril once daily as needed for allergies.    fluticasone propion-salmeteroL (Advair Diskus) 100-50 mcg/dose diskus inhaler 1 puff, inhalation, Every 12 hours, Rinse mouth with water after use to reduce aftertaste and incidence of candidiasis. Do not swallow.    ibuprofen 200 mg, oral, Every 6 hours PRN    leg brace (Knee Support Brace) misc 1 Units, miscellaneous, Daily    levonorgestrel (Mirena) 21 mcg/24 hours (8 yrs) 52 mg IUD Mirena (52 MG) 20 MCG/24HR IUD   Refills: 0       Active    lidocaine 4 % patch 1 patch, transdermal, Daily, Remove & discard patch within 12 hours or as directed by MD.    omeprazole (PRILOSEC) 20 mg, oral, Daily, Do not crush or chew.    ondansetron (ZOFRAN) 4 mg, intravenous, Every 6 hours PRN    oxyCODONE (ROXICODONE) 5 mg, oral, Every 6 hours PRN    prazosin (Minipress) 5 mg capsule 1 capsule, oral, Nightly, Dosage unknown     sertraline (Zoloft) 100 mg tablet 1 tablet, oral, Daily (0630)    traZODone (DESYREL) 100 mg, oral, Nightly, Dosage unknown       Recent Labs     09/17/24  0615 09/15/24  0524   WBC 8.7 9.2   HGB 12.8 13.2   HCT 40.0 41.0    253   MCV 85 85     Recent Labs     09/13/24  1151  "04/30/19 2020   PROTIME 11.2 11.8   INR 1.0 1.1   APTT 29 32     Lab Results   Component Value Date    ABO A 09/13/2024     Recent Labs     05/09/24  0849   FERRITIN 168*   TIBC 478*   IRONSAT 20*     Recent Labs     09/17/24  0615 09/15/24  0524 09/14/24  0520   EGFR >90 >90 82   ANIONGAP 11 10 14   BUN 11 10 10   CREATININE 0.65 0.75 0.93    136 136   K 3.9 3.8 3.8    106 104   CO2 27 24 22   GLUCOSE 88 85 102*     Recent Labs     09/14/24  0520 09/13/24  1151   PROT 6.2* 7.4   ALBUMIN 3.6 3.9   BILITOT 0.5 0.4   ALKPHOS 81 90   ALT 51* 70*   AST 18 27     Recent Labs     06/05/24  1123 06/06/19  1723 05/09/19  1628 04/15/19  0601   LIPASE 13 45 61 63   AMYLASE  --   --  34 20*     Recent Labs     05/09/24  0849 12/08/23  1209 08/12/20  1507 07/10/18  1111   TSH 5.14* 2.73   < > 4.75*   FREET4 0.99  --   --  0.93    < > = values in this interval not displayed.     Recent Labs     09/17/24  0615 09/15/24  0524 03/05/20  1613 07/03/19  0640 07/02/19  0637   CALCIUM 8.4* 8.5*   < > 8.6 8.3*   PHOS  --   --   --  2.9 2.3*    < > = values in this interval not displayed.       EKG   No results found for this or any previous visit.      Visit Vitals  /66   Pulse 85   Temp 35.6 °C (96.1 °F) (Temporal)   Resp 18   Ht 1.676 m (5' 6\")   Wt 109 kg (240 lb 1.3 oz)   BMI 38.75 kg/m²   OB Status Having periods   Smoking Status Never   BSA 2.25 m²     Medical Gas Therapy: None (Room air)      9/26/2024     7:16 AM 9/19/2024     7:58 AM 9/18/2024    10:57 PM 9/18/2024     3:35 PM 9/18/2024     8:03 AM 9/17/2024    11:12 PM 9/17/2024     3:51 PM   BP REVIEW   BP (ultimate) 128/66 121/88 118/88 113/78 135/88 123/82 102/68      Lab Results   Component Value Date    PREGTESTUR Negative 09/26/2024    HCGQUANT <2 09/13/2024          Relevant Problems   Neuro   (+) Anxiety state   (+) Epilepsy   (+) History of neurodevelopmental disorder   (+) History of stroke with current residual effects   (+) Major depressive " disorder, single episode, severe (Multi)   (+) Migraine headache   (+) Post traumatic stress disorder      GI   (+) Esophageal reflux   (+) Hiatal hernia   (+) Rectal hemorrhage      Endocrine   (+) Obesity (BMI 30-39.9)      Hematology   (+) Iron deficiency anemia due to chronic blood loss      Musculoskeletal   (+) Osteoarthritis      HEENT   (+) Visual impairment      Circulatory   (+) [place]oya disease       Clinical information reviewed:   Tobacco  Allergies  Meds  Problems  Med Hx  Surg Hx  OB Status    Fam Hx  Soc Hx        NPO Detail:  NPO/Void Status  Carbohydrate Drink Given Prior to Surgery? : N  Date of Last Liquid: 09/25/24  Time of Last Liquid: 0500  Date of Last Solid: 09/25/24  Time of Last Solid: 1700  Last Intake Type: Clear fluids  Time of Last Void: 0700         Physical Exam    Airway  Mallampati: III  TM distance: >3 FB  Neck ROM: full     Cardiovascular   Rhythm: regular  Rate: normal     Dental   Comments: Multiple missing teeth   Pulmonary   Comments: Normal RR  Non-labored respiration    Abdominal            Anesthesia Plan    History of general anesthesia?: yes  History of complications of general anesthesia?: no    ASA 4     general   (General with ETT. Standard ASA monitoring.  )  The patient is not a current smoker.    intravenous induction   Postoperative administration of opioids is intended.  Anesthetic plan and risks discussed with patient.    Plan discussed with CAA and CRNA.    Possible A line for BP monitoring. Avoidance of hypotension and hyperventilation to decrease CVA risk.   Discussed risks/benefits/alternatives for post-op analgesia including multimodal drug therapy, CLAIRE by surgeon and nerve block using dilute local anesthestic. Pre-op nerve block deferred as patient is high fall risk with residual weakness since CVA. Patient would like a nerve block in PACU if pain is intolerable.

## 2024-09-26 ENCOUNTER — APPOINTMENT (OUTPATIENT)
Dept: RADIOLOGY | Facility: HOSPITAL | Age: 34
End: 2024-09-26
Payer: MEDICARE

## 2024-09-26 ENCOUNTER — ANESTHESIA (OUTPATIENT)
Dept: OPERATING ROOM | Facility: HOSPITAL | Age: 34
End: 2024-09-26
Payer: MEDICARE

## 2024-09-26 ENCOUNTER — HOSPITAL ENCOUNTER (OUTPATIENT)
Facility: HOSPITAL | Age: 34
Discharge: NURSING, REHAB OR LONG-TERM CARE | End: 2024-09-28
Attending: STUDENT IN AN ORGANIZED HEALTH CARE EDUCATION/TRAINING PROGRAM | Admitting: INTERNAL MEDICINE
Payer: MEDICARE

## 2024-09-26 VITALS
RESPIRATION RATE: 18 BRPM | DIASTOLIC BLOOD PRESSURE: 87 MMHG | BODY MASS INDEX: 38.18 KG/M2 | HEIGHT: 66 IN | OXYGEN SATURATION: 98 % | SYSTOLIC BLOOD PRESSURE: 115 MMHG | HEART RATE: 88 BPM | TEMPERATURE: 98 F | WEIGHT: 237.6 LBS

## 2024-09-26 DIAGNOSIS — S82.851A CLOSED DISPLACED TRIMALLEOLAR FRACTURE OF RIGHT ANKLE, INITIAL ENCOUNTER: Primary | ICD-10-CM

## 2024-09-26 DIAGNOSIS — S82.851S CLOSED DISPLACED TRIMALLEOLAR FRACTURE OF RIGHT ANKLE, SEQUELA: ICD-10-CM

## 2024-09-26 DIAGNOSIS — S82.891A CLOSED FRACTURE OF RIGHT ANKLE, INITIAL ENCOUNTER: ICD-10-CM

## 2024-09-26 DIAGNOSIS — S93.04XA ANKLE DISLOCATION, RIGHT, INITIAL ENCOUNTER: ICD-10-CM

## 2024-09-26 LAB — PREGNANCY TEST URINE, POC: NEGATIVE

## 2024-09-26 PROCEDURE — 94640 AIRWAY INHALATION TREATMENT: CPT | Mod: 59

## 2024-09-26 PROCEDURE — 2500000004 HC RX 250 GENERAL PHARMACY W/ HCPCS (ALT 636 FOR OP/ED): Performed by: ANESTHESIOLOGY

## 2024-09-26 PROCEDURE — 2500000005 HC RX 250 GENERAL PHARMACY W/O HCPCS: Performed by: INTERNAL MEDICINE

## 2024-09-26 PROCEDURE — G0378 HOSPITAL OBSERVATION PER HR: HCPCS

## 2024-09-26 PROCEDURE — 7100000002 HC RECOVERY ROOM TIME - EACH INCREMENTAL 1 MINUTE: Performed by: STUDENT IN AN ORGANIZED HEALTH CARE EDUCATION/TRAINING PROGRAM

## 2024-09-26 PROCEDURE — 2500000004 HC RX 250 GENERAL PHARMACY W/ HCPCS (ALT 636 FOR OP/ED): Performed by: NURSE ANESTHETIST, CERTIFIED REGISTERED

## 2024-09-26 PROCEDURE — 64447 NJX AA&/STRD FEMORAL NRV IMG: CPT | Performed by: ANESTHESIOLOGY

## 2024-09-26 PROCEDURE — 3700000002 HC GENERAL ANESTHESIA TIME - EACH INCREMENTAL 1 MINUTE: Performed by: STUDENT IN AN ORGANIZED HEALTH CARE EDUCATION/TRAINING PROGRAM

## 2024-09-26 PROCEDURE — 3600000009 HC OR TIME - EACH INCREMENTAL 1 MINUTE - PROCEDURE LEVEL FOUR: Performed by: STUDENT IN AN ORGANIZED HEALTH CARE EDUCATION/TRAINING PROGRAM

## 2024-09-26 PROCEDURE — 76000 FLUOROSCOPY <1 HR PHYS/QHP: CPT | Mod: 59

## 2024-09-26 PROCEDURE — A27822 PR OPEN TX TRIMALLEOLAR ANKLE FX W/O FIX PST LIP: Performed by: ANESTHESIOLOGY

## 2024-09-26 PROCEDURE — 3700000001 HC GENERAL ANESTHESIA TIME - INITIAL BASE CHARGE: Performed by: STUDENT IN AN ORGANIZED HEALTH CARE EDUCATION/TRAINING PROGRAM

## 2024-09-26 PROCEDURE — 2500000005 HC RX 250 GENERAL PHARMACY W/O HCPCS: Performed by: STUDENT IN AN ORGANIZED HEALTH CARE EDUCATION/TRAINING PROGRAM

## 2024-09-26 PROCEDURE — 3600000004 HC OR TIME - INITIAL BASE CHARGE - PROCEDURE LEVEL FOUR: Performed by: STUDENT IN AN ORGANIZED HEALTH CARE EDUCATION/TRAINING PROGRAM

## 2024-09-26 PROCEDURE — 81025 URINE PREGNANCY TEST: CPT | Performed by: ANESTHESIOLOGY

## 2024-09-26 PROCEDURE — A27822 PR OPEN TX TRIMALLEOLAR ANKLE FX W/O FIX PST LIP: Performed by: NURSE ANESTHETIST, CERTIFIED REGISTERED

## 2024-09-26 PROCEDURE — 2780000003 HC OR 278 NO HCPCS: Performed by: STUDENT IN AN ORGANIZED HEALTH CARE EDUCATION/TRAINING PROGRAM

## 2024-09-26 PROCEDURE — 64445 NJX AA&/STRD SCIATIC NRV IMG: CPT | Performed by: ANESTHESIOLOGY

## 2024-09-26 PROCEDURE — 2500000005 HC RX 250 GENERAL PHARMACY W/O HCPCS: Performed by: NURSE ANESTHETIST, CERTIFIED REGISTERED

## 2024-09-26 PROCEDURE — 9420000001 HC RT PATIENT EDUCATION 5 MIN

## 2024-09-26 PROCEDURE — 94664 DEMO&/EVAL PT USE INHALER: CPT | Mod: 59

## 2024-09-26 PROCEDURE — 2500000005 HC RX 250 GENERAL PHARMACY W/O HCPCS: Performed by: ANESTHESIOLOGY

## 2024-09-26 PROCEDURE — 2500000004 HC RX 250 GENERAL PHARMACY W/ HCPCS (ALT 636 FOR OP/ED): Performed by: STUDENT IN AN ORGANIZED HEALTH CARE EDUCATION/TRAINING PROGRAM

## 2024-09-26 PROCEDURE — C1713 ANCHOR/SCREW BN/BN,TIS/BN: HCPCS | Performed by: STUDENT IN AN ORGANIZED HEALTH CARE EDUCATION/TRAINING PROGRAM

## 2024-09-26 PROCEDURE — 27829 TREAT LOWER LEG JOINT: CPT | Performed by: STUDENT IN AN ORGANIZED HEALTH CARE EDUCATION/TRAINING PROGRAM

## 2024-09-26 PROCEDURE — 7100000001 HC RECOVERY ROOM TIME - INITIAL BASE CHARGE: Performed by: STUDENT IN AN ORGANIZED HEALTH CARE EDUCATION/TRAINING PROGRAM

## 2024-09-26 PROCEDURE — 27822 TREATMENT OF ANKLE FRACTURE: CPT | Performed by: STUDENT IN AN ORGANIZED HEALTH CARE EDUCATION/TRAINING PROGRAM

## 2024-09-26 PROCEDURE — 2500000002 HC RX 250 W HCPCS SELF ADMINISTERED DRUGS (ALT 637 FOR MEDICARE OP, ALT 636 FOR OP/ED): Performed by: NURSE PRACTITIONER

## 2024-09-26 PROCEDURE — 2720000007 HC OR 272 NO HCPCS: Performed by: STUDENT IN AN ORGANIZED HEALTH CARE EDUCATION/TRAINING PROGRAM

## 2024-09-26 PROCEDURE — 2500000001 HC RX 250 WO HCPCS SELF ADMINISTERED DRUGS (ALT 637 FOR MEDICARE OP)

## 2024-09-26 PROCEDURE — 2500000002 HC RX 250 W HCPCS SELF ADMINISTERED DRUGS (ALT 637 FOR MEDICARE OP, ALT 636 FOR OP/ED)

## 2024-09-26 PROCEDURE — 2500000004 HC RX 250 GENERAL PHARMACY W/ HCPCS (ALT 636 FOR OP/ED)

## 2024-09-26 PROCEDURE — 99222 1ST HOSP IP/OBS MODERATE 55: CPT | Performed by: NURSE PRACTITIONER

## 2024-09-26 DEVICE — SCREW, LOCKING 2.7 X 16 SELF TAP: Type: IMPLANTABLE DEVICE | Site: ANKLE | Status: FUNCTIONAL

## 2024-09-26 DEVICE — IMPLANTABLE DEVICE: Type: IMPLANTABLE DEVICE | Site: ANKLE | Status: FUNCTIONAL

## 2024-09-26 DEVICE — SCREW, LOCKING 2.7 X 14 SELF TAP: Type: IMPLANTABLE DEVICE | Site: ANKLE | Status: FUNCTIONAL

## 2024-09-26 DEVICE — SCREW, CORTICAL, SELF-TAPPING, 3.5 X 40 MM, STAINLESS STEEL: Type: IMPLANTABLE DEVICE | Site: ANKLE | Status: FUNCTIONAL

## 2024-09-26 DEVICE — SCREW, CORTICAL, SELF-TAPPING, 3.5 X 14 MM, STAINLESS STEEL: Type: IMPLANTABLE DEVICE | Site: ANKLE | Status: FUNCTIONAL

## 2024-09-26 DEVICE — SCREW CORTEX 3.5 X 45: Type: IMPLANTABLE DEVICE | Site: ANKLE | Status: FUNCTIONAL

## 2024-09-26 DEVICE — SCREW, CORTICAL, SELF-TAPPING, 3.5 X 12 MM, STAINLESS STEEL: Type: IMPLANTABLE DEVICE | Site: ANKLE | Status: FUNCTIONAL

## 2024-09-26 RX ORDER — SODIUM CHLORIDE, SODIUM LACTATE, POTASSIUM CHLORIDE, CALCIUM CHLORIDE 600; 310; 30; 20 MG/100ML; MG/100ML; MG/100ML; MG/100ML
20 INJECTION, SOLUTION INTRAVENOUS CONTINUOUS
Status: DISCONTINUED | OUTPATIENT
Start: 2024-09-26 | End: 2024-09-28 | Stop reason: HOSPADM

## 2024-09-26 RX ORDER — OXYCODONE HYDROCHLORIDE 5 MG/1
10 TABLET ORAL EVERY 6 HOURS PRN
Status: DISCONTINUED | OUTPATIENT
Start: 2024-09-26 | End: 2024-09-27

## 2024-09-26 RX ORDER — METOPROLOL TARTRATE 1 MG/ML
INJECTION, SOLUTION INTRAVENOUS AS NEEDED
Status: DISCONTINUED | OUTPATIENT
Start: 2024-09-26 | End: 2024-09-26

## 2024-09-26 RX ORDER — CEFAZOLIN 1 G/1
INJECTION, POWDER, FOR SOLUTION INTRAVENOUS AS NEEDED
Status: DISCONTINUED | OUTPATIENT
Start: 2024-09-26 | End: 2024-09-26

## 2024-09-26 RX ORDER — ESMOLOL HYDROCHLORIDE 10 MG/ML
INJECTION INTRAVENOUS AS NEEDED
Status: DISCONTINUED | OUTPATIENT
Start: 2024-09-26 | End: 2024-09-26

## 2024-09-26 RX ORDER — FENTANYL CITRATE 50 UG/ML
INJECTION, SOLUTION INTRAMUSCULAR; INTRAVENOUS AS NEEDED
Status: DISCONTINUED | OUTPATIENT
Start: 2024-09-26 | End: 2024-09-26

## 2024-09-26 RX ORDER — NALOXONE HYDROCHLORIDE 0.4 MG/ML
0.2 INJECTION, SOLUTION INTRAMUSCULAR; INTRAVENOUS; SUBCUTANEOUS EVERY 5 MIN PRN
Status: DISCONTINUED | OUTPATIENT
Start: 2024-09-26 | End: 2024-09-28 | Stop reason: HOSPADM

## 2024-09-26 RX ORDER — ASPIRIN 81 MG/1
81 TABLET ORAL 2 TIMES DAILY
Status: DISCONTINUED | OUTPATIENT
Start: 2024-09-27 | End: 2024-09-28 | Stop reason: HOSPADM

## 2024-09-26 RX ORDER — DICYCLOMINE HYDROCHLORIDE 10 MG/1
10 CAPSULE ORAL 4 TIMES DAILY PRN
Status: DISCONTINUED | OUTPATIENT
Start: 2024-09-26 | End: 2024-09-28 | Stop reason: HOSPADM

## 2024-09-26 RX ORDER — NORETHINDRONE AND ETHINYL ESTRADIOL 0.5-0.035
KIT ORAL AS NEEDED
Status: DISCONTINUED | OUTPATIENT
Start: 2024-09-26 | End: 2024-09-26

## 2024-09-26 RX ORDER — FLUTICASONE FUROATE AND VILANTEROL 100; 25 UG/1; UG/1
1 POWDER RESPIRATORY (INHALATION)
Status: DISCONTINUED | OUTPATIENT
Start: 2024-09-26 | End: 2024-09-26

## 2024-09-26 RX ORDER — PHENYLEPHRINE 10 MG/250 ML(40 MCG/ML)IN 0.9 % SOD.CHLORIDE INTRAVENOUS
CONTINUOUS PRN
Status: DISCONTINUED | OUTPATIENT
Start: 2024-09-26 | End: 2024-09-26

## 2024-09-26 RX ORDER — ONDANSETRON HYDROCHLORIDE 2 MG/ML
INJECTION, SOLUTION INTRAVENOUS AS NEEDED
Status: DISCONTINUED | OUTPATIENT
Start: 2024-09-26 | End: 2024-09-26

## 2024-09-26 RX ORDER — PROPOFOL 10 MG/ML
INJECTION, EMULSION INTRAVENOUS CONTINUOUS PRN
Status: DISCONTINUED | OUTPATIENT
Start: 2024-09-26 | End: 2024-09-26

## 2024-09-26 RX ORDER — TRAZODONE HYDROCHLORIDE 50 MG/1
100 TABLET ORAL NIGHTLY
Status: DISCONTINUED | OUTPATIENT
Start: 2024-09-26 | End: 2024-09-28 | Stop reason: HOSPADM

## 2024-09-26 RX ORDER — LIDOCAINE 560 MG/1
2 PATCH PERCUTANEOUS; TOPICAL; TRANSDERMAL DAILY
Status: DISCONTINUED | OUTPATIENT
Start: 2024-09-26 | End: 2024-09-28 | Stop reason: HOSPADM

## 2024-09-26 RX ORDER — MIDAZOLAM HYDROCHLORIDE 1 MG/ML
INJECTION INTRAMUSCULAR; INTRAVENOUS AS NEEDED
Status: DISCONTINUED | OUTPATIENT
Start: 2024-09-26 | End: 2024-09-26

## 2024-09-26 RX ORDER — CHOLECALCIFEROL (VITAMIN D3) 25 MCG
2000 TABLET ORAL DAILY
Status: DISCONTINUED | OUTPATIENT
Start: 2024-09-26 | End: 2024-09-28 | Stop reason: HOSPADM

## 2024-09-26 RX ORDER — DROPERIDOL 2.5 MG/ML
0.62 INJECTION, SOLUTION INTRAMUSCULAR; INTRAVENOUS ONCE AS NEEDED
Status: DISCONTINUED | OUTPATIENT
Start: 2024-09-26 | End: 2024-09-26 | Stop reason: HOSPADM

## 2024-09-26 RX ORDER — VANCOMYCIN HYDROCHLORIDE 1 G/20ML
INJECTION, POWDER, LYOPHILIZED, FOR SOLUTION INTRAVENOUS AS NEEDED
Status: DISCONTINUED | OUTPATIENT
Start: 2024-09-26 | End: 2024-09-26 | Stop reason: HOSPADM

## 2024-09-26 RX ORDER — PROCHLORPERAZINE EDISYLATE 5 MG/ML
5 INJECTION INTRAMUSCULAR; INTRAVENOUS ONCE AS NEEDED
Status: DISCONTINUED | OUTPATIENT
Start: 2024-09-26 | End: 2024-09-26 | Stop reason: HOSPADM

## 2024-09-26 RX ORDER — ACETAMINOPHEN 160 MG/5ML
650 SOLUTION ORAL EVERY 4 HOURS PRN
Status: CANCELLED | OUTPATIENT
Start: 2024-09-26

## 2024-09-26 RX ORDER — BUPIVACAINE HCL/EPINEPHRINE 0.5-1:200K
VIAL (ML) INJECTION
Status: DISPENSED
Start: 2024-09-26 | End: 2024-09-27

## 2024-09-26 RX ORDER — ACETAMINOPHEN 650 MG/1
650 SUPPOSITORY RECTAL EVERY 4 HOURS PRN
Status: CANCELLED | OUTPATIENT
Start: 2024-09-26

## 2024-09-26 RX ORDER — CEFAZOLIN SODIUM 2 G/100ML
2 INJECTION, SOLUTION INTRAVENOUS EVERY 6 HOURS
Status: COMPLETED | OUTPATIENT
Start: 2024-09-26 | End: 2024-09-27

## 2024-09-26 RX ORDER — ONDANSETRON 4 MG/1
4 TABLET, ORALLY DISINTEGRATING ORAL EVERY 8 HOURS PRN
Status: DISCONTINUED | OUTPATIENT
Start: 2024-09-26 | End: 2024-09-28 | Stop reason: HOSPADM

## 2024-09-26 RX ORDER — FORMOTEROL FUMARATE DIHYDRATE 20 UG/2ML
20 SOLUTION RESPIRATORY (INHALATION)
Status: DISCONTINUED | OUTPATIENT
Start: 2024-09-26 | End: 2024-09-28 | Stop reason: HOSPADM

## 2024-09-26 RX ORDER — ONDANSETRON HYDROCHLORIDE 2 MG/ML
4 INJECTION, SOLUTION INTRAVENOUS EVERY 8 HOURS PRN
Status: DISCONTINUED | OUTPATIENT
Start: 2024-09-26 | End: 2024-09-28 | Stop reason: HOSPADM

## 2024-09-26 RX ORDER — POLYETHYLENE GLYCOL 3350 17 G/17G
17 POWDER, FOR SOLUTION ORAL DAILY
Status: DISCONTINUED | OUTPATIENT
Start: 2024-09-26 | End: 2024-09-28 | Stop reason: HOSPADM

## 2024-09-26 RX ORDER — ONDANSETRON HYDROCHLORIDE 2 MG/ML
4 INJECTION, SOLUTION INTRAVENOUS ONCE AS NEEDED
Status: DISCONTINUED | OUTPATIENT
Start: 2024-09-26 | End: 2024-09-26 | Stop reason: HOSPADM

## 2024-09-26 RX ORDER — DIPHENHYDRAMINE HCL 12.5MG/5ML
12.5 LIQUID (ML) ORAL EVERY 6 HOURS PRN
Status: DISCONTINUED | OUTPATIENT
Start: 2024-09-26 | End: 2024-09-28 | Stop reason: HOSPADM

## 2024-09-26 RX ORDER — ROPIVACAINE HYDROCHLORIDE 5 MG/ML
INJECTION, SOLUTION EPIDURAL; INFILTRATION; PERINEURAL
Status: DISPENSED
Start: 2024-09-26 | End: 2024-09-27

## 2024-09-26 RX ORDER — PRAZOSIN HYDROCHLORIDE 1 MG/1
5 CAPSULE ORAL NIGHTLY
Status: DISCONTINUED | OUTPATIENT
Start: 2024-09-26 | End: 2024-09-28 | Stop reason: HOSPADM

## 2024-09-26 RX ORDER — ROCURONIUM BROMIDE 10 MG/ML
INJECTION, SOLUTION INTRAVENOUS AS NEEDED
Status: DISCONTINUED | OUTPATIENT
Start: 2024-09-26 | End: 2024-09-26

## 2024-09-26 RX ORDER — ENOXAPARIN SODIUM 100 MG/ML
40 INJECTION SUBCUTANEOUS EVERY 24 HOURS
Status: CANCELLED | OUTPATIENT
Start: 2024-09-26

## 2024-09-26 RX ORDER — ETOMIDATE 2 MG/ML
INJECTION INTRAVENOUS AS NEEDED
Status: DISCONTINUED | OUTPATIENT
Start: 2024-09-26 | End: 2024-09-26

## 2024-09-26 RX ORDER — PHENYLEPHRINE HCL IN 0.9% NACL 1 MG/10 ML
SYRINGE (ML) INTRAVENOUS AS NEEDED
Status: DISCONTINUED | OUTPATIENT
Start: 2024-09-26 | End: 2024-09-26

## 2024-09-26 RX ORDER — HYDROMORPHONE HYDROCHLORIDE 1 MG/ML
INJECTION, SOLUTION INTRAMUSCULAR; INTRAVENOUS; SUBCUTANEOUS AS NEEDED
Status: DISCONTINUED | OUTPATIENT
Start: 2024-09-26 | End: 2024-09-26

## 2024-09-26 RX ORDER — EPINEPHRINE 1 MG/ML
INJECTION, SOLUTION, CONCENTRATE INTRAVENOUS
Status: DISPENSED
Start: 2024-09-26 | End: 2024-09-27

## 2024-09-26 RX ORDER — FLUTICASONE PROPIONATE 50 MCG
2 SPRAY, SUSPENSION (ML) NASAL DAILY PRN
Status: DISCONTINUED | OUTPATIENT
Start: 2024-09-26 | End: 2024-09-28 | Stop reason: HOSPADM

## 2024-09-26 RX ORDER — SODIUM CHLORIDE, SODIUM LACTATE, POTASSIUM CHLORIDE, CALCIUM CHLORIDE 600; 310; 30; 20 MG/100ML; MG/100ML; MG/100ML; MG/100ML
100 INJECTION, SOLUTION INTRAVENOUS CONTINUOUS
Status: DISCONTINUED | OUTPATIENT
Start: 2024-09-26 | End: 2024-09-26 | Stop reason: HOSPADM

## 2024-09-26 RX ORDER — OXYCODONE HYDROCHLORIDE 5 MG/1
5 TABLET ORAL
Status: DISCONTINUED | OUTPATIENT
Start: 2024-09-26 | End: 2024-09-26 | Stop reason: HOSPADM

## 2024-09-26 RX ORDER — SERTRALINE HYDROCHLORIDE 50 MG/1
100 TABLET, FILM COATED ORAL
Status: DISCONTINUED | OUTPATIENT
Start: 2024-09-26 | End: 2024-09-28 | Stop reason: HOSPADM

## 2024-09-26 RX ORDER — ACETAMINOPHEN 325 MG/1
650 TABLET ORAL EVERY 6 HOURS SCHEDULED
Status: DISCONTINUED | OUTPATIENT
Start: 2024-09-26 | End: 2024-09-28 | Stop reason: HOSPADM

## 2024-09-26 RX ORDER — SODIUM CHLORIDE 0.9 G/100ML
IRRIGANT IRRIGATION AS NEEDED
Status: DISCONTINUED | OUTPATIENT
Start: 2024-09-26 | End: 2024-09-26 | Stop reason: HOSPADM

## 2024-09-26 RX ORDER — ACETAMINOPHEN 325 MG/1
650 TABLET ORAL EVERY 4 HOURS PRN
Status: CANCELLED | OUTPATIENT
Start: 2024-09-26

## 2024-09-26 RX ORDER — BUDESONIDE 0.5 MG/2ML
0.5 INHALANT ORAL
Status: DISCONTINUED | OUTPATIENT
Start: 2024-09-26 | End: 2024-09-28 | Stop reason: HOSPADM

## 2024-09-26 RX ORDER — SODIUM CHLORIDE, SODIUM LACTATE, POTASSIUM CHLORIDE, CALCIUM CHLORIDE 600; 310; 30; 20 MG/100ML; MG/100ML; MG/100ML; MG/100ML
20 INJECTION, SOLUTION INTRAVENOUS CONTINUOUS
Status: DISCONTINUED | OUTPATIENT
Start: 2024-09-26 | End: 2024-09-27

## 2024-09-26 RX ORDER — HYDRALAZINE HYDROCHLORIDE 20 MG/ML
5 INJECTION INTRAMUSCULAR; INTRAVENOUS EVERY 30 MIN PRN
Status: DISCONTINUED | OUTPATIENT
Start: 2024-09-26 | End: 2024-09-26 | Stop reason: HOSPADM

## 2024-09-26 RX ORDER — OXYCODONE HYDROCHLORIDE 5 MG/1
5 TABLET ORAL EVERY 4 HOURS PRN
Status: DISCONTINUED | OUTPATIENT
Start: 2024-09-26 | End: 2024-09-27

## 2024-09-26 RX ORDER — PANTOPRAZOLE SODIUM 20 MG/1
20 TABLET, DELAYED RELEASE ORAL
Status: DISCONTINUED | OUTPATIENT
Start: 2024-09-27 | End: 2024-09-28 | Stop reason: HOSPADM

## 2024-09-26 SDOH — SOCIAL STABILITY: SOCIAL INSECURITY: ABUSE: ADULT

## 2024-09-26 SDOH — SOCIAL STABILITY: SOCIAL INSECURITY: WERE YOU ABLE TO COMPLETE ALL THE BEHAVIORAL HEALTH SCREENINGS?: YES

## 2024-09-26 SDOH — SOCIAL STABILITY: SOCIAL INSECURITY: DOES ANYONE TRY TO KEEP YOU FROM HAVING/CONTACTING OTHER FRIENDS OR DOING THINGS OUTSIDE YOUR HOME?: NO

## 2024-09-26 SDOH — SOCIAL STABILITY: SOCIAL INSECURITY: ARE THERE ANY APPARENT SIGNS OF INJURIES/BEHAVIORS THAT COULD BE RELATED TO ABUSE/NEGLECT?: NO

## 2024-09-26 SDOH — SOCIAL STABILITY: SOCIAL INSECURITY: HAVE YOU HAD ANY THOUGHTS OF HARMING ANYONE ELSE?: NO

## 2024-09-26 SDOH — SOCIAL STABILITY: SOCIAL INSECURITY: ARE YOU OR HAVE YOU BEEN THREATENED OR ABUSED PHYSICALLY, EMOTIONALLY, OR SEXUALLY BY ANYONE?: YES

## 2024-09-26 SDOH — SOCIAL STABILITY: SOCIAL INSECURITY: HAVE YOU HAD THOUGHTS OF HARMING ANYONE ELSE?: NO

## 2024-09-26 SDOH — SOCIAL STABILITY: SOCIAL INSECURITY: HAS ANYONE EVER THREATENED TO HURT YOUR FAMILY OR YOUR PETS?: NO

## 2024-09-26 SDOH — HEALTH STABILITY: MENTAL HEALTH: CURRENT SMOKER: 0

## 2024-09-26 SDOH — SOCIAL STABILITY: SOCIAL INSECURITY: DO YOU FEEL UNSAFE GOING BACK TO THE PLACE WHERE YOU ARE LIVING?: NO

## 2024-09-26 SDOH — SOCIAL STABILITY: SOCIAL INSECURITY: DO YOU FEEL ANYONE HAS EXPLOITED OR TAKEN ADVANTAGE OF YOU FINANCIALLY OR OF YOUR PERSONAL PROPERTY?: NO

## 2024-09-26 ASSESSMENT — ACTIVITIES OF DAILY LIVING (ADL)
DRESSING YOURSELF: INDEPENDENT
WALKS IN HOME: INDEPENDENT
HEARING - LEFT EAR: FUNCTIONAL
HEARING - LEFT EAR: FUNCTIONAL
TOILETING: INDEPENDENT
ASSISTIVE_DEVICE: WALKER
PATIENT'S MEMORY ADEQUATE TO SAFELY COMPLETE DAILY ACTIVITIES?: YES
JUDGMENT_ADEQUATE_SAFELY_COMPLETE_DAILY_ACTIVITIES: YES
GROOMING: INDEPENDENT
JUDGMENT_ADEQUATE_SAFELY_COMPLETE_DAILY_ACTIVITIES: YES
FEEDING YOURSELF: INDEPENDENT
PATIENT'S MEMORY ADEQUATE TO SAFELY COMPLETE DAILY ACTIVITIES?: YES
TOILETING: INDEPENDENT
BATHING: INDEPENDENT
ADEQUATE_TO_COMPLETE_ADL: YES
HEARING - RIGHT EAR: FUNCTIONAL
WALKS IN HOME: INDEPENDENT
DRESSING YOURSELF: INDEPENDENT
ADEQUATE_TO_COMPLETE_ADL: YES
FEEDING YOURSELF: INDEPENDENT
BATHING: INDEPENDENT
HEARING - RIGHT EAR: FUNCTIONAL
ASSISTIVE_DEVICE: WALKER;OTHER (COMMENT)

## 2024-09-26 ASSESSMENT — PAIN SCALES - GENERAL
PAINLEVEL_OUTOF10: 3
PAINLEVEL_OUTOF10: 0 - NO PAIN
PAINLEVEL_OUTOF10: 10 - WORST POSSIBLE PAIN
PAINLEVEL_OUTOF10: 7
PAINLEVEL_OUTOF10: 7
PAINLEVEL_OUTOF10: 8
PAINLEVEL_OUTOF10: 0 - NO PAIN
PAINLEVEL_OUTOF10: 5 - MODERATE PAIN
PAINLEVEL_OUTOF10: 10 - WORST POSSIBLE PAIN
PAINLEVEL_OUTOF10: 5 - MODERATE PAIN
PAINLEVEL_OUTOF10: 7
PAINLEVEL_OUTOF10: 8
PAINLEVEL_OUTOF10: 7

## 2024-09-26 ASSESSMENT — LIFESTYLE VARIABLES
AUDIT-C TOTAL SCORE: 0
HOW OFTEN DO YOU HAVE A DRINK CONTAINING ALCOHOL: NEVER
PRESCIPTION_ABUSE_PAST_12_MONTHS: NO
HOW OFTEN DO YOU HAVE 6 OR MORE DRINKS ON ONE OCCASION: NEVER
HOW MANY STANDARD DRINKS CONTAINING ALCOHOL DO YOU HAVE ON A TYPICAL DAY: PATIENT DOES NOT DRINK
SKIP TO QUESTIONS 9-10: 1
AUDIT-C TOTAL SCORE: 0
SUBSTANCE_ABUSE_PAST_12_MONTHS: NO

## 2024-09-26 ASSESSMENT — COGNITIVE AND FUNCTIONAL STATUS - GENERAL
CLIMB 3 TO 5 STEPS WITH RAILING: A LOT
TOILETING: A LITTLE
DRESSING REGULAR LOWER BODY CLOTHING: A LITTLE
HELP NEEDED FOR BATHING: A LITTLE
TOILETING: A LITTLE
WALKING IN HOSPITAL ROOM: A LOT
PATIENT BASELINE BEDBOUND: NO
TOILETING: A LITTLE
HELP NEEDED FOR BATHING: A LITTLE
STANDING UP FROM CHAIR USING ARMS: A LOT
MOVING FROM LYING ON BACK TO SITTING ON SIDE OF FLAT BED WITH BEDRAILS: A LITTLE
TURNING FROM BACK TO SIDE WHILE IN FLAT BAD: A LOT
STANDING UP FROM CHAIR USING ARMS: A LOT
MOVING FROM LYING ON BACK TO SITTING ON SIDE OF FLAT BED WITH BEDRAILS: A LITTLE
WALKING IN HOSPITAL ROOM: A LOT
CLIMB 3 TO 5 STEPS WITH RAILING: A LOT
MOVING FROM LYING ON BACK TO SITTING ON SIDE OF FLAT BED WITH BEDRAILS: A LITTLE
MOVING TO AND FROM BED TO CHAIR: A LOT
DRESSING REGULAR LOWER BODY CLOTHING: A LITTLE
MOBILITY SCORE: 13
DRESSING REGULAR LOWER BODY CLOTHING: A LITTLE
MOBILITY SCORE: 13
STANDING UP FROM CHAIR USING ARMS: A LOT
CLIMB 3 TO 5 STEPS WITH RAILING: A LOT
MOVING TO AND FROM BED TO CHAIR: A LOT
DAILY ACTIVITIY SCORE: 21
MOVING TO AND FROM BED TO CHAIR: A LOT
WALKING IN HOSPITAL ROOM: A LOT
HELP NEEDED FOR BATHING: A LITTLE
TURNING FROM BACK TO SIDE WHILE IN FLAT BAD: A LOT
MOBILITY SCORE: 13
TURNING FROM BACK TO SIDE WHILE IN FLAT BAD: A LOT
DAILY ACTIVITIY SCORE: 21
DAILY ACTIVITIY SCORE: 21

## 2024-09-26 ASSESSMENT — PAIN DESCRIPTION - ORIENTATION
ORIENTATION: RIGHT

## 2024-09-26 ASSESSMENT — PAIN - FUNCTIONAL ASSESSMENT
PAIN_FUNCTIONAL_ASSESSMENT: 0-10

## 2024-09-26 ASSESSMENT — COLUMBIA-SUICIDE SEVERITY RATING SCALE - C-SSRS
2. HAVE YOU ACTUALLY HAD ANY THOUGHTS OF KILLING YOURSELF?: NO
1. IN THE PAST MONTH, HAVE YOU WISHED YOU WERE DEAD OR WISHED YOU COULD GO TO SLEEP AND NOT WAKE UP?: NO
6. HAVE YOU EVER DONE ANYTHING, STARTED TO DO ANYTHING, OR PREPARED TO DO ANYTHING TO END YOUR LIFE?: NO

## 2024-09-26 ASSESSMENT — PAIN SCALES - PAIN ASSESSMENT IN ADVANCED DEMENTIA (PAINAD): TOTALSCORE: MEDICATION (SEE MAR)

## 2024-09-26 ASSESSMENT — PAIN SCALES - WONG BAKER: WONGBAKER_NUMERICALRESPONSE: HURTS WHOLE LOT

## 2024-09-26 ASSESSMENT — PAIN DESCRIPTION - LOCATION
LOCATION: FOOT
LOCATION: LEG

## 2024-09-26 NOTE — OP NOTE
Right Ankle ORIF (R) Operative Note     Date: 2024  OR Location: [facility] A OR    Name: Windy Lovelace, : 1990, Age: 33 y.o., MRN: [id number], Sex: female    Diagnosis  Pre-op Diagnosis      * Closed displaced trimalleolar fracture of right ankle, initial encounter [S82.851A]     * Ankle dislocation, right, initial encounter [S93.04XA] Post-op Diagnosis     * Closed displaced trimalleolar fracture of right ankle, initial encounter [S82.851A]     * Ankle dislocation, right, initial encounter [S93.04XA]     Procedures  Right Ankle ORIF  03342 - WV OPEN TX TRIMALLEOLAR ANKLE FX W/O FIXJ PST LIP    Right Ankle ORIF  24765 - W[place] OPEN TX DISTAL TIBIOFIBULAR JOINT DISRUPTION      Surgeons      * Timothy Castro - Primary    Resident/Fellow/Other Assistant:  Surgeons and Role:     * Santana Dumont DO - Resident - Assisting    Procedure Summary  Anesthesia: General  ASA: IV  Anesthesia Staff: Anesthesiologist: Di Ralph MD  CRNA: Christiane Chatterjee, APRN-CRNA; JAYLON Rainey-CRNA  Estimated Blood Loss: 40 mL  Intra-op Medications:   Administrations occurring from 0730 to 1130 on 24:   Medication Name Total Dose   sodium chloride 0.9 % irrigation solution 1,000 mL   vancomycin (Vancocin) vial for injection 1 g   lactated Ringer's infusion 378.33 mL              Anesthesia Record               Intraprocedure I/O Totals          Intake    Phenylephrine Drip 0.00 mL    The total shown is the total volume documented since Anesthesia Start was filed.    Total Intake 0 mL       Output    Est. Blood Loss 40 mL    Total Output 40 mL       Net    Net Volume -40 mL          Specimen: No specimens collected     Staff:   Circulator: Chitra  Scrub Person: Rachel Cordero Circulator: Pau Cordero Circulator: Brittney Cordero Scrub: Lori         Drains and/or Catheters: * None in log *    Tourniquet Times:     Total Tourniquet Time Documented:  Thigh (Right) - 112 minutes  Total: Thigh (Right) -  112 minutes      Implants:  Implants       Type Name Action Serial No.      Plate PLATE SIZE 7 Implanted      Screw SCREW SIZE 12 Implanted       SCREW SIZE 16 Implanted       SCREW SIZE 14 Implanted       SCREW SIZE 18 Implanted      Screw SCREW SIZE 10 Implanted       SCREW SIZE 14 Implanted      Plate PLATE SIZE 7 Implanted      Screw SCREW Implanted       SCREW Implanted      Screw SCREW Implanted      Screw SCREW Implanted      Screw SCREW Implanted      Screw SCREW Implanted             Patient Name: Windy Lovelace  MRN: [id number]  YOB: 1990  Date of Service: 09/26/24  Report Type: Operative    PREOPERATIVE DIAGNOSIS:    1) Right Tri-Malleolar Ankle Fracture  2) Right Ankle Dislocation    POSTOPERATIVE DIAGNOSIS:    1) Right Tri-Malleolar Ankle Fracture  2) Right Ankle Dislocation    OPERATION/PROCEDURE:    1) Right Ankle ORIF Tri-Malleolar Fracture without Fixation of the Posterior Lip  2) Right Syndesmosis Stabilization    SURGEON:  Timothy Castro MD    ASSISTANT(S): Santana Dumont DO (PGY-IV)    ANESTHESIA:  General    COMPLICATIONS: None apparent    DISPOSITION: PACU/RNF    BRIEF CLINICAL HISTORY: 33-year-old female with a past medical history of moyamoya, CVA as well as developmental delay who presented after ground-level fall from 09/13/2024 with ankle deformity and pain.  X-ray imaging was obtained which demonstrated a trimalleolar ankle fracture dislocation.  The patient was closed reduced in the ER by the orthopedic resident.  I was not on-call at the time of the patient's presentation was asked by one of my partners to assume care.  On my evaluation the patient she had pain that localized to the ankle, anterior soft tissue was swollen with evidence skin wrinkling.  Patient neuromotor examination was at her baseline, she had a relative plantarflexion contracture.  Review of radiographs including x-ray and CT demonstrated a comminuted trimalleolar ankle fracture with a  posterior malleolar fracture involving less than 25% of the articular surface.  I had a long discussion previously with the patient's power of  Aracelis King regarding her injury pattern and allowing for a period of soft tissue rest prior to definitive fixation either in a splint or spanning external fixator.  Aracelis and I were in agreement that the external fixator would likely be unmanageable for the patient.  In the preoperative holding area, I reviewed once again with Aracelis via telephone that this was an unstable fracture for which I would recommend surgery.  This was a witnessed telephone consent with the patient's power of .  I noted that the patient's splint was dirty, the patient had changed her Ace wrap's but she has clearly been at least putting weight down on her splint.  I reviewed the risk, benefits and alternatives to surgery, risk include but are not limited to infection, wound healing complications, need for further surgery, postoperative stiffness, postoperative pain, posttraumatic osteoarthritis, neurovascular injury, need for blood transfusion, hematoma, hardware failure, hardware pain, mal or nonunion, recurrent deformity as well as intraoperative complications including DVT/PE stroke, MI, death.  Benefits include to decrease pain, improve function and stabilizing the ankle mortise to allow for weightbearing.  Alternatives included conservative management or spanning external fixation as definitive fixation which I would not recommend at this time given the patient's age and activity level.  The patient's power of  gave informed consent via telephone as documented in the medical record and voiced understanding of the risk, benefits and alternatives to surgery.    OPERATIVE REPORT: On the day of surgery, 09/26/2024, in the preoperative holding area, the patient was met by members of the orthopedic, anesthesia and nursing teams.  I marked the patient's right lower extremity with  indelible ink.  The patient was then brought back to the operating room on [facility].  I led to a preprocedure pause verifying the correct patient, procedure and laterality procedure be performed.  All present were in agreement.  The patient received 2 g Ancef for preoperative antibiotic prophylaxis, TXA was held secondary to prior history of seizure.  The patient was then transferred onto the operating room table in the supine position, all bony prominences were padded and a thigh tourniquet was placed on the right lower extremity.  The patient was then prepped and draped in the usual sterile fashion.    I marked out a standard lateral approach to the fibula as well as an anteromedial approach to the medial malleolus.  I again let a preprocedure pause, verifying the correct patient, procedure and laterality of procedure to be performed.  All present were in agreement.  The patient's extremity was exsanguinated with use of an Esmarch and the tourniquet was inflated to 325 mmHg.  I incised through skin and subcutaneous tissue on the lateral side, meticulous hemostasis was achieved.  The SPN was identified, mobilized and protected with the anterior soft tissue flap throughout the duration of the procedure.  Appropriate periosteal flaps were developed.  There was obvious fracture hematoma about the comminuted fibular fracture which was irrigated copiously.  There was intervening callus already formed, the fracture edges were freshened in order to improve visualization as well as reduction.  It was quite evident that the patient's bone quality was poor and this comminuted fibula fracture would not be amenable to lag screw fixation.  I then distracted the fracture with a lamina  without teeth, under fluoroscopic guidance, the free fragments of the posterior malleolus identified by CT were irrigated copiously and excised.  The ankle was taken through range of motion without crepitus under live  fluoroscopy.    Using a combination of inline axial traction as well as internal rotation a provisional reduction was obtained restoring relative fibular length, rotation and alignment.  This was confirmed by biplanar fluoroscopic imaging.  I then selected an appropriately sized 7 hole Synthes distal fibula locking plate.  This was provisionally fixated to bone with K wires placed through locking towers both proximally and distally.  The fibular reduction was then fine-tuned by both direct visualization and biplanar fluoroscopic imaging with a combination of small serrated and pointed reduction forceps until I had confirmed appropriate restoration of fibular length, rotation and alignment for the plate to be utilized in bridge mode.    Beginning proximally, and the fourth most proximal plate hole, I drilled for a bicortical screw to provisionally fixate the plate to bone.  This had excellent fixation.  Proceeding distally, and then drilled for 5 bicortical locking screws into the distal locking cluster under fluoroscopic guidance to ensure an extra-articular trajectory.  After completing fixation of the distal block, I proceeded approximately and drilled for 3 fully threaded cortical screws in the 3 most proximal plate holes to complete the proximal fixation block.  I confirmed by biplanar fluoroscopic imaging that there was appropriate restoration of fibular length, rotation and alignment for the use of the plate and bridge mode.    I then turned my attention to the medial side, I incised through the skin and subcutaneous tissue of the standard anteromedial approach.  Meticulous hemostasis was achieved.  Identified, mobilized and protected the saphenous vein and nerve throughout the duration of the procedure.  Dissection was carried sharply down to the level of the periosteum which was mobilized with minimal anterior posterior periosteal flaps to aid in fracture visualization.  There was once again obvious  intervening callus which was resected to improve visualization.  I then performed a provisional reduction of the medial malleolus fracture with use of a dental pick and was provisionally stabilized with K wires.  Adequacy of reduction was confirmed by biplanar fluoroscopic imaging.  I then measured, drilled and began to secure a 4.0 mm partially threaded cannulated screw, unfortunately due to the comminution this began to secondarily displaced the fracture and was backed out in its entirety including the washer.    I therefore proceeded with cutting and contouring a 2.4 mm mini fragment plate as a provisional reduction aid as well as the function and a tension band fashion.  Plate position and fracture reduction was confirmed by biplanar for scopic imaging.  The plate was provisionally fixated to bone with plate hole just proximal to the fracture site to function as an axilla, then a K wire placed of the locking tower and the most proximal plate hole.  I then drilled bicortically through the apex hole drilled, measured and secured a 2.4 mm bicortical fully threaded cortical screw with excellent fixation and maintained reduction of the medial malleolus.  I then completed fixation proximally with a second bicortical 2.4 mm fully threaded cortical screw.  Fixation was then completed distally in the contoured segment of the 2.4 mini fragment plate with a 50 mm 2.7 mm fully coated cortical screw with excellent fixation.    The ankle was then taken through a range of motion and did not subluxate posteriorly and as the posterior malleolus was less than 25% of the articular surface by CT, I did not proceed with posterior malleolar fixation.    Due to the patient's unstable ankle fracture and concern for potential of noncompliance with weightbearing precaution I proceeded with tricortical syndesmotic stabilization as adjunctive fixation for this unstable fracture.  I first reduced the syndesmosis under thumb pressure and  directed a 2.0 mm K wire quadcortically under fluoroscopic guidance from the lateral fibula to the medial tibia.  Syndesmotic reduction was confirmed under biplanar for scopic imaging.  I then drilled, measured and secured three 3.5 mm fully threaded syndesmotic screws with excellent fixation noted.  This completed syndesmotic stabilization.    Final fluoroscopic films were taken including ankle mortise, dorsiflexion external rotation stress without medial clear space or distal tib-fib widening as well as lateral and plate lateral demonstrating restoration of the ankle mortise, plate and screw fixation of the fibula and bridge mode as well as plate and screw fixation of the medial malleolus as a tension band with tricortical syndesmotic screws with appropriate screw length and trajectory.  There is no intra-articular penetration of screws.    By this time, the tourniquet been let down and there was excellent reperfusion of the extremity noted.  The wounds were irrigated copiously and 1 g of vancomycin powder was placed into the wounds.  The wounds were then closed in layers.  The deep layer was closed with 0 PDS in a figure-of-eight interrupted fashion, and the deep dermal layer was then closed with 3-0 Vicryl in a buried interrupted fashion and the skin was closed with 3-0 nylon in a simple interrupted configuration.  All surgical counts were noted to be correct.  Dry sterile dressings were placed and the patient was placed then into a well-padded short leg splint.  The sterile drapes were removed and the patient was awoken from anesthesia without complication, transferred onto the [facility] and brought back to the PACU in stable condition.    Postoperative plan:  The patient will remain nonweightbearing in the right lower extremity and short leg plaster splint.  I discussed via telephone with her POA Aracelis King that she will remain at significant risk of perioperative complications including hardware  failure if she is unable to comply with weightbearing restrictions.  This may likely result in consideration for definitive arthrodesis.  The patient will be brought into the hospital for observation as well as antibiotics and discharge when stable to an SNF.  I will plan to see the patient back in approximate 2 weeks.  Wound check, upon return patient would require splint removal prior to wound evaluation.    Complications:  None; patient tolerated the procedure well.    Disposition: PACU - hemodynamically stable.  Condition: stable     Attending Attestation: I was present and scrubbed for the entire procedure.    Timothy Castro MD, MACI  Department of Orthopaedic Surgery  [facility]

## 2024-09-26 NOTE — ANESTHESIA POSTPROCEDURE EVALUATION
Patient: Windy Lovelace    Procedure Summary       Date: 09/26/24 Room / Location: [facility] OR 04 / Virtual [facility] OR    Anesthesia Start: 0743 Anesthesia Stop: 1155    Procedure: Right Ankle ORIF (Right: Ankle) Diagnosis:       Closed displaced trimalleolar fracture of right ankle, initial encounter      Ankle dislocation, right, initial encounter      (Closed displaced trimalleolar fracture of right ankle, initial encounter [S82.851A])      (Ankle dislocation, right, initial encounter [S93.04XA])    Surgeons: Timothy Castro MD Responsible Provider: Di Ralph MD    Anesthesia Type: general ASA Status: 4            Anesthesia Type: general    Vitals Value Taken Time   /74 09/26/24 1447   Temp 36.4 °C (97.5 °F) 09/26/24 1300   Pulse 113 09/26/24 1447   Resp 16 09/26/24 1445   SpO2 96 % 09/26/24 1447   Vitals shown include unfiled device data.    Anesthesia Post Evaluation    Patient location during evaluation: PACU  Patient participation: complete - patient participated  Level of consciousness: awake  Pain management: satisfactory to patient  Multimodal analgesia pain management approach  Airway patency: patent  Cardiovascular status: hemodynamically stable  Respiratory status: spontaneous ventilation  Hydration status: euvolemic  Postoperative Nausea and Vomiting: none        No notable events documented.

## 2024-09-26 NOTE — ANESTHESIA PROCEDURE NOTES
Peripheral IV  Date/Time: 9/26/2024 8:05 AM  Inserted by: JAYLON Hensley-CRNA    Placement  Needle size: 20 G  Laterality: left  Location: hand  Local anesthetic: none  Site prep: alcohol  Technique: anatomical landmarks  Attempts: 1

## 2024-09-26 NOTE — ANESTHESIA PROCEDURE NOTES
Airway  Date/Time: 9/26/2024 7:57 AM  Urgency: elective    Airway not difficult    Staffing  Performed: CRNA   Authorized by: Di Ralph MD    Performed by: JAYLON Hensley-DUSTIN  Patient location during procedure: OR    Indications and Patient Condition  Indications for airway management: anesthesia  Spontaneous Ventilation: absent  Sedation level: deep  Preoxygenated: yes  Patient position: sniffing  Mask difficulty assessment: 1 - vent by mask    Final Airway Details  Final airway type: endotracheal airway      Successful airway: ETT  Cuffed: yes   Successful intubation technique: video laryngoscopy  Facilitating devices/methods: intubating stylet  Endotracheal tube insertion site: oral  Blade: Adarsh  Blade size: #3  ETT size (mm): 7.0  Cormack-Lehane Classification: grade I - full view of glottis  Placement verified by: chest auscultation and capnometry   Cuff volume (mL): 8  Measured from: teeth  ETT to teeth (cm): 21  Number of attempts at approach: 1

## 2024-09-26 NOTE — PROGRESS NOTES
Name: Windy Lovelace    YOB: 1990    Code Status: FULL CODE    Chief Complaint:  New patient; initial visit;  Right ankle fracture (right trimalleolar ankle fracture dislocation)   S/p closed reduction with concentric reduction; right ankle pain; etc......       HPI   33 year old female with past medical history of strokes.  Other extensive medical history listed below.      HOSPITAL COURSE:   Patient presented to [facility] on 9/13/24 with an Right Ankle Fracture.    Patient was found on the ground in the bathroom and stool.    Patient was trying to get into the shower, stumbled and had a fall.    Patient denies hitting head or any loss of consciousness.   She denies having fever, chills or any other symptoms.  Patient was evaluated by orthopedics.  Diagnosed with right trimalleolar ankle fracture dislocation.   S/p closed reduction with concentric reduction.  Pain was controlled during admission.   Patient was not having acute pain with ROM or palpation of other extremities other than that which is mentioned below  Evaluated by PT/OT.   No acute orthopaedic surgical intervention was indicated.   Therefore, plans were arranged for surgery on 9/26 due to OR availability.   Pt unable to care for herself at home, so plans to transfer to short term facility.  Pt will  return for admission in preparation for OR on 9/26.   Surgery to be done by Dr. aCstro, orthopedic surgery.   Pt's labs remained stable throughout admission.    Patient was admitted to [facility] from 9/13/24 to 9/19/24 (6 days)    Patient admitted to the [facility] on 9/19/24 for skilled services.    Hospital and chronic diagnoses as follows:    Right ankle fracture (right trimalleolar ankle fracture dislocation)   S/p closed reduction with concentric reduction; right ankle pain:  Evaluated by orthopedics in the hospital.  Evaluated by PT/OT.   No acute orthopaedic surgical intervention was indicated.   Therefore, plans were  arranged for surgery on 9/26 due to OR availability.   Pt unable to care so patient sent to the [facility] for skilled services.  On routine acetaminophen 975 mg PO TID.  On ibuprofen PRN and oxycodone PRN for pain.   Pain is around a 7.  The oxycodone helps to decrease the pain.  Patient seen today.  She is in her bed.  Cooperative, but anxious.  Uncomfortable.  Anxious about her ankle fracture and upcoming surgery.  No complaints of headaches or dizziness.  No chest pain.     Benign essential HTN:  On Prazosin.  Recent BP: 115/87.  No complaints of     Generalized anxiety disorder; major depressive disorder; insomnia:  On sertraline and trazodone.  No complaints of insomnia.      GERD:  On Omeprazole.   No complaints of reflux or N/V.    Irritable bowel syndrome:  On dicyclomine PRN and Pepto-Bismol PRN.  Has chronic issues with IBS.   No complaints of abdominal pain today.    Hemiplegia and hemiparesis following cerebral infarction affecting right dominant side; History of strokes:  On aspirin.      DVT ppx:   On aspirin    Nausea and/ or vomiting:   On ondansetron.  No complaints of N/V today.    Impaired gait and mobility; generalized muscle weakness:  Patient is non-weight bearing has splint on right lower leg.  Patient at the [facility] for skilled services/PT/OT.     Allergic rhinitis:  On fluticasone nasal spray.  No complaints of allergic rhinitis today.  No headaches or dizziness.              Reviewed [facility] records from recent hospitalization.  Reviewed  EMR  Reviewed medical, social, surgical and family history.  Reviewed all current medications and performed medication reconciliation.  Performed prescription drug management.  Reviewed vital signs AND lab results  Reviewed [facility] Documentation  Discussed patient with nursing.   Spent greater than 50 minutes on patient visit.  Ordered RX for oxycodone.                ROS: 10 point ROS performed; Negative unless  noted in HPI.      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), Breast pain (03/21/2024), Candidiasis of skin and nail (06/25/2014), Cellulitis of face (10/10/2013), Headache, unspecified (04/29/2015), Hypomagnesemia (01/23/2014), Other cysts of oral region, not elsewhere classified (06/10/2016), Other enthesopathies, not elsewhere classified (10/29/2015), Other specified abnormal findings of blood chemistry (03/06/2020), Other specified joint disorders, left ankle and foot (11/02/2016), Other specified noninflammatory disorders of vagina (03/20/2018), Pain in right knee (11/12/2018), Pain in right knee (02/19/2018), Pain in right knee (02/20/2019), Pain, unspecified (10/17/2017), Pelvic and perineal pain (01/24/2018), Personal history of diseases of the skin and subcutaneous tissue (02/24/2016), Personal history of other diseases of the digestive system (10/06/2017), Personal history of other diseases of the digestive system (03/17/2015), Personal history of other diseases of the digestive system (05/24/2016), Personal history of other diseases of the female genital tract (08/31/2015), Personal history of other diseases of the musculoskeletal system and connective tissue (02/04/2014), Personal history of other diseases of the nervous system and sense organs (01/05/2015), Personal history of other diseases of the respiratory system (08/29/2013), Personal history of other diseases of the respiratory system (02/03/2017), Personal history of other drug therapy (12/11/2017), Personal history of other endocrine, nutritional and metabolic disease (05/09/2019), Personal history of other mental and behavioral disorders (10/06/2017), Personal history of transient ischemic attack (TIA), and cerebral infarction without residual  "deficits (10/06/2017), Pleurodynia (01/23/2014), Pyuria (06/13/2017), Sprain of medial collateral ligament of right knee, initial encounter (11/02/2016), Sprain of unspecified ligament of left ankle, subsequent encounter (10/29/2015), Strain of muscle and tendon of unspecified wall of thorax, initial encounter (02/04/2014), Syncope and collapse (06/13/2017), Unspecified fall, initial encounter (01/23/2014), and Unspecified symptoms and signs involving the genitourinary system (01/24/2018).    SURGICAL HISTORY:   MR head angio w IV contrast (8/18/2015);   US guided percutaneous peritoneal or retroperitoneal fluid collection drainage (4/10/2019);   and US guided abscess drain (4/10/2019).     SOCIAL HISTORY:  She reports that she has never smoked.   She has never used smokeless tobacco.   She reports that she does not drink alcohol and does not use drugs.    Family History   Problem Relation Name Age of Onset    Kidney cancer Mother        Liver cancer Mother        Pancreatic cancer Father        Glaucoma Other grandmother      Diabetes Other Grandfather           other type with arthropathy, with long term curent use of insulin    Diabetes Other aunt         ALLERGIES:  Bupropion  Ketamine  Augmentin [amoxicillin-pot clavulanate]  Penicillin g,   Penicillins        Lab Results   Component Value Date    WBC 8.7 09/17/2024    HGB 12.8 09/17/2024    HCT 40.0 09/17/2024     09/17/2024    CHOL 152 05/09/2024    TRIG 178 (H) 05/09/2024    HDL 33.6 05/09/2024    ALT 51 (H) 09/14/2024    AST 18 09/14/2024     09/17/2024    K 3.9 09/17/2024     09/17/2024    CREATININE 0.65 09/17/2024    BUN 11 09/17/2024    CO2 27 09/17/2024    TSH 5.14 (H) 05/09/2024    INR 1.0 09/13/2024             /87   Pulse 88   Temp 36.7 °C (98 °F)   Resp 18   Ht 1.676 m (5' 6\")   Wt 108 kg (237 lb 9.6 oz)   SpO2 98%   BMI 38.35 kg/m²      Physical Exam  Vitals and nursing note reviewed.   Constitutional:       " Appearance: She is obese.   HENT:      Head: Normocephalic.      Right Ear: External ear normal.      Left Ear: External ear normal.      Nose: Nose normal.      Mouth/Throat:      Mouth: Mucous membranes are moist.      Pharynx: Oropharynx is clear.   Eyes:      Extraocular Movements: Extraocular movements intact.      Conjunctiva/sclera: Conjunctivae normal.      Pupils: Pupils are equal, round, and reactive to light.   Cardiovascular:      Rate and Rhythm: Normal rate and regular rhythm.      Pulses: Normal pulses.      Heart sounds: Normal heart sounds.   Pulmonary:      Effort: Pulmonary effort is normal.      Breath sounds: Normal breath sounds.   Abdominal:      General: Bowel sounds are normal.      Palpations: Abdomen is soft.   Musculoskeletal:         General: Normal range of motion.      Cervical back: Normal range of motion and neck supple.      Comments: Right lower extremity: splint/ACE wrap intact.   Feet:      Comments: Bilateral feet--adequate movement and sensation;  Cap refill < 3 seconds.   Skin:     General: Skin is warm and dry.   Neurological:      General: No focal deficit present.      Mental Status: She is alert and oriented to person, place, and time. Mental status is at baseline.      Motor: Weakness present.      Gait: Gait abnormal.   Psychiatric:         Mood and Affect: Mood is anxious and depressed.         Behavior: Behavior normal. Behavior is cooperative.          Assessment/Plan    Ordered CMP, CBC with diff. For tomorrow.    Right ankle fracture (right trimalleolar ankle fracture dislocation)   S/p closed reduction with concentric reduction; right ankle pain:  Evaluated by orthopedics in the hospital.  Evaluated by PT/OT.   No acute orthopaedic surgical intervention was indicated.   [facility].  Patient at the [facility] for skilled services.  Continue routine acetaminophen 975 mg PO TID.  Continue ibuprofen PRN and oxycodone PRN for pain.      Benign essential HTN:  Continue Prazosin.  Monitor Bps.    Generalized anxiety disorder; major depressive disorder; insomnia:  Continue sertraline and trazodone.  Monitor for effectiveness of medications.  Follow up with psych PRN.     GERD:  Continue Omeprazole.   Monitor for reflux or N/V.  Sit upright for 2-3 hours after meals.    Irritable bowel syndrome:  Continue dicyclomine PRN and Pepto-Bismol PRN.  Has chronic issues with IBS.   Monitor for abdominal pain.    Hemiplegia and hemiparesis following cerebral infarction affecting right dominant side; History of strokes:  Continue aspirin.      DVT ppx:   Continue aspirin    Nausea and/ or vomiting:   On ondansetron.  No complaints of N/V today.    Impaired gait and mobility; generalized muscle weakness:  Patient is non-weight bearing has splint on right lower leg.  Continue at the [facility] ER for skilled services/PT/OT.   Fall prevention strategies.    Allergic rhinitis:  Continue fluticasone nasal spray.        Problem List Items Addressed This Visit       Allergic rhinitis    Esophageal reflux    History of stroke with current residual effects    Closed displaced trimalleolar fracture of right lower leg     Other Visit Diagnoses       Closed right ankle fracture, sequela    -  Primary    H/O reduction of closed fracture        Acute right ankle pain        Benign essential HTN        Generalized anxiety disorder        Major depressive disorder with current active episode, unspecified depression episode severity, unspecified whether recurrent        Insomnia, unspecified type        Irritable bowel syndrome, unspecified type        Hemiplegia and hemiparesis following cerebral infarction affecting right dominant side (Multi)        On deep vein thrombosis (DVT) prophylaxis        Nausea and vomiting, unspecified vomiting type        Impaired gait and mobility        Generalized muscle weakness                Paola Kessler, APRN-CNP

## 2024-09-26 NOTE — H&P
History Of Present Illness  Windy Lovelace is a 33 y.o. female with a past medical history of moyamoya disease, esophageal reflux, hiatal hernia, neurodevelopmental disorder, CVA, migraine, anxiety, MDD, short gut syndrome, right-sided weakness, epilepsy presenting due to a right ankle trimalleolar fracture and dislocation.  Patient was admitted to [facility] on 9/13/2024 due to this.  At that time she was found on the ground in the bathroom laying stool.  Patient was trying to get in the shower she stumbled and had a fall.  Patient had a closed reduction right ankle and she returns today to go to the OR for Right Ankle ORIF Tri-Malleolar Fracture without Fixation of the Posterior Lip, Right Syndesmosis Stabilization.     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    Headache, unspecified 04/29/2015    Severe headache    Hiatal hernia     LFTs abnormal     Mares mares disease     Obesity (BMI 30-39.9)     Personal history of other mental and behavioral disorders 10/06/2017    History of mental retardation    Syncope and collapse 06/13/2017    Syncope and collapse       Surgical History  Past Surgical History:   Procedure Laterality Date    MR HEAD ANGIO W IV CONTRAST  08/18/2015    MR HEAD ANGIO W IV CONTRAST 8/18/2015 [facility] ANCILLARY LEGACY    ORIF ANKLE FRACTURE Right 09/26/2024    Right Ankle ORIF - Right    OTHER SURGICAL HISTORY  06/16/2015    History Of Prior Surgery    OTHER SURGICAL HISTORY  10/06/2017    Excision Of Lesion Face    OTHER SURGICAL HISTORY  07/23/2019    Small bowel resection    US GUIDED ABSCESS DRAIN  04/10/2019    US GUIDED ABSCESS DRAIN 4/10/2019 [facility] INPATIENT LEGACY    US GUIDED PERCUTANEOUS PERITONEAL OR RETROPERITONEAL FLUID COLLECTION DRAINAGE  04/10/2019    US GUIDED PERCUTANEOUS PERITONEAL OR RETROPERITONEAL FLUID COLLECTION DRAINAGE 4/10/2019 [facility]  "INPATIENT LEGACY        Social History  She reports that she has never smoked. She has never used smokeless tobacco. She reports that she does not drink alcohol and does not use drugs.    Family History  Family History   Problem Relation Name Age of Onset    Kidney cancer Mother      Liver cancer Mother      Pancreatic cancer Father      Glaucoma Other grandmother     Diabetes Other Grandfather         other type with arthropathy, with long term curent use of insulin    Diabetes Other aunt         other type with arthropathy, with long term curent use of insulin        Allergies  Bupropion, Ketamine, Augmentin [amoxicillin-pot clavulanate], Penicillin g, and Penicillins    Review of Systems   Constitutional:  Positive for activity change.   HENT: Negative.     Respiratory: Negative.     Gastrointestinal: Negative.    Genitourinary: Negative.    Musculoskeletal:  Positive for arthralgias and myalgias.   Neurological: Negative.    Hematological: Negative.    Psychiatric/Behavioral: Negative.          Physical Exam  HENT:      Head: Normocephalic.      Mouth/Throat:      Mouth: Mucous membranes are moist.   Cardiovascular:      Rate and Rhythm: Normal rate.   Pulmonary:      Effort: Pulmonary effort is normal.      Breath sounds: Normal breath sounds.   Abdominal:      General: Bowel sounds are normal.   Musculoskeletal:      Cervical back: Neck supple.      Comments: Right ankle in splint    Skin:     General: Skin is warm.   Neurological:      Mental Status: She is alert and oriented to person, place, and time.   Psychiatric:         Mood and Affect: Mood normal.         Behavior: Behavior normal.          Last Recorded Vitals  Blood pressure 104/79, pulse 103, temperature 36.6 °C (97.9 °F), temperature source Temporal, resp. rate 18, height 1.676 m (5' 6\"), weight 109 kg (240 lb 1.3 oz), SpO2 96%.    Relevant Results           Assessment/Plan   Assessment & Plan  Closed displaced trimalleolar fracture of right lower " leg    Ankle dislocation, right, initial encounter  POD #0-Right Ankle ORIF Tri-Malleolar Fracture without Fixation of the Posterior Lip, Right Syndesmosis Stabilization.    Pt/ot, pain control   moyamoya disease, esophageal reflux, hiatal hernia, neurodevelopmental disorder, CVA, migraine, anxiety, MDD, short gut syndrome, right-sided weakness, epilepsy  -continue home meds     DVT prophylaxis- asa bid        Juancarlos Sapp, APRN-CNP

## 2024-09-26 NOTE — INTERVAL H&P NOTE
H&P reviewed. The patient was examined and there are no changes to the H&P.    Patient has been putting weight down on the right side, plantar surface of splint dirty. Acknowledges doing so, discussed with family.    Timothy Castro MD, MACI  Department of Orthopaedic Surgery  [facility]

## 2024-09-26 NOTE — NURSING NOTE
1300: Handoff received from Aaron VEE at this time. Per MD Castro and Anesthesia, the patient cannot discharge until the post-op blocks are completely worn off due to the blocks being on the right (stroke affected) side.    1305: Dr Ralph at bedside.    1320: Family at bedside    1329: Dr Ralph called via Pay4later in regard to pain    1343: Dr Ralph at bedside assessing patient and talking with family    1355: Dr Ralph at bedside to re-block patient    1402: Block complete.    1436: Report sent to Birgit VEE at this time via secure chat. RN made aware that patient cannot discharge until blocks are fully worn off.    1457: Patient to room 719 in stable condition  via transporter at this time. All belongings, including wheelchair with patient.    1501:  Family (Cb) updated via phone call at this time

## 2024-09-26 NOTE — ANESTHESIA PROCEDURE NOTES
Peripheral Block    Patient location during procedure: post-op  Start time: 9/26/2024 12:40 PM  End time: 9/26/2024 12:50 PM  Reason for block: at surgeon's request and post-op pain management  Staffing  Performed: attending   Authorized by: Di Ralph MD    Performed by: Di Ralph MD  Preanesthetic Checklist  Completed: patient identified, IV checked, site marked, risks and benefits discussed, surgical consent, monitors and equipment checked, pre-op evaluation and timeout performed   Timeout performed at: 9/26/2024 12:40 PM  Peripheral Block  Patient position: laying flat  Prep: ChloraPrep and site prepped and draped  Patient monitoring: heart rate, cardiac monitor and continuous pulse ox  Block type: adductor canal, sciatic and popliteal  Laterality: right  Injection technique: single-shot  Guidance: nerve stimulator, ultrasound guided and Ultrasound image saved to chart  Local infiltration: lidocaine (Skin)  Infiltration strength: 1 %  Dose: 3 mL  Needle  Needle type: short-bevel   Needle gauge: 22 G  Needle length: 8 cm  Needle localization: ultrasound guidance and anatomical landmarks (ultrasound image saved to chart.)  Test dose: negative  Assessment  Injection assessment: negative aspiration for heme, no paresthesia on injection, incremental injection and local visualized surrounding nerve on ultrasound  Paresthesia pain: none  Heart rate change: no  Slow fractionated injection: yes  Additional Notes  Patient with pre- existing numbness and weakness in right LE , now with uncontrollable post-op pain. Verbal consent was provided by the patient and/or surrogate decision maker for Popliteal Sciatic (Primary) block and Saphenous (Secondary) Block in pre-op prior to surgery. Anticoagulation (if any) was held per NASREEN guidelines.  No Evoked Motor Response was visible at < 0.3 mA. Using in-plane needle visualization, 60 ml of 0.25% Ropivacaine with epi 5 mcg/ml and decadron 4 mg was injected extraneurally  in 5 ml increments, 30 ml for Popliteal Sciatic block and 20 ml for Adductor Canal block. There was no resistance to injection and appropriate injection pressures were maintained based on tactile feedback. Throughout the procedure, there was consistent and meaningful verbal communication with the patient. Post procedure vital signs were stable and no immediate complications were noted. Pain score decreased from 10--> 7.

## 2024-09-26 NOTE — SIGNIFICANT EVENT
33-year-old female right trimalleolar ankle fracture dislocation status post open reduction internal fixation with Dr. Castro on 9/26/24.    Plan:  -Admit to medicine.  -Nonweightbearing right lower extremity short leg splint.  Encourage elevation.  -Keep splint clean and dry.  Maintain until follow-up.  -Multimodal pain control.  -Ancef for 24 hours and postoperative.  -SCDs, MADHU hose.  -Aspir 81 twice daily starting postop day 1 for DVT prophylaxis to be continued for 4 weeks.  -PT and OT to evaluate and treat.  -Encourage OB, frequent I-S use.  -Regular diet with bowel regimen.  -Follow-up with Dr. Morton in 2 weeks.     Dispo: Pending PT.      Santana Dumont, DO   PGY-IV  Orthopaedic Surgery   Pager: [phone]  Epic Chat Preferred     While inpatient, this patient will be followed by the Orthopaedic foot and ankle team. Please see contact information below:    Ortho Spine  Mahin Harry, DO PGY 3    Please page [phone] (ortho on-call) after 6pm and on weekends.    On weekends and after 6PM:  At [facility]: Please reach out to the orthopaedic on-call resident ([phone])  At [facility]: Please reach out to the orthopaedic on-call SONNY or resident (please refer to Maximo)

## 2024-09-26 NOTE — ANESTHESIA PROCEDURE NOTES
Peripheral Block    Patient location during procedure: pre-op  Start time: 9/26/2024 1:50 PM  End time: 9/26/2024 2:00 PM  Reason for block: at surgeon's request and post-op pain management  Staffing  Performed: attending   Authorized by: Di Ralph MD    Performed by: Di Ralph MD  Preanesthetic Checklist  Completed: patient identified, IV checked, site marked, risks and benefits discussed, surgical consent, monitors and equipment checked, pre-op evaluation and timeout performed   Timeout performed at: 9/26/2024 1:50 PM  Peripheral Block  Patient position: laying flat  Prep: ChloraPrep and site prepped and draped  Patient monitoring: heart rate, cardiac monitor and continuous pulse ox  Block type: adductor canal, sciatic and popliteal  Laterality: right  Injection technique: single-shot  Guidance: nerve stimulator, ultrasound guided and Ultrasound image saved to chart  Local infiltration: lidocaine (Skin)  Infiltration strength: 1 %  Dose: 3 mL  Needle  Needle type: short-bevel   Needle gauge: 22 G  Needle length: 8 cm  Needle localization: ultrasound guidance and anatomical landmarks (ultrasound image saved to chart.)  Test dose: negative  Assessment  Injection assessment: negative aspiration for heme, no paresthesia on injection, incremental injection and local visualized surrounding nerve on ultrasound  Paresthesia pain: none  Heart rate change: no  Slow fractionated injection: yes  Additional Notes  Using in-plane needle visualization, 40 ml of 0.375% Bupivacaine with epi 5 mcg/ml and decadron 4 mg was injected extraneurally in 5 ml increments, 20 ml for Popliteal Sciatic block and 20 ml for Adductor Canal block. There was no resistance to injection and appropriate injection pressures were maintained based on tactile feedback. Throughout the procedure, there was consistent and meaningful verbal communication with the patient. Post procedure vital signs were stable and no immediate complications were  noted. Pain score decreased from 7 -->5. Patient has a profound motor block. PACU will provide written instructions that outline the specific precautions to be taken when caring for an akinetic, insensate extremity. Discussed with Dr. Castro that patient should be discharged home after the nerve block has worn off as she is at increased risk for falls.

## 2024-09-27 ENCOUNTER — APPOINTMENT (OUTPATIENT)
Dept: CARDIOLOGY | Facility: HOSPITAL | Age: 34
End: 2024-09-27
Payer: MEDICARE

## 2024-09-27 PROBLEM — S82.851A CLOSED DISPLACED TRIMALLEOLAR FRACTURE OF RIGHT ANKLE, INITIAL ENCOUNTER: Status: ACTIVE | Noted: 2024-09-27

## 2024-09-27 LAB
ANION GAP SERPL CALC-SCNC: 13 MMOL/L (ref 10–20)
BUN SERPL-MCNC: 7 MG/DL (ref 6–23)
CALCIUM SERPL-MCNC: 8.6 MG/DL (ref 8.6–10.3)
CHLORIDE SERPL-SCNC: 107 MMOL/L (ref 98–107)
CO2 SERPL-SCNC: 22 MMOL/L (ref 21–32)
CREAT SERPL-MCNC: 0.65 MG/DL (ref 0.5–1.05)
EGFRCR SERPLBLD CKD-EPI 2021: >90 ML/MIN/1.73M*2
ERYTHROCYTE [DISTWIDTH] IN BLOOD BY AUTOMATED COUNT: 14.1 % (ref 11.5–14.5)
GLUCOSE SERPL-MCNC: 101 MG/DL (ref 74–99)
HCT VFR BLD AUTO: 38.4 % (ref 36–46)
HGB BLD-MCNC: 12.6 G/DL (ref 12–16)
MCH RBC QN AUTO: 28.1 PG (ref 26–34)
MCHC RBC AUTO-ENTMCNC: 32.8 G/DL (ref 32–36)
MCV RBC AUTO: 86 FL (ref 80–100)
NRBC BLD-RTO: 0 /100 WBCS (ref 0–0)
PLATELET # BLD AUTO: 333 X10*3/UL (ref 150–450)
POTASSIUM SERPL-SCNC: 3.8 MMOL/L (ref 3.5–5.3)
RBC # BLD AUTO: 4.48 X10*6/UL (ref 4–5.2)
SODIUM SERPL-SCNC: 138 MMOL/L (ref 136–145)
WBC # BLD AUTO: 17.3 X10*3/UL (ref 4.4–11.3)

## 2024-09-27 PROCEDURE — 36415 COLL VENOUS BLD VENIPUNCTURE: CPT

## 2024-09-27 PROCEDURE — 2500000005 HC RX 250 GENERAL PHARMACY W/O HCPCS: Performed by: INTERNAL MEDICINE

## 2024-09-27 PROCEDURE — 94640 AIRWAY INHALATION TREATMENT: CPT

## 2024-09-27 PROCEDURE — 82374 ASSAY BLOOD CARBON DIOXIDE: CPT

## 2024-09-27 PROCEDURE — 93005 ELECTROCARDIOGRAM TRACING: CPT | Mod: 59

## 2024-09-27 PROCEDURE — 7100000011 HC EXTENDED STAY RECOVERY HOURLY - NURSING UNIT

## 2024-09-27 PROCEDURE — 9420000001 HC RT PATIENT EDUCATION 5 MIN

## 2024-09-27 PROCEDURE — 97162 PT EVAL MOD COMPLEX 30 MIN: CPT | Mod: GP

## 2024-09-27 PROCEDURE — 97535 SELF CARE MNGMENT TRAINING: CPT | Mod: GO

## 2024-09-27 PROCEDURE — 85027 COMPLETE CBC AUTOMATED: CPT

## 2024-09-27 PROCEDURE — 2500000002 HC RX 250 W HCPCS SELF ADMINISTERED DRUGS (ALT 637 FOR MEDICARE OP, ALT 636 FOR OP/ED): Performed by: NURSE PRACTITIONER

## 2024-09-27 PROCEDURE — 97165 OT EVAL LOW COMPLEX 30 MIN: CPT | Mod: GO

## 2024-09-27 PROCEDURE — 2500000002 HC RX 250 W HCPCS SELF ADMINISTERED DRUGS (ALT 637 FOR MEDICARE OP, ALT 636 FOR OP/ED)

## 2024-09-27 PROCEDURE — 2500000001 HC RX 250 WO HCPCS SELF ADMINISTERED DRUGS (ALT 637 FOR MEDICARE OP)

## 2024-09-27 PROCEDURE — 2500000004 HC RX 250 GENERAL PHARMACY W/ HCPCS (ALT 636 FOR OP/ED): Mod: JZ

## 2024-09-27 PROCEDURE — 99232 SBSQ HOSP IP/OBS MODERATE 35: CPT | Performed by: INTERNAL MEDICINE

## 2024-09-27 RX ORDER — POLYETHYLENE GLYCOL 3350 17 G/17G
17 POWDER, FOR SOLUTION ORAL DAILY
Start: 2024-09-28 | End: 2024-10-28

## 2024-09-27 RX ORDER — OXYCODONE HYDROCHLORIDE 5 MG/1
5 TABLET ORAL EVERY 6 HOURS PRN
Status: DISCONTINUED | OUTPATIENT
Start: 2024-09-27 | End: 2024-09-28 | Stop reason: HOSPADM

## 2024-09-27 RX ORDER — ASPIRIN 81 MG/1
81 TABLET ORAL 2 TIMES DAILY
Start: 2024-09-27 | End: 2024-10-27

## 2024-09-27 ASSESSMENT — COGNITIVE AND FUNCTIONAL STATUS - GENERAL
MOVING FROM LYING ON BACK TO SITTING ON SIDE OF FLAT BED WITH BEDRAILS: A LITTLE
TURNING FROM BACK TO SIDE WHILE IN FLAT BAD: A LITTLE
DRESSING REGULAR LOWER BODY CLOTHING: A LOT
TOILETING: A LITTLE
HELP NEEDED FOR BATHING: A LOT
TURNING FROM BACK TO SIDE WHILE IN FLAT BAD: A LOT
STANDING UP FROM CHAIR USING ARMS: A LOT
WALKING IN HOSPITAL ROOM: A LOT
MOBILITY SCORE: 13
DAILY ACTIVITIY SCORE: 21
DAILY ACTIVITIY SCORE: 21
HELP NEEDED FOR BATHING: A LITTLE
DRESSING REGULAR UPPER BODY CLOTHING: A LITTLE
MOVING FROM LYING ON BACK TO SITTING ON SIDE OF FLAT BED WITH BEDRAILS: A LITTLE
MOVING TO AND FROM BED TO CHAIR: A LOT
DAILY ACTIVITIY SCORE: 21
HELP NEEDED FOR BATHING: A LITTLE
TOILETING: A LITTLE
PERSONAL GROOMING: A LITTLE
MOBILITY SCORE: 13
DAILY ACTIVITIY SCORE: 14
CLIMB 3 TO 5 STEPS WITH RAILING: A LOT
DRESSING REGULAR LOWER BODY CLOTHING: A LITTLE
CLIMB 3 TO 5 STEPS WITH RAILING: A LOT
CLIMB 3 TO 5 STEPS WITH RAILING: TOTAL
DAILY ACTIVITIY SCORE: 21
TURNING FROM BACK TO SIDE WHILE IN FLAT BAD: A LOT
STANDING UP FROM CHAIR USING ARMS: A LOT
MOVING FROM LYING ON BACK TO SITTING ON SIDE OF FLAT BED WITH BEDRAILS: A LITTLE
MOBILITY SCORE: 13
MOBILITY SCORE: 13
STANDING UP FROM CHAIR USING ARMS: A LOT
TOILETING: A LITTLE
MOBILITY SCORE: 13
STANDING UP FROM CHAIR USING ARMS: A LOT
TOILETING: TOTAL
STANDING UP FROM CHAIR USING ARMS: A LOT
MOVING FROM LYING ON BACK TO SITTING ON SIDE OF FLAT BED WITH BEDRAILS: A LITTLE
TURNING FROM BACK TO SIDE WHILE IN FLAT BAD: A LOT
TURNING FROM BACK TO SIDE WHILE IN FLAT BAD: A LOT
EATING MEALS: A LITTLE
MOVING TO AND FROM BED TO CHAIR: A LOT
DRESSING REGULAR LOWER BODY CLOTHING: A LITTLE
MOVING TO AND FROM BED TO CHAIR: A LOT
TOILETING: A LITTLE
WALKING IN HOSPITAL ROOM: A LOT
TURNING FROM BACK TO SIDE WHILE IN FLAT BAD: A LOT
MOVING TO AND FROM BED TO CHAIR: A LOT
STANDING UP FROM CHAIR USING ARMS: A LOT
CLIMB 3 TO 5 STEPS WITH RAILING: A LOT
CLIMB 3 TO 5 STEPS WITH RAILING: A LOT
DRESSING REGULAR LOWER BODY CLOTHING: A LITTLE
WALKING IN HOSPITAL ROOM: A LOT
DAILY ACTIVITIY SCORE: 21
MOBILITY SCORE: 13
TOILETING: A LITTLE
HELP NEEDED FOR BATHING: A LITTLE
CLIMB 3 TO 5 STEPS WITH RAILING: A LOT
WALKING IN HOSPITAL ROOM: A LOT
MOVING FROM LYING ON BACK TO SITTING ON SIDE OF FLAT BED WITH BEDRAILS: A LITTLE
DRESSING REGULAR LOWER BODY CLOTHING: A LITTLE
MOVING FROM LYING ON BACK TO SITTING ON SIDE OF FLAT BED WITH BEDRAILS: A LITTLE
HELP NEEDED FOR BATHING: A LITTLE
WALKING IN HOSPITAL ROOM: A LOT
HELP NEEDED FOR BATHING: A LITTLE
MOVING TO AND FROM BED TO CHAIR: A LOT
DRESSING REGULAR LOWER BODY CLOTHING: A LITTLE
MOVING TO AND FROM BED TO CHAIR: A LOT
WALKING IN HOSPITAL ROOM: A LOT

## 2024-09-27 ASSESSMENT — PAIN - FUNCTIONAL ASSESSMENT
PAIN_FUNCTIONAL_ASSESSMENT: 0-10
PAIN_FUNCTIONAL_ASSESSMENT: 0-10

## 2024-09-27 ASSESSMENT — ACTIVITIES OF DAILY LIVING (ADL)
HOME_MANAGEMENT_TIME_ENTRY: 15
ADL_ASSISTANCE: NEEDS ASSISTANCE
LACK_OF_TRANSPORTATION: NO
ADL_ASSISTANCE: NEEDS ASSISTANCE

## 2024-09-27 ASSESSMENT — ENCOUNTER SYMPTOMS
RESPIRATORY NEGATIVE: 1
NEUROLOGICAL NEGATIVE: 1
PSYCHIATRIC NEGATIVE: 1
GASTROINTESTINAL NEGATIVE: 1
HEMATOLOGIC/LYMPHATIC NEGATIVE: 1
ARTHRALGIAS: 1
ACTIVITY CHANGE: 1
MYALGIAS: 1

## 2024-09-27 ASSESSMENT — PAIN SCALES - GENERAL
PAINLEVEL_OUTOF10: 0 - NO PAIN

## 2024-09-27 ASSESSMENT — PAIN DESCRIPTION - ORIENTATION: ORIENTATION: RIGHT

## 2024-09-27 ASSESSMENT — PAIN DESCRIPTION - LOCATION
LOCATION: FOOT
LOCATION: FOOT

## 2024-09-27 NOTE — PROGRESS NOTES
9/27/2024 10:19 AM I spoke with patient's sister, POA and legal guardian. She said patient lives alone at a condo with 24 hour care from [facility]. She has been at [facility] for short term rehab and plan is for patient to return there upon hospital discharge. Kaylene Shankar [facility]

## 2024-09-27 NOTE — CARE PLAN
The patient's goals for the shift include      The clinical goals for the shift include pain control and safety    Over the shift, the patient did not make progress toward the following goals. Barriers to progression include  Recommendations to address these barriers include      Problem: Pain - Adult  Goal: Verbalizes/displays adequate comfort level or baseline comfort level  Outcome: Progressing     Problem: Safety - Adult  Goal: Free from fall injury  Outcome: Progressing     Problem: Discharge Planning  Goal: Discharge to home or other facility with appropriate resources  Outcome: Progressing     Problem: Skin  Goal: Decreased wound size/increased tissue granulation at next dressing change  Outcome: Progressing

## 2024-09-27 NOTE — PROGRESS NOTES
09/27/24 1110   Discharge Planning   Living Arrangements Alone   Support Systems Family members;Home care staff   Assistance Needed Patient was independent with adls with a cane and required assistance with home making.  Since her ankle injury, she has a wheelchair.   Type of Residence Private residence   Number of Stairs to Enter Residence 0   Who is requesting discharge planning? Provider   Home or Post Acute Services Post acute facilities (Rehab/SNF/etc)   Type of Post Acute Facility Services Skilled nursing   Expected Discharge Disposition SNF  (Return to [facility])   Does the patient need discharge transport arranged? Yes   RoundTrip coordination needed? Yes   Has discharge transport been arranged? Yes   What day is the transport expected? 09/27/24   Financial Resource Strain   How hard is it for you to pay for the very basics like food, housing, medical care, and heating? Not hard   Housing Stability   In the last 12 months, was there a time when you were not able to pay the mortgage or rent on time? N   In the past 12 months, how many times have you moved where you were living? 0   At any time in the past 12 months, were you homeless or living in a shelter (including now)? N   Transportation Needs   In the past 12 months, has lack of transportation kept you from medical appointments or from getting medications? no   In the past 12 months, has lack of transportation kept you from meetings, work, or from getting things needed for daily living? No   Patient Choice   Provider Choice list and CMS website (https://medicare.gov/care-compare#search) for post-acute Quality and Resource Measure Data were provided and reviewed with: Representative   Patient / Family choosing to utilize agency / facility established prior to hospitalization Yes     Patient is medically cleared to return to [facility].  Will request DSC to set up discharge.  SW to notify POA.  Nurse to call report to  [phone]  Bertha Templeton RN     9/27/24 @ 1229   Patient's discharge has been cancelled for today as ortho/anesthesia wants her to stay here till her nerve block wears off.   Updated SNF via [facility].  Bertha Templeton RN

## 2024-09-27 NOTE — PROGRESS NOTES
Physical Therapy    Physical Therapy Evaluation    Patient Name: Windy Lovelace  MRN: [id number]  Department: [place] [address]  Room: [facility]  Today's Date: 9/27/2024   Time Calculation  Start Time: 1126  Stop Time: 1132  Time Calculation (min): 6 min    Assessment/Plan   PT Assessment  PT Assessment Results: Decreased strength, Decreased endurance, Impaired balance, Decreased mobility, Decreased coordination, Decreased cognition, Impaired judgement, Impaired sensation, Orthopedic restrictions  Rehab Prognosis: Fair  Barriers to Discharge: Needing staff x2 for upright mobility, needing increased education and practice for NWB with RLE.  Evaluation/Treatment Tolerance: Patient tolerated treatment well  Medical Staff Made Aware: Yes  Strengths: Support of Caregivers, Housing layout, Premorbid level of function  Barriers to Participation: Ability to acquire knowledge, Comorbidities  End of Session Communication: Bedside nurse, Care Coordinator  Assessment Comment: PT eval completed. Pt slightly limited in endurance and balance, noted coordination lacking and difficulty with maintaining NWB on RLE throughout session. Increased hands on assist needed throughout session, but anticipate improved participation during next session. Needing increased participation in PT plan of care for progression toward functional independence with mobility.   End of Session Patient Position: Bed, 3 rail up, Alarm on  IP OR SWING BED PT PLAN  Inpatient or Swing Bed: Inpatient  PT Plan  Treatment/Interventions: Bed mobility, Transfer training, Gait training, Balance training, Neuromuscular re-education, Strengthening, Endurance training, Therapeutic exercise, Therapeutic activity, Home exercise program, Positioning, Postural re-education  PT Plan: Ongoing PT  PT Frequency: 5 times per week  PT Discharge Recommendations: Moderate intensity level of continued care  Equipment Recommended upon Discharge: Wheeled walker  PT Recommended Transfer  Status: Assist x2  PT - OK to Discharge: Yes (Per PT POC)      Subjective   General Visit Information:  General  Reason for Referral: Recent sx: R ankle ORIF 9/26/24. Pt is a 33 year old female who fell and experienced closed displaced trimalleolar fracture of right ankle. Underwent sx as above.  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    Headache, unspecified 04/29/2015    Severe headache    Hiatal hernia     LFTs abnormal     Mares mares disease     Obesity (BMI 30-39.9)     Personal history of other mental and behavioral disorders 10/06/2017    History of mental retardation    Syncope and collapse 06/13/2017    Syncope and collapse     Past Surgical History:   Procedure Laterality Date    MR HEAD ANGIO W IV CONTRAST  08/18/2015    MR HEAD ANGIO W IV CONTRAST 8/18/2015 [facility] [place] [facility]    ORIF ANKLE FRACTURE Right 09/26/2024    Right Ankle ORIF - Right    OTHER SURGICAL HISTORY  06/16/2015    History Of Prior Surgery    OTHER SURGICAL HISTORY  10/06/2017    Excision Of Lesion Face    OTHER SURGICAL HISTORY  07/23/2019    Small bowel resection    US GUIDED ABSCESS DRAIN  04/10/2019    US GUIDED ABSCESS DRAIN 4/10/2019 [facility] [place] [facility]    US GUIDED PERCUTANEOUS PERITONEAL OR RETROPERITONEAL FLUID COLLECTION DRAINAGE  04/10/2019    US GUIDED PERCUTANEOUS PERITONEAL OR RETROPERITONEAL FLUID COLLECTION DRAINAGE 4/10/2019 [facility] [place] [facility]       Family/Caregiver Present: No  Co-Treatment: OT  Co-Treatment Reason: Partial dovetail co-eval to maximize indep and safety.  Prior to Session Communication: Bedside nurse (Student nursing)  Patient Position Received: Bed, 3 rail up, Alarm off, caregiver present (Pt sitting at EOB with OT and student nursing)  Preferred Learning Style: auditory, kinesthetic, verbal,  visual, written  General Comment: Pt pleasant and cooperative, appears to have developmental delay, very willing to participate. Struggling with NWB and swivel stepping on LLE.  Home Living:  Home Living  Type of Home: House (unit house)  Lives With: Other (Comment) (24/7 aide)  Home Adaptive Equipment: Walker rolling or standard  Home Layout: One level  Home Access:  (no stairs)  Bathroom Shower/Tub: Walk-in shower  Bathroom Toilet: Standard  Bathroom Equipment: Grab bars in shower  Home Living Comments: 1st floor unit, laundry on 1st floor  Prior Level of Function:  Prior Function Per Pt/Caregiver Report  Level of [place]: Needs assistance with ADLs, Needs assistance with homemaking  Receives Help From: Other (Comment) (24/7 aide, reports her step father and step mother come over)  ADL Assistance: Needs assistance  Homemaking Assistance: Needs assistance  Ambulatory Assistance: Independent (Pt reporting ambulating without device, had fall while ambulating to shower, reason for fx and admission with resultant sx.)  Prior Function Comments: Pt reports some assist with ADLs, aides complete all IADLs, indep with ambulation (No AD, reports recently buying a walker)  Precautions:  Precautions  LE Weight Bearing Status: Right Non-Weight Bearing  Medical Precautions: Fall precautions  Precautions Comment: Developmental delay    Vital Signs (Past 2hrs)        Date/Time Vitals Session Patient Position Pulse Resp SpO2 BP MAP (mmHg)    09/27/24 1133 --  --  94  18  98 %  111/77  84                         Objective   Pain:  Pain Assessment  Pain Assessment: 0-10  0-10 (Numeric) Pain Score: 0 - No pain  Cognition:  Cognition  Overall Cognitive Status: Impaired at baseline  Arousal/Alertness: Delayed responses to stimuli  Orientation Level: Oriented X4  Following Commands: Follows one step commands with repetition  Safety Judgment: Decreased awareness of need for safety precautions  Problem Solving: Assistance required to  identify errors made  Problem Solving: Exceptions to WFL  Complex Functional Tasks: Impaired  Verbal Reasoning Skills: Impaired  Safety/Judgement: Exceptions to WFL  Novel Situations: Maximal  Routine Tasks: Minimal  Unable to Self-Monitor and Self-Correct Consistently: Moderate  Insight: Moderate  Task Initiation: Initiates with cues  Planning: Reduced planning skills  Organization: Moderately disorganized  Processing Speed: Delayed    General Assessments:  General Observation  General Observation: Pleasant and cooperative, appropriate to queries, limited in NWB RLE maintenance               Activity Tolerance  Endurance: Tolerates 10 - 20 min exercise with multiple rests    Sensation  Light Touch: Partial deficits in the RLE (Pt reports numbness to RLE, block appears still intact)    Strength  Strength Comments: Needing hands on assist x2 for upright mobility at this time.  Strength  Strength Comments: Needing hands on assist x2 for upright mobility at this time.    Perception  Inattention/Neglect: Appears intact      Coordination  Movements are Fluid and Coordinated: No  Upper Body Coordination: Occasional hand over hand needed for hand placement on WW and delayed coordination with reaching tasks  Lower Body Coordination: Difficulty with maintaining NWB on RLE while performing swivel stepping on LLE  Coordination Comment: Minor coordination difficulties.    Postural Control  Postural Control: Impaired  Trunk Control: Limited trunk control with upright mobility, retropulsive in standing, use of BLE on bed for stabilization, as well as gait belt and 2 person assist    Static Sitting Balance  Static Sitting-Balance Support: Feet supported, Bilateral upper extremity supported  Static Sitting-Level of Assistance: Contact guard  Dynamic Sitting Balance  Dynamic Sitting-Balance Support: Feet supported, Bilateral upper extremity supported  Dynamic Sitting-Level of Assistance: Contact guard  Dynamic Sitting-Balance:  Forward lean, Reaching for objects    Static Standing Balance  Static Standing-Balance Support: Bilateral upper extremity supported  Static Standing-Level of Assistance: Moderate assistance (x2)  Dynamic Standing Balance  Dynamic Standing-Balance Support: Bilateral upper extremity supported  Dynamic Standing-Level of Assistance: Moderate assistance (x2)  Functional Assessments:  Bed Mobility  Bed Mobility: Yes  Bed Mobility 1  Bed Mobility 1: Sitting to supine  Level of Assistance 1: Close supervision  Bed Mobility Comments 1: No true hands on assist, slightly decreased time needs. HOB elevated slightly, use of handrails for mobility. Able to swing BLE into bed without hands on assist.  Bed Mobility 2  Bed Mobility  2: Scooting  Level of Assistance 2: Close supervision  Bed Mobility Comments 2: Using bed rails for assist with hip scooting and repositioning in bed, no hands on assist, verbal cuing only.    Transfers  Transfer: Yes  Transfer 1  Technique 1: Sit to stand, Stand to sit  Transfer Device 1: Gait belt, Walker  Transfer Level of Assistance 1: Moderate assistance, +2  Trials/Comments 1: pt requiring cues for hand placement, cues to maintain R LE NWB, assist at gait belt and B UE's for stability    Ambulation/Gait Training  Ambulation/Gait Training Performed: Yes  Ambulation/Gait Training 1  Surface 1: Level tile  Device 1: Rolling walker  Gait Support Devices: Gait belt  Assistance 1: Moderate assistance (x2)  Quality of Gait 1: Diminished heel strike, Decreased step length, Forward flexed posture, Listing (Noted attempted at swivel step as instructed by PT, able to shift weight, increased verbal cues for maintaining RLE NWB. No overt LOB due to Mod Ax2 for stabilization, device management, and balance.)  Comments/Distance (ft) 1: 2'    Stairs  Stairs: No  Extremity/Trunk Assessments:  RUE   RUE : Within Functional Limits  LUE   LUE: Within Functional Limits  RLE   RLE : Within Functional Limits (Except  ankle/toes in bulky dressing)  LLE   LLE : Within Functional Limits  Outcome Measures:  [facility] Basic Mobility  Turning from your back to your side while in a flat bed without using bedrails: A little  Moving from lying on your back to sitting on the side of a flat bed without using bedrails: A little  Moving to and from bed to chair (including a wheelchair): A lot  Standing up from a chair using your arms (e.g. wheelchair or bedside chair): A lot  To walk in hospital room: A lot  Climbing 3-5 steps with railing: Total  Basic Mobility - Total Score: 13    Encounter Problems       Encounter Problems (Active)       Balance       STG - Maintains dynamic standing balance with upper extremity support At Min Ax1, safely, without LOB, device PRN        Start:  09/27/24    Expected End:  10/11/24       INTERVENTIONS:  1. Practice standing with minimal support.  2. Educate patient about standing tolerance.  3. Educate patient about independence with gait, transfers, and ADL's.  4. Educate patient about use of assistive device.  5. Educate patient about self-directed care.            Mobility       STG - Patient will ambulate At Mod Ax1 using Wheeled walker for 10' while maintaining NWB on RLE without LOB or gross gait deviations        Start:  09/27/24    Expected End:  10/11/24            Endurance - Pt to tolerate >/= 20 minutes therex, theract, gait and/or NMR with </= 5 minutes of rest breaks        Start:  09/27/24    Expected End:  10/11/24               PT Transfers       STG - Patient will perform bed mobility At MOD I, safely, without LOB, device PRN        Start:  09/27/24    Expected End:  10/11/24            STG - Patient will transfer sit to and from stand At Min Ax1, safely, without LOB, device PRN        Start:  09/27/24    Expected End:  10/11/24               Pain - Adult          Safety       Pt will verbalize and apply safety awareness and precautions 100% of time throughout entire session         Start:   09/27/24    Expected End:  10/11/24                   Education Documentation  Precautions, taught by Mark Good, PT at 9/27/2024 11:38 AM.  Learner: Patient  Readiness: Acceptance  Method: Demonstration, Explanation  Response: Needs Reinforcement  Comment: Pt very pleasant and willing to participate, however, appears to have developmental delay, unsure of full comprehension or retention of education    Body Mechanics, taught by Mark Good, PT at 9/27/2024 11:38 AM.  Learner: Patient  Readiness: Acceptance  Method: Demonstration, Explanation  Response: Needs Reinforcement  Comment: Pt very pleasant and willing to participate, however, appears to have developmental delay, unsure of full comprehension or retention of education    Mobility Training, taught by Mark Good, PT at 9/27/2024 11:38 AM.  Learner: Patient  Readiness: Acceptance  Method: Demonstration, Explanation  Response: Needs Reinforcement  Comment: Pt very pleasant and willing to participate, however, appears to have developmental delay, unsure of full comprehension or retention of education    Education Comments  No comments found.

## 2024-09-27 NOTE — CARE PLAN
Problem: Pain - Adult  Goal: Verbalizes/displays adequate comfort level or baseline comfort level  Outcome: Progressing   The patient's goals for the shift include      The clinical goals for the shift include pain management

## 2024-09-27 NOTE — ASSESSMENT & PLAN NOTE
POD #0-Right Ankle ORIF Tri-Malleolar Fracture without Fixation of the Posterior Lip, Right Syndesmosis Stabilization.

## 2024-09-27 NOTE — PROGRESS NOTES
Occupational Therapy    Evaluation/Treatment    Patient Name: Windy Lovelace  MRN: [id number]  Department: [facility] A 7  Room: [facility]71-  Today's Date: 09/27/24  Time Calculation  Start Time: 1055  Stop Time: 1131  Time Calculation (min): 36 min       Assessment:  OT Assessment: Pt requiring assist to complete sit<>stand with FWW. Pt with deficits in balance, strength, and functional mobility requiring skilled OT in order to return to PLOF. Pt able to maintain R LE NWB throughout transfers.   Prognosis: Good  Barriers to Discharge: None  Evaluation/Treatment Tolerance: Patient tolerated treatment well  Medical Staff Made Aware: Yes  End of Session Communication: Bedside nurse  End of Session Patient Position: Bed, 3 rail up, Alarm on  OT Assessment Results: Decreased ADL status, Decreased safe judgment during ADL, Decreased IADLs, Decreased functional mobility  Prognosis: Good  Barriers to Discharge: None  Evaluation/Treatment Tolerance: Patient tolerated treatment well  Medical Staff Made Aware: Yes  Strengths: Ability to acquire knowledge, Attitude of self, Housing layout, Physical health  Barriers to Participation: Capable of completing ADLs semi/independent  Plan:  Treatment Interventions: ADL retraining, Functional transfer training, UE strengthening/ROM, Endurance training, Patient/family training, Compensatory technique education  OT Frequency: 3 times per week  OT Discharge Recommendations: Moderate intensity level of continued care  Equipment Recommended upon Discharge: Other (comment) (TBD)  OT Recommended Transfer Status: Moderate assist, Assist of 1 (for standing)  OT - OK to Discharge: Yes  Treatment Interventions: ADL retraining, Functional transfer training, UE strengthening/ROM, Endurance training, Patient/family training, Compensatory technique education    Subjective   Current Problem:  1. Closed displaced trimalleolar fracture of right ankle, initial encounter        2. Ankle dislocation, right,  initial encounter  FL less than 1 hour    FL less than 1 hour      3. Closed fracture of right ankle, initial encounter  aspirin 81 mg EC tablet    polyethylene glycol (Glycolax, Miralax) 17 gram packet      4. 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  Prior to Session Communication: Bedside nurse  Patient Position Received: Bed, 3 rail up, Alarm on  Preferred Learning Style: auditory, kinesthetic, verbal, visual, written  General Comment: Pt agreeable to OT  Precautions:  LE Weight Bearing Status: Right Non-Weight Bearing  Medical Precautions: Fall precautions    Pain:  Pain Assessment  Pain Assessment: 0-10  0-10 (Numeric) Pain Score: 0 - No pain    Objective   Cognition:  Overall Cognitive Status: Within Functional Limits  Attention: Within Functional Limits     Home Living:  Type of Home: House (unit house)  Lives With: Other (Comment) (24/7 aide)  Home Adaptive Equipment: Walker rolling or standard  Home Layout: One level  Home Access:  (no stairs)  Bathroom Shower/Tub: Walk-in shower  Bathroom Toilet: Standard  Bathroom Equipment: Grab bars in shower  Home Living Comments: 1st floor unit, laundry on 1st  floor  Prior Function:  Level of Cloud: Needs assistance with ADLs, Needs assistance with homemaking  Receives Help From: Other (Comment) (24/7 aide, reports her step father and step mother come over)  ADL Assistance: Needs assistance  Prior Function Comments: Pt reports some assist with ADLs, aides complete all IADLs, indep with ambulation (No AD, reports recently buying a walker)  IADL History:  Homemaking Responsibilities: No  ADL:  Grooming Assistance: Minimal  Grooming Deficit:  (brushing teeth)  Activities of Daily Living: Grooming  Grooming Level of Assistance: Minimum assistance  Grooming Where Assessed: Edge of bed  Grooming Comments: assist to open tooth paste  Activity Tolerance:  Endurance: Tolerates 10 - 20 min exercise with multiple rests  Functional Standing Tolerance:     Bed Mobility/Transfers: Bed Mobility  Bed Mobility: Yes  Bed Mobility 1  Bed Mobility 1: Supine to sitting  Level of Assistance 1: Contact guard  Bed Mobility 2  Bed Mobility  2: Sitting to supine  Level of Assistance 2: Close supervision  Bed Mobility 3  Bed Mobility 3: Scooting  Level of Assistance 3: Close supervision    Transfers  Transfer: Yes  Transfer 1  Technique 1: Sit to stand, Stand to sit  Transfer Device 1: Walker, Gait belt  Transfer Level of Assistance 1: Moderate assistance, +2  Trials/Comments 1: pt requiring cues for hand placement, cues to maintain R LE NWB, assist at gait belt and B UE's for stability    Sitting Balance:  Static Sitting Balance  Static Sitting-Balance Support: Feet supported  Static Sitting-Level of Assistance: Close supervision  Static Sitting-Comment/Number of Minutes: R foot elevated  Dynamic Sitting Balance  Dynamic Sitting-Balance Support: Feet supported (R foot elevated)  Dynamic Sitting-Level of Assistance: Close supervision  Standing Balance:  Static Standing Balance  Static Standing-Balance Support: Bilateral upper extremity supported  Static Standing-Level of Assistance: Moderate  assistance (x2)  Dynamic Standing Balance  Dynamic Standing-Balance Support: Bilateral upper extremity supported  Dynamic Standing-Level of Assistance: Moderate assistance (x2)  Dynamic Standing-Balance: Lateral lean     Vision:Vision - Basic Assessment  Current Vision: No visual deficits  Sensation:  Light Touch: No apparent deficits  Strength:  Strength Comments: LAMONT SÁNCHEZ's WFL     Perception:  Inattention/Neglect: Appears intact  Coordination:      Hand Function:  Hand Function  Gross Grasp: Functional  Coordination: Functional  Extremities: RUE   RUE : Within Functional Limits and LUE   LUE: Within Functional Limits    Outcome Measures: [facility] Daily Activity  Putting on and taking off regular lower body clothing: A lot  Bathing (including washing, rinsing, drying): A lot  Putting on and taking off regular upper body clothing: A little  Toileting, which includes using toilet, bedpan or urinal: Total  Taking care of personal grooming such as brushing teeth: A little  Eating Meals: A little  Daily Activity - Total Score: 14    Education Documentation  Body Mechanics, taught by Adriana Tsang OT at 9/27/2024 11:52 AM.  Learner: Patient  Readiness: Acceptance  Method: Demonstration  Response: Verbalizes Understanding, Demonstrated Understanding, Needs Reinforcement    Precautions, taught by Adriana Tsang OT at 9/27/2024 11:52 AM.  Learner: Patient  Readiness: Acceptance  Method: Demonstration  Response: Verbalizes Understanding, Demonstrated Understanding, Needs Reinforcement    ADL Training, taught by Adriana Tsang OT at 9/27/2024 11:52 AM.  Learner: Patient  Readiness: Acceptance  Method: Demonstration  Response: Verbalizes Understanding, Demonstrated Understanding, Needs Reinforcement    Education Comments  No comments found.           Goals:  Encounter Problems       Encounter Problems (Active)       ADLs       Patient will perform UB and LB bathing with minimal assist  level of assistance.       Start:  09/27/24             Patient with complete upper body dressing with minimal assist  level of assistance donning and doffing all UE clothes.       Start:  09/27/24            Patient with complete lower body dressing with minimal assist  level of assistance donning and doffing all LE clothes.       Start:  09/27/24            Patient will feed self with set-up level of assistance.       Start:  09/27/24            Patient will complete daily grooming tasks brushing teeth and washing face/hair with set-up level of assistance.       Start:  09/27/24            Patient will complete toileting including hygiene clothing management/hygiene with supervision level of assistance.       Start:  09/27/24               MOBILITY       Patient will perform Functional mobility min Household distances/Community Distances with contact guard assist level of assistance and least restrictive device in order to improve safety and functional mobility.       Start:  09/27/24

## 2024-09-27 NOTE — CARE PLAN
Problem: Pain - Adult  Goal: Verbalizes/displays adequate comfort level or baseline comfort level  9/26/2024 2157 by Maame Rivera RN  Outcome: Progressing  9/26/2024 2157 by Maame Rivera RN  Outcome: Progressing  9/26/2024 2157 by Maame Rivera RN  Outcome: Progressing   The patient's goals for the shift include      The clinical goals for the shift include pain management

## 2024-09-27 NOTE — PROGRESS NOTES
"Windy Lovelace is a 33 y.o. female on day 0 of admission presenting with No Principal Problem: There is no principal problem currently on the Problem List. Please update the Problem List and refresh..    Subjective   NAEON. NAD. Pain controlled. Block still in effect.     Only concerns is patient wants a scooter and she wants a regular diet with her food cut up into pieces.        Objective     Physical Exam  NAD. Alert & oriented. Appropriate mood and behavior. WWP throughout extremities.     RLE:  Dressing/splint DCI  Toes WWP. Palpable DP.   Toes numb/unable to fire DF/PF due to block.     Last Recorded Vitals  Blood pressure 97/67, pulse 75, temperature 36.3 °C (97.4 °F), resp. rate 18, height 1.676 m (5' 6\"), weight 109 kg (240 lb 1.3 oz), SpO2 97%.  Intake/Output last 3 Shifts:  I/O last 3 completed shifts:  In: 1520 (14 mL/kg) [P.O.:200; I.V.:1220 (11.2 mL/kg); IV Piggyback:100]  Out: 40 (0.4 mL/kg) [Blood:40]  Weight: 108.9 kg     Relevant Results      Scheduled medications  acetaminophen, 650 mg, oral, q6h VAN  aspirin, 81 mg, oral, BID  budesonide, 0.5 mg, nebulization, BID  cholecalciferol, 2,000 Units, oral, Daily  formoterol, 20 mcg, nebulization, BID  lidocaine, 2 patch, transdermal, Daily  pantoprazole, 20 mg, oral, Daily before breakfast  polyethylene glycol, 17 g, oral, Daily  prazosin, 5 mg, oral, Nightly  sertraline, 100 mg, oral, Daily  traZODone, 100 mg, oral, Nightly      Continuous medications  lactated Ringer's, 20 mL/hr, Last Rate: 20 mL/hr (09/26/24 1609)      PRN medications  PRN medications: dicyclomine, diphenhydrAMINE, fluticasone, HYDROmorphone, naloxone, naloxone, naloxone, ondansetron ODT **OR** ondansetron, oxyCODONE, oxyCODONE  Results for orders placed or performed during the hospital encounter of 09/26/24 (from the past 24 hour(s))   POCT pregnancy, urine   Result Value Ref Range    Preg Test, Ur Negative Negative                            Assessment/Plan   Assessment & " Plan  Esophageal reflux    History of stroke with current residual effects    Migraine headache    Moyamoya disease    Exotropia of right eye    History of neurodevelopmental disorder    Obesity (BMI 30-39.9)    Hiatal hernia    Closed displaced trimalleolar fracture of right lower leg    Ankle dislocation, right, initial encounter    33-year-old female right trimalleolar ankle fracture dislocation status post open reduction internal fixation with Dr. Castro on 9/26/24.     Plan:  -Admit to medicine.  -Nonweightbearing right lower extremity short leg splint.  Encourage elevation.  -Keep splint clean and dry.  Maintain until follow-up.  -Multimodal pain control.  -Ancef for 24 hours and postoperative.  -SCDs, MADHU hose.  -Aspir 81 twice daily starting postop day 1 for DVT prophylaxis to be continued for 4 weeks.  -PT and OT to evaluate and treat.  -Encourage OB, frequent I-S use.  -Regular diet with bowel regimen.  -Follow-up with Dr. Morton in 2 weeks.      Dispo: Pending PT.               Mahin Harry DO

## 2024-09-27 NOTE — NURSING NOTE
"Pt refused to get OOB at this time. Pt states \" I am too weak to use just one leg\" Pt awaiting PT to get OOB.  "

## 2024-09-27 NOTE — PROGRESS NOTES
"Windy Lovelace is a 33 y.o. female on day 0 of admission presenting with Closed displaced trimalleolar fracture of right ankle, initial encounter.    Assessment/Plan        Principal Problem:    Closed displaced trimalleolar fracture of right ankle, initial encounter  Active Problems:    Esophageal reflux    History of stroke with current residual effects    Migraine headache    Moyamoya disease    Exotropia of right eye    History of neurodevelopmental disorder    Obesity (BMI 30-39.9)    Hiatal hernia    Ankle dislocation, right, initial encounter  - asa bid  - plan to send back to SNF  - need to wait till nerve block wears off first; more likely tomorrow           Subjective   Leg feels ok this morning.         Objective     Physical Exam  Cardiovascular:      Rate and Rhythm: Normal rate and regular rhythm.      Heart sounds: Normal heart sounds.   Pulmonary:      Breath sounds: Normal breath sounds.   Abdominal:      General: Bowel sounds are normal.      Palpations: Abdomen is soft.   Musculoskeletal:         General: Normal range of motion.      Comments: Right lower leg wrapped with bandage   Neurological:      General: No focal deficit present.      Mental Status: She is alert and oriented to person, place, and time.   Psychiatric:         Mood and Affect: Mood normal.         Last Recorded Vitals  Blood pressure 105/72, pulse 94, temperature 37 °C (98.6 °F), temperature source Oral, resp. rate 17, height 1.676 m (5' 6\"), weight 109 kg (240 lb 1.3 oz), SpO2 98%.  Intake/Output last 3 Shifts:  I/O last 3 completed shifts:  In: 1757 (16.1 mL/kg) [P.O.:200; I.V.:1357 (12.5 mL/kg); IV Piggyback:200]  Out: 540 (5 mL/kg) [Urine:500 (0.1 mL/kg/hr); Blood:40]  Weight: 108.9 kg     Relevant Results                             I spent 40 minutes in the professional and overall care of this patient.      Rl Conklin MD  "

## 2024-09-28 VITALS
DIASTOLIC BLOOD PRESSURE: 81 MMHG | TEMPERATURE: 97.7 F | HEIGHT: 66 IN | OXYGEN SATURATION: 99 % | WEIGHT: 240.08 LBS | RESPIRATION RATE: 17 BRPM | BODY MASS INDEX: 38.58 KG/M2 | SYSTOLIC BLOOD PRESSURE: 116 MMHG | HEART RATE: 87 BPM

## 2024-09-28 LAB
ERYTHROCYTE [DISTWIDTH] IN BLOOD BY AUTOMATED COUNT: 14.5 % (ref 11.5–14.5)
HCT VFR BLD AUTO: 36.5 % (ref 36–46)
HGB BLD-MCNC: 11.9 G/DL (ref 12–16)
MCH RBC QN AUTO: 27.9 PG (ref 26–34)
MCHC RBC AUTO-ENTMCNC: 32.6 G/DL (ref 32–36)
MCV RBC AUTO: 86 FL (ref 80–100)
NRBC BLD-RTO: 0 /100 WBCS (ref 0–0)
PLATELET # BLD AUTO: 294 X10*3/UL (ref 150–450)
RBC # BLD AUTO: 4.27 X10*6/UL (ref 4–5.2)
WBC # BLD AUTO: 10.1 X10*3/UL (ref 4.4–11.3)

## 2024-09-28 PROCEDURE — 85027 COMPLETE CBC AUTOMATED: CPT

## 2024-09-28 PROCEDURE — 7100000011 HC EXTENDED STAY RECOVERY HOURLY - NURSING UNIT

## 2024-09-28 PROCEDURE — 2500000001 HC RX 250 WO HCPCS SELF ADMINISTERED DRUGS (ALT 637 FOR MEDICARE OP)

## 2024-09-28 PROCEDURE — 2500000005 HC RX 250 GENERAL PHARMACY W/O HCPCS: Performed by: INTERNAL MEDICINE

## 2024-09-28 PROCEDURE — 94640 AIRWAY INHALATION TREATMENT: CPT

## 2024-09-28 PROCEDURE — 9420000001 HC RT PATIENT EDUCATION 5 MIN

## 2024-09-28 PROCEDURE — 2500000002 HC RX 250 W HCPCS SELF ADMINISTERED DRUGS (ALT 637 FOR MEDICARE OP, ALT 636 FOR OP/ED)

## 2024-09-28 PROCEDURE — 36415 COLL VENOUS BLD VENIPUNCTURE: CPT

## 2024-09-28 PROCEDURE — 2500000004 HC RX 250 GENERAL PHARMACY W/ HCPCS (ALT 636 FOR OP/ED)

## 2024-09-28 PROCEDURE — 97110 THERAPEUTIC EXERCISES: CPT | Mod: GP,CQ

## 2024-09-28 PROCEDURE — 2500000002 HC RX 250 W HCPCS SELF ADMINISTERED DRUGS (ALT 637 FOR MEDICARE OP, ALT 636 FOR OP/ED): Performed by: NURSE PRACTITIONER

## 2024-09-28 PROCEDURE — 2500000001 HC RX 250 WO HCPCS SELF ADMINISTERED DRUGS (ALT 637 FOR MEDICARE OP): Performed by: INTERNAL MEDICINE

## 2024-09-28 ASSESSMENT — PAIN DESCRIPTION - ORIENTATION: ORIENTATION: RIGHT

## 2024-09-28 ASSESSMENT — COGNITIVE AND FUNCTIONAL STATUS - GENERAL
WALKING IN HOSPITAL ROOM: A LOT
MOVING TO AND FROM BED TO CHAIR: A LOT
MOVING FROM LYING ON BACK TO SITTING ON SIDE OF FLAT BED WITH BEDRAILS: A LITTLE
STANDING UP FROM CHAIR USING ARMS: A LOT
CLIMB 3 TO 5 STEPS WITH RAILING: TOTAL
MOBILITY SCORE: 13
TURNING FROM BACK TO SIDE WHILE IN FLAT BAD: A LITTLE

## 2024-09-28 ASSESSMENT — PAIN DESCRIPTION - LOCATION: LOCATION: LEG

## 2024-09-28 ASSESSMENT — PAIN SCALES - GENERAL
PAINLEVEL_OUTOF10: 8
PAINLEVEL_OUTOF10: 5 - MODERATE PAIN
PAINLEVEL_OUTOF10: 0 - NO PAIN
PAINLEVEL_OUTOF10: 7

## 2024-09-28 ASSESSMENT — PAIN SCALES - PAIN ASSESSMENT IN ADVANCED DEMENTIA (PAINAD): TOTALSCORE: MEDICATION (SEE MAR)

## 2024-09-28 ASSESSMENT — PAIN - FUNCTIONAL ASSESSMENT
PAIN_FUNCTIONAL_ASSESSMENT: 0-10
PAIN_FUNCTIONAL_ASSESSMENT: 0-10

## 2024-09-28 ASSESSMENT — PAIN SCALES - WONG BAKER: WONGBAKER_NUMERICALRESPONSE: HURTS LITTLE MORE

## 2024-09-28 NOTE — PROGRESS NOTES
"Windy Lovelace is a 33 y.o. female on day 0 of admission presenting with Closed displaced trimalleolar fracture of right ankle, initial encounter.    Subjective   No acute events overnight. Patient resting comfortably in bed. Pain is well controlled. Denies any headache, nausea, vomiting, chest pain, shortness of breath, numbness/tingling, calf pain. Eager for dc.        Objective     - Constitutional: no acute distress, alert, cooperative  - Eyes: anicteric  - Head/Neck: normocephalic, atraumatic  - Respiratory/Thorax: normal work of breathing  - Cardiovascular: extremities warm and well perfused  - Gastrointestinal: non-distended  - Psychological: appropriate mood/behavior  - Skin: warm and dry; additional findings in musculoskeletal evaluation  - Musculoskeletal:  ---  Right Lower Extremity  -Short leg splint c/d/i  -Fires EHL/FHL, DF/PF limited by splint  -SILT in saph/sural/SPN/DPN distributions  -Foot warm, well perfused, brisk cap refill    Last Recorded Vitals  Blood pressure 127/87, pulse 88, temperature 36.1 °C (97 °F), temperature source Temporal, resp. rate 18, height 1.676 m (5' 6\"), weight 109 kg (240 lb 1.3 oz), SpO2 95%.  Intake/Output last 3 Shifts:  I/O last 3 completed shifts:  In: 1131.2 (10.4 mL/kg) [P.O.:120; I.V.:911.2 (8.4 mL/kg); IV Piggyback:100]  Out: 1450 (13.3 mL/kg) [Urine:1450 (0.4 mL/kg/hr)]  Weight: 108.9 kg     Relevant Results      Scheduled medications  acetaminophen, 650 mg, oral, q6h VAN  aspirin, 81 mg, oral, BID  budesonide, 0.5 mg, nebulization, BID  cholecalciferol, 2,000 Units, oral, Daily  formoterol, 20 mcg, nebulization, BID  lidocaine, 2 patch, transdermal, Daily  pantoprazole, 20 mg, oral, Daily before breakfast  polyethylene glycol, 17 g, oral, Daily  prazosin, 5 mg, oral, Nightly  sertraline, 100 mg, oral, Daily  traZODone, 100 mg, oral, Nightly      Continuous medications  lactated Ringer's, 20 mL/hr, Last Rate: 20 mL/hr (09/28/24 0638)      PRN medications  PRN " medications: dicyclomine, diphenhydrAMINE, fluticasone, HYDROmorphone, naloxone, naloxone, naloxone, ondansetron ODT **OR** ondansetron, oxyCODONE  Results for orders placed or performed during the hospital encounter of 09/26/24 (from the past 24 hour(s))   CBC   Result Value Ref Range    WBC 10.1 4.4 - 11.3 x10*3/uL    nRBC 0.0 0.0 - 0.0 /100 WBCs    RBC 4.27 4.00 - 5.20 x10*6/uL    Hemoglobin 11.9 (L) 12.0 - 16.0 g/dL    Hematocrit 36.5 36.0 - 46.0 %    MCV 86 80 - 100 fL    MCH 27.9 26.0 - 34.0 pg    MCHC 32.6 32.0 - 36.0 g/dL    RDW 14.5 11.5 - 14.5 %    Platelets 294 150 - 450 x10*3/uL                            Assessment/Plan   Assessment & Plan  Esophageal reflux    History of stroke with current residual effects    Migraine headache    Moyamoya disease    Exotropia of right eye    History of neurodevelopmental disorder    Obesity (BMI 30-39.9)    Hiatal hernia    Ankle dislocation, right, initial encounter    Closed displaced trimalleolar fracture of right ankle, initial encounter    33-year-old female right trimalleolar ankle fracture dislocation status post open reduction internal fixation with Dr. Castro on 9/26/24.     Plan:  -Admitted to medicine.  -Nonweightbearing right lower extremity short leg splint.  Encourage elevation.  -Keep splint clean and dry.  Maintain until follow-up.  -Multimodal pain control.  -Ancef for 24 hours and postoperative.  -SCDs, MADHU hose.  -Aspir 81 twice daily starting postop day 1 for DVT prophylaxis to be continued for 4 weeks.  -PT and OT to evaluate and treat.  -Encourage OB, frequent I-S use.  -Regular diet with bowel regimen.  -Follow-up with Dr. Morton in 2 weeks.      Dispo: return to SNF.                Luana Jernigan DO

## 2024-09-28 NOTE — CARE PLAN
The patient's goals for the shift include      The clinical goals for the shift include pt to report a decrease in pain      Problem: Pain - Adult  Goal: Verbalizes/displays adequate comfort level or baseline comfort level  Outcome: Progressing     Problem: Safety - Adult  Goal: Free from fall injury  Outcome: Progressing     Problem: Discharge Planning  Goal: Discharge to home or other facility with appropriate resources  Outcome: Progressing     Problem: Chronic Conditions and Co-morbidities  Goal: Patient's chronic conditions and co-morbidity symptoms are monitored and maintained or improved  Outcome: Progressing     Problem: Skin  Goal: Decreased wound size/increased tissue granulation at next dressing change  Outcome: Progressing  Flowsheets (Taken 9/27/2024 2125)  Decreased wound size/increased tissue granulation at next dressing change: Promote sleep for wound healing  Goal: Participates in plan/prevention/treatment measures  Outcome: Progressing  Flowsheets (Taken 9/27/2024 2125)  Participates in plan/prevention/treatment measures:   Discuss with provider PT/OT consult   Elevate heels  Goal: Prevent/manage excess moisture  Outcome: Progressing  Flowsheets (Taken 9/27/2024 2125)  Prevent/manage excess moisture: Cleanse incontinence/protect with barrier cream  Goal: Prevent/minimize sheer/friction injuries  Outcome: Progressing  Flowsheets (Taken 9/27/2024 2125)  Prevent/minimize sheer/friction injuries:   Use pull sheet   Turn/reposition every 2 hours/use positioning/transfer devices  Goal: Promote/optimize nutrition  Outcome: Progressing  Flowsheets (Taken 9/27/2024 2125)  Promote/optimize nutrition: Offer water/supplements/favorite foods  Goal: Promote skin healing  Outcome: Progressing  Flowsheets (Taken 9/27/2024 2125)  Promote skin healing:   Assess skin/pad under line(s)/device(s)   Turn/reposition every 2 hours/use positioning/transfer devices

## 2024-09-28 NOTE — DISCHARGE SUMMARY
Discharge Diagnosis  Closed displaced trimalleolar fracture of right ankle, initial encounter    Issues Requiring Follow-Up  F/up with ortho in 2 weeks    Discharge Meds     Medication List      START taking these medications     polyethylene glycol 17 gram packet; Commonly known as: Glycolax,   Miralax; Take 17 g by mouth once daily.     CHANGE how you take these medications     aspirin 81 mg EC tablet; Take 1 tablet (81 mg) by mouth 2 times a day.;   What changed: when to take this     CONTINUE taking these medications     acetaminophen 325 mg tablet; Commonly known as: Tylenol; Take 3 tablets   (975 mg) by mouth 3 times a day.   cholecalciferol 50 mcg (2,000 unit) capsule; Commonly known as: Vitamin   D-3; Take 1 capsule (50 mcg) by mouth once daily.   dicyclomine 10 mg capsule; Commonly known as: Bentyl; Take 1 capsule (10   mg) by mouth 4 times a day as needed (abdominal pain).   fluticasone 50 mcg/actuation nasal spray; Commonly known as: Flonase   fluticasone propion-salmeteroL 100-50 mcg/dose diskus inhaler; Commonly   known as: Advair Diskus; Inhale 1 puff every 12 hours. Rinse mouth with   water after use to reduce aftertaste and incidence of candidiasis. Do not   swallow.   Knee Support Brace misc; Generic drug: leg brace; 1 Units once daily.   lidocaine 4 % patch; Place 1 patch over 12 hours on the skin once daily.   Remove & discard patch within 12 hours or as directed by MD.   Mirena 21 mcg/24 hr (8 yrs) 52 mg IUD; Generic drug: levonorgestrel   omeprazole 20 mg DR capsule; Commonly known as: PriLOSEC   ondansetron 4 mg/2 mL injection; Commonly known as: Zofran; Infuse 2 mL   (4 mg) into a venous catheter every 6 hours if needed for nausea or   vomiting.   oxyCODONE 5 mg immediate release tablet; Commonly known as: Roxicodone;   Take 1 tablet (5 mg) by mouth every 6 hours if needed for severe pain (7 -   10).   prazosin 5 mg capsule; Commonly known as: Minipress   sertraline 100 mg tablet; Commonly  known as: Zoloft   traZODone 100 mg tablet; Commonly known as: Desyrel     STOP taking these medications     ibuprofen 200 mg tablet       Test Results Pending At Discharge  Pending Labs       No current pending labs.            Hospital Course   POD #2-Right Ankle ORIF Tri-Malleolar Fracture without Fixation of the Posterior Lip, Right Syndesmosis Stabilization.    Closed displaced trimalleolar fracture of right ankle, initial encounter    Principal Problem:    Closed displaced trimalleolar fracture of right ankle, initial encounter  Active Problems:    Esophageal reflux    History of stroke with current residual effects    Migraine headache    Moyamoya disease    Exotropia of right eye    History of neurodevelopmental disorder    Obesity (BMI 30-39.9)    Hiatal hernia    Ankle dislocation, right, initial encounter  - asa bid  - plan to send back to SNF    POD#2 right trimalleolar ankle fracture dislocation status post open reduction internal fixation with Dr. Castro on 9/26/24.    - 9/28 No acute events overnight. Patient resting comfortably in bed. Pain is well controlled. Denies any headache, nausea, vomiting, chest pain, shortness of breath, numbness/tingling, calf pain. Eager for dc.   -Nonweightbearing right lower extremity short leg splint.  Encourage elevation.  -Keep splint clean and dry.  Maintain until follow-up.  -Multimodal pain control.  -SCDs, MADHU hose left leg.  -Aspir 81 twice daily starting postop day 1 for DVT prophylaxis to be continued for 4 weeks.  -PT and OT to evaluate and treat.- discharge to SNF, [facility], 2 person assist- no weight bearing to right leg  -Encourage OB, frequent I-S use.  -Regular diet with bowel regimen.  -Follow-up with Dr. Morton in 2 weeks.     I spent 60 minutes in the professional and overall care of this patient.    JAYLON Calvillo-CNP    Pertinent Physical Exam At Time of Discharge  Physical Exam  Physical Exam  Vitals and nursing note reviewed.    Constitutional:       General: She is not in acute distress.     Appearance: She is obese. She is not ill-appearing.   HENT:      Head: Normocephalic and atraumatic.      Nose: No congestion.      Mouth/Throat:      Mouth: Mucous membranes are moist.   Cardiovascular:      Rate and Rhythm: Normal rate and regular rhythm.      Pulses: Normal pulses.   Pulmonary:      Effort: Pulmonary effort is normal. No respiratory distress.   Abdominal:      Palpations: Abdomen is soft.      Tenderness: There is no abdominal tenderness.   Musculoskeletal:      Cervical back: No tenderness.      Comments: Right leg hard splint- wiggles her does SILT- c/o improved numbness   Skin:     General: Skin is warm and dry.      Findings: No rash.   Neurological:      General: No focal deficit present.      Mental Status: She is alert and oriented to person, place, and time.   Outpatient Follow-Up  Future Appointments   Date Time Provider Department Center   10/16/2024 12:45 PM Khanh Terry MD [id number]     F/up with Dr. Xiao in 2 weeks    JAYLON Calvillo-CNP

## 2024-09-28 NOTE — PROGRESS NOTES
Physical Therapy    Physical Therapy Treatment    Patient Name: Windy Lovelace  MRN: [id number]  Department: [place] [id number]  Room: [address] [place] [address]  Today's Date: 9/28/2024  Time Calculation  Start Time: 1113  Stop Time: 1141  Time Calculation (min): 28 min         Assessment/Plan   PT Assessment  PT Assessment Results: Decreased strength, Decreased endurance, Impaired balance, Decreased mobility, Decreased coordination, Decreased cognition, Impaired judgement, Impaired sensation, Orthopedic restrictions  Rehab Prognosis: Fair  Evaluation/Treatment Tolerance: Patient tolerated treatment well  End of Session Communication: Bedside nurse  Assessment Comment: Patient is pleasant and cooperative.  Participates well in session.  She is expressing and focuses on the desire for discharge this pm.  End of Session Patient Position: Bed, 3 rail up, Alarm on  PT Plan  Inpatient/Swing Bed or Outpatient: Inpatient  PT Plan  Treatment/Interventions: Bed mobility, Transfer training, Balance training, Strengthening, Endurance training, Range of motion, Therapeutic exercise, Therapeutic activity, Home exercise program  PT Plan: Ongoing PT  PT Frequency: 5 times per week  PT Discharge Recommendations: Moderate intensity level of continued care  Equipment Recommended upon Discharge: Wheeled walker  PT Recommended Transfer Status: Assist x2  PT - OK to Discharge: Yes (Per POC)      General Visit Information:   PT  Visit  PT Received On: 09/28/24  Response to Previous Treatment: Patient with no complaints from previous session.  General  Reason for Referral: Recent sx: R ankle ORIF 9/26/24. Pt is a 33 year old female who fell and experienced closed displaced trimalleolar fracture of right ankle. Underwent sx as above.  Referred By: Santana Dumont DO  Family/Caregiver Present: No  Prior to Session Communication: Bedside nurse  Patient Position Received: Bed, 3 rail up, Alarm on  Preferred Learning Style: auditory, kinesthetic, verbal,  visual, written  General Comment: Pt pleasant and cooperative, appears to have developmental delay, very willing to participate. Struggling with NWB and swivel stepping on LLE.    Subjective   Precautions:  Precautions  LE Weight Bearing Status: Right Non-Weight Bearing  Medical Precautions: Fall precautions  Precautions Comment: Developmental delay            Objective   Pain:  Pain Assessment  Pain Assessment: 0-10  0-10 (Numeric) Pain Score: 5 - Moderate pain  Pain Type: Surgical pain  Pain Location: Ankle  Pain Orientation: Right  Cognition:  Cognition  Overall Cognitive Status: Within Functional Limits  Orientation Level: Oriented X4  Coordination:  Movements are Fluid and Coordinated: No  Postural Control:  Postural Control  Postural Control: Impaired  Static Sitting Balance  Static Sitting-Balance Support: Feet supported, Bilateral upper extremity supported  Static Sitting-Level of Assistance: Close supervision  Static Sitting-Comment/Number of Minutes: 2 minutes  Dynamic Sitting Balance  Dynamic Sitting-Balance Support: Feet supported, Bilateral upper extremity supported  Dynamic Sitting-Level of Assistance: Contact guard  Dynamic Sitting-Balance: Forward lean, Reaching for objects  Dynamic Sitting-Comments: Patient reaching for wheelchair during transfers  Static Standing Balance  Static Standing-Balance Support: Bilateral upper extremity supported  Static Standing-Level of Assistance: Moderate assistance  Static Standing-Comment/Number of Minutes: Patient is not able to maintain standing with non-weight bearing  Dynamic Standing Balance  Dynamic Standing-Comments: Not able  Extremity/Trunk Assessments:    Activity Tolerance:  Activity Tolerance  Endurance: Tolerates 10 - 20 min exercise with multiple rests  Activity Tolerance Comments: Patient is painful but tolerates session well.  Treatments:  Therapeutic Exercise  Therapeutic Exercise Performed: Yes  Therapeutic Exercise Activity 1: Patient is  instructed in and performs supine bilateral lower extremity ankle pumps (left LE jeanette), quad sets, heel slides, and abduction x 12 reps x1.  Seated bilateral lower extremity knee extention and marches.  Working to improve strength and active range of motion.    Therapeutic Activity  Therapeutic Activity Performed: Yes  Therapeutic Activity 1: Education provided to improve transfer training.  trials x 2 to stand to walker.  Patient c/o increased pain and is not able to complete task.    Bed Mobility  Bed Mobility: Yes  Bed Mobility 1  Bed Mobility 1: Sitting to supine  Level of Assistance 1: Close supervision  Bed Mobility Comments 1: Patient demonstrates ability to transfer to and from wheelchair with proper set up.  HOB is elevated.  Bed Mobility 2  Bed Mobility  2: Scooting  Level of Assistance 2: Close supervision  Bed Mobility Comments 2: Patient using bed rails for assist scooting hips to reposistion in bed.    Ambulation/Gait Training  Ambulation/Gait Training Performed: No  Ambulation/Gait Training 1  Comments/Distance (ft) 1: Patient is not able to complete standing  Transfers  Transfer: Yes  Transfer 1  Transfer From 1: Bed to  Transfer to 1: Wheelchair  Technique 1: Stand pivot, Sit pivot  Transfer Device 1: Gait belt  Transfer Level of Assistance 1: Minimal verbal cues, Contact guard  Trials/Comments 1: Patient is able to pivot into the chair from the bed.  She demonstrates the ability to return to the bed with instruction and supervision.  Set up is needed to move chair close and position bed.    Stairs  Stairs: No         Outcome Measures:  [facility] Basic Mobility  Turning from your back to your side while in a flat bed without using bedrails: A little  Moving from lying on your back to sitting on the side of a flat bed without using bedrails: A little  Moving to and from bed to chair (including a wheelchair): A lot  Standing up from a chair using your arms (e.g. wheelchair or bedside chair): A lot  To walk  in hospital room: A lot  Climbing 3-5 steps with railing: Total  Basic Mobility - Total Score: 13    Education Documentation  Precautions, taught by Katelin Laurent PTA at 9/28/2024  3:46 PM.  Learner: Patient  Readiness: Acceptance  Method: Explanation, Demonstration  Response: Verbalizes Understanding    Body Mechanics, taught by Katelin Laurent PTA at 9/28/2024  3:46 PM.  Learner: Patient  Readiness: Acceptance  Method: Explanation, Demonstration  Response: Verbalizes Understanding    Mobility Training, taught by Katelin Laurent PTA at 9/28/2024  3:46 PM.  Learner: Patient  Readiness: Acceptance  Method: Explanation, Demonstration  Response: Verbalizes Understanding    Education Comments  No comments found.        OP EDUCATION:  Outpatient Education  Individual(s) Educated: Patient  Education Provided: Anatomy, Home Exercise Program, Post-Op Precautions    Encounter Problems       Encounter Problems (Active)       Balance       STG - Maintains dynamic standing balance with upper extremity support At Min Ax1, safely, without LOB, device PRN  (Progressing)       Start:  09/27/24    Expected End:  10/11/24       INTERVENTIONS:  1. Practice standing with minimal support.  2. Educate patient about standing tolerance.  3. Educate patient about independence with gait, transfers, and ADL's.  4. Educate patient about use of assistive device.  5. Educate patient about self-directed care.            Mobility       STG - Patient will ambulate At Mod Ax1 using Wheeled walker for 10' while maintaining NWB on RLE without LOB or gross gait deviations  (Progressing)       Start:  09/27/24    Expected End:  10/11/24            Endurance - Pt to tolerate >/= 20 minutes therex, theract, gait and/or NMR with </= 5 minutes of rest breaks  (Progressing)       Start:  09/27/24    Expected End:  10/11/24               PT Transfers       STG - Patient will perform bed mobility At MOD I, safely, without LOB, device PRN  (Progressing)       Start:   09/27/24    Expected End:  10/11/24            STG - Patient will transfer sit to and from stand At Min Ax1, safely, without LOB, device PRN  (Progressing)       Start:  09/27/24    Expected End:  10/11/24               Pain - Adult          Safety       Pt will verbalize and apply safety awareness and precautions 100% of time throughout entire session   (Progressing)       Start:  09/27/24    Expected End:  10/11/24

## 2024-09-28 NOTE — PROGRESS NOTES
"Windy Lovelace is a 33 y.o. female on day 0 of admission presenting with Closed displaced trimalleolar fracture of right ankle, initial encounter.    Subjective   \" I feel better then yesterday\"       Objective     Physical Exam  Vitals and nursing note reviewed.   Constitutional:       General: She is not in acute distress.     Appearance: She is obese. She is not ill-appearing.   HENT:      Head: Normocephalic and atraumatic.      Nose: No congestion.      Mouth/Throat:      Mouth: Mucous membranes are moist.   Cardiovascular:      Rate and Rhythm: Normal rate and regular rhythm.      Pulses: Normal pulses.   Pulmonary:      Effort: Pulmonary effort is normal. No respiratory distress.   Abdominal:      Palpations: Abdomen is soft.      Tenderness: There is no abdominal tenderness.   Musculoskeletal:      Cervical back: No tenderness.      Comments: Right leg hard cast- wiggles her does SILT- c/o improved numbness   Skin:     General: Skin is warm and dry.      Findings: No rash.   Neurological:      General: No focal deficit present.      Mental Status: She is alert and oriented to person, place, and time.         Last Recorded Vitals  Blood pressure 127/87, pulse 88, temperature 36.1 °C (97 °F), temperature source Temporal, resp. rate 18, height 1.676 m (5' 6\"), weight 109 kg (240 lb 1.3 oz), SpO2 95%.  Intake/Output last 3 Shifts:  I/O last 3 completed shifts:  In: 1131.2 (10.4 mL/kg) [P.O.:120; I.V.:911.2 (8.4 mL/kg); IV Piggyback:100]  Out: 1450 (13.3 mL/kg) [Urine:1450 (0.4 mL/kg/hr)]  Weight: 108.9 kg     Relevant Results  Results for orders placed or performed during the hospital encounter of 09/26/24 (from the past 96 hour(s))   POCT pregnancy, urine   Result Value Ref Range    Preg Test, Ur Negative Negative   CBC   Result Value Ref Range    WBC 17.3 (H) 4.4 - 11.3 x10*3/uL    nRBC 0.0 0.0 - 0.0 /100 WBCs    RBC 4.48 4.00 - 5.20 x10*6/uL    Hemoglobin 12.6 12.0 - 16.0 g/dL    Hematocrit 38.4 36.0 - " 46.0 %    MCV 86 80 - 100 fL    MCH 28.1 26.0 - 34.0 pg    MCHC 32.8 32.0 - 36.0 g/dL    RDW 14.1 11.5 - 14.5 %    Platelets 333 150 - 450 x10*3/uL   Basic metabolic panel   Result Value Ref Range    Glucose 101 (H) 74 - 99 mg/dL    Sodium 138 136 - 145 mmol/L    Potassium 3.8 3.5 - 5.3 mmol/L    Chloride 107 98 - 107 mmol/L    Bicarbonate 22 21 - 32 mmol/L    Anion Gap 13 10 - 20 mmol/L    Urea Nitrogen 7 6 - 23 mg/dL    Creatinine 0.65 0.50 - 1.05 mg/dL    eGFR >90 >60 mL/min/1.73m*2    Calcium 8.6 8.6 - 10.3 mg/dL   CBC   Result Value Ref Range    WBC 10.1 4.4 - 11.3 x10*3/uL    nRBC 0.0 0.0 - 0.0 /100 WBCs    RBC 4.27 4.00 - 5.20 x10*6/uL    Hemoglobin 11.9 (L) 12.0 - 16.0 g/dL    Hematocrit 36.5 36.0 - 46.0 %    MCV 86 80 - 100 fL    MCH 27.9 26.0 - 34.0 pg    MCHC 32.6 32.0 - 36.0 g/dL    RDW 14.5 11.5 - 14.5 %    Platelets 294 150 - 450 x10*3/uL           Assessment/Plan   Assessment & Plan  Ankle dislocation, right, initial encounter  POD #2-Right Ankle ORIF Tri-Malleolar Fracture without Fixation of the Posterior Lip, Right Syndesmosis Stabilization.    Closed displaced trimalleolar fracture of right ankle, initial encounter    Principal Problem:    Closed displaced trimalleolar fracture of right ankle, initial encounter  Active Problems:    Esophageal reflux    History of stroke with current residual effects    Migraine headache    Moyamoya disease    Exotropia of right eye    History of neurodevelopmental disorder    Obesity (BMI 30-39.9)    Hiatal hernia    Ankle dislocation, right, initial encounter  - asa bid  - plan to send back to SNF    POD#2 right trimalleolar ankle fracture dislocation status post open reduction internal fixation with Dr. Castro on 9/26/24.    - 9/28 No acute events overnight. Patient resting comfortably in bed. Pain is well controlled. Denies any headache, nausea, vomiting, chest pain, shortness of breath, numbness/tingling, calf pain. Eager for dc.   -Nonweightbearing right  lower extremity short leg splint.  Encourage elevation.  -Keep splint clean and dry.  Maintain until follow-up.  -Multimodal pain control.  -Ancef for 24 hours and postoperative.  -SCDs, MADHU hose.  -Aspir 81 twice daily starting postop day 1 for DVT prophylaxis to be continued for 4 weeks.  -PT and OT to evaluate and treat.  -Encourage OB, frequent I-S use.  -Regular diet with bowel regimen.  -Follow-up with Dr. Morton in 2 weeks.     I spent 60 minutes in the professional and overall care of this patient. Spoke to patients sister (POA) and social work and bedside RN, d/w attending Dr. Nicolas Cabrera, APRN-CNP

## 2024-09-28 NOTE — ASSESSMENT & PLAN NOTE
POD #2-Right Ankle ORIF Tri-Malleolar Fracture without Fixation of the Posterior Lip, Right Syndesmosis Stabilization.

## 2024-09-28 NOTE — PROGRESS NOTES
9/28/2024 2:08 PM POA notified of 4:30 PM discharge transport to [facility]. Kaylene CUNNINGHAM

## 2024-09-30 ENCOUNTER — NURSING HOME VISIT (OUTPATIENT)
Dept: POST ACUTE CARE | Facility: EXTERNAL LOCATION | Age: 34
End: 2024-09-30
Payer: MEDICARE

## 2024-09-30 DIAGNOSIS — I67.5 MOYAMOYA DISEASE: ICD-10-CM

## 2024-09-30 DIAGNOSIS — Z79.899 ON DEEP VEIN THROMBOSIS (DVT) PROPHYLAXIS: ICD-10-CM

## 2024-09-30 DIAGNOSIS — I69.351 HEMIPLEGIA AND HEMIPARESIS FOLLOWING CEREBRAL INFARCTION AFFECTING RIGHT DOMINANT SIDE (MULTI): ICD-10-CM

## 2024-09-30 DIAGNOSIS — K21.9 GASTROESOPHAGEAL REFLUX DISEASE, UNSPECIFIED WHETHER ESOPHAGITIS PRESENT: ICD-10-CM

## 2024-09-30 DIAGNOSIS — Z87.81 S/P ORIF (OPEN REDUCTION INTERNAL FIXATION) FRACTURE: ICD-10-CM

## 2024-09-30 DIAGNOSIS — Z98.890 S/P ORIF (OPEN REDUCTION INTERNAL FIXATION) FRACTURE: ICD-10-CM

## 2024-09-30 DIAGNOSIS — G47.00 INSOMNIA, UNSPECIFIED TYPE: ICD-10-CM

## 2024-09-30 DIAGNOSIS — K58.9 IRRITABLE BOWEL SYNDROME, UNSPECIFIED TYPE: ICD-10-CM

## 2024-09-30 DIAGNOSIS — F32.9 MAJOR DEPRESSIVE DISORDER WITH CURRENT ACTIVE EPISODE, UNSPECIFIED DEPRESSION EPISODE SEVERITY, UNSPECIFIED WHETHER RECURRENT: ICD-10-CM

## 2024-09-30 DIAGNOSIS — Z87.81 H/O REDUCTION OF CLOSED FRACTURE: ICD-10-CM

## 2024-09-30 DIAGNOSIS — F41.1 GENERALIZED ANXIETY DISORDER: ICD-10-CM

## 2024-09-30 DIAGNOSIS — M25.571 ACUTE RIGHT ANKLE PAIN: ICD-10-CM

## 2024-09-30 DIAGNOSIS — M62.81 GENERALIZED MUSCLE WEAKNESS: ICD-10-CM

## 2024-09-30 DIAGNOSIS — I10 BENIGN ESSENTIAL HTN: ICD-10-CM

## 2024-09-30 DIAGNOSIS — R11.2 NAUSEA AND VOMITING, UNSPECIFIED VOMITING TYPE: ICD-10-CM

## 2024-09-30 DIAGNOSIS — I69.30 HISTORY OF STROKE WITH CURRENT RESIDUAL EFFECTS: ICD-10-CM

## 2024-09-30 DIAGNOSIS — R26.89 IMPAIRED GAIT AND MOBILITY: ICD-10-CM

## 2024-09-30 DIAGNOSIS — J30.9 ALLERGIC RHINITIS, UNSPECIFIED SEASONALITY, UNSPECIFIED TRIGGER: ICD-10-CM

## 2024-09-30 DIAGNOSIS — S82.851S CLOSED DISPLACED TRIMALLEOLAR FRACTURE OF RIGHT ANKLE, SEQUELA: Primary | ICD-10-CM

## 2024-09-30 PROCEDURE — 99310 SBSQ NF CARE HIGH MDM 45: CPT | Performed by: NURSE PRACTITIONER

## 2024-09-30 NOTE — LETTER
Patient: Windy Lovelace  : 1990    Encounter Date: 2024    Name: Windy Lovelace    YOB: 1990    Code Status: FULL CODE    Chief Complaint:  Readmit after hospitalization;   Right ankle fracture (right trimalleolar ankle fracture dislocation)  S/p closed reduction with concentric reduction;   right ankle pain;  Right Ankle ORIF Tri-Malleolar Fracture without Fixation of the Posterior Lip, Right Syndesmosis Stabilization; etc......       HPI   33 year old female with past medical history of stroke with current residual effects;  Migraine headache;   Moyamoya disease; Esophageal reflux;   Exotropia of right eye; History of neurodevelopmental disorder;   Obesity (BMI 30-39.9); and Hiatal hernia      PREVIOUS HOSPITAL COURSE:   Patient previously presented to [facility] on 24 with an Right Ankle Fracture.    Patient was found on the ground in the bathroom and stool.    Patient was trying to get into the shower, stumbled and had a fall.    Patient denies hitting head or any loss of consciousness.   She denies having fever, chills or any other symptoms.  Patient was evaluated by orthopedics.  Diagnosed with right trimalleolar ankle fracture dislocation.   S/p closed reduction with concentric reduction.  Pain was controlled during admission.   Patient was not having acute pain with ROM or palpation of other extremities other than that which is mentioned below  Evaluated by PT/OT.   No acute orthopaedic surgical intervention was indicated.   Therefore, plans were arranged for surgery on  due to OR availability.   Pt unable to care for herself at home, so plans to transfer to short term facility.  Pt will  return for admission in preparation for OR on .   Surgery to be done by Dr. Castro, orthopedic surgery.   Pt's labs remained stable throughout admission.    RECENT HOSPITAL COURSE:   Patient had a closed reduction right ankle and she returned to [facility] on 24 to go to  the OR for Right Ankle ORIF;    Tri-Malleolar Fracture without Fixation of the Posterior Lip, Right Syndesmosis Stabilization.  Patient tolerated procedure well.  Post op recommendations are:  Nonweightbearing right lower extremity short leg splint.  Encourage elevation.  Keep splint clean and dry.  Maintain until follow-up.  Aspirin 81 mg twice daily starting postop day 1 for DVT prophylaxis to be continued for 4 weeks.  PT and OT to evaluated and treated patient.  Plans to discharge back to [facility], [facility].  Patient requires a 2 person assist- no weight bearing on her right leg.  Follow-up with orthopedics  Dr. Castro in 2 weeks-has appointment scheduled on 10/10/24.  On routine acetaminophen, PRN oxycodone and PRN ibuprofen.      Patient was admitted to [facility] from 9/13/24 to 9/19/24 (6 days)    Patient initially admitted to the [facility] on 9/19/24 for skilled services.    Patient readmitted to [facility] for surgery from 9/26/24 to 9/28/24.    Patient readmitted to the [facility] on 9/28/24 for skilled services.     Hospital and chronic diagnoses as follows:    Right ankle fracture (right trimalleolar ankle fracture dislocation)   S/p closed reduction with concentric reduction;   right ankle pain;  Right Ankle ORIF Tri-Malleolar Fracture without Fixation of the Posterior Lip, Right Syndesmosis Stabilization:   Right trimalleolar ankle fracture dislocation status post open reduction internal fixation with Dr. Castro on 9/26/24.   Patient tolerated procedure well.  Post op recommendations are:  Nonweightbearing right lower extremity short leg splint.  Encourage elevation.  Keep splint clean and dry.  Maintain until follow-up.  Aspirin 81 mg twice daily starting postop day 1 for DVT prophylaxis to be continued for 4 weeks.  PT and OT to evaluate and treat.- discharge to [facility], [facility], 2 person assist- no weight bearing to right leg  Follow-up with orthopedics  Dr. Castro in 2  weeks-has appointment scheduled on 10/10/24.  On routine acetaminophen 975 mg PO TID.  On ibuprofen PRN and oxycodone PRN for pain.   Pain is around a 7.  The oxycodone helps to decrease the pain.  Patient seen today.  She is sitting up in a wheelchair in her room.  Cooperative, complaining of pain.  Reminded patient to elevate leg and ask for oxycodone PRN dose.  No complaints of headaches or dizziness.  No chest pain.     Benign essential HTN:  On Prazosin.  Recent BP: 115/87  No complaints of headaches or dizziness today.    GERD:  On Omeprazole.   No complaints of reflux or N/V.  No abdominal pain.     Major depressive disorder; Generalized anxiety disorder ; insomnia:  On sertraline and trazodone.  No complaints of increased depression, anxiety or insomnia.      Irritable bowel syndrome:  On dicyclomine PRN and Pepto-Bismol PRN.  Has chronic issues with IBS.   No complaints of abdominal pain today.    Moyamoya disease; Hemiplegia and hemiparesis following cerebral infarction affecting right dominant side;   History of strokes:  On aspirin.      DVT ppx:   Aspirin 81 mg  PO BID ordered for 4 weeks    Impaired gait and mobility; generalized muscle weakness:  Patient is non-weight bearing has splint on right lower leg.  Nonweightbearing right lower extremity short leg splint.    Encourage elevation.  Keep splint clean and dry.  Maintain until follow-up appointment with orthopedic doctor Dr. Castro.  Patient at the [facility] for skilled services/PT/OT.     Nausea and/ or vomiting:   On ondansetron.  No complaints of N/V today.    Allergic rhinitis:  On fluticasone nasal spray.  No complaints of allergic rhinitis today.  No headaches or dizziness.                      Reviewed [facility] records from recent hospitalization(s).  Reviewed  EMR  Reviewed medical, social, surgical and family history.  Reviewed all current medications and performed medication reconciliation.  Performed prescription  drug management.  Reviewed vital signs AND lab results  Reviewed [facility] Documentation  Discussed patient with nursing and .   Spent greater than 50 minutes on patient visit.                    ROS: 10 point ROS performed; Negative unless noted in HPI.      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), Breast pain (03/21/2024), Candidiasis of skin and nail (06/25/2014), Cellulitis of face (10/10/2013), Headache, unspecified (04/29/2015), Hypomagnesemia (01/23/2014), Other cysts of oral region, not elsewhere classified (06/10/2016), Other enthesopathies, not elsewhere classified (10/29/2015), Other specified abnormal findings of blood chemistry (03/06/2020), Other specified joint disorders, left ankle and foot (11/02/2016), Other specified noninflammatory disorders of vagina (03/20/2018), Pain in right knee (11/12/2018), Pain in right knee (02/19/2018), Pain in right knee (02/20/2019), Pain, unspecified (10/17/2017), Pelvic and perineal pain (01/24/2018), Personal history of diseases of the skin and subcutaneous tissue (02/24/2016), Personal history of other diseases of the digestive system (10/06/2017), Personal history of other diseases of the digestive system (03/17/2015), Personal history of other diseases of the digestive system (05/24/2016), Personal history of other diseases of the female genital tract (08/31/2015), Personal history of other diseases of the musculoskeletal system and connective tissue (02/04/2014), Personal history of other diseases of the nervous system and sense organs (01/05/2015), Personal history of other diseases of the respiratory system (08/29/2013), Personal history of other diseases of the respiratory system (02/03/2017), Personal history of other drug therapy  (12/11/2017), Personal history of other endocrine, nutritional and metabolic disease (05/09/2019), Personal history of other mental and behavioral disorders (10/06/2017), Personal history of transient ischemic attack (TIA), and cerebral infarction without residual deficits (10/06/2017), Pleurodynia (01/23/2014), Pyuria (06/13/2017), Sprain of medial collateral ligament of right knee, initial encounter (11/02/2016), Sprain of unspecified ligament of left ankle, subsequent encounter (10/29/2015), Strain of muscle and tendon of unspecified wall of thorax, initial encounter (02/04/2014), Syncope and collapse (06/13/2017), Unspecified fall, initial encounter (01/23/2014), and Unspecified symptoms and signs involving the genitourinary system (01/24/2018).    SURGICAL HISTORY:   MR head angio w IV contrast (8/18/2015);   US guided percutaneous peritoneal or retroperitoneal fluid collection drainage (4/10/2019);   and US guided abscess drain (4/10/2019).     SOCIAL HISTORY:  She reports that she has never smoked.   She has never used smokeless tobacco.   She reports that she does not drink alcohol and does not use drugs.    Family History   Problem Relation Name Age of Onset   • Kidney cancer Mother       • Liver cancer Mother       • Pancreatic cancer Father       • Glaucoma Other grandmother     • Diabetes Other Grandfather           other type with arthropathy, with long term curent use of insulin   • Diabetes Other aunt         ALLERGIES:  Bupropion  Ketamine  Augmentin [amoxicillin-pot clavulanate]  Penicillin g,   Penicillins        Lab Results   Component Value Date    WBC 10.1 09/28/2024    HGB 11.9 (L) 09/28/2024    HCT 36.5 09/28/2024     09/28/2024    CHOL 152 05/09/2024    TRIG 178 (H) 05/09/2024    HDL 33.6 05/09/2024    ALT 51 (H) 09/14/2024    AST 18 09/14/2024     09/27/2024    K 3.8 09/27/2024     09/27/2024    CREATININE 0.65 09/27/2024    BUN 7 09/27/2024    CO2 22 09/27/2024    TSH  "5.14 (H) 05/09/2024    INR 1.0 09/13/2024             /87   Pulse 88   Temp 36.7 °C (98 °F)   Resp 18   Ht 1.676 m (5' 6\")   Wt 108 kg (237 lb 9.6 oz)   SpO2 98%   BMI 38.35 kg/m²      Physical Exam  Vitals and nursing note reviewed.   Constitutional:       Appearance: She is obese.   HENT:      Head: Normocephalic.      Right Ear: External ear normal.      Left Ear: External ear normal.      Nose: Nose normal.      Mouth/Throat:      Mouth: Mucous membranes are moist.      Pharynx: Oropharynx is clear.   Eyes:      Extraocular Movements: Extraocular movements intact.      Conjunctiva/sclera: Conjunctivae normal.      Pupils: Pupils are equal, round, and reactive to light.   Cardiovascular:      Rate and Rhythm: Normal rate and regular rhythm.      Pulses: Normal pulses.      Heart sounds: Normal heart sounds.   Pulmonary:      Effort: Pulmonary effort is normal.      Breath sounds: Normal breath sounds.   Abdominal:      General: Bowel sounds are normal.      Palpations: Abdomen is soft.   Musculoskeletal:         General: Normal range of motion.      Cervical back: Normal range of motion and neck supple.      Comments: Right lower extremity: splint intact.  Adequate movement and sensation in toes.  Cap refill < 3 seconds.   Feet:      Comments: Bilateral feet--adequate movement and sensation;  Cap refill < 3 seconds.   Skin:     General: Skin is warm and dry.   Neurological:      General: No focal deficit present.      Mental Status: She is alert and oriented to person, place, and time. Mental status is at baseline.      Motor: Weakness present.      Gait: Gait abnormal.   Psychiatric:         Mood and Affect: Mood is anxious and depressed.         Behavior: Behavior normal. Behavior is cooperative.          Assessment/Plan   Ordered CMP, CBC with diff. For tomorrow.    Right ankle fracture (right trimalleolar ankle fracture dislocation)   S/p closed reduction with concentric reduction;   right ankle " pain;  ######Right Ankle ORIF Tri-Malleolar Fracture without Fixation of the Posterior Lip, Right Syndesmosis Stabilization:   Right trimalleolar ankle fracture dislocation status post open reduction internal fixation with Dr. Castro on 9/26/24.   Patient tolerated procedure well.  Post op recommendations are:  Nonweightbearing right lower extremity short leg splint.  Encourage elevation.  Keep splint clean and dry.  Maintain until follow-up.  Aspirin 81 mg twice daily starting postop day 1 for DVT prophylaxis to be continued for 4 weeks.  Continue with PT and OT evaluate and treat.  At the [facility] for skilled services.  Requires 2 person assist.  No weight bearing to right leg.  Follow-up with orthopedics  Dr. Castro in 2 weeks-has appointment scheduled on 10/10/24.  Continue  routine acetaminophen 975 mg PO TID.  Continue ibuprofen PRN and oxycodone PRN for pain.     Benign essential HTN:  On Prazosin.  Recent BP: 115/87  No complaints of headaches or dizziness today.    GERD:  On Omeprazole.   No complaints of reflux or N/V.    Major depressive disorder; Generalized anxiety disorder ; insomnia:  On sertraline and trazodone.  No complaints of insomnia.      Irritable bowel syndrome:  On dicyclomine PRN and Pepto-Bismol PRN.  Has chronic issues with IBS.   No complaints of abdominal pain today.    Moyamoya disease; Hemiplegia and hemiparesis following cerebral infarction affecting right dominant side;   History of strokes:  Continue aspirin.  Follow up with neurology.       DVT ppx:   Aspirin 81 mg  PO BID ordered for 4 weeks    Impaired gait and mobility; generalized muscle weakness:  Patient is non-weight bearing has splint on right lower leg.  Nonweightbearing right lower extremity short leg splint.    Encourage elevation.  Keep splint clean and dry.  Maintain until follow-up.  Patient at the [facility] for skilled services/PT/OT.     Nausea and/ or vomiting:   On ondansetron.  No complaints  of N/V today.    Allergic rhinitis:  On fluticasone nasal spray.  No complaints of allergic rhinitis today.  No headaches or dizziness.          Problem List Items Addressed This Visit       Allergic rhinitis    Esophageal reflux    History of stroke with current residual effects    Moyamoya disease    Closed displaced trimalleolar fracture of right lower leg - Primary     Other Visit Diagnoses       H/O reduction of closed fracture        Acute right ankle pain        S/P ORIF (open reduction internal fixation) fracture        Benign essential HTN        Major depressive disorder with current active episode, unspecified depression episode severity, unspecified whether recurrent        Generalized anxiety disorder        Insomnia, unspecified type        Irritable bowel syndrome, unspecified type        Hemiplegia and hemiparesis following cerebral infarction affecting right dominant side (Multi)        On deep vein thrombosis (DVT) prophylaxis        Impaired gait and mobility        Generalized muscle weakness        Nausea and vomiting, unspecified vomiting type                  NADEEM Clement       Electronically Signed By: NADEEM Clement   10/3/24  5:34 PM

## 2024-10-03 VITALS
SYSTOLIC BLOOD PRESSURE: 115 MMHG | DIASTOLIC BLOOD PRESSURE: 87 MMHG | BODY MASS INDEX: 38.18 KG/M2 | OXYGEN SATURATION: 98 % | HEIGHT: 66 IN | WEIGHT: 237.6 LBS | HEART RATE: 88 BPM | RESPIRATION RATE: 18 BRPM | TEMPERATURE: 98 F

## 2024-10-03 NOTE — PROGRESS NOTES
Name: Windy Lovelace    YOB: 1990    Code Status: FULL CODE    Chief Complaint:  Readmit after hospitalization;   Right ankle fracture (right trimalleolar ankle fracture dislocation)  S/p closed reduction with concentric reduction;   right ankle pain;  Right Ankle ORIF Tri-Malleolar Fracture without Fixation of the Posterior Lip, Right Syndesmosis Stabilization; etc......       HPI   33 year old female with past medical history of stroke with current residual effects;  Migraine headache;   Moyamoya disease; Esophageal reflux;   Exotropia of right eye; History of neurodevelopmental disorder;   Obesity (BMI 30-39.9); and Hiatal hernia      PREVIOUS HOSPITAL COURSE:   Patient previously presented to [facility] on 9/13/24 with an Right Ankle Fracture.    Patient was found on the ground in the bathroom and stool.    Patient was trying to get into the shower, stumbled and had a fall.    Patient denies hitting head or any loss of consciousness.   She denies having fever, chills or any other symptoms.  Patient was evaluated by orthopedics.  Diagnosed with right trimalleolar ankle fracture dislocation.   S/p closed reduction with concentric reduction.  Pain was controlled during admission.   Patient was not having acute pain with ROM or palpation of other extremities other than that which is mentioned below  Evaluated by PT/OT.   No acute orthopaedic surgical intervention was indicated.   Therefore, plans were arranged for surgery on 9/26 due to OR availability.   Pt unable to care for herself at home, so plans to transfer to short term facility.  Pt will  return for admission in preparation for OR on 9/26.   Surgery to be done by Dr. Castro, orthopedic surgery.   Pt's labs remained stable throughout admission.    RECENT HOSPITAL COURSE:   Patient had a closed reduction right ankle and she returned to [facility] on 9/26/24 to go to the OR for Right Ankle ORIF;    Tri-Malleolar Fracture without Fixation of the  Posterior Lip, Right Syndesmosis Stabilization.  Patient tolerated procedure well.  Post op recommendations are:  Nonweightbearing right lower extremity short leg splint.  Encourage elevation.  Keep splint clean and dry.  Maintain until follow-up.  Aspirin 81 mg twice daily starting postop day 1 for DVT prophylaxis to be continued for 4 weeks.  PT and OT to evaluated and treated patient.  Plans to discharge back to [facility].  Patient requires a 2 person assist- no weight bearing on her right leg.  Follow-up with orthopedics  Dr. Castro in 2 weeks-has appointment scheduled on 10/10/24.  On routine acetaminophen, PRN oxycodone and PRN ibuprofen.      Patient was admitted to [facility] from 9/13/24 to 9/19/24 (6 days)    Patient initially admitted to the [facility] on 9/19/24 for skilled services.    Patient readmitted to [facility] for surgery from 9/26/24 to 9/28/24.    Patient readmitted to the [facility] on 9/28/24 for skilled services.     Hospital and chronic diagnoses as follows:    Right ankle fracture (right trimalleolar ankle fracture dislocation)   S/p closed reduction with concentric reduction;   right ankle pain;  Right Ankle ORIF Tri-Malleolar Fracture without Fixation of the Posterior Lip, Right Syndesmosis Stabilization:   Right trimalleolar ankle fracture dislocation status post open reduction internal fixation with Dr. Castro on 9/26/24.   Patient tolerated procedure well.  Post op recommendations are:  Nonweightbearing right lower extremity short leg splint.  Encourage elevation.  Keep splint clean and dry.  Maintain until follow-up.  Aspirin 81 mg twice daily starting postop day 1 for DVT prophylaxis to be continued for 4 weeks.  PT and OT to evaluate and treat.- discharge to [facility], 2 person assist- no weight bearing to right leg  Follow-up with orthopedics  Dr. Castro in 2 weeks-has appointment scheduled on 10/10/24.  On routine acetaminophen 975 mg PO  TID.  On ibuprofen PRN and oxycodone PRN for pain.   Pain is around a 7.  The oxycodone helps to decrease the pain.  Patient seen today.  She is sitting up in a wheelchair in her room.  Cooperative, complaining of pain.  Reminded patient to elevate leg and ask for oxycodone PRN dose.  No complaints of headaches or dizziness.  No chest pain.     Benign essential HTN:  On Prazosin.  Recent BP: 115/87  No complaints of headaches or dizziness today.    GERD:  On Omeprazole.   No complaints of reflux or N/V.  No abdominal pain.     Major depressive disorder; Generalized anxiety disorder ; insomnia:  On sertraline and trazodone.  No complaints of increased depression, anxiety or insomnia.      Irritable bowel syndrome:  On dicyclomine PRN and Pepto-Bismol PRN.  Has chronic issues with IBS.   No complaints of abdominal pain today.    Moyamoya disease; Hemiplegia and hemiparesis following cerebral infarction affecting right dominant side;   History of strokes:  On aspirin.      DVT ppx:   Aspirin 81 mg  PO BID ordered for 4 weeks    Impaired gait and mobility; generalized muscle weakness:  Patient is non-weight bearing has splint on right lower leg.  Nonweightbearing right lower extremity short leg splint.    Encourage elevation.  Keep splint clean and dry.  Maintain until follow-up appointment with orthopedic doctor Dr. Castro.  Patient at the [facility] for skilled services/PT/OT.     Nausea and/ or vomiting:   On ondansetron.  No complaints of N/V today.    Allergic rhinitis:  On fluticasone nasal spray.  No complaints of allergic rhinitis today.  No headaches or dizziness.                      Reviewed [facility] records from recent hospitalization(s).  Reviewed  EMR  Reviewed medical, social, surgical and family history.  Reviewed all current medications and performed medication reconciliation.  Performed prescription drug management.  Reviewed vital signs AND lab results  Reviewed [facility]  Documentation  Discussed patient with nursing and .   Spent greater than 50 minutes on patient visit.                    ROS: 10 point ROS performed; Negative unless noted in HPI.      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), Breast pain (03/21/2024), Candidiasis of skin and nail (06/25/2014), Cellulitis of face (10/10/2013), Headache, unspecified (04/29/2015), Hypomagnesemia (01/23/2014), Other cysts of oral region, not elsewhere classified (06/10/2016), Other enthesopathies, not elsewhere classified (10/29/2015), Other specified abnormal findings of blood chemistry (03/06/2020), Other specified joint disorders, left ankle and foot (11/02/2016), Other specified noninflammatory disorders of vagina (03/20/2018), Pain in right knee (11/12/2018), Pain in right knee (02/19/2018), Pain in right knee (02/20/2019), Pain, unspecified (10/17/2017), Pelvic and perineal pain (01/24/2018), Personal history of diseases of the skin and subcutaneous tissue (02/24/2016), Personal history of other diseases of the digestive system (10/06/2017), Personal history of other diseases of the digestive system (03/17/2015), Personal history of other diseases of the digestive system (05/24/2016), Personal history of other diseases of the female genital tract (08/31/2015), Personal history of other diseases of the musculoskeletal system and connective tissue (02/04/2014), Personal history of other diseases of the nervous system and sense organs (01/05/2015), Personal history of other diseases of the respiratory system (08/29/2013), Personal history of other diseases of the respiratory system (02/03/2017), Personal history of other drug therapy (12/11/2017), Personal history of other endocrine, nutritional and metabolic disease  (05/09/2019), Personal history of other mental and behavioral disorders (10/06/2017), Personal history of transient ischemic attack (TIA), and cerebral infarction without residual deficits (10/06/2017), Pleurodynia (01/23/2014), Pyuria (06/13/2017), Sprain of medial collateral ligament of right knee, initial encounter (11/02/2016), Sprain of unspecified ligament of left ankle, subsequent encounter (10/29/2015), Strain of muscle and tendon of unspecified wall of thorax, initial encounter (02/04/2014), Syncope and collapse (06/13/2017), Unspecified fall, initial encounter (01/23/2014), and Unspecified symptoms and signs involving the genitourinary system (01/24/2018).    SURGICAL HISTORY:   MR head angio w IV contrast (8/18/2015);   US guided percutaneous peritoneal or retroperitoneal fluid collection drainage (4/10/2019);   and US guided abscess drain (4/10/2019).     SOCIAL HISTORY:  She reports that she has never smoked.   She has never used smokeless tobacco.   She reports that she does not drink alcohol and does not use drugs.    Family History   Problem Relation Name Age of Onset    Kidney cancer Mother        Liver cancer Mother        Pancreatic cancer Father        Glaucoma Other grandmother      Diabetes Other Grandfather           other type with arthropathy, with long term curent use of insulin    Diabetes Other aunt         ALLERGIES:  Bupropion  Ketamine  Augmentin [amoxicillin-pot clavulanate]  Penicillin g,   Penicillins        Lab Results   Component Value Date    WBC 10.1 09/28/2024    HGB 11.9 (L) 09/28/2024    HCT 36.5 09/28/2024     09/28/2024    CHOL 152 05/09/2024    TRIG 178 (H) 05/09/2024    HDL 33.6 05/09/2024    ALT 51 (H) 09/14/2024    AST 18 09/14/2024     09/27/2024    K 3.8 09/27/2024     09/27/2024    CREATININE 0.65 09/27/2024    BUN 7 09/27/2024    CO2 22 09/27/2024    TSH 5.14 (H) 05/09/2024    INR 1.0 09/13/2024             /87   Pulse 88   Temp 36.7 °C  "(98 °F)   Resp 18   Ht 1.676 m (5' 6\")   Wt 108 kg (237 lb 9.6 oz)   SpO2 98%   BMI 38.35 kg/m²      Physical Exam  Vitals and nursing note reviewed.   Constitutional:       Appearance: She is obese.   HENT:      Head: Normocephalic.      Right Ear: External ear normal.      Left Ear: External ear normal.      Nose: Nose normal.      Mouth/Throat:      Mouth: Mucous membranes are moist.      Pharynx: Oropharynx is clear.   Eyes:      Extraocular Movements: Extraocular movements intact.      Conjunctiva/sclera: Conjunctivae normal.      Pupils: Pupils are equal, round, and reactive to light.   Cardiovascular:      Rate and Rhythm: Normal rate and regular rhythm.      Pulses: Normal pulses.      Heart sounds: Normal heart sounds.   Pulmonary:      Effort: Pulmonary effort is normal.      Breath sounds: Normal breath sounds.   Abdominal:      General: Bowel sounds are normal.      Palpations: Abdomen is soft.   Musculoskeletal:         General: Normal range of motion.      Cervical back: Normal range of motion and neck supple.      Comments: Right lower extremity: splint intact.  Adequate movement and sensation in toes.  Cap refill < 3 seconds.   Feet:      Comments: Bilateral feet--adequate movement and sensation;  Cap refill < 3 seconds.   Skin:     General: Skin is warm and dry.   Neurological:      General: No focal deficit present.      Mental Status: She is alert and oriented to person, place, and time. Mental status is at baseline.      Motor: Weakness present.      Gait: Gait abnormal.   Psychiatric:         Mood and Affect: Mood is anxious and depressed.         Behavior: Behavior normal. Behavior is cooperative.          Assessment/Plan    Ordered CMP, CBC with diff. For tomorrow.    Right ankle fracture (right trimalleolar ankle fracture dislocation)   S/p closed reduction with concentric reduction;   right ankle pain;  ######Right Ankle ORIF Tri-Malleolar Fracture without Fixation of the Posterior Lip, " Right Syndesmosis Stabilization:   Right trimalleolar ankle fracture dislocation status post open reduction internal fixation with Dr. Castro on 9/26/24.   Patient tolerated procedure well.  Post op recommendations are:  Nonweightbearing right lower extremity short leg splint.  Encourage elevation.  Keep splint clean and dry.  Maintain until follow-up.  Aspirin 81 mg twice daily starting postop day 1 for DVT prophylaxis to be continued for 4 weeks.  Continue with PT and OT evaluate and treat.  At the [facility] for skilled services.  Requires 2 person assist.  No weight bearing to right leg.  Follow-up with orthopedics  Dr. Castro in 2 weeks-has appointment scheduled on 10/10/24.  Continue  routine acetaminophen 975 mg PO TID.  Continue ibuprofen PRN and oxycodone PRN for pain.     Benign essential HTN:  On Prazosin.  Recent BP: 115/87  No complaints of headaches or dizziness today.    GERD:  On Omeprazole.   No complaints of reflux or N/V.    Major depressive disorder; Generalized anxiety disorder ; insomnia:  On sertraline and trazodone.  No complaints of insomnia.      Irritable bowel syndrome:  On dicyclomine PRN and Pepto-Bismol PRN.  Has chronic issues with IBS.   No complaints of abdominal pain today.    Moyamoya disease; Hemiplegia and hemiparesis following cerebral infarction affecting right dominant side;   History of strokes:  Continue aspirin.  Follow up with neurology.       DVT ppx:   Aspirin 81 mg  PO BID ordered for 4 weeks    Impaired gait and mobility; generalized muscle weakness:  Patient is non-weight bearing has splint on right lower leg.  Nonweightbearing right lower extremity short leg splint.    Encourage elevation.  Keep splint clean and dry.  Maintain until follow-up.  Patient at the [facility] for skilled services/PT/OT.     Nausea and/ or vomiting:   On ondansetron.  No complaints of N/V today.    Allergic rhinitis:  On fluticasone nasal spray.  No complaints of allergic  rhinitis today.  No headaches or dizziness.          Problem List Items Addressed This Visit       Allergic rhinitis    Esophageal reflux    History of stroke with current residual effects    Moyamoya disease    Closed displaced trimalleolar fracture of right lower leg - Primary     Other Visit Diagnoses       H/O reduction of closed fracture        Acute right ankle pain        S/P ORIF (open reduction internal fixation) fracture        Benign essential HTN        Major depressive disorder with current active episode, unspecified depression episode severity, unspecified whether recurrent        Generalized anxiety disorder        Insomnia, unspecified type        Irritable bowel syndrome, unspecified type        Hemiplegia and hemiparesis following cerebral infarction affecting right dominant side (Multi)        On deep vein thrombosis (DVT) prophylaxis        Impaired gait and mobility        Generalized muscle weakness        Nausea and vomiting, unspecified vomiting type                  Paola Kessler, APRN-CNP

## 2024-10-07 ENCOUNTER — NURSING HOME VISIT (OUTPATIENT)
Dept: POST ACUTE CARE | Facility: EXTERNAL LOCATION | Age: 34
End: 2024-10-07
Payer: MEDICARE

## 2024-10-07 DIAGNOSIS — Z79.899 ON DEEP VEIN THROMBOSIS (DVT) PROPHYLAXIS: ICD-10-CM

## 2024-10-07 DIAGNOSIS — Z98.890 S/P ORIF (OPEN REDUCTION INTERNAL FIXATION) FRACTURE: ICD-10-CM

## 2024-10-07 DIAGNOSIS — Z87.81 S/P ORIF (OPEN REDUCTION INTERNAL FIXATION) FRACTURE: ICD-10-CM

## 2024-10-07 DIAGNOSIS — I69.351 HEMIPLEGIA AND HEMIPARESIS FOLLOWING CEREBRAL INFARCTION AFFECTING RIGHT DOMINANT SIDE (MULTI): ICD-10-CM

## 2024-10-07 DIAGNOSIS — I67.5 MOYAMOYA DISEASE: ICD-10-CM

## 2024-10-07 DIAGNOSIS — I10 BENIGN ESSENTIAL HTN: ICD-10-CM

## 2024-10-07 DIAGNOSIS — S82.851S CLOSED DISPLACED TRIMALLEOLAR FRACTURE OF RIGHT ANKLE, SEQUELA: ICD-10-CM

## 2024-10-07 DIAGNOSIS — Z87.81 H/O REDUCTION OF CLOSED FRACTURE: ICD-10-CM

## 2024-10-07 DIAGNOSIS — R26.89 IMPAIRED GAIT AND MOBILITY: ICD-10-CM

## 2024-10-07 DIAGNOSIS — F32.9 MAJOR DEPRESSIVE DISORDER WITH CURRENT ACTIVE EPISODE, UNSPECIFIED DEPRESSION EPISODE SEVERITY, UNSPECIFIED WHETHER RECURRENT: ICD-10-CM

## 2024-10-07 DIAGNOSIS — K58.9 IRRITABLE BOWEL SYNDROME, UNSPECIFIED TYPE: ICD-10-CM

## 2024-10-07 DIAGNOSIS — G47.00 INSOMNIA, UNSPECIFIED TYPE: ICD-10-CM

## 2024-10-07 DIAGNOSIS — M62.81 GENERALIZED MUSCLE WEAKNESS: ICD-10-CM

## 2024-10-07 DIAGNOSIS — K21.9 GASTROESOPHAGEAL REFLUX DISEASE, UNSPECIFIED WHETHER ESOPHAGITIS PRESENT: ICD-10-CM

## 2024-10-07 DIAGNOSIS — M25.571 ACUTE RIGHT ANKLE PAIN: Primary | ICD-10-CM

## 2024-10-07 DIAGNOSIS — F41.1 GENERALIZED ANXIETY DISORDER: ICD-10-CM

## 2024-10-07 PROCEDURE — 99309 SBSQ NF CARE MODERATE MDM 30: CPT | Performed by: NURSE PRACTITIONER

## 2024-10-07 NOTE — LETTER
Patient: Windy Lovelace  : 1990    Encounter Date: 10/07/2024    Name: Windy Lovelace    YOB: 1990    Code Status: FULL CODE    Chief Complaint:  Right ankle pain;   Right ankle fracture (right trimalleolar ankle fracture dislocation);  S/p closed reduction with concentric reduction;   Right Ankle ORIF Tri-Malleolar Fracture without Fixation of the Posterior Lip, Right Syndesmosis Stabilization; etc......       HPI   33 year old female with past medical history of stroke with current residual effects;  Migraine headache;   Moyamoya disease; Esophageal reflux;   Exotropia of right eye; History of neurodevelopmental disorder;   Obesity (BMI 30-39.9); and Hiatal hernia      Patient was admitted to [facility] from 24 to 24 (6 days)    Patient initially admitted to the [facility] on 24 for skilled services.    Patient readmitted to [facility] for surgery from 24 to 24.    Patient readmitted to the [facility] on 24 for skilled services.     Diagnoses as follows:    Right ankle pain; Right ankle fracture (right trimalleolar ankle fracture dislocation)   S/p closed reduction with concentric reduction;   Right Ankle ORIF Tri-Malleolar Fracture without Fixation of the Posterior Lip, Right Syndesmosis Stabilization:   Right trimalleolar ankle fracture dislocation status post open reduction internal fixation with Dr. Castro on 24.   Patient tolerated procedure well.  Patient initially presented to [facility] on 24 with an Right Ankle Fracture.    No acute orthopaedic surgical intervention was initially  indicated.   Surgery was on 24.  Post op recommendations after surgery are:  Nonweightbearing right lower extremity short leg splint.  Encourage elevation.  Keep splint clean and dry.  Maintain until follow-up.  Aspirin 81 mg twice daily starting postop day 1 for DVT prophylaxis to be continued for 4 weeks.  PT and OT to evaluate and treat.-  discharge to [facility], 2 person assist- no weight bearing to right leg  Follow-up with orthopedics  Dr. Castro this week-has appointment scheduled on 10/10/24.  On routine acetaminophen 975 mg PO TID.  On ibuprofen PRN and oxycodone PRN for pain.   Complaining of intermittent pain.  Reports pain a  7. Movement makes it worse.   The oxycodone helps to decrease the pain.  Reminded patient to ask for the oxycodone.   Patient seen today.  She is sitting up in a wheelchair in her room.  Cooperative, complaining of pain.  Reminded patient to elevate leg and ask for oxycodone PRN dose.  No complaints of headaches or dizziness.  No chest pain.     Major depressive disorder; Generalized anxiety disorder ; insomnia:  On sertraline and trazodone.  No complaints of increased depression, anxiety or insomnia.   Pleasant.  Smiles.  Follows commands.      Benign essential HTN:  On Prazosin.  Recent BP: 115/87  No complaints of headaches or dizziness today.    GERD:  On Omeprazole.   No complaints of reflux or N/V.  No abdominal pain.     DVT ppx:   Aspirin 81 mg  PO BID ordered for 4 weeks    Irritable bowel syndrome:  On dicyclomine PRN and Pepto-Bismol PRN.  Has chronic issues with IBS.   No complaints of diarrhea or abdominal pain today.    Hemiplegia and hemiparesis following cerebral infarction affecting right dominant side; Moyamoya disease:  History of strokes:  On aspirin.        Impaired gait and mobility; generalized muscle weakness:  Patient is non-weight bearing has splint on right lower leg.  Nonweightbearing right lower extremity short leg splint.    Encourage elevation.  Keep splint clean and dry.  Maintain until follow-up appointment with orthopedic doctor Dr. Castro.  Patient at the [facility] for skilled services/PT/OT.                           Reviewed  EMR  Reviewed medical, social, surgical and family history.  Reviewed all current medications and performed medication  reconciliation.  Performed prescription drug management.  Reviewed vital signs AND lab results  Reviewed [facility] Documentation  Discussed patient with nursing and .                       ROS: 10 point ROS performed; Negative unless noted in HPI.      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), Breast pain (03/21/2024), Candidiasis of skin and nail (06/25/2014), Cellulitis of face (10/10/2013), Headache, unspecified (04/29/2015), Hypomagnesemia (01/23/2014), Other cysts of oral region, not elsewhere classified (06/10/2016), Other enthesopathies, not elsewhere classified (10/29/2015), Other specified abnormal findings of blood chemistry (03/06/2020), Other specified joint disorders, left ankle and foot (11/02/2016), Other specified noninflammatory disorders of vagina (03/20/2018), Pain in right knee (11/12/2018), Pain in right knee (02/19/2018), Pain in right knee (02/20/2019), Pain, unspecified (10/17/2017), Pelvic and perineal pain (01/24/2018), Personal history of diseases of the skin and subcutaneous tissue (02/24/2016), Personal history of other diseases of the digestive system (10/06/2017), Personal history of other diseases of the digestive system (03/17/2015), Personal history of other diseases of the digestive system (05/24/2016), Personal history of other diseases of the female genital tract (08/31/2015), Personal history of other diseases of the musculoskeletal system and connective tissue (02/04/2014), Personal history of other diseases of the nervous system and sense organs (01/05/2015), Personal history of other diseases of the respiratory system (08/29/2013), Personal history of other diseases of the respiratory system (02/03/2017), Personal history of other drug therapy  (12/11/2017), Personal history of other endocrine, nutritional and metabolic disease (05/09/2019), Personal history of other mental and behavioral disorders (10/06/2017), Personal history of transient ischemic attack (TIA), and cerebral infarction without residual deficits (10/06/2017), Pleurodynia (01/23/2014), Pyuria (06/13/2017), Sprain of medial collateral ligament of right knee, initial encounter (11/02/2016), Sprain of unspecified ligament of left ankle, subsequent encounter (10/29/2015), Strain of muscle and tendon of unspecified wall of thorax, initial encounter (02/04/2014), Syncope and collapse (06/13/2017), Unspecified fall, initial encounter (01/23/2014), and Unspecified symptoms and signs involving the genitourinary system (01/24/2018).    SURGICAL HISTORY:   MR head angio w IV contrast (8/18/2015);   US guided percutaneous peritoneal or retroperitoneal fluid collection drainage (4/10/2019);   and US guided abscess drain (4/10/2019).     SOCIAL HISTORY:  She reports that she has never smoked.   She has never used smokeless tobacco.   She reports that she does not drink alcohol and does not use drugs.    Family History   Problem Relation Name Age of Onset   • Kidney cancer Mother       • Liver cancer Mother       • Pancreatic cancer Father       • Glaucoma Other grandmother     • Diabetes Other Grandfather           other type with arthropathy, with long term curent use of insulin   • Diabetes Other aunt         ALLERGIES:  Bupropion  Ketamine  Augmentin [amoxicillin-pot clavulanate]  Penicillin g,   Penicillins        Lab Results   Component Value Date    WBC 10.1 09/28/2024    HGB 11.9 (L) 09/28/2024    HCT 36.5 09/28/2024     09/28/2024    CHOL 152 05/09/2024    TRIG 178 (H) 05/09/2024    HDL 33.6 05/09/2024    ALT 51 (H) 09/14/2024    AST 18 09/14/2024     09/27/2024    K 3.8 09/27/2024     09/27/2024    CREATININE 0.65 09/27/2024    BUN 7 09/27/2024    CO2 22 09/27/2024    TSH  "5.14 (H) 05/09/2024    INR 1.0 09/13/2024             /87   Pulse 88   Temp 36.7 °C (98 °F)   Resp 18   Ht 1.676 m (5' 6\")   Wt 109 kg (239 lb 12.8 oz)   SpO2 98%   BMI 38.70 kg/m²      Physical Exam  Vitals and nursing note reviewed.   Constitutional:       Appearance: She is obese.   HENT:      Head: Normocephalic.      Right Ear: External ear normal.      Left Ear: External ear normal.      Nose: Nose normal.      Mouth/Throat:      Mouth: Mucous membranes are moist.      Pharynx: Oropharynx is clear.   Eyes:      Extraocular Movements: Extraocular movements intact.      Conjunctiva/sclera: Conjunctivae normal.      Pupils: Pupils are equal, round, and reactive to light.   Cardiovascular:      Rate and Rhythm: Normal rate and regular rhythm.      Pulses: Normal pulses.      Heart sounds: Normal heart sounds.   Pulmonary:      Effort: Pulmonary effort is normal.      Breath sounds: Normal breath sounds.   Abdominal:      General: Bowel sounds are normal.      Palpations: Abdomen is soft.   Musculoskeletal:         General: Normal range of motion.      Cervical back: Normal range of motion and neck supple.      Comments: Right lower extremity: splint intact.  Adequate movement and sensation in toes.  Cap refill < 3 seconds.   Feet:      Comments: Bilateral feet--adequate movement and sensation;  Cap refill < 3 seconds.   Skin:     General: Skin is warm and dry.   Neurological:      General: No focal deficit present.      Mental Status: She is alert and oriented to person, place, and time. Mental status is at baseline.      Motor: Weakness present.      Gait: Gait abnormal.   Psychiatric:         Mood and Affect: Mood is anxious and depressed.         Behavior: Behavior normal. Behavior is cooperative.          Assessment/Plan   Ordered CMP, CBC with diff. For Wednesday.    Right ankle pain; Right ankle fracture (right trimalleolar ankle fracture dislocation)   S/p closed reduction with concentric " reduction;   Right Ankle ORIF Tri-Malleolar Fracture without Fixation of the Posterior Lip, Right Syndesmosis Stabilization:   Right trimalleolar ankle fracture dislocation status post open reduction internal fixation with Dr. Castro on 9/26/24.   Surgery was on 9/26/24.  Post op recommendations after surgery are:  Nonweightbearing right lower extremity short leg splint.  Encourage elevation.  Keep splint clean and dry.  Maintain until follow-up.  Aspirin 81 mg twice daily starting postop day 1 for DVT prophylaxis to be continued for 4 weeks.  PT and OT to evaluate and treat.- discharge to [facility],  person assist- no weight bearing to right leg  Follow-up with orthopedics  Dr. Castro this week-has appointment scheduled on 10/10/24.  Continue routine acetaminophen 975 mg PO TID.  Continue ibuprofen PRN and oxycodone PRN for pain.   Pain is around a 7.    Major depressive disorder; Generalized anxiety disorder; insomnia:  Continue sertraline and trazodone.     Benign essential HTN:  Continue Prazosin.  Monitor Bps.     GERD:  Continue Omeprazole.     DVT ppx:   Continue Aspirin 81 mg  PO BID as  ordered for 4 weeks    Irritable bowel syndrome:  Continue dicyclomine PRN and Pepto-Bismol PRN.  Monitor for diarrhea or abdominal pain.    Hemiplegia and hemiparesis following cerebral infarction affecting right dominant side; Moyamoya disease:  History of strokes:  Continue aspirin.      Impaired gait and mobility; generalized muscle weakness:  Patient is non-weight bearing has splint on right lower leg.  Nonweightbearing right lower extremity short leg splint.    Encourage elevation.  Keep splint clean and dry.    Maintain until follow-up appointment with orthopedic doctor Dr. Castro.  Continue at  [facility] for skilled services/PT/OT.         Problem List Items Addressed This Visit       Esophageal reflux    Moyamoya disease    Closed displaced trimalleolar fracture of right lower leg     Other Visit  Diagnoses       Acute right ankle pain    -  Primary    S/P ORIF (open reduction internal fixation) fracture        H/O reduction of closed fracture        Major depressive disorder with current active episode, unspecified depression episode severity, unspecified whether recurrent        Generalized anxiety disorder        Insomnia, unspecified type        Benign essential HTN        On deep vein thrombosis (DVT) prophylaxis        Irritable bowel syndrome, unspecified type        Hemiplegia and hemiparesis following cerebral infarction affecting right dominant side (Multi)        Impaired gait and mobility        Generalized muscle weakness                    NADEEM Clement       Electronically Signed By: NADEEM Clement   10/10/24  4:41 PM

## 2024-10-10 ENCOUNTER — OFFICE VISIT (OUTPATIENT)
Dept: ORTHOPEDIC SURGERY | Facility: CLINIC | Age: 34
End: 2024-10-10
Payer: MEDICARE

## 2024-10-10 VITALS
OXYGEN SATURATION: 98 % | HEIGHT: 66 IN | BODY MASS INDEX: 38.54 KG/M2 | DIASTOLIC BLOOD PRESSURE: 87 MMHG | WEIGHT: 239.8 LBS | RESPIRATION RATE: 18 BRPM | TEMPERATURE: 98 F | HEART RATE: 88 BPM | SYSTOLIC BLOOD PRESSURE: 115 MMHG

## 2024-10-10 DIAGNOSIS — S82.851S CLOSED DISPLACED TRIMALLEOLAR FRACTURE OF RIGHT ANKLE, SEQUELA: ICD-10-CM

## 2024-10-10 PROCEDURE — 99211 OFF/OP EST MAY X REQ PHY/QHP: CPT | Performed by: STUDENT IN AN ORGANIZED HEALTH CARE EDUCATION/TRAINING PROGRAM

## 2024-10-10 ASSESSMENT — PAIN DESCRIPTION - DESCRIPTORS: DESCRIPTORS: ACHING;THROBBING

## 2024-10-10 ASSESSMENT — PAIN - FUNCTIONAL ASSESSMENT: PAIN_FUNCTIONAL_ASSESSMENT: 0-10

## 2024-10-10 ASSESSMENT — PAIN SCALES - GENERAL: PAINLEVEL_OUTOF10: 8

## 2024-10-10 NOTE — PROGRESS NOTES
ORTHOPAEDIC SURGERY OUTPATIENT PROGRESS NOTE    Chief Complaint:  Right ankle pain    History Of Present Illness  Windy Lovelace is a 34 y.o. female with a past medical history of moyamoya, CVA as well as developmental delay who presented after ground-level fall with a right trimalleolar ankle fracture and dislocation.  The patient was closed reduced by the orthopedic team, I was not on-call at the time the patient's initial presentation was asked by one of my partners to assume care.  The patient was subsequently discharged and return for surgery, unfortunately on return she had been walking on her splint.  She underwent right trimalleolar ankle fracture ORIF without fixation of the posterior lip and with syndesmotic stabilization from 09/26/2024. She has been residing in a SNF and has been compliant with weight-bearing restrictions. Reporting 8/10 pain. Encounter with POA on facetime in the room.    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    Headache, unspecified 04/29/2015    Severe headache    Hiatal hernia     LFTs abnormal     Mares mares disease     Obesity (BMI 30-39.9)     Personal history of other mental and behavioral disorders 10/06/2017    History of mental retardation    Syncope and collapse 06/13/2017    Syncope and collapse       Surgical History  Recent Surgeries in Orthopaedic Surgery            No cases to display             Social History  Social History     Socioeconomic History    Marital status: Single   Tobacco Use    Smoking status: Never    Smokeless tobacco: Never   Vaping Use    Vaping status: Never Used   Substance and Sexual Activity    Alcohol use: Never    Drug use: Never    Sexual activity: Not Currently     Birth control/protection: I.U.D.     Social Determinants of Health     Financial Resource Strain: Low Risk  (9/27/2024)    Overall  Financial Resource Strain (CARDIA)     Difficulty of Paying Living Expenses: Not hard at all   Food Insecurity: Not on File (9/26/2024)    Received from [employer]    Food Insecurity     Food: 0   Transportation Needs: No Transportation Needs (9/27/2024)    PRAPARE - Transportation     Lack of Transportation (Medical): No     Lack of Transportation (Non-Medical): No   Physical Activity: Not on File (5/21/2021)    Received from [employer]    Physical Activity     Physical Activity: 0   Stress: Not on File (5/21/2021)    Received from [employer]    Stress     Stress: 0   Social Connections: Not on File (9/12/2024)    Received from [employer]    Social Connections     Connectedness: 0   Recent Concern: Social Connections - Feeling Somewhat Isolated (6/28/2024)    OASIS : Social Isolation     Frequency of experiencing loneliness or isolation: Sometimes   Housing Stability: Low Risk  (9/27/2024)    Housing Stability Vital Sign     Unable to Pay for Housing in the Last Year: No     Number of Times Moved in the Last Year: 0     Homeless in the Last Year: No       Family History  Family History   Problem Relation Name Age of Onset    Kidney cancer Mother      Liver cancer Mother      Pancreatic cancer Father      Glaucoma Other grandmother     Diabetes Other Grandfather         other type with arthropathy, with long term curent use of insulin    Diabetes Other aunt         other type with arthropathy, with long term curent use of insulin        Allergies  Allergies   Allergen Reactions    Bupropion Unknown and Hives    Ketamine Unknown, Anaphylaxis and Rash    Augmentin [Amoxicillin-Pot Clavulanate] Hives    Penicillin G Hives    Penicillins Unknown and Hives       Review of Systems  REVIEW OF SYSTEMS  Constitutional: no unplanned weight loss  Psychiatric: no suicidal ideation  ENT: no vision changes, no sinus problems  Pulmonary: no shortness of breath  Lymphatic: no enlarged lymph nodes  Cardiovascular: no chest pain or  shortness of breath  Gastrointestinal: no stomach problems  Genitourinary: no dysuria   Skin: no rashes  Endocrine: no thyroid problems  Neurological: no headache, no numbness  Hematological: no easy bruising  Musculoskeletal: Right ankle pain     Physical Exam  PHYSICAL EXAMINATION  Constitutional Exam: well developed and well nourished  Psychiatric Exam: alert and oriented, appropriate mood and behavior  Eye Exam: EOMI  Pulmonary Exam: breathing non-labored, no apparent distress  Lymphatic exam: no appreciable lymphadenopathy in the lower extremities  Cardiovascular exam: RRR to peripheral palpation, DP pulses 2+, PT 2+, toes are pink with good capillary refill, no pitting edema  Skin exam: no open lesions, rashes, abrasions or ulcerations  Neurological exam: sensation to light touch intact in both lower extremities in peripheral and dermatomal distributions (except for any abnormalities noted in musculoskeletal exam)    Musculoskeletal exam: Right lower extremity examination.  Patient lateral and medial ankle incisions with suture line and skin edges well-approximated.  There is expected dior-incisional erythema with minimal tenderness to palpation.  Patient has no obvious ankle effusion.  Patient has sensation grossly intact to light touch in a saphenous, sural, superficial peroneal, deep peroneal and tibial nerve distribution.  She has intact PF/DF/EHL.  She is 2+ DP/PT pulse palpated.     Last Recorded Vitals  There were no vitals taken for this visit.    Laboratory Results  No results found for this or any previous visit (from the past 24 hour(s)).     Radiology Results  No new imaging at this visit.    Assessment/Plan:  34 y.o. female with a past medical history of moyamoya, CVA as well as developmental delay I would recommend the patient continue nonweightbearing to right lower extremity short leg fiberglass cast.  I will retain her sutures today.  She will continue on aspirin for DVT prophylaxis.  s/p  right trimalleolar ankle fracture ORIF without fixation of the posterior lip and with syndesmotic stabilization from 09/26/2024.  I will plan to see the patient back in approximately 2 weeks for repeat clinical and radiographic evaluation.  I would anticipate removing the patient's sutures at that time I discussed a period of prolonged immobilization and nonweightbearing cast, up to 3 months.  Upon return patient would require three-view nonweightbearing right ankle out of fiberglass.    Timothy Castro MD, MACI  Department of Orthopaedic Surgery  [facility]     The diagnosis and treatment plan were reviewed with the patient. All questions were answered. The patient verbalized understanding of the treatment plan. There were no barriers to understanding identified.    Note dictated with coin4ce software.  Completed without full type editing and sent to avoid delay.

## 2024-10-10 NOTE — PROGRESS NOTES
Name: Windy Lovelace    YOB: 1990    Code Status: FULL CODE    Chief Complaint:  Right ankle pain;   Right ankle fracture (right trimalleolar ankle fracture dislocation);  S/p closed reduction with concentric reduction;   Right Ankle ORIF Tri-Malleolar Fracture without Fixation of the Posterior Lip, Right Syndesmosis Stabilization; etc......       HPI   33 year old female with past medical history of stroke with current residual effects;  Migraine headache;   Moyamoya disease; Esophageal reflux;   Exotropia of right eye; History of neurodevelopmental disorder;   Obesity (BMI 30-39.9); and Hiatal hernia      Patient was admitted to [facility] from 9/13/24 to 9/19/24 (6 days)    Patient initially admitted to the [facility] on 9/19/24 for skilled services.    Patient readmitted to [facility] for surgery from 9/26/24 to 9/28/24.    Patient readmitted to the [facility] on 9/28/24 for skilled services.     Diagnoses as follows:    Right ankle pain; Right ankle fracture (right trimalleolar ankle fracture dislocation)   S/p closed reduction with concentric reduction;   Right Ankle ORIF Tri-Malleolar Fracture without Fixation of the Posterior Lip, Right Syndesmosis Stabilization:   Right trimalleolar ankle fracture dislocation status post open reduction internal fixation with Dr. Castro on 9/26/24.   Patient tolerated procedure well.  Patient initially presented to [facility] on 9/13/24 with an Right Ankle Fracture.    No acute orthopaedic surgical intervention was initially  indicated.   Surgery was on 9/26/24.  Post op recommendations after surgery are:  Nonweightbearing right lower extremity short leg splint.  Encourage elevation.  Keep splint clean and dry.  Maintain until follow-up.  Aspirin 81 mg twice daily starting postop day 1 for DVT prophylaxis to be continued for 4 weeks.  PT and OT to evaluate and treat.- discharge to [facility], [facility], 2 person assist- no weight bearing to right  leg  Follow-up with orthopedics  Dr. Castro this week-has appointment scheduled on 10/10/24.  On routine acetaminophen 975 mg PO TID.  On ibuprofen PRN and oxycodone PRN for pain.   Complaining of intermittent pain.  Reports pain a  7. Movement makes it worse.   The oxycodone helps to decrease the pain.  Reminded patient to ask for the oxycodone.   Patient seen today.  She is sitting up in a wheelchair in her room.  Cooperative, complaining of pain.  Reminded patient to elevate leg and ask for oxycodone PRN dose.  No complaints of headaches or dizziness.  No chest pain.     Major depressive disorder; Generalized anxiety disorder ; insomnia:  On sertraline and trazodone.  No complaints of increased depression, anxiety or insomnia.   Pleasant.  Smiles.  Follows commands.      Benign essential HTN:  On Prazosin.  Recent BP: 115/87  No complaints of headaches or dizziness today.    GERD:  On Omeprazole.   No complaints of reflux or N/V.  No abdominal pain.     DVT ppx:   Aspirin 81 mg  PO BID ordered for 4 weeks    Irritable bowel syndrome:  On dicyclomine PRN and Pepto-Bismol PRN.  Has chronic issues with IBS.   No complaints of diarrhea or abdominal pain today.    Hemiplegia and hemiparesis following cerebral infarction affecting right dominant side; Moyamoya disease:  History of strokes:  On aspirin.        Impaired gait and mobility; generalized muscle weakness:  Patient is non-weight bearing has splint on right lower leg.  Nonweightbearing right lower extremity short leg splint.    Encourage elevation.  Keep splint clean and dry.  Maintain until follow-up appointment with orthopedic doctor Dr. Castro.  Patient at the [facility] for skilled services/PT/OT.                           Reviewed  EMR  Reviewed medical, social, surgical and family history.  Reviewed all current medications and performed medication reconciliation.  Performed prescription drug management.  Reviewed vital signs AND lab  results  Reviewed [facility] Documentation  Discussed patient with nursing and .                       ROS: 10 point ROS performed; Negative unless noted in HPI.      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), Breast pain (03/21/2024), Candidiasis of skin and nail (06/25/2014), Cellulitis of face (10/10/2013), Headache, unspecified (04/29/2015), Hypomagnesemia (01/23/2014), Other cysts of oral region, not elsewhere classified (06/10/2016), Other enthesopathies, not elsewhere classified (10/29/2015), Other specified abnormal findings of blood chemistry (03/06/2020), Other specified joint disorders, left ankle and foot (11/02/2016), Other specified noninflammatory disorders of vagina (03/20/2018), Pain in right knee (11/12/2018), Pain in right knee (02/19/2018), Pain in right knee (02/20/2019), Pain, unspecified (10/17/2017), Pelvic and perineal pain (01/24/2018), Personal history of diseases of the skin and subcutaneous tissue (02/24/2016), Personal history of other diseases of the digestive system (10/06/2017), Personal history of other diseases of the digestive system (03/17/2015), Personal history of other diseases of the digestive system (05/24/2016), Personal history of other diseases of the female genital tract (08/31/2015), Personal history of other diseases of the musculoskeletal system and connective tissue (02/04/2014), Personal history of other diseases of the nervous system and sense organs (01/05/2015), Personal history of other diseases of the respiratory system (08/29/2013), Personal history of other diseases of the respiratory system (02/03/2017), Personal history of other drug therapy (12/11/2017), Personal history of other endocrine, nutritional and metabolic disease  (05/09/2019), Personal history of other mental and behavioral disorders (10/06/2017), Personal history of transient ischemic attack (TIA), and cerebral infarction without residual deficits (10/06/2017), Pleurodynia (01/23/2014), Pyuria (06/13/2017), Sprain of medial collateral ligament of right knee, initial encounter (11/02/2016), Sprain of unspecified ligament of left ankle, subsequent encounter (10/29/2015), Strain of muscle and tendon of unspecified wall of thorax, initial encounter (02/04/2014), Syncope and collapse (06/13/2017), Unspecified fall, initial encounter (01/23/2014), and Unspecified symptoms and signs involving the genitourinary system (01/24/2018).    SURGICAL HISTORY:   MR head angio w IV contrast (8/18/2015);   US guided percutaneous peritoneal or retroperitoneal fluid collection drainage (4/10/2019);   and US guided abscess drain (4/10/2019).     SOCIAL HISTORY:  She reports that she has never smoked.   She has never used smokeless tobacco.   She reports that she does not drink alcohol and does not use drugs.    Family History   Problem Relation Name Age of Onset    Kidney cancer Mother        Liver cancer Mother        Pancreatic cancer Father        Glaucoma Other grandmother      Diabetes Other Grandfather           other type with arthropathy, with long term curent use of insulin    Diabetes Other aunt         ALLERGIES:  Bupropion  Ketamine  Augmentin [amoxicillin-pot clavulanate]  Penicillin g,   Penicillins        Lab Results   Component Value Date    WBC 10.1 09/28/2024    HGB 11.9 (L) 09/28/2024    HCT 36.5 09/28/2024     09/28/2024    CHOL 152 05/09/2024    TRIG 178 (H) 05/09/2024    HDL 33.6 05/09/2024    ALT 51 (H) 09/14/2024    AST 18 09/14/2024     09/27/2024    K 3.8 09/27/2024     09/27/2024    CREATININE 0.65 09/27/2024    BUN 7 09/27/2024    CO2 22 09/27/2024    TSH 5.14 (H) 05/09/2024    INR 1.0 09/13/2024             /87   Pulse 88   Temp 36.7 °C  "(98 °F)   Resp 18   Ht 1.676 m (5' 6\")   Wt 109 kg (239 lb 12.8 oz)   SpO2 98%   BMI 38.70 kg/m²      Physical Exam  Vitals and nursing note reviewed.   Constitutional:       Appearance: She is obese.   HENT:      Head: Normocephalic.      Right Ear: External ear normal.      Left Ear: External ear normal.      Nose: Nose normal.      Mouth/Throat:      Mouth: Mucous membranes are moist.      Pharynx: Oropharynx is clear.   Eyes:      Extraocular Movements: Extraocular movements intact.      Conjunctiva/sclera: Conjunctivae normal.      Pupils: Pupils are equal, round, and reactive to light.   Cardiovascular:      Rate and Rhythm: Normal rate and regular rhythm.      Pulses: Normal pulses.      Heart sounds: Normal heart sounds.   Pulmonary:      Effort: Pulmonary effort is normal.      Breath sounds: Normal breath sounds.   Abdominal:      General: Bowel sounds are normal.      Palpations: Abdomen is soft.   Musculoskeletal:         General: Normal range of motion.      Cervical back: Normal range of motion and neck supple.      Comments: Right lower extremity: splint intact.  Adequate movement and sensation in toes.  Cap refill < 3 seconds.   Feet:      Comments: Bilateral feet--adequate movement and sensation;  Cap refill < 3 seconds.   Skin:     General: Skin is warm and dry.   Neurological:      General: No focal deficit present.      Mental Status: She is alert and oriented to person, place, and time. Mental status is at baseline.      Motor: Weakness present.      Gait: Gait abnormal.   Psychiatric:         Mood and Affect: Mood is anxious and depressed.         Behavior: Behavior normal. Behavior is cooperative.          Assessment/Plan    Ordered CMP, CBC with diff. For Wednesday.    Right ankle pain; Right ankle fracture (right trimalleolar ankle fracture dislocation)   S/p closed reduction with concentric reduction;   Right Ankle ORIF Tri-Malleolar Fracture without Fixation of the Posterior Lip, " Right Syndesmosis Stabilization:   Right trimalleolar ankle fracture dislocation status post open reduction internal fixation with Dr. Castro on 9/26/24.   Surgery was on 9/26/24.  Post op recommendations after surgery are:  Nonweightbearing right lower extremity short leg splint.  Encourage elevation.  Keep splint clean and dry.  Maintain until follow-up.  Aspirin 81 mg twice daily starting postop day 1 for DVT prophylaxis to be continued for 4 weeks.  PT and OT to evaluate and treat.- discharge to [facility], [facility],  person assist- no weight bearing to right leg  Follow-up with orthopedics  Dr. Castro this week-has appointment scheduled on 10/10/24.  Continue routine acetaminophen 975 mg PO TID.  Continue ibuprofen PRN and oxycodone PRN for pain.   Pain is around a 7.    Major depressive disorder; Generalized anxiety disorder; insomnia:  Continue sertraline and trazodone.     Benign essential HTN:  Continue Prazosin.  Monitor Bps.     GERD:  Continue Omeprazole.     DVT ppx:   Continue Aspirin 81 mg  PO BID as  ordered for 4 weeks    Irritable bowel syndrome:  Continue dicyclomine PRN and Pepto-Bismol PRN.  Monitor for diarrhea or abdominal pain.    Hemiplegia and hemiparesis following cerebral infarction affecting right dominant side; Moyamoya disease:  History of strokes:  Continue aspirin.      Impaired gait and mobility; generalized muscle weakness:  Patient is non-weight bearing has splint on right lower leg.  Nonweightbearing right lower extremity short leg splint.    Encourage elevation.  Keep splint clean and dry.    Maintain until follow-up appointment with orthopedic doctor Dr. Castro.  Continue at  [facility] for skilled services/PT/OT.         Problem List Items Addressed This Visit       Esophageal reflux    Moyamoya disease    Closed displaced trimalleolar fracture of right lower leg     Other Visit Diagnoses       Acute right ankle pain    -  Primary    S/P ORIF (open reduction internal  fixation) fracture        H/O reduction of closed fracture        Major depressive disorder with current active episode, unspecified depression episode severity, unspecified whether recurrent        Generalized anxiety disorder        Insomnia, unspecified type        Benign essential HTN        On deep vein thrombosis (DVT) prophylaxis        Irritable bowel syndrome, unspecified type        Hemiplegia and hemiparesis following cerebral infarction affecting right dominant side (Multi)        Impaired gait and mobility        Generalized muscle weakness                    Paola Kessler, APRN-CNP

## 2024-10-14 ENCOUNTER — NURSING HOME VISIT (OUTPATIENT)
Dept: POST ACUTE CARE | Facility: EXTERNAL LOCATION | Age: 34
End: 2024-10-14
Payer: MEDICAID

## 2024-10-14 DIAGNOSIS — I67.5 MOYAMOYA DISEASE: ICD-10-CM

## 2024-10-14 DIAGNOSIS — Z87.81 S/P ORIF (OPEN REDUCTION INTERNAL FIXATION) FRACTURE: ICD-10-CM

## 2024-10-14 DIAGNOSIS — K58.9 IRRITABLE BOWEL SYNDROME, UNSPECIFIED TYPE: ICD-10-CM

## 2024-10-14 DIAGNOSIS — J30.9 ALLERGIC RHINITIS, UNSPECIFIED SEASONALITY, UNSPECIFIED TRIGGER: ICD-10-CM

## 2024-10-14 DIAGNOSIS — M25.571 ACUTE RIGHT ANKLE PAIN: ICD-10-CM

## 2024-10-14 DIAGNOSIS — S82.851S CLOSED DISPLACED TRIMALLEOLAR FRACTURE OF RIGHT ANKLE, SEQUELA: ICD-10-CM

## 2024-10-14 DIAGNOSIS — Z79.899 ON DEEP VEIN THROMBOSIS (DVT) PROPHYLAXIS: ICD-10-CM

## 2024-10-14 DIAGNOSIS — F41.1 GENERALIZED ANXIETY DISORDER: Primary | ICD-10-CM

## 2024-10-14 DIAGNOSIS — Z98.890 S/P ORIF (OPEN REDUCTION INTERNAL FIXATION) FRACTURE: ICD-10-CM

## 2024-10-14 DIAGNOSIS — I69.351 HEMIPLEGIA AND HEMIPARESIS FOLLOWING CEREBRAL INFARCTION AFFECTING RIGHT DOMINANT SIDE (MULTI): ICD-10-CM

## 2024-10-14 DIAGNOSIS — J45.909 ASTHMA, UNSPECIFIED ASTHMA SEVERITY, UNSPECIFIED WHETHER COMPLICATED, UNSPECIFIED WHETHER PERSISTENT (HHS-HCC): ICD-10-CM

## 2024-10-14 DIAGNOSIS — Z87.81 H/O REDUCTION OF CLOSED FRACTURE: ICD-10-CM

## 2024-10-14 DIAGNOSIS — I69.30 HISTORY OF STROKE WITH CURRENT RESIDUAL EFFECTS: ICD-10-CM

## 2024-10-14 DIAGNOSIS — K21.9 GASTROESOPHAGEAL REFLUX DISEASE, UNSPECIFIED WHETHER ESOPHAGITIS PRESENT: ICD-10-CM

## 2024-10-14 DIAGNOSIS — R26.89 IMPAIRED GAIT AND MOBILITY: ICD-10-CM

## 2024-10-14 DIAGNOSIS — M62.81 GENERALIZED MUSCLE WEAKNESS: ICD-10-CM

## 2024-10-14 DIAGNOSIS — F32.9 MAJOR DEPRESSIVE DISORDER WITH CURRENT ACTIVE EPISODE, UNSPECIFIED DEPRESSION EPISODE SEVERITY, UNSPECIFIED WHETHER RECURRENT: ICD-10-CM

## 2024-10-14 DIAGNOSIS — G47.00 INSOMNIA, UNSPECIFIED TYPE: ICD-10-CM

## 2024-10-14 DIAGNOSIS — I10 BENIGN ESSENTIAL HTN: ICD-10-CM

## 2024-10-14 PROCEDURE — 99309 SBSQ NF CARE MODERATE MDM 30: CPT | Performed by: NURSE PRACTITIONER
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
## 2024-10-16 ENCOUNTER — APPOINTMENT (OUTPATIENT)
Dept: OTOLARYNGOLOGY | Facility: CLINIC | Age: 34
End: 2024-10-16
Payer: MEDICARE

## 2024-10-16 ENCOUNTER — TELEPHONE (OUTPATIENT)
Dept: OTOLARYNGOLOGY | Facility: CLINIC | Age: 34
End: 2024-10-16

## 2024-10-17 VITALS
DIASTOLIC BLOOD PRESSURE: 87 MMHG | OXYGEN SATURATION: 98 % | HEIGHT: 66 IN | RESPIRATION RATE: 18 BRPM | TEMPERATURE: 97.7 F | SYSTOLIC BLOOD PRESSURE: 115 MMHG | HEART RATE: 88 BPM | BODY MASS INDEX: 38.54 KG/M2 | WEIGHT: 239.8 LBS
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
## 2024-10-21 ENCOUNTER — NURSING HOME VISIT (OUTPATIENT)
Dept: POST ACUTE CARE | Facility: EXTERNAL LOCATION | Age: 34
End: 2024-10-21
Payer: MEDICARE

## 2024-10-21 DIAGNOSIS — Z87.81 S/P ORIF (OPEN REDUCTION INTERNAL FIXATION) FRACTURE: ICD-10-CM

## 2024-10-21 DIAGNOSIS — F41.1 GENERALIZED ANXIETY DISORDER: ICD-10-CM

## 2024-10-21 DIAGNOSIS — Z87.81 H/O REDUCTION OF CLOSED FRACTURE: ICD-10-CM

## 2024-10-21 DIAGNOSIS — J45.909 ASTHMA, UNSPECIFIED ASTHMA SEVERITY, UNSPECIFIED WHETHER COMPLICATED, UNSPECIFIED WHETHER PERSISTENT (HHS-HCC): ICD-10-CM

## 2024-10-21 DIAGNOSIS — M25.571 RIGHT ANKLE PAIN, UNSPECIFIED CHRONICITY: ICD-10-CM

## 2024-10-21 DIAGNOSIS — K58.9 IRRITABLE BOWEL SYNDROME, UNSPECIFIED TYPE: ICD-10-CM

## 2024-10-21 DIAGNOSIS — K21.9 GASTROESOPHAGEAL REFLUX DISEASE, UNSPECIFIED WHETHER ESOPHAGITIS PRESENT: ICD-10-CM

## 2024-10-21 DIAGNOSIS — Z98.890 S/P ORIF (OPEN REDUCTION INTERNAL FIXATION) FRACTURE: ICD-10-CM

## 2024-10-21 DIAGNOSIS — I69.351 HEMIPLEGIA AND HEMIPARESIS FOLLOWING CEREBRAL INFARCTION AFFECTING RIGHT DOMINANT SIDE (MULTI): ICD-10-CM

## 2024-10-21 DIAGNOSIS — Z79.899 ON DEEP VEIN THROMBOSIS (DVT) PROPHYLAXIS: ICD-10-CM

## 2024-10-21 DIAGNOSIS — I10 BENIGN ESSENTIAL HTN: ICD-10-CM

## 2024-10-21 DIAGNOSIS — J30.9 ALLERGIC RHINITIS, UNSPECIFIED SEASONALITY, UNSPECIFIED TRIGGER: ICD-10-CM

## 2024-10-21 DIAGNOSIS — F32.9 MAJOR DEPRESSIVE DISORDER WITH CURRENT ACTIVE EPISODE, UNSPECIFIED DEPRESSION EPISODE SEVERITY, UNSPECIFIED WHETHER RECURRENT: ICD-10-CM

## 2024-10-21 DIAGNOSIS — S82.851S CLOSED DISPLACED TRIMALLEOLAR FRACTURE OF RIGHT ANKLE, SEQUELA: Primary | ICD-10-CM

## 2024-10-21 PROCEDURE — 99309 SBSQ NF CARE MODERATE MDM 30: CPT | Performed by: NURSE PRACTITIONER
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
## 2024-10-23 VITALS
SYSTOLIC BLOOD PRESSURE: 115 MMHG | BODY MASS INDEX: 38.54 KG/M2 | WEIGHT: 239.8 LBS | RESPIRATION RATE: 18 BRPM | HEIGHT: 66 IN | DIASTOLIC BLOOD PRESSURE: 87 MMHG | HEART RATE: 80 BPM | OXYGEN SATURATION: 98 %
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
## 2024-10-24 ENCOUNTER — HOSPITAL ENCOUNTER (OUTPATIENT)
Dept: RADIOLOGY | Facility: CLINIC | Age: 34
Discharge: HOME | End: 2024-10-24
Payer: MEDICARE

## 2024-10-24 ENCOUNTER — OFFICE VISIT (OUTPATIENT)
Dept: ORTHOPEDIC SURGERY | Facility: CLINIC | Age: 34
End: 2024-10-24
Payer: MEDICARE

## 2024-10-24 DIAGNOSIS — S82.851S CLOSED DISPLACED TRIMALLEOLAR FRACTURE OF RIGHT ANKLE, SEQUELA: ICD-10-CM

## 2024-10-24 PROBLEM — M54.50 LOW BACK PAIN: Status: ACTIVE | Noted: 2024-10-24

## 2024-10-24 PROBLEM — K90.829 SHORT BOWEL SYNDROME: Status: ACTIVE | Noted: 2024-10-24

## 2024-10-24 PROBLEM — G47.00 INSOMNIA: Status: ACTIVE | Noted: 2024-10-24

## 2024-10-24 PROBLEM — M79.601 CHRONIC PAIN OF RIGHT UPPER EXTREMITY: Status: ACTIVE | Noted: 2024-10-24

## 2024-10-24 PROBLEM — K21.9 GASTROESOPHAGEAL REFLUX DISEASE: Status: ACTIVE | Noted: 2024-10-24

## 2024-10-24 PROBLEM — G89.29 CHRONIC PAIN OF RIGHT UPPER EXTREMITY: Status: ACTIVE | Noted: 2024-10-24

## 2024-10-24 PROBLEM — J40 BRONCHITIS: Status: ACTIVE | Noted: 2024-10-24

## 2024-10-24 PROBLEM — M25.579 ANKLE PAIN: Status: ACTIVE | Noted: 2024-10-24

## 2024-10-24 PROCEDURE — 99211 OFF/OP EST MAY X REQ PHY/QHP: CPT | Performed by: STUDENT IN AN ORGANIZED HEALTH CARE EDUCATION/TRAINING PROGRAM

## 2024-10-24 PROCEDURE — 73610 X-RAY EXAM OF ANKLE: CPT | Mod: RT

## 2024-10-24 RX ORDER — NAPROXEN SODIUM 220 MG/1
1 TABLET, FILM COATED ORAL
COMMUNITY
Start: 2024-06-07

## 2024-10-24 RX ORDER — NAPROXEN SODIUM 220 MG/1
TABLET, FILM COATED ORAL
COMMUNITY
Start: 2024-09-16

## 2024-10-24 RX ORDER — NAPROXEN SODIUM 220 MG/1
1 TABLET, FILM COATED ORAL
COMMUNITY
Start: 2024-06-18

## 2024-10-24 NOTE — PROGRESS NOTES
ORTHOPAEDIC SURGERY OUTPATIENT PROGRESS NOTE    Chief Complaint:  Right ankle pain    History Of Present Illness  Windy Lovelace is a 34 y.o. female with a past medical history of moyamoya, CVA as well as developmental delay who presented after ground-level fall with a right trimalleolar ankle fracture and dislocation.  The patient was closed reduced by the orthopedic team, I was not on-call at the time the patient's initial presentation was asked by one of my partners to assume care.  The patient was subsequently discharged and return for surgery, unfortunately on return she had been walking on her splint.  She underwent right trimalleolar ankle fracture ORIF without fixation of the posterior lip and with syndesmotic stabilization from 09/26/2024. She has been residing in a SNF and has been compliant with weight-bearing restrictions. Reporting 8/10 pain. Encounter with POA on facetime in the room.    10/20/2024: Patient returns s/p right trimalleolar ankle fracture ORIF without fixation of the posterior lip and with syndesmotic stabilization from 09/26/2024.  She is currently reporting no pain at rest but had 8 out of 10 pain at night.  Her Sister Windy and RAQUEL is present on the phone via FaceTime in the room.  She has been putting weight down for transfers to the bathroom and while at PT.  She has been residing in a SNF.    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    Headache, unspecified 04/29/2015    Severe headache    Hiatal hernia     LFTs abnormal     Mares mares disease     Obesity (BMI 30-39.9)     Personal history of other mental and behavioral disorders 10/06/2017    History of mental retardation    Syncope and collapse 06/13/2017    Syncope and collapse       Surgical History  Recent Surgeries in Orthopaedic Surgery            No cases to display              Social History  Social History     Socioeconomic History    Marital status: Single   Tobacco Use    Smoking status: Never    Smokeless tobacco: Never   Vaping Use    Vaping status: Never Used   Substance and Sexual Activity    Alcohol use: Never    Drug use: Never    Sexual activity: Not Currently     Birth control/protection: I.U.D.     Social Drivers of Health     Financial Resource Strain: Low Risk  (9/27/2024)    Overall Financial Resource Strain (CARDIA)     Difficulty of Paying Living Expenses: Not hard at all   Food Insecurity: Not on File (9/26/2024)    Received from [facility]    Food Insecurity     Food: 0   Transportation Needs: No Transportation Needs (9/27/2024)    PRAPARE - Transportation     Lack of Transportation (Medical): No     Lack of Transportation (Non-Medical): No   Physical Activity: Not on File (5/21/2021)    Received from [facility]    Physical Activity     Physical Activity: 0   Stress: Not on File (5/21/2021)    Received from [facility]    Stress     Stress: 0   Social Connections: Not on File (9/12/2024)    Received from [facility]    Social Connections     Connectedness: 0   Recent Concern: Social Connections - Feeling Somewhat Isolated (6/28/2024)    OASIS : Social Isolation     Frequency of experiencing loneliness or isolation: Sometimes   Housing Stability: Low Risk  (9/27/2024)    Housing Stability Vital Sign     Unable to Pay for Housing in the Last Year: No     Number of Times Moved in the Last Year: 0     Homeless in the Last Year: No       Family History  Family History   Problem Relation Name Age of Onset    Kidney cancer Mother      Liver cancer Mother      Pancreatic cancer Father      Glaucoma Other grandmother     Diabetes Other Grandfather         other type with arthropathy, with long term curent use of insulin    Diabetes Other aunt         other type with arthropathy, with long term curent use of insulin        Allergies  Allergies   Allergen Reactions    Bupropion  Unknown and Hives    Ketamine Unknown, Anaphylaxis and Rash    Augmentin [Amoxicillin-Pot Clavulanate] Hives    Penicillin G Hives    Penicillins Unknown and Hives       Review of Systems  REVIEW OF SYSTEMS  Constitutional: no unplanned weight loss  Psychiatric: no suicidal ideation  ENT: no vision changes, no sinus problems  Pulmonary: no shortness of breath  Lymphatic: no enlarged lymph nodes  Cardiovascular: no chest pain or shortness of breath  Gastrointestinal: no stomach problems  Genitourinary: no dysuria   Skin: no rashes  Endocrine: no thyroid problems  Neurological: no headache, no numbness  Hematological: no easy bruising  Musculoskeletal: Right ankle pain     Physical Exam  PHYSICAL EXAMINATION  Constitutional Exam: well developed and well nourished  Psychiatric Exam: alert and oriented, appropriate mood and behavior  Eye Exam: EOMI  Pulmonary Exam: breathing non-labored, no apparent distress  Lymphatic exam: no appreciable lymphadenopathy in the lower extremities  Cardiovascular exam: RRR to peripheral palpation, DP pulses 2+, PT 2+, toes are pink with good capillary refill, no pitting edema  Skin exam: no open lesions, rashes, abrasions or ulcerations  Neurological exam: sensation to light touch intact in both lower extremities in peripheral and dermatomal distributions (except for any abnormalities noted in musculoskeletal exam)    Musculoskeletal exam: Right lower extremity examination.  Patient lateral and medial ankle incisions with suture line and skin edges well approximated and healed.  There is improved dior-incisional erythema without tenderness to palpation.  No evidence of ankle effusion. Patient has sensation grossly intact to light touch in a saphenous, sural, superficial peroneal, deep peroneal and tibial nerve distribution.  She has intact PF/DF/EHL.  She is 2+ DP/PT pulse palpated.     Last Recorded Vitals  There were no vitals taken for this visit.    Laboratory Results  No results  found for this or any previous visit (from the past 24 hours).     Radiology Results  X-ray imaging 3 view nonweightbearing  Right ankle reviewed and independently evaluated by me on 10/20/2024 demonstrates appropriate position and alignment following trimalleolar ankle fracture plate and screw fixation of distal fibula and medial malleolus and tricortical syndesmotic screw fixation with interval callus noted.  Ankle mortise appears well aligned.    Assessment/Plan:  34 y.o. female with a past medical history of moyamoya, CVA as well as developmental delay s/p right trimalleolar ankle fracture ORIF without fixation of the posterior lip and with syndesmotic stabilization from 09/26/2024.  I would recommend the patient continue strict nonweightbearing in her right lower extremity in a short leg fiberglass cast.  I will remove her sutures today and she will continue on aspirin for DVT prophylaxis.  I will plan to see the patient back in approximately 1 month for a repeat clinical and radiographic evaluation.  I again discussed a prolonged period of immobilization and nonweightbearing in a cast, up to 3 months to allow for appropriate healing before initiating weightbearing.  Upon return, patient would require three-view nonweightbearing right ankle out of fiberglass.    Timothy Castro MD, MACI  Department of Orthopaedic Surgery  [facility]     The diagnosis and treatment plan were reviewed with the patient. All questions were answered. The patient verbalized understanding of the treatment plan. There were no barriers to understanding identified.    Note dictated with [facility] software.  Completed without full type editing and sent to avoid delay.

## 2024-10-28 ENCOUNTER — NURSING HOME VISIT (OUTPATIENT)
Dept: POST ACUTE CARE | Facility: EXTERNAL LOCATION | Age: 34
End: 2024-10-28
Payer: MEDICARE

## 2024-10-28 DIAGNOSIS — M25.571 RIGHT ANKLE PAIN, UNSPECIFIED CHRONICITY: ICD-10-CM

## 2024-10-28 DIAGNOSIS — F41.1 GENERALIZED ANXIETY DISORDER: ICD-10-CM

## 2024-10-28 DIAGNOSIS — R26.89 IMPAIRED GAIT AND MOBILITY: ICD-10-CM

## 2024-10-28 DIAGNOSIS — J30.9 ALLERGIC RHINITIS, UNSPECIFIED SEASONALITY, UNSPECIFIED TRIGGER: ICD-10-CM

## 2024-10-28 DIAGNOSIS — M62.81 GENERALIZED MUSCLE WEAKNESS: ICD-10-CM

## 2024-10-28 DIAGNOSIS — Z87.81 S/P ORIF (OPEN REDUCTION INTERNAL FIXATION) FRACTURE: ICD-10-CM

## 2024-10-28 DIAGNOSIS — I10 BENIGN ESSENTIAL HTN: Primary | ICD-10-CM

## 2024-10-28 DIAGNOSIS — J45.909 ASTHMA, UNSPECIFIED ASTHMA SEVERITY, UNSPECIFIED WHETHER COMPLICATED, UNSPECIFIED WHETHER PERSISTENT (HHS-HCC): ICD-10-CM

## 2024-10-28 DIAGNOSIS — F32.9 MAJOR DEPRESSIVE DISORDER WITH CURRENT ACTIVE EPISODE, UNSPECIFIED DEPRESSION EPISODE SEVERITY, UNSPECIFIED WHETHER RECURRENT: ICD-10-CM

## 2024-10-28 DIAGNOSIS — G47.00 INSOMNIA, UNSPECIFIED TYPE: ICD-10-CM

## 2024-10-28 DIAGNOSIS — Z87.81 H/O REDUCTION OF CLOSED FRACTURE: ICD-10-CM

## 2024-10-28 DIAGNOSIS — Z98.890 S/P ORIF (OPEN REDUCTION INTERNAL FIXATION) FRACTURE: ICD-10-CM

## 2024-10-28 DIAGNOSIS — S82.851S CLOSED DISPLACED TRIMALLEOLAR FRACTURE OF RIGHT ANKLE, SEQUELA: ICD-10-CM

## 2024-10-28 PROCEDURE — 99309 SBSQ NF CARE MODERATE MDM 30: CPT | Performed by: NURSE PRACTITIONER

## 2024-10-30 VITALS
OXYGEN SATURATION: 99 % | RESPIRATION RATE: 18 BRPM | TEMPERATURE: 97.8 F | DIASTOLIC BLOOD PRESSURE: 88 MMHG | HEART RATE: 57 BPM | HEIGHT: 66 IN | WEIGHT: 239.8 LBS | BODY MASS INDEX: 38.54 KG/M2 | SYSTOLIC BLOOD PRESSURE: 121 MMHG

## 2024-10-31 DIAGNOSIS — S82.851S CLOSED DISPLACED TRIMALLEOLAR FRACTURE OF RIGHT ANKLE, SEQUELA: ICD-10-CM

## 2024-11-04 ENCOUNTER — NURSING HOME VISIT (OUTPATIENT)
Dept: POST ACUTE CARE | Facility: EXTERNAL LOCATION | Age: 34
End: 2024-11-04
Payer: MEDICARE

## 2024-11-04 DIAGNOSIS — S82.851S CLOSED DISPLACED TRIMALLEOLAR FRACTURE OF RIGHT ANKLE, SEQUELA: Primary | ICD-10-CM

## 2024-11-04 DIAGNOSIS — Z87.81 H/O REDUCTION OF CLOSED FRACTURE: ICD-10-CM

## 2024-11-04 DIAGNOSIS — M62.81 GENERALIZED MUSCLE WEAKNESS: ICD-10-CM

## 2024-11-04 DIAGNOSIS — M25.571 RIGHT ANKLE PAIN, UNSPECIFIED CHRONICITY: ICD-10-CM

## 2024-11-04 DIAGNOSIS — Z79.899 ON DEEP VEIN THROMBOSIS (DVT) PROPHYLAXIS: ICD-10-CM

## 2024-11-04 DIAGNOSIS — I69.30 HISTORY OF STROKE WITH CURRENT RESIDUAL EFFECTS: ICD-10-CM

## 2024-11-04 DIAGNOSIS — I69.351 HEMIPLEGIA AND HEMIPARESIS FOLLOWING CEREBRAL INFARCTION AFFECTING RIGHT DOMINANT SIDE (MULTI): ICD-10-CM

## 2024-11-04 DIAGNOSIS — Z98.890 S/P ORIF (OPEN REDUCTION INTERNAL FIXATION) FRACTURE: ICD-10-CM

## 2024-11-04 DIAGNOSIS — R26.89 IMPAIRED GAIT AND MOBILITY: ICD-10-CM

## 2024-11-04 DIAGNOSIS — Z87.81 S/P ORIF (OPEN REDUCTION INTERNAL FIXATION) FRACTURE: ICD-10-CM

## 2024-11-04 PROCEDURE — 99308 SBSQ NF CARE LOW MDM 20: CPT | Performed by: NURSE PRACTITIONER

## 2024-11-04 NOTE — LETTER
Patient: Windy Lovelace  : 1990    Encounter Date: 2024    Name: Windy Lovelace    YOB: 1990    Code Status: FULL CODE    Chief Complaint:  Follow up on Right ankle fracture (right trimalleolar ankle fracture dislocation)   S/p closed reduction with concentric reduction; Right ankle pain;  etc......       HPI   33 year old female with past medical history of stroke with current residual effects;  Migraine headache;   Moyamoya disease; Esophageal reflux;   Exotropia of right eye; History of neurodevelopmental disorder;   Obesity (BMI 30-39.9); and Hiatal hernia      Patient was admitted to [facility] from 24 to 24 (6 days)    Patient initially admitted to the [facility] on 24 for skilled services.    Patient readmitted to [facility] for surgery from 24 to 24.    Patient readmitted to the [facility] on 24 for skilled services.     Diagnoses as follows:    Right ankle fracture (right trimalleolar ankle fracture dislocation)   S/p closed reduction with concentric reduction; Right ankle pain;  Right Ankle ORIF Tri-Malleolar Fracture without Fixation of the Posterior Lip, Right Syndesmosis Stabilization:   Right trimalleolar ankle fracture dislocation status post open reduction internal fixation with Dr. Castro on 24.   Patient tolerated procedure well.  Patient initially presented to [facility] on 24 with an Right Ankle Fracture.    No acute orthopaedic surgical intervention was initially  indicated.   Surgery was on 24.  Post op recommendations after surgery are:  Nonweightbearing right lower extremity short leg splint.  Encourage elevation.  Keep splint clean and dry.  Maintain until follow-up.  Aspirin 81 mg twice daily starting postop day 1 for DVT prophylaxis to be continued for 4 weeks.  PT and OT to evaluate and treat.- discharge to [facility], [facility], 2 person assist- no weight bearing to right leg  Patient had a follow-up  with orthopedics Dr. Castro on 10/10/24.  Next orthopedics appointment is in approximately 2 weeks.   On routine acetaminophen 975 mg PO TID.  On ibuprofen PRN and oxycodone PRN for pain.   She states her pain is improving, she is not taking oxycodone PRN hardly at all any more.  Reports pain a  5. Movement makes it worse.   The oxycodone helps to decrease the pain.  Reminded patient to ask for the oxycodone.   Reminded patient to elevate leg and ask for oxycodone PRN dose.  Patient seen today.  Cooperative but slightly anxious.  No complaints of headaches or dizziness.  No chest pain.    Generalized muscle weakness; Impaired gait and mobility :  Patient is non-weight bearing has splint on right lower leg.  Nonweightbearing right lower extremity short leg splint.    Encourage elevation.  Keep splint clean and dry.  Maintain until follow-up appointment with orthopedic doctor Dr. Castro.  Patient at the [facility] for skilled services/PT/OT.     DVT ppx:   Aspirin 81 mg  PO BID ordered for 4 weeks    Hemiplegia and hemiparesis following cerebral infarction affecting right dominant side;  History of strokes:  On aspirin.                              Reviewed  EMR  Reviewed medical, social, surgical and family history.  Reviewed all current medications and performed medication reconciliation.  Performed prescription drug management.  Reviewed vital signs AND lab results  Reviewed [facility] Documentation  Discussed patient with nursing and .                       ROS: 10 point ROS performed; Negative unless noted in HPI.      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), Breast pain (03/21/2024), Candidiasis of skin and nail (06/25/2014), Cellulitis of face (10/10/2013), Headache,  unspecified (04/29/2015), Hypomagnesemia (01/23/2014), Other cysts of oral region, not elsewhere classified (06/10/2016), Other enthesopathies, not elsewhere classified (10/29/2015), Other specified abnormal findings of blood chemistry (03/06/2020), Other specified joint disorders, left ankle and foot (11/02/2016), Other specified noninflammatory disorders of vagina (03/20/2018), Pain in right knee (11/12/2018), Pain in right knee (02/19/2018), Pain in right knee (02/20/2019), Pain, unspecified (10/17/2017), Pelvic and perineal pain (01/24/2018), Personal history of diseases of the skin and subcutaneous tissue (02/24/2016), Personal history of other diseases of the digestive system (10/06/2017), Personal history of other diseases of the digestive system (03/17/2015), Personal history of other diseases of the digestive system (05/24/2016), Personal history of other diseases of the female genital tract (08/31/2015), Personal history of other diseases of the musculoskeletal system and connective tissue (02/04/2014), Personal history of other diseases of the nervous system and sense organs (01/05/2015), Personal history of other diseases of the respiratory system (08/29/2013), Personal history of other diseases of the respiratory system (02/03/2017), Personal history of other drug therapy (12/11/2017), Personal history of other endocrine, nutritional and metabolic disease (05/09/2019), Personal history of other mental and behavioral disorders (10/06/2017), Personal history of transient ischemic attack (TIA), and cerebral infarction without residual deficits (10/06/2017), Pleurodynia (01/23/2014), Pyuria (06/13/2017), Sprain of medial collateral ligament of right knee, initial encounter (11/02/2016), Sprain of unspecified ligament of left ankle, subsequent encounter (10/29/2015), Strain of muscle and tendon of unspecified wall of thorax, initial encounter (02/04/2014), Syncope and collapse (06/13/2017), Unspecified fall,  "initial encounter (01/23/2014), and Unspecified symptoms and signs involving the genitourinary system (01/24/2018).    SURGICAL HISTORY:   MR head angio w IV contrast (8/18/2015);   US guided percutaneous peritoneal or retroperitoneal fluid collection drainage (4/10/2019);   and US guided abscess drain (4/10/2019).     SOCIAL HISTORY:  She reports that she has never smoked.   She has never used smokeless tobacco.   She reports that she does not drink alcohol and does not use drugs.    Family History   Problem Relation Name Age of Onset   • Kidney cancer Mother       • Liver cancer Mother       • Pancreatic cancer Father       • Glaucoma Other grandmother     • Diabetes Other Grandfather           other type with arthropathy, with long term curent use of insulin   • Diabetes Other aunt         ALLERGIES:  Bupropion  Ketamine  Augmentin [amoxicillin-pot clavulanate]  Penicillin g,   Penicillins    LABS:  CBC: Date: 10/9/2024 WBC 9.1 Hgb 12.6 hematocrit 39.8 platelets 319    BMP: Date: 10/15/2024 Na 142 K 4 Cr 0.7 BUN 10 glucose 69 Ca 8 GFR 95  CBC: Date: 10/15/2024 WBC 7.7 Hgb 12.2 hematocrit 37.1 platelets 202  Liver function: Date: 10/15/2024 ALT 36 AST 35 alkaline phos.  71 bilirubin 0.4 albumin 3.5 protein 5.9            Lab Results   Component Value Date    WBC 10.1 09/28/2024    HGB 11.9 (L) 09/28/2024    HCT 36.5 09/28/2024     09/28/2024    CHOL 152 05/09/2024    TRIG 178 (H) 05/09/2024    HDL 33.6 05/09/2024    ALT 51 (H) 09/14/2024    AST 18 09/14/2024     09/27/2024    K 3.8 09/27/2024     09/27/2024    CREATININE 0.65 09/27/2024    BUN 7 09/27/2024    CO2 22 09/27/2024    TSH 5.14 (H) 05/09/2024    INR 1.0 09/13/2024             /88   Pulse 70   Temp 36.6 °C (97.8 °F)   Resp 18   Ht 1.676 m (5' 6\")   Wt 109 kg (239 lb 12.8 oz)   SpO2 99%   BMI 38.70 kg/m²      Physical Exam  Vitals and nursing note reviewed.   Constitutional:       Appearance: She is obese.   HENT:      " Head: Normocephalic.      Right Ear: External ear normal.      Left Ear: External ear normal.      Nose: Nose normal.      Mouth/Throat:      Mouth: Mucous membranes are moist.      Pharynx: Oropharynx is clear.   Eyes:      Extraocular Movements: Extraocular movements intact.      Conjunctiva/sclera: Conjunctivae normal.      Pupils: Pupils are equal, round, and reactive to light.   Cardiovascular:      Rate and Rhythm: Normal rate and regular rhythm.      Pulses: Normal pulses.      Heart sounds: Normal heart sounds.   Pulmonary:      Effort: Pulmonary effort is normal.      Breath sounds: Normal breath sounds.   Abdominal:      General: Bowel sounds are normal.      Palpations: Abdomen is soft.   Musculoskeletal:         General: Normal range of motion.      Cervical back: Normal range of motion and neck supple.      Comments: Right lower extremity: splint intact.  Adequate movement and sensation in toes.  Cap refill < 3 seconds.   Feet:      Comments: Bilateral feet--adequate movement and sensation;  Cap refill < 3 seconds.   Skin:     General: Skin is warm and dry.      Capillary Refill: Capillary refill takes less than 2 seconds.   Neurological:      General: No focal deficit present.      Mental Status: She is alert and oriented to person, place, and time. Mental status is at baseline.      Motor: Weakness present.      Gait: Gait abnormal.   Psychiatric:         Mood and Affect: Mood is anxious and depressed.         Behavior: Behavior normal. Behavior is cooperative.          Assessment/Plan     Right ankle fracture (right trimalleolar ankle fracture dislocation)   S/p closed reduction with concentric reduction; Right ankle pain;  Right Ankle ORIF Tri-Malleolar Fracture without Fixation of the Posterior Lip, Right Syndesmosis Stabilization:   Right trimalleolar ankle fracture dislocation status post open reduction internal fixation with Dr. Castro on 9/26/24.   Patient initially presented to [facility] on  9/13/24 with an Right Ankle Fracture.    Post op recommendations after surgery are:  Nonweightbearing right lower extremity short leg splint.  Encourage elevation.  Keep splint clean and dry.  Maintain until follow-up.  Aspirin 81 mg twice daily starting postop day 1 for DVT prophylaxis to be continued for 4 weeks.  PT and OT to evaluate and treat.- discharge to [facility], [facility], 2 person assist- no weight bearing to right leg  Needs to follow up with orthopedics as recommended.  Continue routine acetaminophen 975 mg PO TID.  Continue ibuprofen PRN and oxycodone PRN for pain.   Follow up with orthopedic doctor in 2 weeks as planned.    Impaired gait and mobility; generalized muscle weakness:  Patient is non-weight bearing has splint on right lower leg.  Nonweightbearing right lower extremity short leg splint.    Encourage elevation.  Keep splint clean and dry.   Follow-up with orthopedic doctor Dr. Castro.  Continue at the [facility] for skilled services/PT/OT.     DVT ppx:   Continue Aspirin 81 mg  PO BID ordered for 4 weeks    Hemiplegia and hemiparesis following cerebral infarction affecting right dominant side:  History of strokes:  Continue aspirin.          Problem List Items Addressed This Visit       History of stroke with current residual effects    Closed displaced trimalleolar fracture of right lower leg - Primary    Ankle pain     Other Visit Diagnoses       S/P ORIF (open reduction internal fixation) fracture        H/O reduction of closed fracture        Impaired gait and mobility        Generalized muscle weakness        On deep vein thrombosis (DVT) prophylaxis        Hemiplegia and hemiparesis following cerebral infarction affecting right dominant side (Multi)                            NADEEM Clement       Electronically Signed By: NADEEM Clement   11/5/24  7:05 PM

## 2024-11-05 VITALS
OXYGEN SATURATION: 99 % | HEART RATE: 70 BPM | RESPIRATION RATE: 18 BRPM | WEIGHT: 239.8 LBS | TEMPERATURE: 97.8 F | HEIGHT: 66 IN | BODY MASS INDEX: 38.54 KG/M2 | SYSTOLIC BLOOD PRESSURE: 121 MMHG | DIASTOLIC BLOOD PRESSURE: 88 MMHG

## 2024-11-05 NOTE — PROGRESS NOTES
Name: Windy Lovelace    YOB: 1990    Code Status: FULL CODE    Chief Complaint:  Follow up on Right ankle fracture (right trimalleolar ankle fracture dislocation)   S/p closed reduction with concentric reduction; Right ankle pain;  etc......       HPI   33 year old female with past medical history of stroke with current residual effects;  Migraine headache;   Moyamoya disease; Esophageal reflux;   Exotropia of right eye; History of neurodevelopmental disorder;   Obesity (BMI 30-39.9); and Hiatal hernia      Patient was admitted to [facility] from 9/13/24 to 9/19/24 (6 days)    Patient initially admitted to the [facility] on 9/19/24 for skilled services.    Patient readmitted to [facility] for surgery from 9/26/24 to 9/28/24.    Patient readmitted to the [facility] on 9/28/24 for skilled services.     Diagnoses as follows:    Right ankle fracture (right trimalleolar ankle fracture dislocation)   S/p closed reduction with concentric reduction; Right ankle pain;  Right Ankle ORIF Tri-Malleolar Fracture without Fixation of the Posterior Lip, Right Syndesmosis Stabilization:   Right trimalleolar ankle fracture dislocation status post open reduction internal fixation with Dr. Castro on 9/26/24.   Patient tolerated procedure well.  Patient initially presented to [facility] on 9/13/24 with an Right Ankle Fracture.    No acute orthopaedic surgical intervention was initially  indicated.   Surgery was on 9/26/24.  Post op recommendations after surgery are:  Nonweightbearing right lower extremity short leg splint.  Encourage elevation.  Keep splint clean and dry.  Maintain until follow-up.  Aspirin 81 mg twice daily starting postop day 1 for DVT prophylaxis to be continued for 4 weeks.  PT and OT to evaluate and treat.- discharge to [facility], [facility], 2 person assist- no weight bearing to right leg  Patient had a follow-up with orthopedics Dr. Castro on 10/10/24.  Next orthopedics appointment is in  approximately 2 weeks.   On routine acetaminophen 975 mg PO TID.  On ibuprofen PRN and oxycodone PRN for pain.   She states her pain is improving, she is not taking oxycodone PRN hardly at all any more.  Reports pain a  5. Movement makes it worse.   The oxycodone helps to decrease the pain.  Reminded patient to ask for the oxycodone.   Reminded patient to elevate leg and ask for oxycodone PRN dose.  Patient seen today.  Cooperative but slightly anxious.  No complaints of headaches or dizziness.  No chest pain.    Generalized muscle weakness; Impaired gait and mobility :  Patient is non-weight bearing has splint on right lower leg.  Nonweightbearing right lower extremity short leg splint.    Encourage elevation.  Keep splint clean and dry.  Maintain until follow-up appointment with orthopedic doctor Dr. Castro.  Patient at the [facility] for skilled services/PT/OT.     DVT ppx:   Aspirin 81 mg  PO BID ordered for 4 weeks    Hemiplegia and hemiparesis following cerebral infarction affecting right dominant side;  History of strokes:  On aspirin.                              Reviewed  EMR  Reviewed medical, social, surgical and family history.  Reviewed all current medications and performed medication reconciliation.  Performed prescription drug management.  Reviewed vital signs AND lab results  Reviewed [facility] Documentation  Discussed patient with nursing and .                       ROS: 10 point ROS performed; Negative unless noted in HPI.      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), Breast pain (03/21/2024), Candidiasis of skin and nail (06/25/2014), Cellulitis of face (10/10/2013), Headache, unspecified (04/29/2015), Hypomagnesemia (01/23/2014), Other cysts of oral region, not  elsewhere classified (06/10/2016), Other enthesopathies, not elsewhere classified (10/29/2015), Other specified abnormal findings of blood chemistry (03/06/2020), Other specified joint disorders, left ankle and foot (11/02/2016), Other specified noninflammatory disorders of vagina (03/20/2018), Pain in right knee (11/12/2018), Pain in right knee (02/19/2018), Pain in right knee (02/20/2019), Pain, unspecified (10/17/2017), Pelvic and perineal pain (01/24/2018), Personal history of diseases of the skin and subcutaneous tissue (02/24/2016), Personal history of other diseases of the digestive system (10/06/2017), Personal history of other diseases of the digestive system (03/17/2015), Personal history of other diseases of the digestive system (05/24/2016), Personal history of other diseases of the female genital tract (08/31/2015), Personal history of other diseases of the musculoskeletal system and connective tissue (02/04/2014), Personal history of other diseases of the nervous system and sense organs (01/05/2015), Personal history of other diseases of the respiratory system (08/29/2013), Personal history of other diseases of the respiratory system (02/03/2017), Personal history of other drug therapy (12/11/2017), Personal history of other endocrine, nutritional and metabolic disease (05/09/2019), Personal history of other mental and behavioral disorders (10/06/2017), Personal history of transient ischemic attack (TIA), and cerebral infarction without residual deficits (10/06/2017), Pleurodynia (01/23/2014), Pyuria (06/13/2017), Sprain of medial collateral ligament of right knee, initial encounter (11/02/2016), Sprain of unspecified ligament of left ankle, subsequent encounter (10/29/2015), Strain of muscle and tendon of unspecified wall of thorax, initial encounter (02/04/2014), Syncope and collapse (06/13/2017), Unspecified fall, initial encounter (01/23/2014), and Unspecified symptoms and signs involving the  "genitourinary system (01/24/2018).    SURGICAL HISTORY:   MR head angio w IV contrast (8/18/2015);   US guided percutaneous peritoneal or retroperitoneal fluid collection drainage (4/10/2019);   and US guided abscess drain (4/10/2019).     SOCIAL HISTORY:  She reports that she has never smoked.   She has never used smokeless tobacco.   She reports that she does not drink alcohol and does not use drugs.    Family History   Problem Relation Name Age of Onset    Kidney cancer Mother        Liver cancer Mother        Pancreatic cancer Father        Glaucoma Other grandmother      Diabetes Other Grandfather           other type with arthropathy, with long term curent use of insulin    Diabetes Other aunt         ALLERGIES:  Bupropion  Ketamine  Augmentin [amoxicillin-pot clavulanate]  Penicillin g,   Penicillins    LABS:  CBC: Date: 10/9/2024 WBC 9.1 Hgb 12.6 hematocrit 39.8 platelets 319    BMP: Date: 10/15/2024 Na 142 K 4 Cr 0.7 BUN 10 glucose 69 Ca 8 GFR 95  CBC: Date: 10/15/2024 WBC 7.7 Hgb 12.2 hematocrit 37.1 platelets 202  Liver function: Date: 10/15/2024 ALT 36 AST 35 alkaline phos.  71 bilirubin 0.4 albumin 3.5 protein 5.9            Lab Results   Component Value Date    WBC 10.1 09/28/2024    HGB 11.9 (L) 09/28/2024    HCT 36.5 09/28/2024     09/28/2024    CHOL 152 05/09/2024    TRIG 178 (H) 05/09/2024    HDL 33.6 05/09/2024    ALT 51 (H) 09/14/2024    AST 18 09/14/2024     09/27/2024    K 3.8 09/27/2024     09/27/2024    CREATININE 0.65 09/27/2024    BUN 7 09/27/2024    CO2 22 09/27/2024    TSH 5.14 (H) 05/09/2024    INR 1.0 09/13/2024             /88   Pulse 70   Temp 36.6 °C (97.8 °F)   Resp 18   Ht 1.676 m (5' 6\")   Wt 109 kg (239 lb 12.8 oz)   SpO2 99%   BMI 38.70 kg/m²      Physical Exam  Vitals and nursing note reviewed.   Constitutional:       Appearance: She is obese.   HENT:      Head: Normocephalic.      Right Ear: External ear normal.      Left Ear: External ear " normal.      Nose: Nose normal.      Mouth/Throat:      Mouth: Mucous membranes are moist.      Pharynx: Oropharynx is clear.   Eyes:      Extraocular Movements: Extraocular movements intact.      Conjunctiva/sclera: Conjunctivae normal.      Pupils: Pupils are equal, round, and reactive to light.   Cardiovascular:      Rate and Rhythm: Normal rate and regular rhythm.      Pulses: Normal pulses.      Heart sounds: Normal heart sounds.   Pulmonary:      Effort: Pulmonary effort is normal.      Breath sounds: Normal breath sounds.   Abdominal:      General: Bowel sounds are normal.      Palpations: Abdomen is soft.   Musculoskeletal:         General: Normal range of motion.      Cervical back: Normal range of motion and neck supple.      Comments: Right lower extremity: splint intact.  Adequate movement and sensation in toes.  Cap refill < 3 seconds.   Feet:      Comments: Bilateral feet--adequate movement and sensation;  Cap refill < 3 seconds.   Skin:     General: Skin is warm and dry.      Capillary Refill: Capillary refill takes less than 2 seconds.   Neurological:      General: No focal deficit present.      Mental Status: She is alert and oriented to person, place, and time. Mental status is at baseline.      Motor: Weakness present.      Gait: Gait abnormal.   Psychiatric:         Mood and Affect: Mood is anxious and depressed.         Behavior: Behavior normal. Behavior is cooperative.          Assessment/Plan      Right ankle fracture (right trimalleolar ankle fracture dislocation)   S/p closed reduction with concentric reduction; Right ankle pain;  Right Ankle ORIF Tri-Malleolar Fracture without Fixation of the Posterior Lip, Right Syndesmosis Stabilization:   Right trimalleolar ankle fracture dislocation status post open reduction internal fixation with Dr. Castro on 9/26/24.   Patient initially presented to [facility] on 9/13/24 with an Right Ankle Fracture.    Post op recommendations after surgery  are:  Nonweightbearing right lower extremity short leg splint.  Encourage elevation.  Keep splint clean and dry.  Maintain until follow-up.  Aspirin 81 mg twice daily starting postop day 1 for DVT prophylaxis to be continued for 4 weeks.  PT and OT to evaluate and treat.- discharge to [facility], [facility], 2 person assist- no weight bearing to right leg  Needs to follow up with orthopedics as recommended.  Continue routine acetaminophen 975 mg PO TID.  Continue ibuprofen PRN and oxycodone PRN for pain.   Follow up with orthopedic doctor in 2 weeks as planned.    Impaired gait and mobility; generalized muscle weakness:  Patient is non-weight bearing has splint on right lower leg.  Nonweightbearing right lower extremity short leg splint.    Encourage elevation.  Keep splint clean and dry.   Follow-up with orthopedic doctor Dr. Castro.  Continue at the [facility] for skilled services/PT/OT.     DVT ppx:   Continue Aspirin 81 mg  PO BID ordered for 4 weeks    Hemiplegia and hemiparesis following cerebral infarction affecting right dominant side:  History of strokes:  Continue aspirin.          Problem List Items Addressed This Visit       History of stroke with current residual effects    Closed displaced trimalleolar fracture of right lower leg - Primary    Ankle pain     Other Visit Diagnoses       S/P ORIF (open reduction internal fixation) fracture        H/O reduction of closed fracture        Impaired gait and mobility        Generalized muscle weakness        On deep vein thrombosis (DVT) prophylaxis        Hemiplegia and hemiparesis following cerebral infarction affecting right dominant side (Multi)                            Paola Kessler, APRN-CNP

## 2024-11-11 ENCOUNTER — NURSING HOME VISIT (OUTPATIENT)
Dept: POST ACUTE CARE | Facility: EXTERNAL LOCATION | Age: 34
End: 2024-11-11
Payer: MEDICARE

## 2024-11-11 DIAGNOSIS — M62.81 GENERALIZED MUSCLE WEAKNESS: ICD-10-CM

## 2024-11-11 DIAGNOSIS — R26.89 IMPAIRED GAIT AND MOBILITY: ICD-10-CM

## 2024-11-11 DIAGNOSIS — Z87.81 S/P ORIF (OPEN REDUCTION INTERNAL FIXATION) FRACTURE: ICD-10-CM

## 2024-11-11 DIAGNOSIS — Z98.890 S/P ORIF (OPEN REDUCTION INTERNAL FIXATION) FRACTURE: ICD-10-CM

## 2024-11-11 DIAGNOSIS — S82.851S CLOSED DISPLACED TRIMALLEOLAR FRACTURE OF RIGHT ANKLE, SEQUELA: Primary | ICD-10-CM

## 2024-11-11 DIAGNOSIS — M25.571 RIGHT ANKLE PAIN, UNSPECIFIED CHRONICITY: ICD-10-CM

## 2024-11-11 DIAGNOSIS — I69.30 HISTORY OF STROKE WITH CURRENT RESIDUAL EFFECTS: ICD-10-CM

## 2024-11-11 DIAGNOSIS — Z87.81 H/O REDUCTION OF CLOSED FRACTURE: ICD-10-CM

## 2024-11-11 DIAGNOSIS — I69.351 HEMIPLEGIA AND HEMIPARESIS FOLLOWING CEREBRAL INFARCTION AFFECTING RIGHT DOMINANT SIDE (MULTI): ICD-10-CM

## 2024-11-11 DIAGNOSIS — Z79.899 ON DEEP VEIN THROMBOSIS (DVT) PROPHYLAXIS: ICD-10-CM

## 2024-11-11 PROCEDURE — 99308 SBSQ NF CARE LOW MDM 20: CPT | Performed by: NURSE PRACTITIONER

## 2024-11-11 NOTE — LETTER
Patient: Windy Lovelace  : 1990    Encounter Date: 2024    Name: Windy Lovelace    YOB: 1990    Code Status: FULL CODE    Chief Complaint:  Follow up on Right ankle fracture (right trimalleolar ankle fracture dislocation)   S/p closed reduction with concentric reduction; Right ankle pain;  etc......       HPI   33 year old female with past medical history of stroke with current residual effects;  Migraine headache;   Moyamoya disease; Esophageal reflux;   Exotropia of right eye; History of neurodevelopmental disorder;   Obesity (BMI 30-39.9); and Hiatal hernia      Patient was admitted to [facility] from 24 to 24 (6 days)    Patient initially admitted to the [facility] on 24 for skilled services.    Patient readmitted to [facility] for surgery from 24 to 24.    Patient readmitted to the [facility] on 24 for skilled services.     Diagnoses as follows:    Right ankle fracture (right trimalleolar ankle fracture dislocation)   S/p closed reduction with concentric reduction; Right ankle pain;  Right Ankle ORIF Tri-Malleolar Fracture without Fixation of the Posterior Lip, Right Syndesmosis Stabilization:   Right trimalleolar ankle fracture dislocation status post open reduction internal fixation with Dr. Castro on 24.   Patient tolerated procedure well.  Patient initially presented to [facility] on 24 with an Right Ankle Fracture.    No acute orthopaedic surgical intervention was initially indicated.   Surgery was on 24.  Post op recommendations after surgery are:  Nonweightbearing right lower extremity short leg splint.  Encourage elevation.  Keep splint clean and dry.  Maintain until follow-up.  Aspirin 81 mg twice daily starting postop day 1 for DVT prophylaxis to be continued for 4 weeks.  PT and OT to evaluate and treat.- discharge to [facility], 2 person assist- no weight bearing to right leg  Patient had a follow-up  with orthopedics Dr. Castro on 10/10/24.  Next orthopedics appointment is on 11/26/24   On routine acetaminophen 975 mg PO TID.  On ibuprofen PRN and oxycodone PRN for pain.   She states her pain is improving, she is not taking oxycodone PRN hardly at all any more.  Reports pain a 4. Movement makes it worse.   Reminded patient to elevate leg.  Patient states she has not been taking oxycodone PRN for pain.  She think she does not need it any more.   Patient seen today.  Cooperative but slightly anxious.  No complaints of headaches or dizziness.  No chest pain.    Generalized muscle weakness; Impaired gait and mobility :  Patient is non-weight bearing has splint on right lower leg.  Nonweightbearing right lower extremity short leg splint.    Encouraged patient to elevate her leg.  Keep splint clean and dry.   Splint needs to stay intact untill follow-up appointment with orthopedic doctor Dr. Castro.  Patient at the [facility] for skilled services/PT/OT.     Hemiplegia and hemiparesis following cerebral infarction affecting right dominant side;  History of strokes:  On aspirin.     DVT ppx:   Aspirin 81 mg  PO BID ordered for 4 weeks                             Reviewed  EMR  Reviewed medical, social, surgical and family history.  Reviewed all current medications and performed medication reconciliation.  Performed prescription drug management.  Reviewed vital signs AND lab results  Reviewed [facility] Documentation  Discussed patient with nursing and .                       ROS: 10 point ROS performed; Negative unless noted in HPI.      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), Breast pain (03/21/2024), Candidiasis of skin and nail (06/25/2014), Cellulitis of face (10/10/2013),  Headache, unspecified (04/29/2015), Hypomagnesemia (01/23/2014), Other cysts of oral region, not elsewhere classified (06/10/2016), Other enthesopathies, not elsewhere classified (10/29/2015), Other specified abnormal findings of blood chemistry (03/06/2020), Other specified joint disorders, left ankle and foot (11/02/2016), Other specified noninflammatory disorders of vagina (03/20/2018), Pain in right knee (11/12/2018), Pain in right knee (02/19/2018), Pain in right knee (02/20/2019), Pain, unspecified (10/17/2017), Pelvic and perineal pain (01/24/2018), Personal history of diseases of the skin and subcutaneous tissue (02/24/2016), Personal history of other diseases of the digestive system (10/06/2017), Personal history of other diseases of the digestive system (03/17/2015), Personal history of other diseases of the digestive system (05/24/2016), Personal history of other diseases of the female genital tract (08/31/2015), Personal history of other diseases of the musculoskeletal system and connective tissue (02/04/2014), Personal history of other diseases of the nervous system and sense organs (01/05/2015), Personal history of other diseases of the respiratory system (08/29/2013), Personal history of other diseases of the respiratory system (02/03/2017), Personal history of other drug therapy (12/11/2017), Personal history of other endocrine, nutritional and metabolic disease (05/09/2019), Personal history of other mental and behavioral disorders (10/06/2017), Personal history of transient ischemic attack (TIA), and cerebral infarction without residual deficits (10/06/2017), Pleurodynia (01/23/2014), Pyuria (06/13/2017), Sprain of medial collateral ligament of right knee, initial encounter (11/02/2016), Sprain of unspecified ligament of left ankle, subsequent encounter (10/29/2015), Strain of muscle and tendon of unspecified wall of thorax, initial encounter (02/04/2014), Syncope and collapse (06/13/2017),  "Unspecified fall, initial encounter (01/23/2014), and Unspecified symptoms and signs involving the genitourinary system (01/24/2018).    SURGICAL HISTORY:   MR head angio w IV contrast (8/18/2015);   US guided percutaneous peritoneal or retroperitoneal fluid collection drainage (4/10/2019);   and US guided abscess drain (4/10/2019).     SOCIAL HISTORY:  She reports that she has never smoked.   She has never used smokeless tobacco.   She reports that she does not drink alcohol and does not use drugs.    Family History   Problem Relation Name Age of Onset   • Kidney cancer Mother       • Liver cancer Mother       • Pancreatic cancer Father       • Glaucoma Other grandmother     • Diabetes Other Grandfather           other type with arthropathy, with long term curent use of insulin   • Diabetes Other aunt         ALLERGIES:  Bupropion  Ketamine  Augmentin [amoxicillin-pot clavulanate]  Penicillin g,   Penicillins    LABS:  CBC: Date: 10/9/2024 WBC 9.1 Hgb 12.6 hematocrit 39.8 platelets 319    BMP: Date: 10/15/2024 Na 142 K 4 Cr 0.7 BUN 10 glucose 69 Ca 8 GFR 95  CBC: Date: 10/15/2024 WBC 7.7 Hgb 12.2 hematocrit 37.1 platelets 202  Liver function: Date: 10/15/2024 ALT 36 AST 35 alkaline phos.  71 bilirubin 0.4 albumin 3.5 protein 5.9            Lab Results   Component Value Date    WBC 10.1 09/28/2024    HGB 11.9 (L) 09/28/2024    HCT 36.5 09/28/2024     09/28/2024    CHOL 152 05/09/2024    TRIG 178 (H) 05/09/2024    HDL 33.6 05/09/2024    ALT 51 (H) 09/14/2024    AST 18 09/14/2024     09/27/2024    K 3.8 09/27/2024     09/27/2024    CREATININE 0.65 09/27/2024    BUN 7 09/27/2024    CO2 22 09/27/2024    TSH 5.14 (H) 05/09/2024    INR 1.0 09/13/2024             /88   Pulse 60   Temp 36.6 °C (97.8 °F)   Resp 18   Ht 1.676 m (5' 6\")   Wt 109 kg (241 lb)   SpO2 99%   BMI 38.90 kg/m²      Physical Exam  Vitals and nursing note reviewed.   Constitutional:       Appearance: She is obese.   HENT: "      Head: Normocephalic.      Right Ear: External ear normal.      Left Ear: External ear normal.      Nose: Nose normal.      Mouth/Throat:      Mouth: Mucous membranes are moist.      Pharynx: Oropharynx is clear.   Eyes:      Extraocular Movements: Extraocular movements intact.      Conjunctiva/sclera: Conjunctivae normal.      Pupils: Pupils are equal, round, and reactive to light.   Cardiovascular:      Rate and Rhythm: Normal rate and regular rhythm.      Pulses: Normal pulses.      Heart sounds: Normal heart sounds.   Pulmonary:      Effort: Pulmonary effort is normal.      Breath sounds: Normal breath sounds.   Abdominal:      General: Bowel sounds are normal.      Palpations: Abdomen is soft.   Musculoskeletal:         General: Normal range of motion.      Cervical back: Normal range of motion and neck supple.      Comments: Right lower extremity: splint intact.  Adequate movement and sensation in toes.  Cap refill < 3 seconds.   Feet:      Comments: Bilateral feet--adequate movement and sensation;  Cap refill < 3 seconds.   Skin:     General: Skin is warm and dry.      Capillary Refill: Capillary refill takes less than 2 seconds.   Neurological:      General: No focal deficit present.      Mental Status: She is alert and oriented to person, place, and time. Mental status is at baseline.      Motor: Weakness present.      Gait: Gait abnormal.   Psychiatric:         Mood and Affect: Mood is anxious and depressed.         Behavior: Behavior normal. Behavior is cooperative.          Assessment/Plan     Right ankle fracture (right trimalleolar ankle fracture dislocation)   S/p closed reduction with concentric reduction; Right ankle pain;  Right Ankle ORIF Tri-Malleolar Fracture without Fixation of the Posterior Lip, Right Syndesmosis Stabilization:   Right trimalleolar ankle fracture dislocation status post open reduction internal fixation with Dr. Castro on 9/26/24.   Patient initially presented to [facility]  on 9/13/24 with an Right Ankle Fracture.    Post op recommendations after surgery are:  Nonweightbearing right lower extremity short leg splint.  Encourage elevation.  Keep splint clean and dry.  Maintain until follow-up.  Aspirin 81 mg twice daily starting postop day 1 for DVT prophylaxis to be continued for 4 weeks.  PT and OT to evaluate and treat.- discharge to [facility], [facility], 2 person assist- no weight bearing to right leg  Needs to follow up with orthopedics as recommended.  Continue routine acetaminophen 975 mg PO TID.  Continue ibuprofen PRN and oxycodone PRN for pain.   Follow up with orthopedic doctor on 11/26/24 as scheduled.    Impaired gait and mobility; generalized muscle weakness:  Patient is non-weight bearing has splint on right lower leg.  Nonweightbearing right lower extremity short leg splint.    Encourage elevation.  Keep splint clean and dry.   Follow-up with orthopedic doctor Dr. Castro.  Continue at the [facility] for skilled services/PT/OT.     DVT ppx:   Continue Aspirin 81 mg  PO BID ordered for 4 weeks    Hemiplegia and hemiparesis following cerebral infarction affecting right dominant side:  History of strokes:  Continue aspirin.          Problem List Items Addressed This Visit       History of stroke with current residual effects    Closed displaced trimalleolar fracture of right lower leg - Primary    Ankle pain     Other Visit Diagnoses       H/O reduction of closed fracture        S/P ORIF (open reduction internal fixation) fracture        Impaired gait and mobility        Generalized muscle weakness        On deep vein thrombosis (DVT) prophylaxis        Hemiplegia and hemiparesis following cerebral infarction affecting right dominant side (Multi)                              NADEEM Clement       Electronically Signed By: NADEEM Clement   11/13/24 11:30 PM

## 2024-11-12 ENCOUNTER — HOSPITAL ENCOUNTER (EMERGENCY)
Facility: HOSPITAL | Age: 34
Discharge: HOME | End: 2024-11-12
Attending: EMERGENCY MEDICINE
Payer: MEDICARE

## 2024-11-12 ENCOUNTER — APPOINTMENT (OUTPATIENT)
Dept: RADIOLOGY | Facility: HOSPITAL | Age: 34
End: 2024-11-12
Payer: MEDICARE

## 2024-11-12 VITALS
OXYGEN SATURATION: 99 % | HEART RATE: 88 BPM | RESPIRATION RATE: 18 BRPM | DIASTOLIC BLOOD PRESSURE: 88 MMHG | TEMPERATURE: 97.1 F | SYSTOLIC BLOOD PRESSURE: 128 MMHG

## 2024-11-12 DIAGNOSIS — M79.604 RIGHT LEG PAIN: Primary | ICD-10-CM

## 2024-11-12 DIAGNOSIS — Z98.890 HISTORY OF ANKLE SURGERY: ICD-10-CM

## 2024-11-12 PROCEDURE — 2500000001 HC RX 250 WO HCPCS SELF ADMINISTERED DRUGS (ALT 637 FOR MEDICARE OP): Performed by: EMERGENCY MEDICINE

## 2024-11-12 PROCEDURE — 29515 APPLICATION SHORT LEG SPLINT: CPT | Mod: RT

## 2024-11-12 PROCEDURE — 99284 EMERGENCY DEPT VISIT MOD MDM: CPT | Mod: 25

## 2024-11-12 PROCEDURE — 93971 EXTREMITY STUDY: CPT | Mod: FOREIGN READ | Performed by: RADIOLOGY

## 2024-11-12 PROCEDURE — 93971 EXTREMITY STUDY: CPT

## 2024-11-12 PROCEDURE — 73610 X-RAY EXAM OF ANKLE: CPT | Mod: RT

## 2024-11-12 PROCEDURE — 73610 X-RAY EXAM OF ANKLE: CPT | Mod: RIGHT SIDE | Performed by: RADIOLOGY

## 2024-11-12 RX ORDER — OXYCODONE HYDROCHLORIDE 5 MG/1
5 TABLET ORAL ONCE
Status: COMPLETED | OUTPATIENT
Start: 2024-11-12 | End: 2024-11-12

## 2024-11-12 RX ORDER — GABAPENTIN 300 MG/1
300 CAPSULE ORAL ONCE
Status: COMPLETED | OUTPATIENT
Start: 2024-11-12 | End: 2024-11-12

## 2024-11-12 ASSESSMENT — PAIN DESCRIPTION - ORIENTATION
ORIENTATION: RIGHT
ORIENTATION: RIGHT

## 2024-11-12 ASSESSMENT — PAIN SCALES - GENERAL
PAINLEVEL_OUTOF10: 5 - MODERATE PAIN
PAINLEVEL_OUTOF10: 5 - MODERATE PAIN
PAINLEVEL_OUTOF10: 8

## 2024-11-12 ASSESSMENT — PAIN DESCRIPTION - LOCATION
LOCATION: LEG
LOCATION: LEG
LOCATION: FOOT

## 2024-11-12 ASSESSMENT — PAIN DESCRIPTION - PAIN TYPE
TYPE: ACUTE PAIN
TYPE: ACUTE PAIN

## 2024-11-12 ASSESSMENT — ACTIVITIES OF DAILY LIVING (ADL): LACK_OF_TRANSPORTATION: PATIENT UNABLE TO ANSWER

## 2024-11-12 ASSESSMENT — PAIN DESCRIPTION - DESCRIPTORS
DESCRIPTORS: TINGLING
DESCRIPTORS: BURNING

## 2024-11-12 ASSESSMENT — PAIN - FUNCTIONAL ASSESSMENT
PAIN_FUNCTIONAL_ASSESSMENT: 0-10
PAIN_FUNCTIONAL_ASSESSMENT: 0-10

## 2024-11-12 NOTE — ED PROVIDER NOTES
History/Exam limitations: none.   Additional history was obtained from patient and past medical records.          HPI:    Windy Lovelace is a 34 y.o. female PMH moyamoya, CVA, developmental delay, right trimalleolar fracture/dislocation status post operative management 9/26/2024 presenting for evaluation of discomfort beneath her right lower extremity cast.  Patient states that she had the cast up on a pillow at her nursing home.  She states that she had significant burning sensation around the entire right lower extremity under the cast and also had numbness.  Denies any specific distribution of the numbness and states it was in all areas.  She states that she still has some intermittent burning type sensation but has improved after having it in different position.  No leg swelling.  No change in sensation of her toes.  Has been taking her pain medications as prescribed, Toradol and oxycodone she states.  No fever.  No chest pain or shortness of breath/palpitations.        Physical Exam:  ED Triage Vitals   Temperature Heart Rate Respirations BP   11/12/24 0245 11/12/24 0245 11/12/24 0245 11/12/24 0245   36.2 °C (97.1 °F) 69 16 133/87      Pulse Ox Temp Source Heart Rate Source Patient Position   11/12/24 0245 11/12/24 0245 11/12/24 0237 --   96 % Temporal Monitor       BP Location FiO2 (%)     -- --              GEN:      Alert, NAD  CV:      RRR, no MRG, no LE pitting edema, 2+ radial and pedal pulses  PULM:     CTAB, no w/r/r, easy WOB, symmetric chest rise  ABD:      Soft, NT, ND, no masses, BS +  NEURO:   A&Ox3, no focal deficits    MSK:      Right lower extremity from proximal shin to toes, obvious wear to the plantar aspect of the cast, brisk cap refill to toes, sensation intact to light touch in all toes, 2+ DP pulse, no swelling, no drainage, after cast removed surgical incisions evaluated medial and laterally witherythema, no drainage, no fluctuance  SKIN:       Warm and dry  PSYCH:    Appropriate  mood and affect         MDM/ED Course:   Windy Lovelace is a 34 y.o. female PMH moyamoya, CVA, developmental delay, right trimalleolar fracture/dislocation status post operative management 9/26/2024 presenting for evaluation of discomfort beneath her right lower extremity cast.   Vitals reassuring.  Exam as documented above.  Differential includes nerve compression however patient has symptoms throughout the entire leg with the cast and does not follow a dermatomal or peripheral nerve distribution.  Differential includes underlying infection.  Differential includes vascular abnormality however patient has brisk cap refill to toes, resolution of symptoms on my assessment and 2+ DP pulse, less likely.  Differential includes neuropathic pain.  Patient was given p.o. oxycodone and gabapentin.  Sensation intact to light touch in all toes.  There is no evidence of swelling however differential includes DVT, DVT ultrasound obtained and negative.  Patient's surgeon Dr. Castro was reached.  Patient has been hopping on the cast which may be potential cause of her symptoms.  Orthopedics recommendations as below.     ED Course as of 11/14/24 1922 Tue Nov 12, 2024   2173 I reached Dr. Jorge from orthopedics, recommends removing cast.  Patient appears to be walking on it and states has been hopping on it to get to the restroom due to intermittent diarrhea.  The bottom of the cast is warm.  Recommends removing, evaluating for wounds and repeat x-ray. [JM]   0501 Dr. Castro evaluated patient and reviewed imaging.  Recommends placing in short leg splint with stirrups and discharged with outpatient follow-up.  Patient remains asymptomatic after removal of cast.  Has been walking on the cast, potentially related to symptoms.  Surgical incisions are well-healed and no surrounding erythema induration or drainage.  Neurovasc intact on reassessment.  Wounds appear reassuring.  No evidence of underlying skin infection.  To follow-up  with patient in office in 1 week.  Plan for placement and short leg splint with stirrups. [JM]      ED Course User Index  [JM] Miguel Diaz MD         Diagnoses as of 11/14/24 1922   Right leg pain   History of ankle surgery     ED case manager reaching out to facility to ensure enough support for patient getting to restroom and to ensure NWB RLE status.  Patient and patient's family comfortable with discharge plan.  Patient to be placed in splint and following this discharged back to facility.  Recommend nonweightbearing status and strict return precautions discussed.    Chronic medical conditions affecting care listed in MDM. I independently reviewed imaging studies and agreed with radiology reads. I reviewed recent and relevant outside records including PCP notes, Prior discharge summaries, and prior radiology reports.    Procedure  Procedures    Diagnosis:   1.  Right lower extremity pain  2.  History of trimalar fracture status post repair    Dispo: Discharged circumferential cast to in stable condition      Disclaimer: Portions of this note were dictated by speech recognition. An attempt at proof reading was made to minimize errors. Minor errors in transcription may be present.  Please call if questions.     Miguel Diaz MD  11/14/24 1921       Miguel Diaz MD  11/14/24 1922

## 2024-11-12 NOTE — PROGRESS NOTES
[facility]  Aracelis Fernando, Power of  [phone] (also a )    Needs to continue non-weight bearing on splint    Call out for DON @ SNF regarding care concerns:  ?room change  ?increased toileting routine  ?BSC  ?uses WC with foot riser  ?increase PT- guardian has order     11/12/24 0821   Discharge Planning   Living Arrangements Alone   Support Systems Family members  (sister)   Assistance Needed continue NWB status   Type of Residence Skilled nursing facility   Do you have animals or pets at home? No   Home or Post Acute Services Post acute facilities (Rehab/SNF/etc)   Type of Post Acute Facility Services Skilled nursing   Expected Discharge Disposition SNF   Does the patient need discharge transport arranged? Yes   RoundTrip coordination needed? Yes   Has discharge transport been arranged? Yes   What day is the transport expected? 11/12/24   Financial Resource Strain   How hard is it for you to pay for the very basics like food, housing, medical care, and heating? Pt Unable   Housing Stability   In the last 12 months, was there a time when you were not able to pay the mortgage or rent on time? Pt Unable   In the past 12 months, how many times have you moved where you were living? 1   At any time in the past 12 months, were you homeless or living in a shelter (including now)? Pt Unable   Transportation Needs   In the past 12 months, has lack of transportation kept you from medical appointments or from getting medications? Pt Unable   In the past 12 months, has lack of transportation kept you from meetings, work, or from getting things needed for daily living? Pt Unable

## 2024-11-12 NOTE — CONSULTS
ORTHOPAEDIC SURGERY CONSULT NOTE    Reason For Consult  Right ankle pain    Chief Complaint:  Right ankle pain    History Of Present Illness  Windy Lovelace is a 34 y.o. female with a past medical history of moyamoya, CVA as well as developmental delay s/p right trimalleolar ankle fracture ORIF without fixation of the posterior lip and with syndesmotic stabilization from 09/26/2024.  Patient is experiencing increased burning sensation.  She has had bouts of diarrhea and has been putting her foot down in order to get to the bathroom.  Her POA by review of the EMR message yesterday regarding foot warmth.  She was seen in the ER, she has been without fevers, chills or night sweats.  She is overall improving following cast removal.     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    Headache, unspecified 04/29/2015    Severe headache    Hiatal hernia     LFTs abnormal     Mares mares disease     Obesity (BMI 30-39.9)     Personal history of other mental and behavioral disorders 10/06/2017    History of mental retardation    Syncope and collapse 06/13/2017    Syncope and collapse       Surgical History  Recent Surgeries in Orthopaedic Surgery            No cases to display             Social History  Social History     Socioeconomic History    Marital status: Single   Tobacco Use    Smoking status: Never    Smokeless tobacco: Never   Vaping Use    Vaping status: Never Used   Substance and Sexual Activity    Alcohol use: Never    Drug use: Never    Sexual activity: Not Currently     Birth control/protection: I.U.D.     Social Drivers of Health     Financial Resource Strain: Low Risk  (9/27/2024)    Overall Financial Resource Strain (CARDIA)     Difficulty of Paying Living Expenses: Not hard at all   Food Insecurity: Not on File (9/26/2024)    Received from [facility]    Food [employer]      Food: 0   Transportation Needs: No Transportation Needs (9/27/2024)    PRAPARE - Transportation     Lack of Transportation (Medical): No     Lack of Transportation (Non-Medical): No   Physical Activity: Not on File (5/21/2021)    Received from [facility]    Physical Activity     Physical Activity: 0   Stress: Not on File (5/21/2021)    Received from [facility]    Stress     Stress: 0   Social Connections: Not on File (9/12/2024)    Received from [employer]    Social Connections     Connectedness: 0   Recent Concern: Social Connections - Feeling Somewhat Isolated (6/28/2024)    OASIS : Social Isolation     Frequency of experiencing loneliness or isolation: Sometimes   Housing Stability: Low Risk  (9/27/2024)    Housing Stability Vital Sign     Unable to Pay for Housing in the Last Year: No     Number of Times Moved in the Last Year: 0     Homeless in the Last Year: No       Family History  Family History   Problem Relation Name Age of Onset    Kidney cancer Mother      Liver cancer Mother      Pancreatic cancer Father      Glaucoma Other grandmother     Diabetes Other Grandfather         other type with arthropathy, with long term curent use of insulin    Diabetes Other aunt         other type with arthropathy, with long term curent use of insulin        Allergies  Allergies   Allergen Reactions    Bupropion Unknown and Hives    Ketamine Unknown, Anaphylaxis and Rash    Augmentin [Amoxicillin-Pot Clavulanate] Hives    Penicillin G Hives    Penicillins Unknown and Hives       Review of Systems  REVIEW OF SYSTEMS  Constitutional: no unplanned weight loss  Psychiatric: no suicidal ideation  ENT: no vision changes, no sinus problems  Pulmonary: no shortness of breath  Lymphatic: no enlarged lymph nodes  Cardiovascular: no chest pain or shortness of breath  Gastrointestinal: no stomach problems  Genitourinary: no dysuria   Skin: no rashes  Endocrine: no thyroid problems  Neurological: no headache, no  numbness  Hematological: no easy bruising  Musculoskeletal: Right ankle pain     Physical Exam  PHYSICAL EXAMINATION  Constitutional Exam: well developed and well nourished  Psychiatric Exam: alert and oriented, appropriate mood and behavior  Eye Exam: EOMI  Pulmonary Exam: breathing non-labored, no apparent distress  Lymphatic exam: no appreciable lymphadenopathy in the lower extremities  Cardiovascular exam: RRR to peripheral palpation, DP pulses 2+, PT 2+, toes are pink with good capillary refill, no pitting edema  Skin exam: no open lesions, rashes, abrasions or ulcerations  Neurological exam: sensation to light touch intact in both lower extremities in peripheral and dermatomal distributions (except for any abnormalities noted in musculoskeletal exam)    Musculoskeletal exam: Right lower extremity examination.  Patient anteromedial ankle and lateral fibular incision well-appearing.  There is no dior-incisional erythema, induration, fluctuance or drainage.  No obvious ankle effusion.  Patient tolerates short arcs of motion of the ankle without significant pain.  Sensation is grossly intact to light touch in the distal extremity with intact PF/DF/EHL.  Patient has 2+ DP/PT pulses palpated. Cast present in room with obvious contamination of plantar surface of splint indicative of weight-bearing.     Last Recorded Vitals  Blood pressure (!) 161/97, pulse 84, temperature 36.2 °C (97.1 °F), temperature source Temporal, resp. rate 16, SpO2 98%.    Laboratory Results  No results found for this or any previous visit (from the past 24 hours).     Radiology Results  X-ray imaging 3 view nonweightbearing right ankle reviewed and independently evaluated by me on 11/12/2024 demonstrates continued interval healing of trimalleolar ankle fracture fixation without fixation of the posterior lip.  Ankle mortise appears well aligned.  There is no dior-implant fracture, lucency or loosening.    Assessment/Plan:  35 y/o F with a past  medical history of moyamoya, CVA as well as developmental delay s/p right trimalleolar ankle fracture ORIF without fixation of the posterior lip and with syndesmotic stabilization from 09/26/2024.     - No acute orthopaedic surgery intervention  - NWB RLE in SLS  - Pain control per ER  - DVT PPX: SCDs, early mobilization and continue on aspirin for DVT PPX  - RTC in 1 week for repeat clinical and radiographic evaluation    Patient attempted to contact her POA and brother-in-law via telephone for coordinated care discussion but was unable to reach them at the time of my encounter.    Timothy Castro MD, MACI  Department of Orthopaedic Surgery  [facility]

## 2024-11-12 NOTE — ED TRIAGE NOTES
"Pt had a cast placed to right ankle post fracture and reports feeling a \"burning sensation\" to lower leg and foot  "

## 2024-11-12 NOTE — DISCHARGE INSTRUCTIONS
You were seen in the emergency room today for evaluation of discomfort under your cast.  Your cast was removed and your wounds appeared overall reassuring.  You were evaluated by orthopedic surgeon and x-ray was obtained.  Please maintain splint.  Please continue nonweightbearing status/do not walk on the splint.  Please follow-up with orthopedics in 1 week as planned.  Please return to the emergency room if you have any recurrent or worsening symptoms including but not limited to any increasing pain, numbness, swelling, or if you have other concerns.

## 2024-11-13 VITALS
RESPIRATION RATE: 18 BRPM | WEIGHT: 241 LBS | DIASTOLIC BLOOD PRESSURE: 88 MMHG | BODY MASS INDEX: 38.73 KG/M2 | HEIGHT: 66 IN | SYSTOLIC BLOOD PRESSURE: 121 MMHG | OXYGEN SATURATION: 99 % | TEMPERATURE: 97.8 F | HEART RATE: 60 BPM

## 2024-11-14 NOTE — PROGRESS NOTES
Name: Windy Lovelace    YOB: 1990    Code Status: FULL CODE    Chief Complaint:  Follow up on Right ankle fracture (right trimalleolar ankle fracture dislocation)   S/p closed reduction with concentric reduction; Right ankle pain;  etc......       HPI   33 year old female with past medical history of stroke with current residual effects;  Migraine headache;   Moyamoya disease; Esophageal reflux;   Exotropia of right eye; History of neurodevelopmental disorder;   Obesity (BMI 30-39.9); and Hiatal hernia      Patient was admitted to [facility] from 9/13/24 to 9/19/24 (6 days)    Patient initially admitted to the [facility] on 9/19/24 for skilled services.    Patient readmitted to [facility] for surgery from 9/26/24 to 9/28/24.    Patient readmitted to the [facility] on 9/28/24 for skilled services.     Diagnoses as follows:    Right ankle fracture (right trimalleolar ankle fracture dislocation)   S/p closed reduction with concentric reduction; Right ankle pain;  Right Ankle ORIF Tri-Malleolar Fracture without Fixation of the Posterior Lip, Right Syndesmosis Stabilization:   Right trimalleolar ankle fracture dislocation status post open reduction internal fixation with Dr. Castro on 9/26/24.   Patient tolerated procedure well.  Patient initially presented to [facility] on 9/13/24 with an Right Ankle Fracture.    No acute orthopaedic surgical intervention was initially indicated.   Surgery was on 9/26/24.  Post op recommendations after surgery are:  Nonweightbearing right lower extremity short leg splint.  Encourage elevation.  Keep splint clean and dry.  Maintain until follow-up.  Aspirin 81 mg twice daily starting postop day 1 for DVT prophylaxis to be continued for 4 weeks.  PT and OT to evaluate and treat.- discharge to [facility], [facility], 2 person assist- no weight bearing to right leg  Patient had a follow-up with orthopedics Dr. Castro on 10/10/24.  Next orthopedics appointment is on  11/26/24   On routine acetaminophen 975 mg PO TID.  On ibuprofen PRN and oxycodone PRN for pain.   She states her pain is improving, she is not taking oxycodone PRN hardly at all any more.  Reports pain a 4. Movement makes it worse.   Reminded patient to elevate leg.  Patient states she has not been taking oxycodone PRN for pain.  She think she does not need it any more.   Patient seen today.  Cooperative but slightly anxious.  No complaints of headaches or dizziness.  No chest pain.    Generalized muscle weakness; Impaired gait and mobility :  Patient is non-weight bearing has splint on right lower leg.  Nonweightbearing right lower extremity short leg splint.    Encouraged patient to elevate her leg.  Keep splint clean and dry.   Splint needs to stay intact untill follow-up appointment with orthopedic doctor Dr. Castro.  Patient at the [facility] for skilled services/PT/OT.     Hemiplegia and hemiparesis following cerebral infarction affecting right dominant side;  History of strokes:  On aspirin.     DVT ppx:   Aspirin 81 mg  PO BID ordered for 4 weeks                             Reviewed  EMR  Reviewed medical, social, surgical and family history.  Reviewed all current medications and performed medication reconciliation.  Performed prescription drug management.  Reviewed vital signs AND lab results  Reviewed [facility] Documentation  Discussed patient with nursing and .                       ROS: 10 point ROS performed; Negative unless noted in HPI.      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), Breast pain (03/21/2024), Candidiasis of skin and nail (06/25/2014), Cellulitis of face (10/10/2013), Headache, unspecified (04/29/2015), Hypomagnesemia (01/23/2014), Other cysts of oral  region, not elsewhere classified (06/10/2016), Other enthesopathies, not elsewhere classified (10/29/2015), Other specified abnormal findings of blood chemistry (03/06/2020), Other specified joint disorders, left ankle and foot (11/02/2016), Other specified noninflammatory disorders of vagina (03/20/2018), Pain in right knee (11/12/2018), Pain in right knee (02/19/2018), Pain in right knee (02/20/2019), Pain, unspecified (10/17/2017), Pelvic and perineal pain (01/24/2018), Personal history of diseases of the skin and subcutaneous tissue (02/24/2016), Personal history of other diseases of the digestive system (10/06/2017), Personal history of other diseases of the digestive system (03/17/2015), Personal history of other diseases of the digestive system (05/24/2016), Personal history of other diseases of the female genital tract (08/31/2015), Personal history of other diseases of the musculoskeletal system and connective tissue (02/04/2014), Personal history of other diseases of the nervous system and sense organs (01/05/2015), Personal history of other diseases of the respiratory system (08/29/2013), Personal history of other diseases of the respiratory system (02/03/2017), Personal history of other drug therapy (12/11/2017), Personal history of other endocrine, nutritional and metabolic disease (05/09/2019), Personal history of other mental and behavioral disorders (10/06/2017), Personal history of transient ischemic attack (TIA), and cerebral infarction without residual deficits (10/06/2017), Pleurodynia (01/23/2014), Pyuria (06/13/2017), Sprain of medial collateral ligament of right knee, initial encounter (11/02/2016), Sprain of unspecified ligament of left ankle, subsequent encounter (10/29/2015), Strain of muscle and tendon of unspecified wall of thorax, initial encounter (02/04/2014), Syncope and collapse (06/13/2017), Unspecified fall, initial encounter (01/23/2014), and Unspecified symptoms and signs  "involving the genitourinary system (01/24/2018).    SURGICAL HISTORY:   MR head angio w IV contrast (8/18/2015);   US guided percutaneous peritoneal or retroperitoneal fluid collection drainage (4/10/2019);   and US guided abscess drain (4/10/2019).     SOCIAL HISTORY:  She reports that she has never smoked.   She has never used smokeless tobacco.   She reports that she does not drink alcohol and does not use drugs.    Family History   Problem Relation Name Age of Onset    Kidney cancer Mother        Liver cancer Mother        Pancreatic cancer Father        Glaucoma Other grandmother      Diabetes Other Grandfather           other type with arthropathy, with long term curent use of insulin    Diabetes Other aunt         ALLERGIES:  Bupropion  Ketamine  Augmentin [amoxicillin-pot clavulanate]  Penicillin g,   Penicillins    LABS:  CBC: Date: 10/9/2024 WBC 9.1 Hgb 12.6 hematocrit 39.8 platelets 319    BMP: Date: 10/15/2024 Na 142 K 4 Cr 0.7 BUN 10 glucose 69 Ca 8 GFR 95  CBC: Date: 10/15/2024 WBC 7.7 Hgb 12.2 hematocrit 37.1 platelets 202  Liver function: Date: 10/15/2024 ALT 36 AST 35 alkaline phos.  71 bilirubin 0.4 albumin 3.5 protein 5.9            Lab Results   Component Value Date    WBC 10.1 09/28/2024    HGB 11.9 (L) 09/28/2024    HCT 36.5 09/28/2024     09/28/2024    CHOL 152 05/09/2024    TRIG 178 (H) 05/09/2024    HDL 33.6 05/09/2024    ALT 51 (H) 09/14/2024    AST 18 09/14/2024     09/27/2024    K 3.8 09/27/2024     09/27/2024    CREATININE 0.65 09/27/2024    BUN 7 09/27/2024    CO2 22 09/27/2024    TSH 5.14 (H) 05/09/2024    INR 1.0 09/13/2024             /88   Pulse 60   Temp 36.6 °C (97.8 °F)   Resp 18   Ht 1.676 m (5' 6\")   Wt 109 kg (241 lb)   SpO2 99%   BMI 38.90 kg/m²      Physical Exam  Vitals and nursing note reviewed.   Constitutional:       Appearance: She is obese.   HENT:      Head: Normocephalic.      Right Ear: External ear normal.      Left Ear: External ear " normal.      Nose: Nose normal.      Mouth/Throat:      Mouth: Mucous membranes are moist.      Pharynx: Oropharynx is clear.   Eyes:      Extraocular Movements: Extraocular movements intact.      Conjunctiva/sclera: Conjunctivae normal.      Pupils: Pupils are equal, round, and reactive to light.   Cardiovascular:      Rate and Rhythm: Normal rate and regular rhythm.      Pulses: Normal pulses.      Heart sounds: Normal heart sounds.   Pulmonary:      Effort: Pulmonary effort is normal.      Breath sounds: Normal breath sounds.   Abdominal:      General: Bowel sounds are normal.      Palpations: Abdomen is soft.   Musculoskeletal:         General: Normal range of motion.      Cervical back: Normal range of motion and neck supple.      Comments: Right lower extremity: splint intact.  Adequate movement and sensation in toes.  Cap refill < 3 seconds.   Feet:      Comments: Bilateral feet--adequate movement and sensation;  Cap refill < 3 seconds.   Skin:     General: Skin is warm and dry.      Capillary Refill: Capillary refill takes less than 2 seconds.   Neurological:      General: No focal deficit present.      Mental Status: She is alert and oriented to person, place, and time. Mental status is at baseline.      Motor: Weakness present.      Gait: Gait abnormal.   Psychiatric:         Mood and Affect: Mood is anxious and depressed.         Behavior: Behavior normal. Behavior is cooperative.          Assessment/Plan      Right ankle fracture (right trimalleolar ankle fracture dislocation)   S/p closed reduction with concentric reduction; Right ankle pain;  Right Ankle ORIF Tri-Malleolar Fracture without Fixation of the Posterior Lip, Right Syndesmosis Stabilization:   Right trimalleolar ankle fracture dislocation status post open reduction internal fixation with Dr. Castro on 9/26/24.   Patient initially presented to [facility] on 9/13/24 with an Right Ankle Fracture.    Post op recommendations after surgery  are:  Nonweightbearing right lower extremity short leg splint.  Encourage elevation.  Keep splint clean and dry.  Maintain until follow-up.  Aspirin 81 mg twice daily starting postop day 1 for DVT prophylaxis to be continued for 4 weeks.  PT and OT to evaluate and treat.- discharge to , [facility], 2 person assist- no weight bearing to right leg  Needs to follow up with orthopedics as recommended.  Continue routine acetaminophen 975 mg PO TID.  Continue ibuprofen PRN and oxycodone PRN for pain.   Follow up with orthopedic doctor on 11/26/24 as scheduled.    Impaired gait and mobility; generalized muscle weakness:  Patient is non-weight bearing has splint on right lower leg.  Nonweightbearing right lower extremity short leg splint.    Encourage elevation.  Keep splint clean and dry.   Follow-up with orthopedic doctor Dr. Castro.  Continue at the [facility] for skilled services/PT/OT.     DVT ppx:   Continue Aspirin 81 mg  PO BID ordered for 4 weeks    Hemiplegia and hemiparesis following cerebral infarction affecting right dominant side:  History of strokes:  Continue aspirin.          Problem List Items Addressed This Visit       History of stroke with current residual effects    Closed displaced trimalleolar fracture of right lower leg - Primary    Ankle pain     Other Visit Diagnoses       H/O reduction of closed fracture        S/P ORIF (open reduction internal fixation) fracture        Impaired gait and mobility        Generalized muscle weakness        On deep vein thrombosis (DVT) prophylaxis        Hemiplegia and hemiparesis following cerebral infarction affecting right dominant side (Multi)                              Paola Kessler, APRN-CNP

## 2024-11-18 ENCOUNTER — NURSING HOME VISIT (OUTPATIENT)
Dept: POST ACUTE CARE | Facility: EXTERNAL LOCATION | Age: 34
End: 2024-11-18
Payer: MEDICARE

## 2024-11-18 DIAGNOSIS — F41.1 GENERALIZED ANXIETY DISORDER: ICD-10-CM

## 2024-11-18 DIAGNOSIS — I10 BENIGN ESSENTIAL HTN: Primary | ICD-10-CM

## 2024-11-18 DIAGNOSIS — Z98.890 S/P ORIF (OPEN REDUCTION INTERNAL FIXATION) FRACTURE: ICD-10-CM

## 2024-11-18 DIAGNOSIS — R26.89 IMPAIRED GAIT AND MOBILITY: ICD-10-CM

## 2024-11-18 DIAGNOSIS — F32.9 MAJOR DEPRESSIVE DISORDER WITH CURRENT ACTIVE EPISODE, UNSPECIFIED DEPRESSION EPISODE SEVERITY, UNSPECIFIED WHETHER RECURRENT: ICD-10-CM

## 2024-11-18 DIAGNOSIS — Z87.81 H/O REDUCTION OF CLOSED FRACTURE: ICD-10-CM

## 2024-11-18 DIAGNOSIS — S82.851S CLOSED DISPLACED TRIMALLEOLAR FRACTURE OF RIGHT ANKLE, SEQUELA: ICD-10-CM

## 2024-11-18 DIAGNOSIS — G47.00 INSOMNIA, UNSPECIFIED TYPE: ICD-10-CM

## 2024-11-18 DIAGNOSIS — M25.571 RIGHT ANKLE PAIN, UNSPECIFIED CHRONICITY: ICD-10-CM

## 2024-11-18 DIAGNOSIS — Z79.899 ON DEEP VEIN THROMBOSIS (DVT) PROPHYLAXIS: ICD-10-CM

## 2024-11-18 DIAGNOSIS — K21.9 GASTROESOPHAGEAL REFLUX DISEASE, UNSPECIFIED WHETHER ESOPHAGITIS PRESENT: ICD-10-CM

## 2024-11-18 DIAGNOSIS — I69.351 HEMIPLEGIA AND HEMIPARESIS FOLLOWING CEREBRAL INFARCTION AFFECTING RIGHT DOMINANT SIDE (MULTI): ICD-10-CM

## 2024-11-18 DIAGNOSIS — J30.9 ALLERGIC RHINITIS, UNSPECIFIED SEASONALITY, UNSPECIFIED TRIGGER: ICD-10-CM

## 2024-11-18 DIAGNOSIS — J45.909 ASTHMA, UNSPECIFIED ASTHMA SEVERITY, UNSPECIFIED WHETHER COMPLICATED, UNSPECIFIED WHETHER PERSISTENT (HHS-HCC): ICD-10-CM

## 2024-11-18 DIAGNOSIS — M62.81 GENERALIZED MUSCLE WEAKNESS: ICD-10-CM

## 2024-11-18 DIAGNOSIS — Z87.81 S/P ORIF (OPEN REDUCTION INTERNAL FIXATION) FRACTURE: ICD-10-CM

## 2024-11-18 DIAGNOSIS — I69.30 HISTORY OF STROKE WITH CURRENT RESIDUAL EFFECTS: ICD-10-CM

## 2024-11-18 PROCEDURE — 99309 SBSQ NF CARE MODERATE MDM 30: CPT | Performed by: NURSE PRACTITIONER

## 2024-11-18 NOTE — LETTER
Patient: Windy Lovelace  : 1990    Encounter Date: 2024    Name: Windy Lovelace    YOB: 1990    Code Status: FULL CODE    Chief Complaint:  Follow up on HTN;  etc......       HPI   34 year old female with past medical history of stroke with current residual effects;  Migraine headache;   Moyamoya disease; Esophageal reflux;   Exotropia of right eye; History of neurodevelopmental disorder;   Obesity (BMI 30-39.9); and Hiatal hernia      Patient was admitted to [facility] from 24 to 24 (6 days)    Patient initially admitted to the [facility] on 24 for skilled services.    Patient readmitted to [facility] for surgery from 24 to 24.    Patient readmitted to the [facility] on 24 for skilled services.     Diagnoses as follows:    Benign essential HTN:  On Prazosin.  Recent BP: 115/87.  No complaints of headaches or dizziness.  No chest pain.  Patient seen today.  Calm, cooperative, mentation at baseline.  She is in her bed.     Generalized anxiety disorder;   major depressive disorder; insomnia:  On sertraline and trazodone.  Patient appears anxious intermittently.  Unhappy she is not able to ambulate.  No complaints of insomnia.      GERD:  On Omeprazole.   No complaints of reflux or N/V.    Asthma; allergic rhinitis:   On Advair and fluticasone nasal spray.  No reports cough or SOB today.  On fluticasone for allergic rhinitis.   No complaints of runny or stuffy nose.  No headaches or dizziness.    Right ankle fracture (right trimalleolar ankle fracture dislocation)   S/p closed reduction with concentric reduction; Right ankle pain;  Right Ankle ORIF Tri-Malleolar Fracture without Fixation of the Posterior Lip, Right Syndesmosis Stabilization:   Right trimalleolar ankle fracture dislocation status post open reduction internal fixation with Dr. Castro on 24.   Patient tolerated procedure well.  Patient initially presented to [facility] on  9/13/24 with an Right Ankle Fracture.    No acute orthopaedic surgical intervention was initially indicated.   Surgery was on 9/26/24.  Post op recommendations after surgery are:  Nonweightbearing right lower extremity short leg splint.  Encourage elevation.  Keep splint clean and dry.  Maintain until follow-up.  Aspirin 81 mg twice daily starting postop day 1 for DVT prophylaxis to be continued for 4 weeks.  PT and OT to evaluate and treat.- discharge to [facility], 2 person assist- no weight bearing to right leg  Patient had a follow-up with orthopedics Dr. Castro on 10/10/24.  Next orthopedics appointment is on 11/26/24 at [facility].  On routine acetaminophen 975 mg PO TID.  On ibuprofen PRN and oxycodone PRN for pain.   She states her pain is improving, she is not taking oxycodone PRN hardly at all any more.  Reports pain a 4. Movement makes it worse.   Reminded patient to elevate leg.  Patient states she has not been taking oxycodone PRN for pain.  She think she does not need it any more.   Patient seen today.  Cooperative but slightly anxious.  No complaints of headaches or dizziness.  No chest pain.    Impaired gait and mobility; Generalized muscle weakness :  Patient is non-weight bearing has splint on right lower leg.  Nonweightbearing right lower extremity short leg splint.    Encouraged patient to elevate her leg.  Keep splint clean and dry.   Splint needs to stay intact untill follow-up appointment with orthopedic doctor Dr. Castro.  Patient at the [facility] for skilled services/PT/OT.     Hemiplegia and hemiparesis following cerebral infarction affecting right dominant side;  History of strokes:  On aspirin.     DVT ppx:   Aspirin 81 mg  PO BID ordered for 4 weeks                             Reviewed  EMR  Reviewed medical, social, surgical and family history.  Reviewed all current medications and performed medication reconciliation.  Performed prescription drug management.  Reviewed  vital signs AND lab results  Reviewed [facility] Documentation  Discussed patient with nursing and .                       ROS: 10 point ROS performed; Negative unless noted in HPI.      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), Breast pain (03/21/2024), Candidiasis of skin and nail (06/25/2014), Cellulitis of face (10/10/2013), Headache, unspecified (04/29/2015), Hypomagnesemia (01/23/2014), Other cysts of oral region, not elsewhere classified (06/10/2016), Other enthesopathies, not elsewhere classified (10/29/2015), Other specified abnormal findings of blood chemistry (03/06/2020), Other specified joint disorders, left ankle and foot (11/02/2016), Other specified noninflammatory disorders of vagina (03/20/2018), Pain in right knee (11/12/2018), Pain in right knee (02/19/2018), Pain in right knee (02/20/2019), Pain, unspecified (10/17/2017), Pelvic and perineal pain (01/24/2018), Personal history of diseases of the skin and subcutaneous tissue (02/24/2016), Personal history of other diseases of the digestive system (10/06/2017), Personal history of other diseases of the digestive system (03/17/2015), Personal history of other diseases of the digestive system (05/24/2016), Personal history of other diseases of the female genital tract (08/31/2015), Personal history of other diseases of the musculoskeletal system and connective tissue (02/04/2014), Personal history of other diseases of the nervous system and sense organs (01/05/2015), Personal history of other diseases of the respiratory system (08/29/2013), Personal history of other diseases of the respiratory system (02/03/2017), Personal history of other drug therapy (12/11/2017), Personal history of other endocrine, nutritional and  metabolic disease (05/09/2019), Personal history of other mental and behavioral disorders (10/06/2017), Personal history of transient ischemic attack (TIA), and cerebral infarction without residual deficits (10/06/2017), Pleurodynia (01/23/2014), Pyuria (06/13/2017), Sprain of medial collateral ligament of right knee, initial encounter (11/02/2016), Sprain of unspecified ligament of left ankle, subsequent encounter (10/29/2015), Strain of muscle and tendon of unspecified wall of thorax, initial encounter (02/04/2014), Syncope and collapse (06/13/2017), Unspecified fall, initial encounter (01/23/2014), and Unspecified symptoms and signs involving the genitourinary system (01/24/2018).    SURGICAL HISTORY:   MR head angio w IV contrast (8/18/2015);   US guided percutaneous peritoneal or retroperitoneal fluid collection drainage (4/10/2019);   and US guided abscess drain (4/10/2019).     SOCIAL HISTORY:  She reports that she has never smoked.   She has never used smokeless tobacco.   She reports that she does not drink alcohol and does not use drugs.    Family History   Problem Relation Name Age of Onset   • Kidney cancer Mother       • Liver cancer Mother       • Pancreatic cancer Father       • Glaucoma Other grandmother     • Diabetes Other Grandfather           other type with arthropathy, with long term curent use of insulin   • Diabetes Other aunt         ALLERGIES:  Bupropion  Ketamine  Augmentin [amoxicillin-pot clavulanate]  Penicillin g,   Penicillins    LABS:  CBC: Date: 10/9/2024 WBC 9.1 Hgb 12.6 hematocrit 39.8 platelets 319    BMP: Date: 10/15/2024 Na 142 K 4 Cr 0.7 BUN 10 glucose 69 Ca 8 GFR 95  CBC: Date: 10/15/2024 WBC 7.7 Hgb 12.2 hematocrit 37.1 platelets 202  Liver function: Date: 10/15/2024 ALT 36 AST 35 alkaline phos.  71 bilirubin 0.4 albumin 3.5 protein 5.9            Lab Results   Component Value Date    WBC 10.1 09/28/2024    HGB 11.9 (L) 09/28/2024    HCT 36.5 09/28/2024     09/28/2024  "   CHOL 152 05/09/2024    TRIG 178 (H) 05/09/2024    HDL 33.6 05/09/2024    ALT 51 (H) 09/14/2024    AST 18 09/14/2024     09/27/2024    K 3.8 09/27/2024     09/27/2024    CREATININE 0.65 09/27/2024    BUN 7 09/27/2024    CO2 22 09/27/2024    TSH 5.14 (H) 05/09/2024    INR 1.0 09/13/2024             /88   Pulse 84   Temp 36.6 °C (97.8 °F)   Resp 18   Ht 1.676 m (5' 6\")   Wt 109 kg (241 lb)   SpO2 99%   BMI 38.90 kg/m²      Physical Exam  Vitals and nursing note reviewed.   Constitutional:       Appearance: She is obese.   HENT:      Head: Normocephalic.      Right Ear: External ear normal.      Left Ear: External ear normal.      Nose: Nose normal.      Mouth/Throat:      Mouth: Mucous membranes are moist.      Pharynx: Oropharynx is clear.   Eyes:      Extraocular Movements: Extraocular movements intact.      Conjunctiva/sclera: Conjunctivae normal.      Pupils: Pupils are equal, round, and reactive to light.   Cardiovascular:      Rate and Rhythm: Normal rate and regular rhythm.      Pulses: Normal pulses.      Heart sounds: Normal heart sounds.   Pulmonary:      Effort: Pulmonary effort is normal.      Breath sounds: Normal breath sounds.   Abdominal:      General: Bowel sounds are normal.      Palpations: Abdomen is soft.   Musculoskeletal:         General: Normal range of motion.      Cervical back: Normal range of motion and neck supple.      Comments: Right lower extremity: splint intact.  Adequate movement and sensation in toes.  Cap refill < 3 seconds.   Feet:      Comments: Bilateral feet--adequate movement and sensation;  Cap refill < 3 seconds.   Skin:     General: Skin is warm and dry.      Capillary Refill: Capillary refill takes less than 2 seconds.   Neurological:      General: No focal deficit present.      Mental Status: She is alert and oriented to person, place, and time. Mental status is at baseline.      Motor: Weakness present.      Gait: Gait abnormal.   Psychiatric:  "        Mood and Affect: Mood is anxious and depressed.         Behavior: Behavior normal. Behavior is cooperative.          Assessment/Plan     CMP and CBC with diff next week.    Benign essential HTN:  Continue Prazosin.  Monitor Bps.    Generalized anxiety disorder;   major depressive disorder; insomnia:  Continue sertraline and trazodone.  Monitor for anxiety, depression, insomnia.    GERD:  Continue Omeprazole.   Monitor for reflux or N/V.    Asthma; allergic rhinitis:   Continue Advair and fluticasone nasal spray.    Right ankle fracture (right trimalleolar ankle fracture dislocation)   S/p closed reduction with concentric reduction; Right ankle pain;  Right Ankle ORIF Tri-Malleolar Fracture without Fixation of the Posterior Lip, Right Syndesmosis Stabilization:   Right trimalleolar ankle fracture dislocation status post open reduction internal fixation with Dr. Castro on 9/26/24.   Post op recommendations after surgery are:  Nonweightbearing right lower extremity short leg splint.  Encourage elevation.  Keep splint clean and dry.  Maintain until follow-up.  Aspirin 81 mg twice daily starting postop day 1 for DVT prophylaxis to be continued for 4 weeks.  PT and OT to evaluate and treat.- discharge to [facility], 2 person assist- no weight bearing to right leg  Patient had a follow-up with orthopedics Dr. Castro on 10/10/24.  Next orthopedics appointment is on 11/26/24 at [facility] [place].  Continue routine acetaminophen 975 mg PO TID.  Continue ibuprofen PRN and oxycodone PRN for pain.   Elevate right leg as tolerated.    Impaired gait and mobility; Generalized muscle weakness :  Patient is non-weight bearing has splint on right lower leg.  Nonweightbearing right lower extremity short leg splint.    Encouraged patient to elevate her leg.  Keep splint clean and dry.   Splint needs to stay intact untill follow-up appointment with orthopedic doctor Dr. Castro.  Continue [facility] for skilled services/PT/OT.      Hemiplegia and hemiparesis following cerebral infarction affecting right dominant side;  History of strokes:  Continue aspirin.     DVT ppx:   Continue Aspirin 81 mg  PO BID ordered for 4 weeks        Problem List Items Addressed This Visit       Allergic rhinitis    History of stroke with current residual effects    Closed displaced trimalleolar fracture of right lower leg    Gastroesophageal reflux disease    Insomnia    Ankle pain     Other Visit Diagnoses       Benign essential HTN    -  Primary    Generalized anxiety disorder        Major depressive disorder with current active episode, unspecified depression episode severity, unspecified whether recurrent        Asthma, unspecified asthma severity, unspecified whether complicated, unspecified whether persistent ([facility])        H/O reduction of closed fracture        S/P ORIF (open reduction internal fixation) fracture        Impaired gait and mobility        Generalized muscle weakness        On deep vein thrombosis (DVT) prophylaxis        Hemiplegia and hemiparesis following cerebral infarction affecting right dominant side (Multi)                                NADEEM Clement       Electronically Signed By: NADEEM Clement   11/20/24  6:36 PM

## 2024-11-20 ENCOUNTER — OFFICE VISIT (OUTPATIENT)
Dept: ORTHOPEDIC SURGERY | Facility: CLINIC | Age: 34
End: 2024-11-20
Payer: MEDICARE

## 2024-11-20 ENCOUNTER — HOSPITAL ENCOUNTER (OUTPATIENT)
Dept: RADIOLOGY | Facility: CLINIC | Age: 34
Discharge: HOME | End: 2024-11-20
Payer: MEDICARE

## 2024-11-20 VITALS
TEMPERATURE: 97.8 F | HEART RATE: 84 BPM | OXYGEN SATURATION: 99 % | RESPIRATION RATE: 18 BRPM | BODY MASS INDEX: 38.73 KG/M2 | SYSTOLIC BLOOD PRESSURE: 121 MMHG | WEIGHT: 241 LBS | DIASTOLIC BLOOD PRESSURE: 88 MMHG | HEIGHT: 66 IN

## 2024-11-20 DIAGNOSIS — S82.851S CLOSED DISPLACED TRIMALLEOLAR FRACTURE OF RIGHT ANKLE, SEQUELA: ICD-10-CM

## 2024-11-20 PROCEDURE — 73610 X-RAY EXAM OF ANKLE: CPT | Mod: RIGHT SIDE | Performed by: RADIOLOGY

## 2024-11-20 PROCEDURE — 73630 X-RAY EXAM OF FOOT: CPT | Mod: RT

## 2024-11-20 PROCEDURE — 99211 OFF/OP EST MAY X REQ PHY/QHP: CPT | Performed by: STUDENT IN AN ORGANIZED HEALTH CARE EDUCATION/TRAINING PROGRAM

## 2024-11-20 PROCEDURE — 73630 X-RAY EXAM OF FOOT: CPT | Mod: RIGHT SIDE | Performed by: RADIOLOGY

## 2024-11-20 PROCEDURE — 73610 X-RAY EXAM OF ANKLE: CPT | Mod: RT

## 2024-11-20 NOTE — PROGRESS NOTES
ORTHOPAEDIC SURGERY OUTPATIENT PROGRESS NOTE    Chief Complaint:  Right ankle pain    History Of Present Illness  Windy Lovelace is a 34 y.o. female with a past medical history of moyamoya, CVA as well as developmental delay who presented after ground-level fall with a right trimalleolar ankle fracture and dislocation.  The patient was closed reduced by the orthopedic team, I was not on-call at the time the patient's initial presentation was asked by one of my partners to assume care.  The patient was subsequently discharged and return for surgery, unfortunately on return she had been walking on her splint.  She underwent right trimalleolar ankle fracture ORIF without fixation of the posterior lip and with syndesmotic stabilization from 09/26/2024. She has been residing in a SNF and has been compliant with weight-bearing restrictions. Reporting 8/10 pain. Encounter with POA on facetime in the room.    10/20/2024: Patient returns s/p right trimalleolar ankle fracture ORIF without fixation of the posterior lip and with syndesmotic stabilization from 09/26/2024.  She is currently reporting no pain at rest but had 8 out of 10 pain at night.  Her Sister and POA is present on the phone via [employer] in the room.  She has been putting weight down for transfers to the bathroom and while at PT.  She has been residing in a SNF.    11/20/2024: Patient returns s/p right trimalleolar ankle fracture ORIF without fixation of the posterior lip and with syndesmotic stabilization from 09/26/2024.  Patient reports minimal pain at present.  She has continued to put weight down primarily on her toes for ambulation, most often for toileting.  Discussed with POA via [employer].    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    Headache, unspecified 04/29/2015    Severe headache    Hiatal hernia      LFTs abnormal     Mares mares disease     Obesity (BMI 30-39.9)     Personal history of other mental and behavioral disorders 10/06/2017    History of mental retardation    Syncope and collapse 06/13/2017    Syncope and collapse       Surgical History  Recent Surgeries in Orthopaedic Surgery            No cases to display             Social History  Social History     Socioeconomic History    Marital status: Single   Tobacco Use    Smoking status: Never    Smokeless tobacco: Never   Vaping Use    Vaping status: Never Used   Substance and Sexual Activity    Alcohol use: Never    Drug use: Never    Sexual activity: Not Currently     Birth control/protection: I.U.D.     Social Drivers of Health     Financial Resource Strain: Patient Unable To Answer (11/12/2024)    Overall Financial Resource Strain (CARDIA)     Difficulty of Paying Living Expenses: Patient unable to answer   Food Insecurity: Not on File (9/26/2024)    Received from [facility]    Food Insecurity     Food: 0   Transportation Needs: Patient Unable To Answer (11/12/2024)    PRAPARE - Transportation     Lack of Transportation (Medical): Patient unable to answer     Lack of Transportation (Non-Medical): Patient unable to answer   Physical Activity: Not on File (5/21/2021)    Received from [facility]    Physical Activity     Physical Activity: 0   Stress: Not on File (5/21/2021)    Received from [facility] [employer]    Stress     Stress: 0   Social Connections: Not on File (9/12/2024)    Received from [facility]    Social Connections     Connectedness: 0   Recent Concern: Social Connections - Feeling Somewhat Isolated (6/28/2024)    OASIS : Social Isolation     Frequency of experiencing loneliness or isolation: Sometimes   Housing Stability: Patient Unable To Answer (11/12/2024)    Housing Stability Vital Sign     Unable to Pay for Housing in the Last Year: Patient unable to answer     Number of Times Moved in the Last Year: 1     Homeless in the Last Year: Patient unable  to answer       Family History  Family History   Problem Relation Name Age of Onset    Kidney cancer Mother      Liver cancer Mother      Pancreatic cancer Father      Glaucoma Other grandmother     Diabetes Other Grandfather         other type with arthropathy, with long term curent use of insulin    Diabetes Other aunt         other type with arthropathy, with long term curent use of insulin        Allergies  Allergies   Allergen Reactions    Bupropion Unknown and Hives    Ketamine Unknown, Anaphylaxis and Rash    Augmentin [Amoxicillin-Pot Clavulanate] Hives    Penicillin G Hives    Penicillins Unknown and Hives       Review of Systems  REVIEW OF SYSTEMS  Constitutional: no unplanned weight loss  Psychiatric: no suicidal ideation  ENT: no vision changes, no sinus problems  Pulmonary: no shortness of breath  Lymphatic: no enlarged lymph nodes  Cardiovascular: no chest pain or shortness of breath  Gastrointestinal: no stomach problems  Genitourinary: no dysuria   Skin: no rashes  Endocrine: no thyroid problems  Neurological: no headache, no numbness  Hematological: no easy bruising  Musculoskeletal: Right ankle pain     Physical Exam  PHYSICAL EXAMINATION  Constitutional Exam: well developed and well nourished  Psychiatric Exam: alert and oriented, appropriate mood and behavior  Eye Exam: EOMI  Pulmonary Exam: breathing non-labored, no apparent distress  Lymphatic exam: no appreciable lymphadenopathy in the lower extremities  Cardiovascular exam: RRR to peripheral palpation, DP pulses 2+, PT 2+, toes are pink with good capillary refill, no pitting edema  Skin exam: no open lesions, rashes, abrasions or ulcerations  Neurological exam: sensation to light touch intact in both lower extremities in peripheral and dermatomal distributions (except for any abnormalities noted in musculoskeletal exam)    Musculoskeletal exam: Right lower extremity examination.  Patient lateral and medial ankle incisions well-healed.  There  is no obvious ankle effusion.  Nontender to palpation medially or laterally about the plates. Patient has sensation grossly intact to light touch in a saphenous, sural, superficial peroneal, deep peroneal and tibial nerve distribution.  She has intact PF/DF/EHL.  She is 2+ DP/PT pulse palpated.     Last Recorded Vitals  There were no vitals taken for this visit.    Laboratory Results  No results found for this or any previous visit (from the past 24 hours).     Radiology Results  X-ray imaging 3 view nonweightbearing right ankle reviewed and independently evaluated by me on 11/20/2024 demonstrates appropriate position alignment following trimalleolar ankle fracture with plate and screw fixation of distal fibula as well as medial malleolus.  There is interval fracture healing, there is tricortical syndesmotic screw fixation with no obvious dior-implant fracture, lucency or loosening.  Ankle mortise continues to remain well aligned.    Assessment/Plan:  34 y.o. female with a past medical history of moyamoya, CVA as well as developmental delay s/p right trimalleolar ankle fracture ORIF without fixation of the posterior lip and with syndesmotic stabilization from 09/26/2024.  I would recommend the patient continue strict nonweightbearing in her right lower extremity in a short leg fiberglass cast for an additional one month.  She will continue on aspirin for DVT prophylaxis.  I will plan to see the patient back in approximately 1 month for a repeat clinical and radiographic evaluation.  I encouraged the patient to limit weight bearing but preference through the heel as opposed to the toes. Upon return, patient would require three-view nonweightbearing right ankle out of fiberglass.    Timothy Castro MD, MACI  Department of Orthopaedic Surgery  [facility]     The diagnosis and treatment plan were reviewed with the patient. All questions were answered. The patient verbalized  understanding of the treatment plan. There were no barriers to understanding identified.    Note dictated with [employer] software.  Completed without full type editing and sent to avoid delay.

## 2024-11-20 NOTE — PROGRESS NOTES
Name: Windy Lovelace    YOB: 1990    Code Status: FULL CODE    Chief Complaint:  Follow up on HTN;  etc......       HPI   34 year old female with past medical history of stroke with current residual effects;  Migraine headache;   Moyamoya disease; Esophageal reflux;   Exotropia of right eye; History of neurodevelopmental disorder;   Obesity (BMI 30-39.9); and Hiatal hernia      Patient was admitted to [facility] from 9/13/24 to 9/19/24 (6 days)    Patient initially admitted to the [facility] on 9/19/24 for skilled services.    Patient readmitted to [facility] for surgery from 9/26/24 to 9/28/24.    Patient readmitted to the [facility] on 9/28/24 for skilled services.     Diagnoses as follows:    Benign essential HTN:  On Prazosin.  Recent BP: 115/87.  No complaints of headaches or dizziness.  No chest pain.  Patient seen today.  Calm, cooperative, mentation at baseline.  She is in her bed.     Generalized anxiety disorder;   major depressive disorder; insomnia:  On sertraline and trazodone.  Patient appears anxious intermittently.  Unhappy she is not able to ambulate.  No complaints of insomnia.      GERD:  On Omeprazole.   No complaints of reflux or N/V.    Asthma; allergic rhinitis:   On Advair and fluticasone nasal spray.  No reports cough or SOB today.  On fluticasone for allergic rhinitis.   No complaints of runny or stuffy nose.  No headaches or dizziness.    Right ankle fracture (right trimalleolar ankle fracture dislocation)   S/p closed reduction with concentric reduction; Right ankle pain;  Right Ankle ORIF Tri-Malleolar Fracture without Fixation of the Posterior Lip, Right Syndesmosis Stabilization:   Right trimalleolar ankle fracture dislocation status post open reduction internal fixation with Dr. Castro on 9/26/24.   Patient tolerated procedure well.  Patient initially presented to [facility] on 9/13/24 with an Right Ankle Fracture.    No acute orthopaedic surgical  intervention was initially indicated.   Surgery was on 9/26/24.  Post op recommendations after surgery are:  Nonweightbearing right lower extremity short leg splint.  Encourage elevation.  Keep splint clean and dry.  Maintain until follow-up.  Aspirin 81 mg twice daily starting postop day 1 for DVT prophylaxis to be continued for 4 weeks.  PT and OT to evaluate and treat.- discharge to [facility], 2 person assist- no weight bearing to right leg  Patient had a follow-up with orthopedics Dr. Castro on 10/10/24.  Next orthopedics appointment is on 11/26/24 at [facility].  On routine acetaminophen 975 mg PO TID.  On ibuprofen PRN and oxycodone PRN for pain.   She states her pain is improving, she is not taking oxycodone PRN hardly at all any more.  Reports pain a 4. Movement makes it worse.   Reminded patient to elevate leg.  Patient states she has not been taking oxycodone PRN for pain.  She think she does not need it any more.   Patient seen today.  Cooperative but slightly anxious.  No complaints of headaches or dizziness.  No chest pain.    Impaired gait and mobility; Generalized muscle weakness :  Patient is non-weight bearing has splint on right lower leg.  Nonweightbearing right lower extremity short leg splint.    Encouraged patient to elevate her leg.  Keep splint clean and dry.   Splint needs to stay intact untill follow-up appointment with orthopedic doctor Dr. Castro.  Patient at the [facility] for skilled services/PT/OT.     Hemiplegia and hemiparesis following cerebral infarction affecting right dominant side;  History of strokes:  On aspirin.     DVT ppx:   Aspirin 81 mg  PO BID ordered for 4 weeks                             Reviewed  EMR  Reviewed medical, social, surgical and family history.  Reviewed all current medications and performed medication reconciliation.  Performed prescription drug management.  Reviewed vital signs AND lab results  Reviewed [facility]  Documentation  Discussed patient with nursing and .                       ROS: 10 point ROS performed; Negative unless noted in HPI.      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), Breast pain (03/21/2024), Candidiasis of skin and nail (06/25/2014), Cellulitis of face (10/10/2013), Headache, unspecified (04/29/2015), Hypomagnesemia (01/23/2014), Other cysts of oral region, not elsewhere classified (06/10/2016), Other enthesopathies, not elsewhere classified (10/29/2015), Other specified abnormal findings of blood chemistry (03/06/2020), Other specified joint disorders, left ankle and foot (11/02/2016), Other specified noninflammatory disorders of vagina (03/20/2018), Pain in right knee (11/12/2018), Pain in right knee (02/19/2018), Pain in right knee (02/20/2019), Pain, unspecified (10/17/2017), Pelvic and perineal pain (01/24/2018), Personal history of diseases of the skin and subcutaneous tissue (02/24/2016), Personal history of other diseases of the digestive system (10/06/2017), Personal history of other diseases of the digestive system (03/17/2015), Personal history of other diseases of the digestive system (05/24/2016), Personal history of other diseases of the female genital tract (08/31/2015), Personal history of other diseases of the musculoskeletal system and connective tissue (02/04/2014), Personal history of other diseases of the nervous system and sense organs (01/05/2015), Personal history of other diseases of the respiratory system (08/29/2013), Personal history of other diseases of the respiratory system (02/03/2017), Personal history of other drug therapy (12/11/2017), Personal history of other endocrine, nutritional and metabolic disease (05/09/2019), Personal history of other mental  and behavioral disorders (10/06/2017), Personal history of transient ischemic attack (TIA), and cerebral infarction without residual deficits (10/06/2017), Pleurodynia (01/23/2014), Pyuria (06/13/2017), Sprain of medial collateral ligament of right knee, initial encounter (11/02/2016), Sprain of unspecified ligament of left ankle, subsequent encounter (10/29/2015), Strain of muscle and tendon of unspecified wall of thorax, initial encounter (02/04/2014), Syncope and collapse (06/13/2017), Unspecified fall, initial encounter (01/23/2014), and Unspecified symptoms and signs involving the genitourinary system (01/24/2018).    SURGICAL HISTORY:   MR head angio w IV contrast (8/18/2015);   US guided percutaneous peritoneal or retroperitoneal fluid collection drainage (4/10/2019);   and US guided abscess drain (4/10/2019).     SOCIAL HISTORY:  She reports that she has never smoked.   She has never used smokeless tobacco.   She reports that she does not drink alcohol and does not use drugs.    Family History   Problem Relation Name Age of Onset    Kidney cancer Mother        Liver cancer Mother        Pancreatic cancer Father        Glaucoma Other grandmother      Diabetes Other Grandfather           other type with arthropathy, with long term curent use of insulin    Diabetes Other aunt         ALLERGIES:  Bupropion  Ketamine  Augmentin [amoxicillin-pot clavulanate]  Penicillin g,   Penicillins    LABS:  CBC: Date: 10/9/2024 WBC 9.1 Hgb 12.6 hematocrit 39.8 platelets 319    BMP: Date: 10/15/2024 Na 142 K 4 Cr 0.7 BUN 10 glucose 69 Ca 8 GFR 95  CBC: Date: 10/15/2024 WBC 7.7 Hgb 12.2 hematocrit 37.1 platelets 202  Liver function: Date: 10/15/2024 ALT 36 AST 35 alkaline phos.  71 bilirubin 0.4 albumin 3.5 protein 5.9            Lab Results   Component Value Date    WBC 10.1 09/28/2024    HGB 11.9 (L) 09/28/2024    HCT 36.5 09/28/2024     09/28/2024    CHOL 152 05/09/2024    TRIG 178 (H) 05/09/2024    HDL 33.6  "05/09/2024    ALT 51 (H) 09/14/2024    AST 18 09/14/2024     09/27/2024    K 3.8 09/27/2024     09/27/2024    CREATININE 0.65 09/27/2024    BUN 7 09/27/2024    CO2 22 09/27/2024    TSH 5.14 (H) 05/09/2024    INR 1.0 09/13/2024             /88   Pulse 84   Temp 36.6 °C (97.8 °F)   Resp 18   Ht 1.676 m (5' 6\")   Wt 109 kg (241 lb)   SpO2 99%   BMI 38.90 kg/m²      Physical Exam  Vitals and nursing note reviewed.   Constitutional:       Appearance: She is obese.   HENT:      Head: Normocephalic.      Right Ear: External ear normal.      Left Ear: External ear normal.      Nose: Nose normal.      Mouth/Throat:      Mouth: Mucous membranes are moist.      Pharynx: Oropharynx is clear.   Eyes:      Extraocular Movements: Extraocular movements intact.      Conjunctiva/sclera: Conjunctivae normal.      Pupils: Pupils are equal, round, and reactive to light.   Cardiovascular:      Rate and Rhythm: Normal rate and regular rhythm.      Pulses: Normal pulses.      Heart sounds: Normal heart sounds.   Pulmonary:      Effort: Pulmonary effort is normal.      Breath sounds: Normal breath sounds.   Abdominal:      General: Bowel sounds are normal.      Palpations: Abdomen is soft.   Musculoskeletal:         General: Normal range of motion.      Cervical back: Normal range of motion and neck supple.      Comments: Right lower extremity: splint intact.  Adequate movement and sensation in toes.  Cap refill < 3 seconds.   Feet:      Comments: Bilateral feet--adequate movement and sensation;  Cap refill < 3 seconds.   Skin:     General: Skin is warm and dry.      Capillary Refill: Capillary refill takes less than 2 seconds.   Neurological:      General: No focal deficit present.      Mental Status: She is alert and oriented to person, place, and time. Mental status is at baseline.      Motor: Weakness present.      Gait: Gait abnormal.   Psychiatric:         Mood and Affect: Mood is anxious and depressed.        "  Behavior: Behavior normal. Behavior is cooperative.          Assessment/Plan      CMP and CBC with diff next week.    Benign essential HTN:  Continue Prazosin.  Monitor Bps.    Generalized anxiety disorder;   major depressive disorder; insomnia:  Continue sertraline and trazodone.  Monitor for anxiety, depression, insomnia.    GERD:  Continue Omeprazole.   Monitor for reflux or N/V.    Asthma; allergic rhinitis:   Continue Advair and fluticasone nasal spray.    Right ankle fracture (right trimalleolar ankle fracture dislocation)   S/p closed reduction with concentric reduction; Right ankle pain;  Right Ankle ORIF Tri-Malleolar Fracture without Fixation of the Posterior Lip, Right Syndesmosis Stabilization:   Right trimalleolar ankle fracture dislocation status post open reduction internal fixation with Dr. Castro on 9/26/24.   Post op recommendations after surgery are:  Nonweightbearing right lower extremity short leg splint.  Encourage elevation.  Keep splint clean and dry.  Maintain until follow-up.  Aspirin 81 mg twice daily starting postop day 1 for DVT prophylaxis to be continued for 4 weeks.  PT and OT to evaluate and treat.- discharge to [facility], 2 person assist- no weight bearing to right leg  Patient had a follow-up with orthopedics Dr. Castro on 10/10/24.  Next orthopedics appointment is on 11/26/24 at [facility].  Continue routine acetaminophen 975 mg PO TID.  Continue ibuprofen PRN and oxycodone PRN for pain.   Elevate right leg as tolerated.    Impaired gait and mobility; Generalized muscle weakness :  Patient is non-weight bearing has splint on right lower leg.  Nonweightbearing right lower extremity short leg splint.    Encouraged patient to elevate her leg.  Keep splint clean and dry.   Splint needs to stay intact untill follow-up appointment with orthopedic doctor Dr. Castro.  Continue [facility] for skilled services/PT/OT.     Hemiplegia and hemiparesis following cerebral infarction  affecting right dominant side;  History of strokes:  Continue aspirin.     DVT ppx:   Continue Aspirin 81 mg  PO BID ordered for 4 weeks        Problem List Items Addressed This Visit       Allergic rhinitis    History of stroke with current residual effects    Closed displaced trimalleolar fracture of right lower leg    Gastroesophageal reflux disease    Insomnia    Ankle pain     Other Visit Diagnoses       Benign essential HTN    -  Primary    Generalized anxiety disorder        Major depressive disorder with current active episode, unspecified depression episode severity, unspecified whether recurrent        Asthma, unspecified asthma severity, unspecified whether complicated, unspecified whether persistent ([facility])        H/O reduction of closed fracture        S/P ORIF (open reduction internal fixation) fracture        Impaired gait and mobility        Generalized muscle weakness        On deep vein thrombosis (DVT) prophylaxis        Hemiplegia and hemiparesis following cerebral infarction affecting right dominant side (Multi)                                Paola Kessler, APRN-CNP

## 2024-11-26 ENCOUNTER — APPOINTMENT (OUTPATIENT)
Dept: ORTHOPEDIC SURGERY | Facility: CLINIC | Age: 34
End: 2024-11-26
Payer: MEDICARE

## 2024-12-02 ENCOUNTER — NURSING HOME VISIT (OUTPATIENT)
Dept: POST ACUTE CARE | Facility: EXTERNAL LOCATION | Age: 34
End: 2024-12-02
Payer: MEDICARE

## 2024-12-02 DIAGNOSIS — F32.9 MAJOR DEPRESSIVE DISORDER WITH CURRENT ACTIVE EPISODE, UNSPECIFIED DEPRESSION EPISODE SEVERITY, UNSPECIFIED WHETHER RECURRENT: ICD-10-CM

## 2024-12-02 DIAGNOSIS — Z87.81 S/P ORIF (OPEN REDUCTION INTERNAL FIXATION) FRACTURE: ICD-10-CM

## 2024-12-02 DIAGNOSIS — J45.909 ASTHMA, UNSPECIFIED ASTHMA SEVERITY, UNSPECIFIED WHETHER COMPLICATED, UNSPECIFIED WHETHER PERSISTENT (HHS-HCC): ICD-10-CM

## 2024-12-02 DIAGNOSIS — I69.351 HEMIPLEGIA AND HEMIPARESIS FOLLOWING CEREBRAL INFARCTION AFFECTING RIGHT DOMINANT SIDE (MULTI): ICD-10-CM

## 2024-12-02 DIAGNOSIS — M62.81 GENERALIZED MUSCLE WEAKNESS: ICD-10-CM

## 2024-12-02 DIAGNOSIS — M25.571 RIGHT ANKLE PAIN, UNSPECIFIED CHRONICITY: ICD-10-CM

## 2024-12-02 DIAGNOSIS — K58.9 IRRITABLE BOWEL SYNDROME, UNSPECIFIED TYPE: Primary | ICD-10-CM

## 2024-12-02 DIAGNOSIS — Z98.890 S/P ORIF (OPEN REDUCTION INTERNAL FIXATION) FRACTURE: ICD-10-CM

## 2024-12-02 DIAGNOSIS — Z79.899 ON DEEP VEIN THROMBOSIS (DVT) PROPHYLAXIS: ICD-10-CM

## 2024-12-02 DIAGNOSIS — K21.9 GASTROESOPHAGEAL REFLUX DISEASE, UNSPECIFIED WHETHER ESOPHAGITIS PRESENT: ICD-10-CM

## 2024-12-02 DIAGNOSIS — R10.9 ABDOMINAL CRAMPS: ICD-10-CM

## 2024-12-02 DIAGNOSIS — S82.851S CLOSED DISPLACED TRIMALLEOLAR FRACTURE OF RIGHT ANKLE, SEQUELA: ICD-10-CM

## 2024-12-02 DIAGNOSIS — G47.00 INSOMNIA, UNSPECIFIED TYPE: ICD-10-CM

## 2024-12-02 DIAGNOSIS — F41.1 GENERALIZED ANXIETY DISORDER: ICD-10-CM

## 2024-12-02 DIAGNOSIS — R26.89 IMPAIRED GAIT AND MOBILITY: ICD-10-CM

## 2024-12-02 DIAGNOSIS — Z87.81 H/O REDUCTION OF CLOSED FRACTURE: ICD-10-CM

## 2024-12-02 DIAGNOSIS — J30.9 ALLERGIC RHINITIS, UNSPECIFIED SEASONALITY, UNSPECIFIED TRIGGER: ICD-10-CM

## 2024-12-02 PROCEDURE — 99309 SBSQ NF CARE MODERATE MDM 30: CPT | Performed by: NURSE PRACTITIONER

## 2024-12-02 NOTE — LETTER
Patient: Windy Lovelace  : 1990    Encounter Date: 2024    Name: Windy Lovelace    YOB: 1990    Code Status: FULL CODE    Chief Complaint:  IBS;   abdominal cramps;  etc......       HPI   34 year old female with past medical history of stroke with current residual effects;  Migraine headache;   Moyamoya disease; Esophageal reflux;   Exotropia of right eye; History of neurodevelopmental disorder;   Obesity (BMI 30-39.9); and Hiatal hernia      Patient was admitted to [facility] from 24 to 24 (6 days)    Patient initially admitted to the [facility] on 24 for skilled services.    Patient readmitted to [facility] for surgery from 24 to 24.    Patient readmitted to the [facility] on 24 for skilled services.     Diagnoses as follows:    IBS; abdominal cramps:  Patient states that she has had some abdominal cramping today.  She states that the abdominal cramping is not from her menses it is from her IBS.   She has also had increased diarrhea the last couple of days.   On immodium PRN, dicyclomine PRN, Pepto-Bismol PRN.  Also on lactobacillus routinely.   Patient states that she needs more medication to help her IBS symptoms.   She states years ago she had a bowel obstruction and that is when all of her issues started.  She states she has seen a GI doctor before at , but she does not remember his name.  Patient seen today.  She is upset today about her IBS symptoms.   She is in her bed.   No chest pain.  No headaches or dizziness.  No nausea or vomiting.     Generalized anxiety disorder;   major depressive disorder; insomnia:  On sertraline and trazodone.  She has increased anxiety today.  Unhappy she is not able to ambulate.  No complaints of insomnia.     GERD:  On Omeprazole.   No complaints of reflux or N/V.    Right ankle fracture (right trimalleolar ankle fracture dislocation)   S/p closed reduction with concentric reduction; Right  ankle pain;  Right Ankle ORIF Tri-Malleolar Fracture without Fixation of the Posterior Lip, Right Syndesmosis Stabilization:   Right trimalleolar ankle fracture dislocation status post open reduction internal fixation with Dr. Castro on 9/26/24.   Patient tolerated procedure well.  Patient initially presented to [facility] on 9/13/24 with an Right Ankle Fracture.    No acute orthopaedic surgical intervention was initially indicated.   Surgery was on 9/26/24.  Post op recommendations after surgery are:  Nonweightbearing right lower extremity short leg splint.  Encourage elevation.  Keep splint clean and dry.  Maintain until follow-up.  Aspirin 81 mg twice daily starting postop day 1 for DVT prophylaxis to be continued for 4 weeks.  PT and OT to evaluate and treat.- discharge to [facility],  person assist- no weight bearing to right leg  Patient had a follow-up with orthopedics Dr. Castro on 10/10/24.  Next orthopedics appointment is on 11/26/24 at [facility].  On routine acetaminophen 975 mg PO TID.  On ibuprofen PRN and oxycodone PRN for pain.   She states her pain is improving, she is not taking oxycodone PRN hardly at all any more.  Reports pain a 4. Movement makes it worse.   Reminded patient to elevate leg.  Patient states she has not been taking oxycodone PRN for pain.  She thinks she does not need it any more.    Asthma; allergic rhinitis:   On Advair and fluticasone nasal spray.  No reports cough or SOB today.  On fluticasone for allergic rhinitis.   No complaints of runny or stuffy nose.  No headaches or dizziness.    Impaired gait and mobility; Generalized muscle weakness :  Patient is non-weight bearing has splint on right lower leg.  Nonweightbearing right lower extremity short leg splint.    Encouraged patient to elevate her leg.  Keep splint clean and dry.   Splint needs to stay intact untill follow-up appointment with orthopedic doctor Dr. Castro.  Patient at the [facility]  services/PT/OT.     Hemiplegia and hemiparesis following cerebral infarction affecting right dominant side;  History of strokes:  On aspirin.     DVT ppx:   Aspirin 81 mg  PO BID ordered for 4 weeks                             Reviewed  EMR  Reviewed medical, social, surgical and family history.  Reviewed all current medications and performed medication reconciliation.  Performed prescription drug management.  Reviewed vital signs AND lab results  Reviewed [facility] Documentation  Discussed patient with nursing and DON.                      ROS: 10 point ROS performed; Negative unless noted in HPI.      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), Breast pain (03/21/2024), Candidiasis of skin and nail (06/25/2014), Cellulitis of face (10/10/2013), Headache, unspecified (04/29/2015), Hypomagnesemia (01/23/2014), Other cysts of oral region, not elsewhere classified (06/10/2016), Other enthesopathies, not elsewhere classified (10/29/2015), Other specified abnormal findings of blood chemistry (03/06/2020), Other specified joint disorders, left ankle and foot (11/02/2016), Other specified noninflammatory disorders of vagina (03/20/2018), Pain in right knee (11/12/2018), Pain in right knee (02/19/2018), Pain in right knee (02/20/2019), Pain, unspecified (10/17/2017), Pelvic and perineal pain (01/24/2018), Personal history of diseases of the skin and subcutaneous tissue (02/24/2016), Personal history of other diseases of the digestive system (10/06/2017), Personal history of other diseases of the digestive system (03/17/2015), Personal history of other diseases of the digestive system (05/24/2016), Personal history of other diseases of the female genital tract (08/31/2015), Personal history of other diseases of the  musculoskeletal system and connective tissue (02/04/2014), Personal history of other diseases of the nervous system and sense organs (01/05/2015), Personal history of other diseases of the respiratory system (08/29/2013), Personal history of other diseases of the respiratory system (02/03/2017), Personal history of other drug therapy (12/11/2017), Personal history of other endocrine, nutritional and metabolic disease (05/09/2019), Personal history of other mental and behavioral disorders (10/06/2017), Personal history of transient ischemic attack (TIA), and cerebral infarction without residual deficits (10/06/2017), Pleurodynia (01/23/2014), Pyuria (06/13/2017), Sprain of medial collateral ligament of right knee, initial encounter (11/02/2016), Sprain of unspecified ligament of left ankle, subsequent encounter (10/29/2015), Strain of muscle and tendon of unspecified wall of thorax, initial encounter (02/04/2014), Syncope and collapse (06/13/2017), Unspecified fall, initial encounter (01/23/2014), and Unspecified symptoms and signs involving the genitourinary system (01/24/2018).    SURGICAL HISTORY:   MR head angio w IV contrast (8/18/2015);   US guided percutaneous peritoneal or retroperitoneal fluid collection drainage (4/10/2019);   and US guided abscess drain (4/10/2019).     SOCIAL HISTORY:  She reports that she has never smoked.   She has never used smokeless tobacco.   She reports that she does not drink alcohol and does not use drugs.    Family History   Problem Relation Name Age of Onset   • Kidney cancer Mother       • Liver cancer Mother       • Pancreatic cancer Father       • Glaucoma Other grandmother     • Diabetes Other Grandfather           other type with arthropathy, with long term curent use of insulin   • Diabetes Other aunt         ALLERGIES:  Bupropion  Ketamine  Augmentin [amoxicillin-pot clavulanate]  Penicillin g,   Penicillins    LABS:  CBC: Date: 10/9/2024 WBC 9.1 Hgb 12.6 hematocrit 39.8  "platelets 319    BMP: Date: 10/15/2024 Na 142 K 4 Cr 0.7 BUN 10 glucose 69 Ca 8 GFR 95  CBC: Date: 10/15/2024 WBC 7.7 Hgb 12.2 hematocrit 37.1 platelets 202  Liver function: Date: 10/15/2024 ALT 36 AST 35 alkaline phos.  71 bilirubin 0.4 albumin 3.5 protein 5.9    BMP: Date: 11/21/2024 Na 139 K 3.6 Cr 0.6 BUN 8 glucose 81 Ca 9.2   CBC: Date: 11/21/2024 WBC 8.9 Hgb 14.8 hematocrit 43.7 platelets 287                Lab Results   Component Value Date    WBC 10.1 09/28/2024    HGB 11.9 (L) 09/28/2024    HCT 36.5 09/28/2024     09/28/2024    CHOL 152 05/09/2024    TRIG 178 (H) 05/09/2024    HDL 33.6 05/09/2024    ALT 51 (H) 09/14/2024    AST 18 09/14/2024     09/27/2024    K 3.8 09/27/2024     09/27/2024    CREATININE 0.65 09/27/2024    BUN 7 09/27/2024    CO2 22 09/27/2024    TSH 5.14 (H) 05/09/2024    INR 1.0 09/13/2024             /78   Pulse 88   Temp 36.7 °C (98 °F)   Resp 18   Ht 1.676 m (5' 6\")   Wt 108 kg (237 lb)   SpO2 99%   BMI 38.25 kg/m²      Physical Exam  Vitals and nursing note reviewed.   Constitutional:       Appearance: She is obese.   HENT:      Head: Normocephalic.      Right Ear: External ear normal.      Left Ear: External ear normal.      Nose: Nose normal.      Mouth/Throat:      Mouth: Mucous membranes are moist.      Pharynx: Oropharynx is clear.   Eyes:      Extraocular Movements: Extraocular movements intact.      Conjunctiva/sclera: Conjunctivae normal.      Pupils: Pupils are equal, round, and reactive to light.   Cardiovascular:      Rate and Rhythm: Normal rate and regular rhythm.      Pulses: Normal pulses.      Heart sounds: Normal heart sounds.   Pulmonary:      Effort: Pulmonary effort is normal.      Breath sounds: Normal breath sounds.   Abdominal:      General: Bowel sounds are normal.      Palpations: Abdomen is soft.   Musculoskeletal:         General: Normal range of motion.      Cervical back: Normal range of motion and neck supple.      " Comments: Right lower extremity: splint intact.  Adequate movement and sensation in toes.  Cap refill < 3 seconds.   Feet:      Comments: Bilateral feet--adequate movement and sensation;  Cap refill < 3 seconds.   Skin:     General: Skin is warm and dry.      Capillary Refill: Capillary refill takes less than 2 seconds.   Neurological:      General: No focal deficit present.      Mental Status: She is alert and oriented to person, place, and time. Mental status is at baseline.      Motor: Weakness present.      Gait: Gait abnormal.   Psychiatric:         Mood and Affect: Mood is anxious and depressed.         Behavior: Behavior normal. Behavior is cooperative.          Assessment/Plan   Ordered BMP, CBC with diff. And vitamin B level for tomorrow.    IBS; abdominal cramps:  Continue immodium PRN and Pepto-Bismol PRN.  Continue lactobacillus routinely.   Ordered abdominal x-ray 2 view   DC dicyclomine PRN.  Start dicyclomine 10 mg PO Q 6 hours routinely.  Ordered  GI consult.    Generalized anxiety disorder;   Major depressive disorder; insomnia:  Continue sertraline and trazodone.  Follow up with psych.     GERD:  Continue  Omeprazole.   Monitor for reflux or N/V.    Right ankle fracture (right trimalleolar ankle fracture dislocation)   S/p closed reduction with concentric reduction; Right ankle pain;  Right Ankle ORIF Tri-Malleolar Fracture without Fixation of the Posterior Lip, Right Syndesmosis Stabilization:   Right trimalleolar ankle fracture dislocation status post open reduction internal fixation   with Dr. Castro on 9/26/24.   Patient initially presented to [facility] on 9/[facility]/24 with an Right Ankle Fracture.    No acute orthopaedic surgical intervention was initially indicated.   Post op recommendations after surgery are:  Nonweightbearing right lower extremity short leg splint.  Encourage elevation.  Keep splint clean and dry.  Maintain until follow-up.  Aspirin 81 mg twice daily starting postop day 1 for  DVT prophylaxis to be continued for 4 weeks.  PT and OT to evaluate and treat.- discharge to [facility], 2 person assist- no weight bearing to right leg  Follow up with orthopedics as recommended.  Continue routine acetaminophen 975 mg PO TID.  Continue ibuprofen PRN and oxycodone PRN for pain.   Elevate leg.    Asthma; allergic rhinitis:   Continue Advair and fluticasone nasal spray.  Monitor for cough, SOB today.  Continue fluticasone for allergic rhinitis.     Impaired gait and mobility; Generalized muscle weakness :  Patient is non-weight bearing has splint on right lower leg.  Nonweightbearing right lower extremity short leg splint.    Encouraged patient to elevate her leg.  Keep splint clean and dry.   Splint needs to stay intact untill follow-up appointment with orthopedic doctor Dr. Castro.  Continue at [facility] for skilled services/PT/OT.     Hemiplegia and hemiparesis following cerebral infarction affecting right dominant side;  History of strokes:  Continue aspirin.     DVT ppx:   Continue Aspirin 81 mg  PO BID as ordered.      Problem List Items Addressed This Visit       Allergic rhinitis    Closed displaced trimalleolar fracture of right lower leg    Gastroesophageal reflux disease    Insomnia    Ankle pain     Other Visit Diagnoses       Irritable bowel syndrome, unspecified type    -  Primary    Abdominal cramps        Generalized anxiety disorder        Major depressive disorder with current active episode, unspecified depression episode severity, unspecified whether recurrent        H/O reduction of closed fracture        S/P ORIF (open reduction internal fixation) fracture        Asthma, unspecified asthma severity, unspecified whether complicated, unspecified whether persistent ([facility]HS-HCC)        Impaired gait and mobility        Generalized muscle weakness        Hemiplegia and hemiparesis following cerebral infarction affecting right dominant side (Multi)        On deep vein  thrombosis (DVT) prophylaxis                    NADEEM Clement       Electronically Signed By: NADEEM Clement   12/3/24  7:41 PM

## 2024-12-03 VITALS
RESPIRATION RATE: 18 BRPM | HEIGHT: 66 IN | OXYGEN SATURATION: 99 % | WEIGHT: 237 LBS | TEMPERATURE: 98 F | BODY MASS INDEX: 38.09 KG/M2 | HEART RATE: 88 BPM | DIASTOLIC BLOOD PRESSURE: 78 MMHG | SYSTOLIC BLOOD PRESSURE: 113 MMHG

## 2024-12-03 NOTE — PROGRESS NOTES
Name: Windy Lovelace    YOB: 1990    Code Status: FULL CODE    Chief Complaint:  IBS;   abdominal cramps;  etc......       HPI   34 year old female with past medical history of stroke with current residual effects;  Migraine headache;   Moyamoya disease; Esophageal reflux;   Exotropia of right eye; History of neurodevelopmental disorder;   Obesity (BMI 30-39.9); and Hiatal hernia      Patient was admitted to [facility] from 9/13/24 to 9/19/24 (6 days)    Patient initially admitted to the [facility] on 9/19/24 for skilled services.    Patient readmitted to [facility] for surgery from 9/26/24 to 9/28/24.    Patient readmitted to the [facility] on 9/28/24 for skilled services.     Diagnoses as follows:    IBS; abdominal cramps:  Patient states that she has had some abdominal cramping today.  She states that the abdominal cramping is not from her menses it is from her IBS.   She has also had increased diarrhea the last couple of days.   On immodium PRN, dicyclomine PRN, Pepto-Bismol PRN.  Also on lactobacillus routinely.   Patient states that she needs more medication to help her IBS symptoms.   She states years ago she had a bowel obstruction and that is when all of her issues started.  She states she has seen a GI doctor before at , but she does not remember his name.  Patient seen today.  She is upset today about her IBS symptoms.   She is in her bed.   No chest pain.  No headaches or dizziness.  No nausea or vomiting.     Generalized anxiety disorder;   major depressive disorder; insomnia:  On sertraline and trazodone.  She has increased anxiety today.  Unhappy she is not able to ambulate.  No complaints of insomnia.     GERD:  On Omeprazole.   No complaints of reflux or N/V.    Right ankle fracture (right trimalleolar ankle fracture dislocation)   S/p closed reduction with concentric reduction; Right ankle pain;  Right Ankle ORIF Tri-Malleolar Fracture without Fixation of the  Posterior Lip, Right Syndesmosis Stabilization:   Right trimalleolar ankle fracture dislocation status post open reduction internal fixation with Dr. Castro on 9/26/24.   Patient tolerated procedure well.  Patient initially presented to [facility] on 9/13/24 with an Right Ankle Fracture.    No acute orthopaedic surgical intervention was initially indicated.   Surgery was on 9/26/24.  Post op recommendations after surgery are:  Nonweightbearing right lower extremity short leg splint.  Encourage elevation.  Keep splint clean and dry.  Maintain until follow-up.  Aspirin 81 mg twice daily starting postop day 1 for DVT prophylaxis to be continued for 4 weeks.  PT and OT to evaluate and treat.- discharge to [facility], 2 person assist- no weight bearing to right leg  Patient had a follow-up with orthopedics Dr. Castro on 10/10/24.  Next orthopedics appointment is on 11/26/24 at [facility].  On routine acetaminophen 975 mg PO TID.  On ibuprofen PRN and oxycodone PRN for pain.   She states her pain is improving, she is not taking oxycodone PRN hardly at all any more.  Reports pain a 4. Movement makes it worse.   Reminded patient to elevate leg.  Patient states she has not been taking oxycodone PRN for pain.  She thinks she does not need it any more.    Asthma; allergic rhinitis:   On Advair and fluticasone nasal spray.  No reports cough or SOB today.  On fluticasone for allergic rhinitis.   No complaints of runny or stuffy nose.  No headaches or dizziness.    Impaired gait and mobility; Generalized muscle weakness :  Patient is non-weight bearing has splint on right lower leg.  Nonweightbearing right lower extremity short leg splint.    Encouraged patient to elevate her leg.  Keep splint clean and dry.   Splint needs to stay intact untill follow-up appointment with orthopedic doctor Dr. Castro.  Patient at the [facility] for skilled services/PT/OT.     Hemiplegia and hemiparesis following cerebral infarction  affecting right dominant side;  History of strokes:  On aspirin.     DVT ppx:   Aspirin 81 mg  PO BID ordered for 4 weeks                             Reviewed  EMR  Reviewed medical, social, surgical and family history.  Reviewed all current medications and performed medication reconciliation.  Performed prescription drug management.  Reviewed vital signs AND lab results  Reviewed [facility] Documentation  Discussed patient with nursing and DON.                      ROS: 10 point ROS performed; Negative unless noted in HPI.      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), Breast pain (03/21/2024), Candidiasis of skin and nail (06/25/2014), Cellulitis of face (10/10/2013), Headache, unspecified (04/29/2015), Hypomagnesemia (01/23/2014), Other cysts of oral region, not elsewhere classified (06/10/2016), Other enthesopathies, not elsewhere classified (10/29/2015), Other specified abnormal findings of blood chemistry (03/06/2020), Other specified joint disorders, left ankle and foot (11/02/2016), Other specified noninflammatory disorders of vagina (03/20/2018), Pain in right knee (11/12/2018), Pain in right knee (02/19/2018), Pain in right knee (02/20/2019), Pain, unspecified (10/17/2017), Pelvic and perineal pain (01/24/2018), Personal history of diseases of the skin and subcutaneous tissue (02/24/2016), Personal history of other diseases of the digestive system (10/06/2017), Personal history of other diseases of the digestive system (03/17/2015), Personal history of other diseases of the digestive system (05/24/2016), Personal history of other diseases of the female genital tract (08/31/2015), Personal history of other diseases of the musculoskeletal system and connective tissue (02/04/2014), Personal history  of other diseases of the nervous system and sense organs (01/05/2015), Personal history of other diseases of the respiratory system (08/29/2013), Personal history of other diseases of the respiratory system (02/03/2017), Personal history of other drug therapy (12/11/2017), Personal history of other endocrine, nutritional and metabolic disease (05/09/2019), Personal history of other mental and behavioral disorders (10/06/2017), Personal history of transient ischemic attack (TIA), and cerebral infarction without residual deficits (10/06/2017), Pleurodynia (01/23/2014), Pyuria (06/13/2017), Sprain of medial collateral ligament of right knee, initial encounter (11/02/2016), Sprain of unspecified ligament of left ankle, subsequent encounter (10/29/2015), Strain of muscle and tendon of unspecified wall of thorax, initial encounter (02/04/2014), Syncope and collapse (06/13/2017), Unspecified fall, initial encounter (01/23/2014), and Unspecified symptoms and signs involving the genitourinary system (01/24/2018).    SURGICAL HISTORY:   MR head angio w IV contrast (8/18/2015);   US guided percutaneous peritoneal or retroperitoneal fluid collection drainage (4/10/2019);   and US guided abscess drain (4/10/2019).     SOCIAL HISTORY:  She reports that she has never smoked.   She has never used smokeless tobacco.   She reports that she does not drink alcohol and does not use drugs.    Family History   Problem Relation Name Age of Onset    Kidney cancer Mother        Liver cancer Mother        Pancreatic cancer Father        Glaucoma Other grandmother      Diabetes Other Grandfather           other type with arthropathy, with long term curent use of insulin    Diabetes Other aunt         ALLERGIES:  Bupropion  Ketamine  Augmentin [amoxicillin-pot clavulanate]  Penicillin g,   Penicillins    LABS:  CBC: Date: 10/9/2024 WBC 9.1 Hgb 12.6 hematocrit 39.8 platelets 319    BMP: Date: 10/15/2024 Na 142 K 4 Cr 0.7 BUN 10 glucose 69 Ca 8  "GFR 95  CBC: Date: 10/15/2024 WBC 7.7 Hgb 12.2 hematocrit 37.1 platelets 202  Liver function: Date: 10/15/2024 ALT 36 AST 35 alkaline phos.  71 bilirubin 0.4 albumin 3.5 protein 5.9    BMP: Date: 11/21/2024 Na 139 K 3.6 Cr 0.6 BUN 8 glucose 81 Ca 9.2   CBC: Date: 11/21/2024 WBC 8.9 Hgb 14.8 hematocrit 43.7 platelets 287                Lab Results   Component Value Date    WBC 10.1 09/28/2024    HGB 11.9 (L) 09/28/2024    HCT 36.5 09/28/2024     09/28/2024    CHOL 152 05/09/2024    TRIG 178 (H) 05/09/2024    HDL 33.6 05/09/2024    ALT 51 (H) 09/14/2024    AST 18 09/14/2024     09/27/2024    K 3.8 09/27/2024     09/27/2024    CREATININE 0.65 09/27/2024    BUN 7 09/27/2024    CO2 22 09/27/2024    TSH 5.14 (H) 05/09/2024    INR 1.0 09/13/2024             /78   Pulse 88   Temp 36.7 °C (98 °F)   Resp 18   Ht 1.676 m (5' 6\")   Wt 108 kg (237 lb)   SpO2 99%   BMI 38.25 kg/m²      Physical Exam  Vitals and nursing note reviewed.   Constitutional:       Appearance: She is obese.   HENT:      Head: Normocephalic.      Right Ear: External ear normal.      Left Ear: External ear normal.      Nose: Nose normal.      Mouth/Throat:      Mouth: Mucous membranes are moist.      Pharynx: Oropharynx is clear.   Eyes:      Extraocular Movements: Extraocular movements intact.      Conjunctiva/sclera: Conjunctivae normal.      Pupils: Pupils are equal, round, and reactive to light.   Cardiovascular:      Rate and Rhythm: Normal rate and regular rhythm.      Pulses: Normal pulses.      Heart sounds: Normal heart sounds.   Pulmonary:      Effort: Pulmonary effort is normal.      Breath sounds: Normal breath sounds.   Abdominal:      General: Bowel sounds are normal.      Palpations: Abdomen is soft.   Musculoskeletal:         General: Normal range of motion.      Cervical back: Normal range of motion and neck supple.      Comments: Right lower extremity: splint intact.  Adequate movement and sensation " in toes.  Cap refill < 3 seconds.   Feet:      Comments: Bilateral feet--adequate movement and sensation;  Cap refill < 3 seconds.   Skin:     General: Skin is warm and dry.      Capillary Refill: Capillary refill takes less than 2 seconds.   Neurological:      General: No focal deficit present.      Mental Status: She is alert and oriented to person, place, and time. Mental status is at baseline.      Motor: Weakness present.      Gait: Gait abnormal.   Psychiatric:         Mood and Affect: Mood is anxious and depressed.         Behavior: Behavior normal. Behavior is cooperative.          Assessment/Plan    Ordered BMP, CBC with diff. And vitamin B level for tomorrow.    IBS; abdominal cramps:  Continue immodium PRN and Pepto-Bismol PRN.  Continue lactobacillus routinely.   Ordered abdominal x-ray 2 view   DC dicyclomine PRN.  Start dicyclomine 10 mg PO Q 6 hours routinely.  Ordered  GI consult.    Generalized anxiety disorder;   Major depressive disorder; insomnia:  Continue sertraline and trazodone.  Follow up with psych.     GERD:  Continue  Omeprazole.   Monitor for reflux or N/V.    Right ankle fracture (right trimalleolar ankle fracture dislocation)   S/p closed reduction with concentric reduction; Right ankle pain;  Right Ankle ORIF Tri-Malleolar Fracture without Fixation of the Posterior Lip, Right Syndesmosis Stabilization:   Right trimalleolar ankle fracture dislocation status post open reduction internal fixation   with Dr. Castro on 9/26/24.   Patient initially presented to [facility] on 9/13/24 with an Right Ankle Fracture.    No acute orthopaedic surgical intervention was initially indicated.   Post op recommendations after surgery are:  Nonweightbearing right lower extremity short leg splint.  Encourage elevation.  Keep splint clean and dry.  Maintain until follow-up.  Aspirin 81 mg twice daily starting postop day 1 for DVT prophylaxis to be continued for 4 weeks.  PT and OT to evaluate and treat.-  discharge to [facility], 2 person assist- no weight bearing to right leg  Follow up with orthopedics as recommended.  Continue routine acetaminophen 975 mg PO TID.  Continue ibuprofen PRN and oxycodone PRN for pain.   Elevate leg.    Asthma; allergic rhinitis:   Continue Advair and fluticasone nasal spray.  Monitor for cough, SOB today.  Continue fluticasone for allergic rhinitis.     Impaired gait and mobility; Generalized muscle weakness :  Patient is non-weight bearing has splint on right lower leg.  Nonweightbearing right lower extremity short leg splint.    Encouraged patient to elevate her leg.  Keep splint clean and dry.   Splint needs to stay intact untill follow-up appointment with orthopedic doctor Dr. Castro.  Continue at [facility] for skilled services/PT/OT.     Hemiplegia and hemiparesis following cerebral infarction affecting right dominant side;  History of strokes:  Continue aspirin.     DVT ppx:   Continue Aspirin 81 mg  PO BID as ordered.      Problem List Items Addressed This Visit       Allergic rhinitis    Closed displaced trimalleolar fracture of right lower leg    Gastroesophageal reflux disease    Insomnia    Ankle pain     Other Visit Diagnoses       Irritable bowel syndrome, unspecified type    -  Primary    Abdominal cramps        Generalized anxiety disorder        Major depressive disorder with current active episode, unspecified depression episode severity, unspecified whether recurrent        H/O reduction of closed fracture        S/P ORIF (open reduction internal fixation) fracture        Asthma, unspecified asthma severity, unspecified whether complicated, unspecified whether persistent ([facility]-HCC)        Impaired gait and mobility        Generalized muscle weakness        Hemiplegia and hemiparesis following cerebral infarction affecting right dominant side (Multi)        On deep vein thrombosis (DVT) prophylaxis                    Paola Kessler, APRN-CNP

## 2024-12-23 ENCOUNTER — NURSING HOME VISIT (OUTPATIENT)
Dept: POST ACUTE CARE | Facility: EXTERNAL LOCATION | Age: 34
End: 2024-12-23
Payer: MEDICARE

## 2024-12-23 DIAGNOSIS — K21.9 GASTROESOPHAGEAL REFLUX DISEASE, UNSPECIFIED WHETHER ESOPHAGITIS PRESENT: ICD-10-CM

## 2024-12-23 DIAGNOSIS — K58.9 IRRITABLE BOWEL SYNDROME, UNSPECIFIED TYPE: Primary | ICD-10-CM

## 2024-12-23 DIAGNOSIS — S82.851S CLOSED DISPLACED TRIMALLEOLAR FRACTURE OF RIGHT ANKLE, SEQUELA: ICD-10-CM

## 2024-12-23 DIAGNOSIS — M25.571 RIGHT ANKLE PAIN, UNSPECIFIED CHRONICITY: ICD-10-CM

## 2024-12-23 DIAGNOSIS — Z98.890 S/P ORIF (OPEN REDUCTION INTERNAL FIXATION) FRACTURE: ICD-10-CM

## 2024-12-23 DIAGNOSIS — Z87.81 H/O REDUCTION OF CLOSED FRACTURE: ICD-10-CM

## 2024-12-23 DIAGNOSIS — J45.909 ASTHMA, UNSPECIFIED ASTHMA SEVERITY, UNSPECIFIED WHETHER COMPLICATED, UNSPECIFIED WHETHER PERSISTENT (HHS-HCC): ICD-10-CM

## 2024-12-23 DIAGNOSIS — F41.1 GENERALIZED ANXIETY DISORDER: ICD-10-CM

## 2024-12-23 DIAGNOSIS — F32.9 MAJOR DEPRESSIVE DISORDER WITH CURRENT ACTIVE EPISODE, UNSPECIFIED DEPRESSION EPISODE SEVERITY, UNSPECIFIED WHETHER RECURRENT: ICD-10-CM

## 2024-12-23 DIAGNOSIS — R10.9 ABDOMINAL CRAMPS: ICD-10-CM

## 2024-12-23 DIAGNOSIS — Z87.81 S/P ORIF (OPEN REDUCTION INTERNAL FIXATION) FRACTURE: ICD-10-CM

## 2024-12-23 DIAGNOSIS — I69.351 HEMIPLEGIA AND HEMIPARESIS FOLLOWING CEREBRAL INFARCTION AFFECTING RIGHT DOMINANT SIDE (MULTI): ICD-10-CM

## 2024-12-23 DIAGNOSIS — Z79.899 ON DEEP VEIN THROMBOSIS (DVT) PROPHYLAXIS: ICD-10-CM

## 2024-12-23 DIAGNOSIS — M62.81 GENERALIZED MUSCLE WEAKNESS: ICD-10-CM

## 2024-12-23 DIAGNOSIS — J30.9 ALLERGIC RHINITIS, UNSPECIFIED SEASONALITY, UNSPECIFIED TRIGGER: ICD-10-CM

## 2024-12-23 DIAGNOSIS — R26.89 IMPAIRED GAIT AND MOBILITY: ICD-10-CM

## 2024-12-23 DIAGNOSIS — G47.00 INSOMNIA, UNSPECIFIED TYPE: ICD-10-CM

## 2024-12-23 PROCEDURE — 99309 SBSQ NF CARE MODERATE MDM 30: CPT | Performed by: NURSE PRACTITIONER

## 2024-12-23 NOTE — LETTER
Patient: Winyd Lovelace  : 1990    Encounter Date: 2024    Name: Windy Lovelace    YOB: 1990    Code Status: FULL CODE    Chief Complaint:  Follow up on IBS; abdominal cramps;  etc......       HPI   34 year old female with past medical history of stroke with current residual effects;  Migraine headache;   Moyamoya disease; Esophageal reflux;   Exotropia of right eye; History of neurodevelopmental disorder;   Obesity (BMI 30-39.9); and Hiatal hernia      Patient was admitted to [facility] from 24 to 24 (6 days)    Patient initially admitted to the [facility] on 24 for skilled services.    Patient readmitted to [facility] for surgery from 24 to 24.    Patient readmitted to the [facility] on 24 for skilled services.     Diagnoses as follows:    IBS; abdominal cramps:  IBS symptoms and abdominal cramps have improved.  On dicyclomine routinely.  Also on immodium PRN and Pepto-Bismol PRN.  Also on lactobacillus routinely.   GI consult ordered--appt. Needs to be scheduled.   No fevers reported.  No nausea or vomiting.     Right ankle fracture (right trimalleolar ankle fracture dislocation)   S/p closed reduction with concentric reduction; Right ankle pain;  Right Ankle ORIF Tri-Malleolar Fracture without Fixation of the Posterior Lip, Right Syndesmosis Stabilization; med refill needed:   Right trimalleolar ankle fracture dislocation status post open reduction internal fixation with Dr. Castro on 24.   Patient tolerated procedure well.  Patient initially presented to [facility] on 24 with an Right Ankle Fracture.    No acute orthopaedic surgical intervention was initially indicated.   Surgery was on 24.  Post op recommendations after surgery are:  Nonweightbearing right lower extremity short leg splint.  Encourage elevation.  Keep splint clean and dry.  Maintain until follow-up.  Aspirin 81 mg twice daily starting postop day 1 for  DVT prophylaxis to be continued for 4 weeks.  PT and OT to evaluate and treat.- discharge to [facility], 2 person assist- no weight bearing to right leg  Patient has had follow-up appointments with orthopedics Dr. Castro on 10/10/24 and 24 at [facility].   On routine acetaminophen 975 mg PO TID.  On ibuprofen PRN and oxycodone PRN for pain.   Patient had not been taking oxycodone PRN at all, but states she might require it if she   Starts therapy again if orthopedic doctor states she is able to bear weight on operative leg.   Reminded patient to elevate leg.  Her oxycodone RX has .   She is asking for new RX in case she needs it.     Asthma; allergic rhinitis:   On Advair and fluticasone nasal spray.  No reports cough or SOB today.  On fluticasone for allergic rhinitis.   No complaints of runny or stuffy nose.  No headaches or dizziness.    Generalized anxiety disorder;   major depressive disorder; insomnia:  On sertraline and trazodone.  She has increased anxiety today.  Unhappy she is not able to ambulate.  No complaints of insomnia.     GERD:  On Omeprazole.   No complaints of reflux or N/V.    Impaired gait and mobility; Generalized muscle weakness :  Patient is non-weight bearing has splint on right lower leg.  Nonweightbearing right lower extremity short leg splint.    Encouraged patient to elevate her leg.  Keep splint clean and dry.   Splint needs to stay intact untill follow-up appointment with orthopedic doctor Dr. Castro.  Patient at the [facility] for skilled services/PT/OT.     Hemiplegia and hemiparesis following cerebral infarction affecting right dominant side;  History of strokes:  On aspirin.     DVT ppx:   Aspirin 81 mg  PO BID ordered for 4 weeks                             Reviewed  EMR  Reviewed medical, social, surgical and family history.  Reviewed all current medications and performed medication reconciliation.  Performed prescription drug management.  Reviewed  vital signs AND lab results  Reviewed [facility] Documentation  Discussed patient with nursing.  Ordered new RX for oxycodone.                       ROS: 10 point ROS performed; Negative unless noted in HPI.      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), Breast pain (03/21/2024), Candidiasis of skin and nail (06/25/2014), Cellulitis of face (10/10/2013), Headache, unspecified (04/29/2015), Hypomagnesemia (01/23/2014), Other cysts of oral region, not elsewhere classified (06/10/2016), Other enthesopathies, not elsewhere classified (10/29/2015), Other specified abnormal findings of blood chemistry (03/06/2020), Other specified joint disorders, left ankle and foot (11/02/2016), Other specified noninflammatory disorders of vagina (03/20/2018), Pain in right knee (11/12/2018), Pain in right knee (02/19/2018), Pain in right knee (02/20/2019), Pain, unspecified (10/17/2017), Pelvic and perineal pain (01/24/2018), Personal history of diseases of the skin and subcutaneous tissue (02/24/2016), Personal history of other diseases of the digestive system (10/06/2017), Personal history of other diseases of the digestive system (03/17/2015), Personal history of other diseases of the digestive system (05/24/2016), Personal history of other diseases of the female genital tract (08/31/2015), Personal history of other diseases of the musculoskeletal system and connective tissue (02/04/2014), Personal history of other diseases of the nervous system and sense organs (01/05/2015), Personal history of other diseases of the respiratory system (08/29/2013), Personal history of other diseases of the respiratory system (02/03/2017), Personal history of other drug therapy (12/11/2017), Personal history of other endocrine, nutritional  and metabolic disease (05/09/2019), Personal history of other mental and behavioral disorders (10/06/2017), Personal history of transient ischemic attack (TIA), and cerebral infarction without residual deficits (10/06/2017), Pleurodynia (01/23/2014), Pyuria (06/13/2017), Sprain of medial collateral ligament of right knee, initial encounter (11/02/2016), Sprain of unspecified ligament of left ankle, subsequent encounter (10/29/2015), Strain of muscle and tendon of unspecified wall of thorax, initial encounter (02/04/2014), Syncope and collapse (06/13/2017), Unspecified fall, initial encounter (01/23/2014), and Unspecified symptoms and signs involving the genitourinary system (01/24/2018).    SURGICAL HISTORY:   MR head angio w IV contrast (8/18/2015);   US guided percutaneous peritoneal or retroperitoneal fluid collection drainage (4/10/2019);   and US guided abscess drain (4/10/2019).     SOCIAL HISTORY:  She reports that she has never smoked.   She has never used smokeless tobacco.   She reports that she does not drink alcohol and does not use drugs.    Family History   Problem Relation Name Age of Onset   • Kidney cancer Mother       • Liver cancer Mother       • Pancreatic cancer Father       • Glaucoma Other grandmother     • Diabetes Other Grandfather           other type with arthropathy, with long term curent use of insulin   • Diabetes Other aunt         ALLERGIES:  Bupropion  Ketamine  Augmentin [amoxicillin-pot clavulanate]  Penicillin g,   Penicillins    LABS:  CBC: Date: 10/9/2024 WBC 9.1 Hgb 12.6 hematocrit 39.8 platelets 319    BMP: Date: 10/15/2024 Na 142 K 4 Cr 0.7 BUN 10 glucose 69 Ca 8 GFR 95  CBC: Date: 10/15/2024 WBC 7.7 Hgb 12.2 hematocrit 37.1 platelets 202  Liver function: Date: 10/15/2024 ALT 36 AST 35 alkaline phos.  71 bilirubin 0.4 albumin 3.5 protein 5.9    BMP: Date: 11/21/2024 Na 139 K 3.6 Cr 0.6 BUN 8 glucose 81 Ca 9.2   CBC: Date: 11/21/2024 WBC 8.9 Hgb 14.8 hematocrit 43.7  "platelets 287                Lab Results   Component Value Date    WBC 10.1 09/28/2024    HGB 11.9 (L) 09/28/2024    HCT 36.5 09/28/2024     09/28/2024    CHOL 152 05/09/2024    TRIG 178 (H) 05/09/2024    HDL 33.6 05/09/2024    ALT 51 (H) 09/14/2024    AST 18 09/14/2024     09/27/2024    K 3.8 09/27/2024     09/27/2024    CREATININE 0.65 09/27/2024    BUN 7 09/27/2024    CO2 22 09/27/2024    TSH 5.14 (H) 05/09/2024    INR 1.0 09/13/2024             /77   Pulse 81   Temp 36.7 °C (98.1 °F)   Resp 18   Ht 1.676 m (5' 6\")   Wt 106 kg (234 lb 6.4 oz)   SpO2 98%   BMI 37.83 kg/m²      Physical Exam  Vitals and nursing note reviewed.   Constitutional:       Appearance: She is obese.   HENT:      Head: Normocephalic.      Right Ear: External ear normal.      Left Ear: External ear normal.      Nose: Nose normal.      Mouth/Throat:      Mouth: Mucous membranes are moist.      Pharynx: Oropharynx is clear.   Eyes:      Extraocular Movements: Extraocular movements intact.      Conjunctiva/sclera: Conjunctivae normal.      Pupils: Pupils are equal, round, and reactive to light.   Cardiovascular:      Rate and Rhythm: Normal rate and regular rhythm.      Pulses: Normal pulses.      Heart sounds: Normal heart sounds.   Pulmonary:      Effort: Pulmonary effort is normal.      Breath sounds: Normal breath sounds.   Abdominal:      General: Bowel sounds are normal.      Palpations: Abdomen is soft.   Musculoskeletal:         General: Normal range of motion.      Cervical back: Normal range of motion and neck supple.      Comments: Right lower extremity: splint intact.  Adequate movement and sensation in toes.  Cap refill < 3 seconds.   Feet:      Comments: Bilateral feet--adequate movement and sensation;  Cap refill < 3 seconds.   Skin:     General: Skin is warm and dry.      Capillary Refill: Capillary refill takes less than 2 seconds.   Neurological:      General: No focal deficit present.      " Mental Status: She is alert and oriented to person, place, and time. Mental status is at baseline.      Motor: Weakness present.      Gait: Gait abnormal.   Psychiatric:         Mood and Affect: Mood is anxious and depressed.         Behavior: Behavior normal. Behavior is cooperative.          Assessment/Plan   Ordered BMP, CBC with diff. And vitamin B level for tomorrow.    IBS; abdominal cramps:  Continue immodium PRN and Pepto-Bismol PRN.  Continue lactobacillus routinely.   Continue dicyclomine routinely.  Ordered  GI consult--needs to be scheduled.     Right ankle fracture (right trimalleolar ankle fracture dislocation)   S/p closed reduction with concentric reduction; Right ankle pain;  Right Ankle ORIF Tri-Malleolar Fracture without Fixation of the Posterior Lip, Right Syndesmosis Stabilization;   Medication RX needed:  Right trimalleolar ankle fracture dislocation status post open reduction internal fixation   with Dr. Castro on 9/26/24.   Patient initially presented to [facility] on 9/13/24 with an Right Ankle Fracture.    No acute orthopaedic surgical intervention was initially indicated.   Post op recommendations after surgery are:  Nonweightbearing right lower extremity short leg splint.  Encourage elevation.  Keep splint clean and dry.  Maintain until follow-up.  Aspirin 81 mg twice daily starting postop day 1 for DVT prophylaxis to be continued for 4 weeks.  PT and OT to evaluate and treat.- discharge to [facility], 2 person assist- no weight bearing to right leg  Follow up with orthopedics as recommended.  Continue routine acetaminophen 975 mg PO TID.  Continue ibuprofen PRN and oxycodone PRN for pain.   Elevate leg.    Asthma; allergic rhinitis:   Continue Advair and fluticasone nasal spray.  Monitor for cough, SOB today.  Continue fluticasone for allergic rhinitis.     Generalized anxiety disorder;   Major depressive disorder; insomnia:  Continue sertraline and trazodone.  Follow up with psych.      GERD:  Continue  Omeprazole.   Monitor for reflux or N/V.    Impaired gait and mobility; Generalized muscle weakness :  Patient is non-weight bearing has splint on right lower leg.  Nonweightbearing right lower extremity short leg splint.    Encouraged patient to elevate her leg.  Keep splint clean and dry.   Splint needs to stay intact untill follow-up appointment with orthopedic doctor Dr. Castro.  Continue at [facility] for skilled services/PT/OT.     Hemiplegia and hemiparesis following cerebral infarction affecting right dominant side;  History of strokes:  Continue aspirin.     DVT ppx:   Continue Aspirin 81 mg  PO BID as ordered.      Problem List Items Addressed This Visit       Allergic rhinitis    Closed displaced trimalleolar fracture of right lower leg    Gastroesophageal reflux disease    Insomnia    Ankle pain     Other Visit Diagnoses       Irritable bowel syndrome, unspecified type    -  Primary    Abdominal cramps        S/P ORIF (open reduction internal fixation) fracture        H/O reduction of closed fracture        Asthma, unspecified asthma severity, unspecified whether complicated, unspecified whether persistent ([facility])        Generalized anxiety disorder        Major depressive disorder with current active episode, unspecified depression episode severity, unspecified whether recurrent        Impaired gait and mobility        Generalized muscle weakness        Hemiplegia and hemiparesis following cerebral infarction affecting right dominant side ([facility])        On deep vein thrombosis (DVT) prophylaxis                      NADEEM Clement       Electronically Signed By: NADEEM Clement   12/27/24  5:54 PM

## 2024-12-24 ENCOUNTER — HOSPITAL ENCOUNTER (OUTPATIENT)
Dept: RADIOLOGY | Facility: CLINIC | Age: 34
Discharge: HOME | End: 2024-12-24
Payer: MEDICARE

## 2024-12-24 ENCOUNTER — OFFICE VISIT (OUTPATIENT)
Dept: ORTHOPEDIC SURGERY | Facility: CLINIC | Age: 34
End: 2024-12-24
Payer: MEDICARE

## 2024-12-24 DIAGNOSIS — S82.851S CLOSED DISPLACED TRIMALLEOLAR FRACTURE OF RIGHT ANKLE, SEQUELA: ICD-10-CM

## 2024-12-24 PROCEDURE — 73610 X-RAY EXAM OF ANKLE: CPT | Mod: RT

## 2024-12-24 PROCEDURE — 99211 OFF/OP EST MAY X REQ PHY/QHP: CPT | Performed by: STUDENT IN AN ORGANIZED HEALTH CARE EDUCATION/TRAINING PROGRAM

## 2024-12-24 NOTE — PROGRESS NOTES
ORTHOPAEDIC SURGERY OUTPATIENT PROGRESS NOTE    Chief Complaint:  Right ankle pain    History Of Present Illness  Windy Lovelace is a 34 y.o. female with a past medical history of moyamoya, CVA as well as developmental delay who presented after ground-level fall with a right trimalleolar ankle fracture and dislocation.  The patient was closed reduced by the orthopedic team, I was not on-call at the time the patient's initial presentation was asked by one of my partners to assume care.  The patient was subsequently discharged and return for surgery, unfortunately on return she had been walking on her splint.  She underwent right trimalleolar ankle fracture ORIF without fixation of the posterior lip and with syndesmotic stabilization from 09/26/2024. She has been residing in a SNF and has been compliant with weight-bearing restrictions. Reporting 8/10 pain. Encounter with POA on facetime in the room.    10/20/2024: Patient returns s/p right trimalleolar ankle fracture ORIF without fixation of the posterior lip and with syndesmotic stabilization from 09/26/2024.  She is currently reporting no pain at rest but had 8 out of 10 pain at night.  Her Sister and POA is present on the phone via [employer] in the room.  She has been putting weight down for transfers to the bathroom and while at PT.  She has been residing in a SNF.    11/20/2024: Patient returns s/p right trimalleolar ankle fracture ORIF without fixation of the posterior lip and with syndesmotic stabilization from 09/26/2024.  Patient reports minimal pain at present.  She has continued to put weight down primarily on her toes for ambulation, most often for toileting.  Discussed with POA via [employer].    12/24/2024: Patient returns s/p right trimalleolar ankle fracture ORIF without fixation of the posterior lip and with syndesmotic stabilization from 09/26/2024.  Patient has been somewhat noncompliant with weightbearing restrictions.  She has been ambulating   in her fiberglass cast.  During the course of the visit today, the patient picked out a stable eschar while in the examination room.  I discussed the patient's clinical course as well as findings with the patient's POA via FaceTime today.    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    Headache, unspecified 04/29/2015    Severe headache    Hiatal hernia     LFTs abnormal     Mares mares disease     Obesity (BMI 30-39.9)     Personal history of other mental and behavioral disorders 10/06/2017    History of mental retardation    Syncope and collapse 06/13/2017    Syncope and collapse       Surgical History  Recent Surgeries in Orthopaedic Surgery            No cases to display             Social History  Social History     Socioeconomic History    Marital status: Single   Tobacco Use    Smoking status: Never    Smokeless tobacco: Never   Vaping Use    Vaping status: Never Used   Substance and Sexual Activity    Alcohol use: Never    Drug use: Never    Sexual activity: Not Currently     Birth control/protection: I.U.D.     Social Drivers of Health     Financial Resource Strain: Patient Unable To Answer (11/12/2024)    Overall Financial Resource Strain (CARDIA)     Difficulty of Paying Living Expenses: Patient unable to answer   Food Insecurity: Not on File (9/26/2024)    Received from [employer]    Food Insecurity     Food: 0   Transportation Needs: Patient Unable To Answer (11/12/2024)    PRAPARE - Transportation     Lack of Transportation (Medical): Patient unable to answer     Lack of Transportation (Non-Medical): Patient unable to answer   Physical Activity: Not on File (5/21/2021)    Received from [facility]    Physical Activity     Physical Activity: 0   Stress: Not on File (5/21/2021)    Received from [facility]    Stress     Stress: 0   Social Connections: Not on File  (9/12/2024)    Received from [facility]    Social Connections     Connectedness: 0   Recent Concern: Social Connections - Feeling Somewhat Isolated (6/28/2024)    OASIS : Social Isolation     Frequency of experiencing loneliness or isolation: Sometimes   Housing Stability: Patient Unable To Answer (11/12/2024)    Housing Stability Vital Sign     Unable to Pay for Housing in the Last Year: Patient unable to answer     Number of Times Moved in the Last Year: 1     Homeless in the Last Year: Patient unable to answer       Family History  Family History   Problem Relation Name Age of Onset    Kidney cancer Mother      Liver cancer Mother      Pancreatic cancer Father      Glaucoma Other grandmother     Diabetes Other Grandfather         other type with arthropathy, with long term curent use of insulin    Diabetes Other aunt         other type with arthropathy, with long term curent use of insulin        Allergies  Allergies   Allergen Reactions    Bupropion Unknown and Hives    Ketamine Unknown, Anaphylaxis and Rash    Augmentin [Amoxicillin-Pot Clavulanate] Hives    Penicillin G Hives    Penicillins Unknown and Hives       Review of Systems  REVIEW OF SYSTEMS  Constitutional: no unplanned weight loss  Psychiatric: no suicidal ideation  ENT: no vision changes, no sinus problems  Pulmonary: no shortness of breath  Lymphatic: no enlarged lymph nodes  Cardiovascular: no chest pain or shortness of breath  Gastrointestinal: no stomach problems  Genitourinary: no dysuria   Skin: no rashes  Endocrine: no thyroid problems  Neurological: no headache, no numbness  Hematological: no easy bruising  Musculoskeletal: Right ankle pain     Physical Exam  PHYSICAL EXAMINATION  Constitutional Exam: well developed and well nourished  Psychiatric Exam: alert and oriented, appropriate mood and behavior  Eye Exam: EOMI  Pulmonary Exam: breathing non-labored, no apparent distress  Lymphatic exam: no appreciable lymphadenopathy in the lower  extremities  Cardiovascular exam: RRR to peripheral palpation, DP pulses 2+, PT 2+, toes are pink with good capillary refill, no pitting edema  Skin exam: no open lesions, rashes, abrasions or ulcerations  Neurological exam: sensation to light touch intact in both lower extremities in peripheral and dermatomal distributions (except for any abnormalities noted in musculoskeletal exam)    Musculoskeletal exam: Right lower extremity examination.  Patient lateral and medial ankle incisions well-healed.  There is no obvious effusion.  Patient is nontender to palpation at the plate sites.  Patient has pain-free and supple ankle range of motion, I am able to passively range her to neutral dorsiflexion. Patient has sensation grossly intact to light touch in a saphenous, sural, superficial peroneal, deep peroneal and tibial nerve distribution.  She has intact PF/DF/EHL.  She is 2+ DP/PT pulse palpated.     Last Recorded Vitals  There were no vitals taken for this visit.    Laboratory Results  No results found for this or any previous visit (from the past 24 hours).     Radiology Results  X-ray imaging 3 view nonweightbearing right ankle reviewed and independently evaluated by me on 12/24/2024 demonstrates unchanged alignment following trimalleolar ankle fracture fixation with plate and screw construct at the distal fibula and medial malleolus.  There is interval fracture healing noted.  There is no dior-implant fracture, lucency or loosening.    Assessment/Plan:  34 y.o. female with a past medical history of moyamoya, CVA as well as developmental delay s/p right trimalleolar ankle fracture ORIF without fixation of the posterior lip and with syndesmotic stabilization from 09/26/2024.  I would recommend the patient begin weightbearing to her tolerance in her right lower extremity in a walking boot.  I will provide her with a formal referral to physical therapy to begin ankle range of motion.  The patient may steadily  transition out of the walking boot as she is able.  She will certainly remain at risk of posttraumatic osteoarthritis.  I would plan to see the patient back in approximately 2 months for repeat clinical and radiographic evaluation.  Upon return, patient would require three-view weightbearing right ankle.    Timothy Castro MD, MACI  Department of Orthopaedic Surgery  [facility]     The diagnosis and treatment plan were reviewed with the patient. All questions were answered. The patient verbalized understanding of the treatment plan. There were no barriers to understanding identified.    Note dictated with [employer] software.  Completed without full type editing and sent to avoid delay.

## 2024-12-27 VITALS
WEIGHT: 234.4 LBS | HEART RATE: 81 BPM | TEMPERATURE: 98.1 F | RESPIRATION RATE: 18 BRPM | DIASTOLIC BLOOD PRESSURE: 77 MMHG | BODY MASS INDEX: 37.67 KG/M2 | SYSTOLIC BLOOD PRESSURE: 124 MMHG | OXYGEN SATURATION: 98 % | HEIGHT: 66 IN

## 2024-12-27 NOTE — PROGRESS NOTES
Name: Windy Lovelace    YOB: 1990    Code Status: FULL CODE    Chief Complaint:  Follow up on IBS; abdominal cramps;  etc......       HPI   34 year old female with past medical history of stroke with current residual effects;  Migraine headache;   Moyamoya disease; Esophageal reflux;   Exotropia of right eye; History of neurodevelopmental disorder;   Obesity (BMI 30-39.9); and Hiatal hernia      Patient was admitted to [facility] from 9/13/24 to 9/19/24 (6 days)    Patient initially admitted to the [facility] on 9/19/24 for skilled services.    Patient readmitted to [facility] for surgery from 9/26/24 to 9/28/24.    Patient readmitted to the [facility] on 9/28/24 for skilled services.     Diagnoses as follows:    IBS; abdominal cramps:  IBS symptoms and abdominal cramps have improved.  On dicyclomine routinely.  Also on immodium PRN and Pepto-Bismol PRN.  Also on lactobacillus routinely.   GI consult ordered--appt. Needs to be scheduled.   No fevers reported.  No nausea or vomiting.     Right ankle fracture (right trimalleolar ankle fracture dislocation)   S/p closed reduction with concentric reduction; Right ankle pain;  Right Ankle ORIF Tri-Malleolar Fracture without Fixation of the Posterior Lip, Right Syndesmosis Stabilization; med refill needed:   Right trimalleolar ankle fracture dislocation status post open reduction internal fixation with Dr. Castro on 9/26/24.   Patient tolerated procedure well.  Patient initially presented to [facility] on 9/13/24 with an Right Ankle Fracture.    No acute orthopaedic surgical intervention was initially indicated.   Surgery was on 9/26/24.  Post op recommendations after surgery are:  Nonweightbearing right lower extremity short leg splint.  Encourage elevation.  Keep splint clean and dry.  Maintain until follow-up.  Aspirin 81 mg twice daily starting postop day 1 for DVT prophylaxis to be continued for 4 weeks.  PT and OT to evaluate and  treat.- discharge to SNF, [facility], 2 person assist- no weight bearing to right leg  Patient has had follow-up appointments with orthopedics Dr. Castro on 10/10/24 and 24 at [facility].   On routine acetaminophen 975 mg PO TID.  On ibuprofen PRN and oxycodone PRN for pain.   Patient had not been taking oxycodone PRN at all, but states she might require it if she   Starts therapy again if orthopedic doctor states she is able to bear weight on operative leg.   Reminded patient to elevate leg.  Her oxycodone RX has .   She is asking for new RX in case she needs it.     Asthma; allergic rhinitis:   On Advair and fluticasone nasal spray.  No reports cough or SOB today.  On fluticasone for allergic rhinitis.   No complaints of runny or stuffy nose.  No headaches or dizziness.    Generalized anxiety disorder;   major depressive disorder; insomnia:  On sertraline and trazodone.  She has increased anxiety today.  Unhappy she is not able to ambulate.  No complaints of insomnia.     GERD:  On Omeprazole.   No complaints of reflux or N/V.    Impaired gait and mobility; Generalized muscle weakness :  Patient is non-weight bearing has splint on right lower leg.  Nonweightbearing right lower extremity short leg splint.    Encouraged patient to elevate her leg.  Keep splint clean and dry.   Splint needs to stay intact untill follow-up appointment with orthopedic doctor Dr. Castro.  Patient at the [facility] for skilled services/PT/OT.     Hemiplegia and hemiparesis following cerebral infarction affecting right dominant side;  History of strokes:  On aspirin.     DVT ppx:   Aspirin 81 mg  PO BID ordered for 4 weeks                             Reviewed  EMR  Reviewed medical, social, surgical and family history.  Reviewed all current medications and performed medication reconciliation.  Performed prescription drug management.  Reviewed vital signs AND lab results  Reviewed [facility]  Documentation  Discussed patient with nursing.  Ordered new RX for oxycodone.                       ROS: 10 point ROS performed; Negative unless noted in HPI.      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), Breast pain (03/21/2024), Candidiasis of skin and nail (06/25/2014), Cellulitis of face (10/10/2013), Headache, unspecified (04/29/2015), Hypomagnesemia (01/23/2014), Other cysts of oral region, not elsewhere classified (06/10/2016), Other enthesopathies, not elsewhere classified (10/29/2015), Other specified abnormal findings of blood chemistry (03/06/2020), Other specified joint disorders, left ankle and foot (11/02/2016), Other specified noninflammatory disorders of vagina (03/20/2018), Pain in right knee (11/12/2018), Pain in right knee (02/19/2018), Pain in right knee (02/20/2019), Pain, unspecified (10/17/2017), Pelvic and perineal pain (01/24/2018), Personal history of diseases of the skin and subcutaneous tissue (02/24/2016), Personal history of other diseases of the digestive system (10/06/2017), Personal history of other diseases of the digestive system (03/17/2015), Personal history of other diseases of the digestive system (05/24/2016), Personal history of other diseases of the female genital tract (08/31/2015), Personal history of other diseases of the musculoskeletal system and connective tissue (02/04/2014), Personal history of other diseases of the nervous system and sense organs (01/05/2015), Personal history of other diseases of the respiratory system (08/29/2013), Personal history of other diseases of the respiratory system (02/03/2017), Personal history of other drug therapy (12/11/2017), Personal history of other endocrine, nutritional and metabolic disease (05/09/2019), Personal history of  other mental and behavioral disorders (10/06/2017), Personal history of transient ischemic attack (TIA), and cerebral infarction without residual deficits (10/06/2017), Pleurodynia (01/23/2014), Pyuria (06/13/2017), Sprain of medial collateral ligament of right knee, initial encounter (11/02/2016), Sprain of unspecified ligament of left ankle, subsequent encounter (10/29/2015), Strain of muscle and tendon of unspecified wall of thorax, initial encounter (02/04/2014), Syncope and collapse (06/13/2017), Unspecified fall, initial encounter (01/23/2014), and Unspecified symptoms and signs involving the genitourinary system (01/24/2018).    SURGICAL HISTORY:   MR head angio w IV contrast (8/18/2015);   US guided percutaneous peritoneal or retroperitoneal fluid collection drainage (4/10/2019);   and US guided abscess drain (4/10/2019).     SOCIAL HISTORY:  She reports that she has never smoked.   She has never used smokeless tobacco.   She reports that she does not drink alcohol and does not use drugs.    Family History   Problem Relation Name Age of Onset    Kidney cancer Mother        Liver cancer Mother        Pancreatic cancer Father        Glaucoma Other grandmother      Diabetes Other Grandfather           other type with arthropathy, with long term curent use of insulin    Diabetes Other aunt         ALLERGIES:  Bupropion  Ketamine  Augmentin [amoxicillin-pot clavulanate]  Penicillin g,   Penicillins    LABS:  CBC: Date: 10/9/2024 WBC 9.1 Hgb 12.6 hematocrit 39.8 platelets 319    BMP: Date: 10/15/2024 Na 142 K 4 Cr 0.7 BUN 10 glucose 69 Ca 8 GFR 95  CBC: Date: 10/15/2024 WBC 7.7 Hgb 12.2 hematocrit 37.1 platelets 202  Liver function: Date: 10/15/2024 ALT 36 AST 35 alkaline phos.  71 bilirubin 0.4 albumin 3.5 protein 5.9    BMP: Date: 11/21/2024 Na 139 K 3.6 Cr 0.6 BUN 8 glucose 81 Ca 9.2   CBC: Date: 11/21/2024 WBC 8.9 Hgb 14.8 hematocrit 43.7 platelets 287                Lab Results   Component Value Date  "   WBC 10.1 09/28/2024    HGB 11.9 (L) 09/28/2024    HCT 36.5 09/28/2024     09/28/2024    CHOL 152 05/09/2024    TRIG 178 (H) 05/09/2024    HDL 33.6 05/09/2024    ALT 51 (H) 09/14/2024    AST 18 09/14/2024     09/27/2024    K 3.8 09/27/2024     09/27/2024    CREATININE 0.65 09/27/2024    BUN 7 09/27/2024    CO2 22 09/27/2024    TSH 5.14 (H) 05/09/2024    INR 1.0 09/13/2024             /77   Pulse 81   Temp 36.7 °C (98.1 °F)   Resp 18   Ht 1.676 m (5' 6\")   Wt 106 kg (234 lb 6.4 oz)   SpO2 98%   BMI 37.83 kg/m²      Physical Exam  Vitals and nursing note reviewed.   Constitutional:       Appearance: She is obese.   HENT:      Head: Normocephalic.      Right Ear: External ear normal.      Left Ear: External ear normal.      Nose: Nose normal.      Mouth/Throat:      Mouth: Mucous membranes are moist.      Pharynx: Oropharynx is clear.   Eyes:      Extraocular Movements: Extraocular movements intact.      Conjunctiva/sclera: Conjunctivae normal.      Pupils: Pupils are equal, round, and reactive to light.   Cardiovascular:      Rate and Rhythm: Normal rate and regular rhythm.      Pulses: Normal pulses.      Heart sounds: Normal heart sounds.   Pulmonary:      Effort: Pulmonary effort is normal.      Breath sounds: Normal breath sounds.   Abdominal:      General: Bowel sounds are normal.      Palpations: Abdomen is soft.   Musculoskeletal:         General: Normal range of motion.      Cervical back: Normal range of motion and neck supple.      Comments: Right lower extremity: splint intact.  Adequate movement and sensation in toes.  Cap refill < 3 seconds.   Feet:      Comments: Bilateral feet--adequate movement and sensation;  Cap refill < 3 seconds.   Skin:     General: Skin is warm and dry.      Capillary Refill: Capillary refill takes less than 2 seconds.   Neurological:      General: No focal deficit present.      Mental Status: She is alert and oriented to person, place, and time. " Mental status is at baseline.      Motor: Weakness present.      Gait: Gait abnormal.   Psychiatric:         Mood and Affect: Mood is anxious and depressed.         Behavior: Behavior normal. Behavior is cooperative.          Assessment/Plan    Ordered BMP, CBC with diff. And vitamin B level for tomorrow.    IBS; abdominal cramps:  Continue immodium PRN and Pepto-Bismol PRN.  Continue lactobacillus routinely.   Continue dicyclomine routinely.  Ordered  GI consult--needs to be scheduled.     Right ankle fracture (right trimalleolar ankle fracture dislocation)   S/p closed reduction with concentric reduction; Right ankle pain;  Right Ankle ORIF Tri-Malleolar Fracture without Fixation of the Posterior Lip, Right Syndesmosis Stabilization;   Medication RX needed:  Right trimalleolar ankle fracture dislocation status post open reduction internal fixation   with Dr. Castro on 9/26/24.   Patient initially presented to [facility] on 9/13/24 with an Right Ankle Fracture.    No acute orthopaedic surgical intervention was initially indicated.   Post op recommendations after surgery are:  Nonweightbearing right lower extremity short leg splint.  Encourage elevation.  Keep splint clean and dry.  Maintain until follow-up.  Aspirin 81 mg twice daily starting postop day 1 for DVT prophylaxis to be continued for 4 weeks.  PT and OT to evaluate and treat.- discharge to [facility], 2 person assist- no weight bearing to right leg  Follow up with orthopedics as recommended.  Continue routine acetaminophen 975 mg PO TID.  Continue ibuprofen PRN and oxycodone PRN for pain.   Elevate leg.    Asthma; allergic rhinitis:   Continue Advair and fluticasone nasal spray.  Monitor for cough, SOB today.  Continue fluticasone for allergic rhinitis.     Generalized anxiety disorder;   Major depressive disorder; insomnia:  Continue sertraline and trazodone.  Follow up with psych.     GERD:  Continue  Omeprazole.   Monitor for reflux or  N/V.    Impaired gait and mobility; Generalized muscle weakness :  Patient is non-weight bearing has splint on right lower leg.  Nonweightbearing right lower extremity short leg splint.    Encouraged patient to elevate her leg.  Keep splint clean and dry.   Splint needs to stay intact untill follow-up appointment with orthopedic doctor Dr. Castro.  Continue at [facility] skilled services/PT/OT.     Hemiplegia and hemiparesis following cerebral infarction affecting right dominant side;  History of strokes:  Continue aspirin.     DVT ppx:   Continue Aspirin 81 mg  PO BID as ordered.      Problem List Items Addressed This Visit       Allergic rhinitis    Closed displaced trimalleolar fracture of right lower leg    Gastroesophageal reflux disease    Insomnia    Ankle pain     Other Visit Diagnoses       Irritable bowel syndrome, unspecified type    -  Primary    Abdominal cramps        S/P ORIF (open reduction internal fixation) fracture        H/O reduction of closed fracture        Asthma, unspecified asthma severity, unspecified whether complicated, unspecified whether persistent ([facility])        Generalized anxiety disorder        Major depressive disorder with current active episode, unspecified depression episode severity, unspecified whether recurrent        Impaired gait and mobility        Generalized muscle weakness        Hemiplegia and hemiparesis following cerebral infarction affecting right dominant side ([facility])        On deep vein thrombosis (DVT) prophylaxis                      Paola Kessler, APRN-CNP

## 2024-12-30 ENCOUNTER — NURSING HOME VISIT (OUTPATIENT)
Dept: POST ACUTE CARE | Facility: EXTERNAL LOCATION | Age: 34
End: 2024-12-30
Payer: MEDICARE

## 2024-12-30 DIAGNOSIS — J30.9 ALLERGIC RHINITIS, UNSPECIFIED SEASONALITY, UNSPECIFIED TRIGGER: ICD-10-CM

## 2024-12-30 DIAGNOSIS — R26.89 IMPAIRED GAIT AND MOBILITY: ICD-10-CM

## 2024-12-30 DIAGNOSIS — K58.9 IRRITABLE BOWEL SYNDROME, UNSPECIFIED TYPE: ICD-10-CM

## 2024-12-30 DIAGNOSIS — Z87.81 S/P ORIF (OPEN REDUCTION INTERNAL FIXATION) FRACTURE: ICD-10-CM

## 2024-12-30 DIAGNOSIS — Z87.81 H/O REDUCTION OF CLOSED FRACTURE: ICD-10-CM

## 2024-12-30 DIAGNOSIS — S82.851S CLOSED DISPLACED TRIMALLEOLAR FRACTURE OF RIGHT ANKLE, SEQUELA: Primary | ICD-10-CM

## 2024-12-30 DIAGNOSIS — I69.351 HEMIPLEGIA AND HEMIPARESIS FOLLOWING CEREBRAL INFARCTION AFFECTING RIGHT DOMINANT SIDE (MULTI): ICD-10-CM

## 2024-12-30 DIAGNOSIS — J45.909 ASTHMA, UNSPECIFIED ASTHMA SEVERITY, UNSPECIFIED WHETHER COMPLICATED, UNSPECIFIED WHETHER PERSISTENT (HHS-HCC): ICD-10-CM

## 2024-12-30 DIAGNOSIS — Z98.890 S/P ORIF (OPEN REDUCTION INTERNAL FIXATION) FRACTURE: ICD-10-CM

## 2024-12-30 DIAGNOSIS — R10.9 ABDOMINAL CRAMPS: ICD-10-CM

## 2024-12-30 DIAGNOSIS — K21.9 GASTROESOPHAGEAL REFLUX DISEASE, UNSPECIFIED WHETHER ESOPHAGITIS PRESENT: ICD-10-CM

## 2024-12-30 DIAGNOSIS — M25.571 RIGHT ANKLE PAIN, UNSPECIFIED CHRONICITY: ICD-10-CM

## 2024-12-30 DIAGNOSIS — M62.81 GENERALIZED MUSCLE WEAKNESS: ICD-10-CM

## 2024-12-30 DIAGNOSIS — Z79.899 ON DEEP VEIN THROMBOSIS (DVT) PROPHYLAXIS: ICD-10-CM

## 2024-12-30 DIAGNOSIS — F41.1 GENERALIZED ANXIETY DISORDER: ICD-10-CM

## 2024-12-30 PROCEDURE — 99309 SBSQ NF CARE MODERATE MDM 30: CPT | Performed by: NURSE PRACTITIONER

## 2024-12-30 NOTE — LETTER
Patient: Windy Lovelace  : 1990    Encounter Date: 2024    Name: Windy Lovelace    YOB: 1990    Code Status: FULL CODE    Chief Complaint:  Follow up on closed displaced trimalleolar fracture of right ankle, sequela; etc......       HPI   34 year old female with past medical history of stroke with current residual effects;  Migraine headache;   Moyamoya disease; Esophageal reflux;   Exotropia of right eye; History of neurodevelopmental disorder;   Obesity (BMI 30-39.9); and Hiatal hernia      Patient was admitted to [facility] from 24 to 24 (6 days)    Patient initially admitted to the [facility] on 24 for skilled services.    Patient readmitted to [facility] for surgery from 24 to 24.    Patient readmitted to the [facility] on 24 for skilled services.     Diagnoses as follows:    Closed displaced trimalleolar fracture of right ankle, sequela;   Right ankle fracture (right trimalleolar ankle fracture dislocation)   S/p closed reduction with concentric reduction; Right ankle pain;  Right Ankle ORIF Tri-Malleolar Fracture without Fixation of the Posterior Lip, Right Syndesmosis Stabilization:   Right trimalleolar ankle fracture dislocation status post open reduction internal fixation with Dr. Castro on 24.   Patient tolerated procedure well.  Patient initially presented to [facility] on 24 with an Right Ankle Fracture.    No acute orthopaedic surgical intervention was initially indicated.   Surgery was on 24.  Post op recommendations after surgery were nonweightbearing   right lower extremity short leg splint.   Patient should also elevate her leg.   Aspirin 81 mg twice daily starting postop day 1 for DVT prophylaxis to be continued for 4 weeks.  Patient has had a few follow-up appointments with orthopedics Dr. Castro on 10/10/24,  24, and 24 at [facility].   On routine acetaminophen 975 mg PO TID,  ibuprofen PRN  and oxycodone PRN for pain.   On 12/24/24 visit with orthopedic doctor her fiberglass cast was taken off.   Doctor commented that she was noncompliant with weightbearing  Restrictions.   ##Plans are for pt. To start weightbearing to her tolerance in her right lower extremity in a walking boot.   Orthopedics ordered a formal referral to physical therapy to begin ankle range of motion.   The patient may steadily transition out of the walking boot as she is able.   She will certainly remain at risk of posttraumatic osteoarthritis. Patient needs to follow up again in approximately 2 months for repeat clinical and radiographic evaluation.   Upon return, patient would require three-view weightbearing right ankle.   Patient had stopped  taking oxycodone PRN but was concerned last week about needing it if she starts therapy.  Her oxycodone Rx was renewed last week.  Patient has been taking the oxycodone PRN now, discussed with patient whether or not she would like the oxycodone given routinely or continue with PRN dosing.  She states she prefers to continue with PRN dosing.       IBS; abdominal cramps:  IBS symptoms and abdominal cramps have improved.  On dicyclomine routinely.  Also on immodium PRN and Pepto-Bismol PRN.  Also on lactobacillus routinely.   GI consult ordered--appt. Needs to be scheduled.   No fevers reported.  No nausea or vomiting.     Asthma; allergic rhinitis:   On Advair and fluticasone nasal spray.  No reports cough or SOB today.  On fluticasone for allergic rhinitis.   No complaints of runny or stuffy nose.  No headaches or dizziness.    Generalized anxiety disorder;   major depressive disorder; insomnia:  On sertraline and trazodone.  She has increased anxiety today.  Unhappy she is not able to ambulate.  No complaints of insomnia.     Impaired gait and mobility; Generalized muscle weakness :  Plans are for pt. To start weightbearing to her tolerance in her right lower extremity in a walking boot.    Orthopedics ordered a formal referral to physical therapy to begin ankle range of motion.   The patient may steadily transition out of the walking boot as she is able.   Needs to follow-up  with orthopedic doctor Dr. Castro in approximately 2 months.   Patient at the [facility] for [facility] services/PT/OT.     Hemiplegia and hemiparesis following cerebral infarction affecting right dominant side;  History of strokes:  On aspirin.    GERD:  On Omeprazole.   No complaints of reflux or N/V.     DVT ppx:   Aspirin 81 mg  PO BID ordered for 4 weeks                             Reviewed  EMR  Reviewed medical, social, surgical and family history.  Reviewed all current medications and performed medication reconciliation.  Performed prescription drug management.  Reviewed vital signs AND lab results  Reviewed [facility] Documentation  Discussed patient with nursing.                        ROS: 10 point ROS performed; Negative unless noted in HPI.      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), Breast pain (03/21/2024), Candidiasis of skin and nail (06/25/2014), Cellulitis of face (10/10/2013), Headache, unspecified (04/29/2015), Hypomagnesemia (01/23/2014), Other cysts of oral region, not elsewhere classified (06/10/2016), Other enthesopathies, not elsewhere classified (10/29/2015), Other specified abnormal findings of blood chemistry (03/06/2020), Other specified joint disorders, left ankle and foot (11/02/2016), Other specified noninflammatory disorders of vagina (03/20/2018), Pain in right knee (11/12/2018), Pain in right knee (02/19/2018), Pain in right knee (02/20/2019), Pain, unspecified (10/17/2017), Pelvic and perineal pain (01/24/2018), Personal history of diseases of the skin and subcutaneous  tissue (02/24/2016), Personal history of other diseases of the digestive system (10/06/2017), Personal history of other diseases of the digestive system (03/17/2015), Personal history of other diseases of the digestive system (05/24/2016), Personal history of other diseases of the female genital tract (08/31/2015), Personal history of other diseases of the musculoskeletal system and connective tissue (02/04/2014), Personal history of other diseases of the nervous system and sense organs (01/05/2015), Personal history of other diseases of the respiratory system (08/29/2013), Personal history of other diseases of the respiratory system (02/03/2017), Personal history of other drug therapy (12/11/2017), Personal history of other endocrine, nutritional and metabolic disease (05/09/2019), Personal history of other mental and behavioral disorders (10/06/2017), Personal history of transient ischemic attack (TIA), and cerebral infarction without residual deficits (10/06/2017), Pleurodynia (01/23/2014), Pyuria (06/13/2017), Sprain of medial collateral ligament of right knee, initial encounter (11/02/2016), Sprain of unspecified ligament of left ankle, subsequent encounter (10/29/2015), Strain of muscle and tendon of unspecified wall of thorax, initial encounter (02/04/2014), Syncope and collapse (06/13/2017), Unspecified fall, initial encounter (01/23/2014), and Unspecified symptoms and signs involving the genitourinary system (01/24/2018).    SURGICAL HISTORY:   MR head angio w IV contrast (8/18/2015);   US guided percutaneous peritoneal or retroperitoneal fluid collection drainage (4/10/2019);   and US guided abscess drain (4/10/2019).     SOCIAL HISTORY:  She reports that she has never smoked.   She has never used smokeless tobacco.   She reports that she does not drink alcohol and does not use drugs.    Family History   Problem Relation Name Age of Onset   • Kidney cancer Mother       • Liver cancer Mother       •  "Pancreatic cancer Father       • Glaucoma Other grandmother     • Diabetes Other Grandfather           other type with arthropathy, with long term curent use of insulin   • Diabetes Other aunt         ALLERGIES:  Bupropion  Ketamine  Augmentin [amoxicillin-pot clavulanate]  Penicillin g,   Penicillins    LABS:  CBC: Date: 10/9/2024 WBC 9.1 Hgb 12.6 hematocrit 39.8 platelets 319    BMP: Date: 10/15/2024 Na 142 K 4 Cr 0.7 BUN 10 glucose 69 Ca 8 GFR 95  CBC: Date: 10/15/2024 WBC 7.7 Hgb 12.2 hematocrit 37.1 platelets 202  Liver function: Date: 10/15/2024 ALT 36 AST 35 alkaline phos.  71 bilirubin 0.4 albumin 3.5 protein 5.9    BMP: Date: 11/21/2024 Na 139 K 3.6 Cr 0.6 BUN 8 glucose 81 Ca 9.2   CBC: Date: 11/21/2024 WBC 8.9 Hgb 14.8 hematocrit 43.7 platelets 287         Lab Results   Component Value Date    WBC 10.1 09/28/2024    HGB 11.9 (L) 09/28/2024    HCT 36.5 09/28/2024     09/28/2024    CHOL 152 05/09/2024    TRIG 178 (H) 05/09/2024    HDL 33.6 05/09/2024    ALT 51 (H) 09/14/2024    AST 18 09/14/2024     09/27/2024    K 3.8 09/27/2024     09/27/2024    CREATININE 0.65 09/27/2024    BUN 7 09/27/2024    CO2 22 09/27/2024    TSH 5.14 (H) 05/09/2024    INR 1.0 09/13/2024             /77   Pulse 81   Temp 36.7 °C (98.1 °F)   Resp 18   Ht 1.676 m (5' 6\")   Wt 106 kg (234 lb 6.4 oz)   SpO2 98%   BMI 37.83 kg/m²      Physical Exam  Vitals and nursing note reviewed.   Constitutional:       Appearance: She is obese.   HENT:      Head: Normocephalic.      Right Ear: External ear normal.      Left Ear: External ear normal.      Nose: Nose normal.      Mouth/Throat:      Mouth: Mucous membranes are moist.      Pharynx: Oropharynx is clear.   Eyes:      Extraocular Movements: Extraocular movements intact.      Conjunctiva/sclera: Conjunctivae normal.      Pupils: Pupils are equal, round, and reactive to light.   Cardiovascular:      Rate and Rhythm: Normal rate and regular rhythm.      " Pulses: Normal pulses.      Heart sounds: Normal heart sounds.   Pulmonary:      Effort: Pulmonary effort is normal.      Breath sounds: Normal breath sounds.   Abdominal:      General: Bowel sounds are normal.      Palpations: Abdomen is soft.   Musculoskeletal:         General: Normal range of motion.      Cervical back: Normal range of motion and neck supple.      Comments: Right lower extremity--surgical incision, edges well approximated no erythema or purulent drainage.    Adequate movement and sensation in toes.  Cap refill < 3 seconds.   Feet:      Comments: Bilateral feet--adequate movement and sensation;  Cap refill < 3 seconds.   Skin:     General: Skin is warm and dry.      Capillary Refill: Capillary refill takes less than 2 seconds.   Neurological:      General: No focal deficit present.      Mental Status: She is alert and oriented to person, place, and time. Mental status is at baseline.      Motor: Weakness present.      Gait: Gait abnormal.   Psychiatric:         Mood and Affect: Mood is anxious and depressed.         Behavior: Behavior normal. Behavior is cooperative.          Assessment/Plan     CMP, CBC with diff., vitamin B 12 level ordered---results pending follow up when available.     Closed displaced trimalleolar fracture of right ankle, sequela;   Right ankle fracture (right trimalleolar ankle fracture dislocation)   S/p closed reduction with concentric reduction; Right ankle pain;  Right Ankle ORIF Tri-Malleolar Fracture without Fixation of the Posterior Lip, Right Syndesmosis Stabilization:   Right trimalleolar ankle fracture dislocation status post open reduction internal fixation with Dr. Castro on 9/26/24.   Patient tolerated procedure well.  Patient initially presented to [facility] on 9/13/24 with an Right Ankle Fracture.    No acute orthopaedic surgical intervention was initially indicated.   Surgery was on 9/26/24.  Patient should also elevate her leg.   Patient has had a few  follow-up appointments with orthopedics Dr. Castro on 10/10/24, 11/26/24, and 12/24/24 at [facility].   Continue routine acetaminophen 975 mg PO TID,  ibuprofen PRN and oxycodone PRN for pain.   On 12/24/24 visit with orthopedic doctor her fiberglass cast was taken off.   Doctor commented that she was noncompliant with weightbearing  Restrictions.   ##Plans are for pt. To start weightbearing to her tolerance in her right lower extremity in a walking boot.   Orthopedics ordered a formal referral to physical therapy to begin ankle range of motion.   The patient may steadily transition out of the walking boot as she is able.   Patient needs to follow up again in approximately 2 months for repeat clinical and radiographic evaluation.   Upon return, patient would require three-view weightbearing right ankle.     IBS; abdominal cramps:  Monitor for symptoms.  Continue dicyclomine routinely.  Continue immodium PRN and Pepto-Bismol PRN.  Continue lactobacillus routinely.     Asthma; allergic rhinitis:   Continue Advair and fluticasone nasal spray.    Generalized anxiety disorder;   major depressive disorder; insomnia:  Continue sertraline and trazodone.    Impaired gait and mobility; Generalized muscle weakness:  Plans are for pt. To start weightbearing to her tolerance in her right lower extremity in a walking boot.   Orthopedics ordered a formal referral to physical therapy to begin ankle range of motion.   The patient may steadily transition out of the walking boot as she is able.   Needs to follow-up with orthopedic doctor Dr. Castro in approximately 2 months.   Patient at the [facility] for skilled services/PT/OT.     Hemiplegia and hemiparesis following cerebral infarction affecting right dominant side;  History of strokes:  Continue aspirin.    GERD:  Continue omeprazole.   Monitor for reflux or N/V.     DVT ppx:   Continue aspirin as ordered.                 Problem List Items Addressed This Visit        Allergic rhinitis    Closed displaced trimalleolar fracture of right lower leg - Primary    Gastroesophageal reflux disease    Ankle pain     Other Visit Diagnoses       S/P ORIF (open reduction internal fixation) fracture        H/O reduction of closed fracture        Irritable bowel syndrome, unspecified type        Abdominal cramps        Asthma, unspecified asthma severity, unspecified whether complicated, unspecified whether persistent ([facility])        Generalized anxiety disorder        Impaired gait and mobility        Generalized muscle weakness        Hemiplegia and hemiparesis following cerebral infarction affecting right dominant side (Multi)        On deep vein thrombosis (DVT) prophylaxis                        NADEEM Clement       Electronically Signed By: NADEEM Clement   1/2/25  9:07 PM

## 2025-01-02 VITALS
HEIGHT: 66 IN | RESPIRATION RATE: 18 BRPM | TEMPERATURE: 98.1 F | BODY MASS INDEX: 37.67 KG/M2 | OXYGEN SATURATION: 98 % | HEART RATE: 81 BPM | WEIGHT: 234.4 LBS | SYSTOLIC BLOOD PRESSURE: 124 MMHG | DIASTOLIC BLOOD PRESSURE: 77 MMHG

## 2025-01-03 NOTE — PROGRESS NOTES
Name: Windy Lovelace    YOB: 1990    Code Status: FULL CODE    Chief Complaint:  Follow up on closed displaced trimalleolar fracture of right ankle, sequela; etc......       HPI   34 year old female with past medical history of stroke with current residual effects;  Migraine headache;   Moyamoya disease; Esophageal reflux;   Exotropia of right eye; History of neurodevelopmental disorder;   Obesity (BMI 30-39.9); and Hiatal hernia      Patient was admitted to [facility] from 9/13/24 to 9/19/24 (6 days)    Patient initially admitted to the [facility] on 9/19/24 for skilled services.    Patient readmitted to [facility] for surgery from 9/26/24 to 9/28/24.    Patient readmitted to the [facility] on 9/28/24 for skilled services.     Diagnoses as follows:    Closed displaced trimalleolar fracture of right ankle, sequela;   Right ankle fracture (right trimalleolar ankle fracture dislocation)   S/p closed reduction with concentric reduction; Right ankle pain;  Right Ankle ORIF Tri-Malleolar Fracture without Fixation of the Posterior Lip, Right Syndesmosis Stabilization:   Right trimalleolar ankle fracture dislocation status post open reduction internal fixation with Dr. Castro on 9/26/24.   Patient tolerated procedure well.  Patient initially presented to [facility] on 9/13/24 with an Right Ankle Fracture.    No acute orthopaedic surgical intervention was initially indicated.   Surgery was on 9/26/24.  Post op recommendations after surgery were nonweightbearing   right lower extremity short leg splint.   Patient should also elevate her leg.   Aspirin 81 mg twice daily starting postop day 1 for DVT prophylaxis to be continued for 4 weeks.  Patient has had a few follow-up appointments with orthopedics Dr. Castro on 10/10/24,  11/26/24, and 12/24/24 at [facility].   On routine acetaminophen 975 mg PO TID,  ibuprofen PRN and oxycodone PRN for pain.   On 12/24/24 visit with orthopedic doctor her  fiberglass cast was taken off.   Doctor commented that she was noncompliant with weightbearing  Restrictions.   ##Plans are for pt. To start weightbearing to her tolerance in her right lower extremity in a walking boot.   Orthopedics ordered a formal referral to physical therapy to begin ankle range of motion.   The patient may steadily transition out of the walking boot as she is able.   She will certainly remain at risk of posttraumatic osteoarthritis. Patient needs to follow up again in approximately 2 months for repeat clinical and radiographic evaluation.   Upon return, patient would require three-view weightbearing right ankle.   Patient had stopped  taking oxycodone PRN but was concerned last week about needing it if she starts therapy.  Her oxycodone Rx was renewed last week.  Patient has been taking the oxycodone PRN now, discussed with patient whether or not she would like the oxycodone given routinely or continue with PRN dosing.  She states she prefers to continue with PRN dosing.       IBS; abdominal cramps:  IBS symptoms and abdominal cramps have improved.  On dicyclomine routinely.  Also on immodium PRN and Pepto-Bismol PRN.  Also on lactobacillus routinely.   GI consult ordered--appt. Needs to be scheduled.   No fevers reported.  No nausea or vomiting.     Asthma; allergic rhinitis:   On Advair and fluticasone nasal spray.  No reports cough or SOB today.  On fluticasone for allergic rhinitis.   No complaints of runny or stuffy nose.  No headaches or dizziness.    Generalized anxiety disorder;   major depressive disorder; insomnia:  On sertraline and trazodone.  She has increased anxiety today.  Unhappy she is not able to ambulate.  No complaints of insomnia.     Impaired gait and mobility; Generalized muscle weakness :  Plans are for pt. To start weightbearing to her tolerance in her right lower extremity in a walking boot.   Orthopedics ordered a formal referral to physical therapy to begin ankle  range of motion.   The patient may steadily transition out of the walking boot as she is able.   Needs to follow-up  with orthopedic doctor Dr. Castro in approximately 2 months.   Patient at the [facility] for [facility] services/PT/OT.     Hemiplegia and hemiparesis following cerebral infarction affecting right dominant side;  History of strokes:  On aspirin.    GERD:  On Omeprazole.   No complaints of reflux or N/V.     DVT ppx:   Aspirin 81 mg  PO BID ordered for 4 weeks                             Reviewed  EMR  Reviewed medical, social, surgical and family history.  Reviewed all current medications and performed medication reconciliation.  Performed prescription drug management.  Reviewed vital signs AND lab results  Reviewed [facility] Documentation  Discussed patient with nursing.                        ROS: 10 point ROS performed; Negative unless noted in HPI.      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), Breast pain (03/21/2024), Candidiasis of skin and nail (06/25/2014), Cellulitis of face (10/10/2013), Headache, unspecified (04/29/2015), Hypomagnesemia (01/23/2014), Other cysts of oral region, not elsewhere classified (06/10/2016), Other enthesopathies, not elsewhere classified (10/29/2015), Other specified abnormal findings of blood chemistry (03/06/2020), Other specified joint disorders, left ankle and foot (11/02/2016), Other specified noninflammatory disorders of vagina (03/20/2018), Pain in right knee (11/12/2018), Pain in right knee (02/19/2018), Pain in right knee (02/20/2019), Pain, unspecified (10/17/2017), Pelvic and perineal pain (01/24/2018), Personal history of diseases of the skin and subcutaneous tissue (02/24/2016), Personal history of other diseases of the digestive  system (10/06/2017), Personal history of other diseases of the digestive system (03/17/2015), Personal history of other diseases of the digestive system (05/24/2016), Personal history of other diseases of the female genital tract (08/31/2015), Personal history of other diseases of the musculoskeletal system and connective tissue (02/04/2014), Personal history of other diseases of the nervous system and sense organs (01/05/2015), Personal history of other diseases of the respiratory system (08/29/2013), Personal history of other diseases of the respiratory system (02/03/2017), Personal history of other drug therapy (12/11/2017), Personal history of other endocrine, nutritional and metabolic disease (05/09/2019), Personal history of other mental and behavioral disorders (10/06/2017), Personal history of transient ischemic attack (TIA), and cerebral infarction without residual deficits (10/06/2017), Pleurodynia (01/23/2014), Pyuria (06/13/2017), Sprain of medial collateral ligament of right knee, initial encounter (11/02/2016), Sprain of unspecified ligament of left ankle, subsequent encounter (10/29/2015), Strain of muscle and tendon of unspecified wall of thorax, initial encounter (02/04/2014), Syncope and collapse (06/13/2017), Unspecified fall, initial encounter (01/23/2014), and Unspecified symptoms and signs involving the genitourinary system (01/24/2018).    SURGICAL HISTORY:   MR head angio w IV contrast (8/18/2015);   US guided percutaneous peritoneal or retroperitoneal fluid collection drainage (4/10/2019);   and US guided abscess drain (4/10/2019).     SOCIAL HISTORY:  She reports that she has never smoked.   She has never used smokeless tobacco.   She reports that she does not drink alcohol and does not use drugs.    Family History   Problem Relation Name Age of Onset    Kidney cancer Mother        Liver cancer Mother        Pancreatic cancer Father        Glaucoma Other grandmother      Diabetes Other  "Grandfather           other type with arthropathy, with long term curent use of insulin    Diabetes Other aunt         ALLERGIES:  Bupropion  Ketamine  Augmentin [amoxicillin-pot clavulanate]  Penicillin g,   Penicillins    LABS:  CBC: Date: 10/9/2024 WBC 9.1 Hgb 12.6 hematocrit 39.8 platelets 319    BMP: Date: 10/15/2024 Na 142 K 4 Cr 0.7 BUN 10 glucose 69 Ca 8 GFR 95  CBC: Date: 10/15/2024 WBC 7.7 Hgb 12.2 hematocrit 37.1 platelets 202  Liver function: Date: 10/15/2024 ALT 36 AST 35 alkaline phos.  71 bilirubin 0.4 albumin 3.5 protein 5.9    BMP: Date: 11/21/2024 Na 139 K 3.6 Cr 0.6 BUN 8 glucose 81 Ca 9.2   CBC: Date: 11/21/2024 WBC 8.9 Hgb 14.8 hematocrit 43.7 platelets 287         Lab Results   Component Value Date    WBC 10.1 09/28/2024    HGB 11.9 (L) 09/28/2024    HCT 36.5 09/28/2024     09/28/2024    CHOL 152 05/09/2024    TRIG 178 (H) 05/09/2024    HDL 33.6 05/09/2024    ALT 51 (H) 09/14/2024    AST 18 09/14/2024     09/27/2024    K 3.8 09/27/2024     09/27/2024    CREATININE 0.65 09/27/2024    BUN 7 09/27/2024    CO2 22 09/27/2024    TSH 5.14 (H) 05/09/2024    INR 1.0 09/13/2024             /77   Pulse 81   Temp 36.7 °C (98.1 °F)   Resp 18   Ht 1.676 m (5' 6\")   Wt 106 kg (234 lb 6.4 oz)   SpO2 98%   BMI 37.83 kg/m²      Physical Exam  Vitals and nursing note reviewed.   Constitutional:       Appearance: She is obese.   HENT:      Head: Normocephalic.      Right Ear: External ear normal.      Left Ear: External ear normal.      Nose: Nose normal.      Mouth/Throat:      Mouth: Mucous membranes are moist.      Pharynx: Oropharynx is clear.   Eyes:      Extraocular Movements: Extraocular movements intact.      Conjunctiva/sclera: Conjunctivae normal.      Pupils: Pupils are equal, round, and reactive to light.   Cardiovascular:      Rate and Rhythm: Normal rate and regular rhythm.      Pulses: Normal pulses.      Heart sounds: Normal heart sounds.   Pulmonary:      " Effort: Pulmonary effort is normal.      Breath sounds: Normal breath sounds.   Abdominal:      General: Bowel sounds are normal.      Palpations: Abdomen is soft.   Musculoskeletal:         General: Normal range of motion.      Cervical back: Normal range of motion and neck supple.      Comments: Right lower extremity--surgical incision, edges well approximated no erythema or purulent drainage.    Adequate movement and sensation in toes.  Cap refill < 3 seconds.   Feet:      Comments: Bilateral feet--adequate movement and sensation;  Cap refill < 3 seconds.   Skin:     General: Skin is warm and dry.      Capillary Refill: Capillary refill takes less than 2 seconds.   Neurological:      General: No focal deficit present.      Mental Status: She is alert and oriented to person, place, and time. Mental status is at baseline.      Motor: Weakness present.      Gait: Gait abnormal.   Psychiatric:         Mood and Affect: Mood is anxious and depressed.         Behavior: Behavior normal. Behavior is cooperative.          Assessment/Plan      CMP, CBC with diff., vitamin B 12 level ordered---results pending follow up when available.     Closed displaced trimalleolar fracture of right ankle, sequela;   Right ankle fracture (right trimalleolar ankle fracture dislocation)   S/p closed reduction with concentric reduction; Right ankle pain;  Right Ankle ORIF Tri-Malleolar Fracture without Fixation of the Posterior Lip, Right Syndesmosis Stabilization:   Right trimalleolar ankle fracture dislocation status post open reduction internal fixation with Dr. Castro on 9/26/24.   Patient tolerated procedure well.  Patient initially presented to [place] on 9/13/24 with an Right Ankle Fracture.    No acute orthopaedic surgical intervention was initially indicated.   Surgery was on 9/26/24.  Patient should also elevate her leg.   Patient has had a few follow-up appointments with orthopedics Dr. Castro on 10/10/24, 11/26/24, and 12/24/24  at  [facility].   Continue routine acetaminophen 975 mg PO TID,  ibuprofen PRN and oxycodone PRN for pain.   On 12/24/24 visit with orthopedic doctor her fiberglass cast was taken off.   Doctor commented that she was noncompliant with weightbearing  Restrictions.   ##Plans are for pt. To start weightbearing to her tolerance in her right lower extremity in a walking boot.   Orthopedics ordered a formal referral to physical therapy to begin ankle range of motion.   The patient may steadily transition out of the walking boot as she is able.   Patient needs to follow up again in approximately 2 months for repeat clinical and radiographic evaluation.   Upon return, patient would require three-view weightbearing right ankle.     IBS; abdominal cramps:  Monitor for symptoms.  Continue dicyclomine routinely.  Continue immodium PRN and Pepto-Bismol PRN.  Continue lactobacillus routinely.     Asthma; allergic rhinitis:   Continue Advair and fluticasone nasal spray.    Generalized anxiety disorder;   major depressive disorder; insomnia:  Continue sertraline and trazodone.    Impaired gait and mobility; Generalized muscle weakness:  Plans are for pt. To start weightbearing to her tolerance in her right lower extremity in a walking boot.   Orthopedics ordered a formal referral to physical therapy to begin ankle range of motion.   The patient may steadily transition out of the walking boot as she is able.   Needs to follow-up with orthopedic doctor Dr. Castro in approximately 2 months.   Patient at the [facility] for skilled services/PT/OT.     Hemiplegia and hemiparesis following cerebral infarction affecting right dominant side;  History of strokes:  Continue aspirin.    GERD:  Continue omeprazole.   Monitor for reflux or N/V.     DVT ppx:   Continue aspirin as ordered.                 Problem List Items Addressed This Visit       Allergic rhinitis    Closed displaced trimalleolar fracture of right lower leg - Primary     Gastroesophageal reflux disease    Ankle pain     Other Visit Diagnoses       S/P ORIF (open reduction internal fixation) fracture        H/O reduction of closed fracture        Irritable bowel syndrome, unspecified type        Abdominal cramps        Asthma, unspecified asthma severity, unspecified whether complicated, unspecified whether persistent ([facility])        Generalized anxiety disorder        Impaired gait and mobility        Generalized muscle weakness        Hemiplegia and hemiparesis following cerebral infarction affecting right dominant side (Multi)        On deep vein thrombosis (DVT) prophylaxis                        Paola Kessler, APRN-CNP

## 2025-01-06 ENCOUNTER — NURSING HOME VISIT (OUTPATIENT)
Dept: POST ACUTE CARE | Facility: EXTERNAL LOCATION | Age: 35
End: 2025-01-06
Payer: COMMERCIAL

## 2025-01-06 DIAGNOSIS — G47.00 INSOMNIA, UNSPECIFIED TYPE: ICD-10-CM

## 2025-01-06 DIAGNOSIS — E53.8 VITAMIN B 12 DEFICIENCY: ICD-10-CM

## 2025-01-06 DIAGNOSIS — M62.81 GENERALIZED MUSCLE WEAKNESS: ICD-10-CM

## 2025-01-06 DIAGNOSIS — Z87.81 S/P ORIF (OPEN REDUCTION INTERNAL FIXATION) FRACTURE: ICD-10-CM

## 2025-01-06 DIAGNOSIS — F32.9 MAJOR DEPRESSIVE DISORDER WITH CURRENT ACTIVE EPISODE, UNSPECIFIED DEPRESSION EPISODE SEVERITY, UNSPECIFIED WHETHER RECURRENT: ICD-10-CM

## 2025-01-06 DIAGNOSIS — M25.571 RIGHT ANKLE PAIN, UNSPECIFIED CHRONICITY: ICD-10-CM

## 2025-01-06 DIAGNOSIS — K58.9 IRRITABLE BOWEL SYNDROME, UNSPECIFIED TYPE: ICD-10-CM

## 2025-01-06 DIAGNOSIS — I69.351 HEMIPLEGIA AND HEMIPARESIS FOLLOWING CEREBRAL INFARCTION AFFECTING RIGHT DOMINANT SIDE (MULTI): ICD-10-CM

## 2025-01-06 DIAGNOSIS — E87.6 HYPOKALEMIA: ICD-10-CM

## 2025-01-06 DIAGNOSIS — J45.909 ASTHMA, UNSPECIFIED ASTHMA SEVERITY, UNSPECIFIED WHETHER COMPLICATED, UNSPECIFIED WHETHER PERSISTENT (HHS-HCC): ICD-10-CM

## 2025-01-06 DIAGNOSIS — R26.89 IMPAIRED GAIT AND MOBILITY: ICD-10-CM

## 2025-01-06 DIAGNOSIS — R10.9 ABDOMINAL CRAMPS: ICD-10-CM

## 2025-01-06 DIAGNOSIS — Z98.890 S/P ORIF (OPEN REDUCTION INTERNAL FIXATION) FRACTURE: ICD-10-CM

## 2025-01-06 DIAGNOSIS — J30.9 ALLERGIC RHINITIS, UNSPECIFIED SEASONALITY, UNSPECIFIED TRIGGER: ICD-10-CM

## 2025-01-06 DIAGNOSIS — Z87.81 H/O REDUCTION OF CLOSED FRACTURE: ICD-10-CM

## 2025-01-06 DIAGNOSIS — F41.1 GENERALIZED ANXIETY DISORDER: ICD-10-CM

## 2025-01-06 DIAGNOSIS — S82.851S CLOSED DISPLACED TRIMALLEOLAR FRACTURE OF RIGHT ANKLE, SEQUELA: Primary | ICD-10-CM

## 2025-01-06 NOTE — LETTER
Patient: Windy Lovelace  : 1990    Encounter Date: 2025    Name: Windy Lovelace    YOB: 1990    Code Status: FULL CODE    Chief Complaint:  Follow up on Closed displaced trimalleolar fracture of right ankle, sequela; etc......       HPI   34 year old female with past medical history of stroke with current residual effects;  Migraine headache;   Moyamoya disease; Esophageal reflux;   Exotropia of right eye; History of neurodevelopmental disorder;   Obesity (BMI 30-39.9); and Hiatal hernia      Patient was admitted to [facility] from 24 to 24 (6 days)    Patient initially admitted to the [facility] on 24 for skilled services.    Patient readmitted to [facility] for surgery from 24 to 24.    Patient readmitted to the [facility] on 24 for skilled services.     Diagnoses as follows:    Closed displaced trimalleolar fracture of right ankle, sequela;   Right ankle fracture (right trimalleolar ankle fracture dislocation)   S/p closed reduction with concentric reduction; Right ankle pain;  Right Ankle ORIF Tri-Malleolar Fracture without Fixation of the Posterior Lip, Right Syndesmosis Stabilization:   Right trimalleolar ankle fracture dislocation status post open reduction internal fixation with Dr. Castro on 24.   Patient tolerated procedure well.  Patient initially presented to [facility] on 24 with an Right Ankle Fracture.    No acute orthopaedic surgical intervention was initially indicated.   Surgery was on 24.  Post op recommendations after surgery were nonweightbearing   right lower extremity short leg splint.   Patient should also elevate her leg.   Aspirin 81 mg twice daily starting postop day 1 for DVT prophylaxis to be continued for 4 weeks.  Patient has had a few follow-up appointments with orthopedics Dr. Castro on 10/10/24,  24, and 24 at [facility].   On routine acetaminophen 975 mg PO TID,  ibuprofen PRN  and oxycodone PRN for pain.   On 12/24/24 visit with orthopedic doctor her fiberglass cast was taken off.   Doctor commented that she was noncompliant with weightbearing  Restrictions.   Patient has been weightbearing to her tolerance in her right lower extremity in a walking boot.   Orthopedics ordered a formal referral to physical therapy to begin ankle range of motion.   The patient may steadily transition out of the walking boot as she is able.   She is at risk of posttraumatic osteoarthritis.   In 2 months patient needs follow up with ortho and three-view weightbearing right ankle.   Patient has a follow up appt. With ortho scheduled on 2/25/25.   Her oxycodone Rx was renewed last week.  Patient has been taking the oxycodone PRN and it is sufficient pain control.   Patient seen today, she is in bed.  Cooperative.  Mentation at baseline.   No complaints of leg pain today.    Vitamin B 12 deficiency:  Patient states she has vitamin B 12 deficiency.  She has had it for a long time.  She had reported she is supposed to be on Vitamin B 12 injections about a month ago.  At that time we discussed she will be started on oral vitamin B 12 100 mcg PO every day.  On 12/26/24 vitamin B 12 level was 336 wnl.    Hypokalemia:  On 12/26/24 potassium level was 3.4 L  Not on potassium chloride.     Asthma; allergic rhinitis:   On Advair and fluticasone nasal spray.  No reports cough or SOB today.  On fluticasone for allergic rhinitis.   No complaints of runny or stuffy nose.  No headaches or dizziness.    IBS; abdominal cramps:  IBS symptoms and abdominal cramps have improved.  On dicyclomine routinely.  Also on immodium PRN and Pepto-Bismol PRN.  Also on lactobacillus routinely.   GI consult ordered--appt. Needs to be scheduled.   No fevers reported.  No nausea or vomiting.     Generalized anxiety disorder;   major depressive disorder; insomnia:  On sertraline and trazodone.  No complaints of insomnia.     Impaired gait and  mobility; Generalized muscle weakness :  Plans are for pt. To start weightbearing to her tolerance in her right lower extremity in a walking boot.   Orthopedics ordered a formal referral to physical therapy to begin ankle range of motion.   The patient may steadily transition out of the walking boot as she is able.   Needs to follow-up  with orthopedic doctor Dr. Castro in approximately 2 months.   Patient at the [facility] for skilled services/PT/OT.     Hemiplegia and hemiparesis following cerebral infarction affecting right dominant side;  History of strokes:  At the [facility] skilled services.                              Reviewed  EMR  Reviewed medical, social, surgical and family history.  Reviewed all current medications and performed medication reconciliation.  Performed prescription drug management.  Reviewed vital signs AND lab results  Reviewed [facility] Documentation  Discussed patient with nursing.                        ROS: 10 point ROS performed; Negative unless noted in HPI.      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), Breast pain (03/21/2024), Candidiasis of skin and nail (06/25/2014), Cellulitis of face (10/10/2013), Headache, unspecified (04/29/2015), Hypomagnesemia (01/23/2014), Other cysts of oral region, not elsewhere classified (06/10/2016), Other enthesopathies, not elsewhere classified (10/29/2015), Other specified abnormal findings of blood chemistry (03/06/2020), Other specified joint disorders, left ankle and foot (11/02/2016), Other specified noninflammatory disorders of vagina (03/20/2018), Pain in right knee (11/12/2018), Pain in right knee (02/19/2018), Pain in right knee (02/20/2019), Pain, unspecified (10/17/2017), Pelvic and perineal pain  (01/24/2018), Personal history of diseases of the skin and subcutaneous tissue (02/24/2016), Personal history of other diseases of the digestive system (10/06/2017), Personal history of other diseases of the digestive system (03/17/2015), Personal history of other diseases of the digestive system (05/24/2016), Personal history of other diseases of the female genital tract (08/31/2015), Personal history of other diseases of the musculoskeletal system and connective tissue (02/04/2014), Personal history of other diseases of the nervous system and sense organs (01/05/2015), Personal history of other diseases of the respiratory system (08/29/2013), Personal history of other diseases of the respiratory system (02/03/2017), Personal history of other drug therapy (12/11/2017), Personal history of other endocrine, nutritional and metabolic disease (05/09/2019), Personal history of other mental and behavioral disorders (10/06/2017), Personal history of transient ischemic attack (TIA), and cerebral infarction without residual deficits (10/06/2017), Pleurodynia (01/23/2014), Pyuria (06/13/2017), Sprain of medial collateral ligament of right knee, initial encounter (11/02/2016), Sprain of unspecified ligament of left ankle, subsequent encounter (10/29/2015), Strain of muscle and tendon of unspecified wall of thorax, initial encounter (02/04/2014), Syncope and collapse (06/13/2017), Unspecified fall, initial encounter (01/23/2014), and Unspecified symptoms and signs involving the genitourinary system (01/24/2018).    SURGICAL HISTORY:   MR head angio w IV contrast (8/18/2015);   US guided percutaneous peritoneal or retroperitoneal fluid collection drainage (4/10/2019);   and US guided abscess drain (4/10/2019).     SOCIAL HISTORY:  She reports that she has never smoked.   She has never used smokeless tobacco.   She reports that she does not drink alcohol and does not use drugs.    Family History   Problem Relation Name Age of  Onset   • Kidney cancer Mother       • Liver cancer Mother       • Pancreatic cancer Father       • Glaucoma Other grandmother     • Diabetes Other Grandfather           other type with arthropathy, with long term curent use of insulin   • Diabetes Other aunt         ALLERGIES:  Bupropion  Ketamine  Augmentin [amoxicillin-pot clavulanate]  Penicillin g,   Penicillins    LABS:  CBC: Date: 10/9/2024 WBC 9.1 Hgb 12.6 hematocrit 39.8 platelets 319    BMP: Date: 10/15/2024 Na 142 K 4 Cr 0.7 BUN 10 glucose 69 Ca 8 GFR 95  CBC: Date: 10/15/2024 WBC 7.7 Hgb 12.2 hematocrit 37.1 platelets 202  Liver function: Date: 10/15/2024 ALT 36 AST 35 alkaline phos.  71 bilirubin 0.4 albumin 3.5 protein 5.9    BMP: Date: 11/6/2024 Na 141 K 3.7 Cr 0.7 BUN 7 glucose 64 Ca 8.4 GFR 95  CBC: Date: 11/6/2024 WBC 6.7 Hgb 13.1 hematocrit 39.9 platelets 244  Liver function: Date: 11/6/2024 ALT 50 AST 42 alkaline phos.  72 bilirubin 0.5 albumin 3.5 protein 5.9      BMP: Date: 11/21/2024 Na 139 K 3.6 Cr 0.6 BUN 8 glucose 81 Ca 9.2   CBC: Date: 11/21/2024 WBC 8.9 Hgb 14.8 hematocrit 43.7 platelets 287    BMP: Date: 12/4/2024 Na 141 K 3.5 Cr 0.8 BUN 11 glucose 81 Ca 9 GFR 81  CBC: Date: 12/4/2024 WBC 8 Hgb 14.8 hematocrit 45.4 platelets 268    BMP: Date: 12/26/2024 Na 140 K 3.4 Cr 0.7 BUN 10 glucose 86 Ca 8.4 GFR 95  CBC: Date: 12/26/2024 WBC 5.9 Hgb 14 hematocrit 43.9 platelets 240  Liver function: Date: 12/26/2024 ALT 42 AST 32 alkaline phos.  76 bilirubin 0.4 albumin 3.7 protein 6    12/26/2024 vitamin B12 336 (range 211-911)             Lab Results   Component Value Date    WBC 10.1 09/28/2024    HGB 11.9 (L) 09/28/2024    HCT 36.5 09/28/2024     09/28/2024    CHOL 152 05/09/2024    TRIG 178 (H) 05/09/2024    HDL 33.6 05/09/2024    ALT 51 (H) 09/14/2024    AST 18 09/14/2024     09/27/2024    K 3.8 09/27/2024     09/27/2024    CREATININE 0.65 09/27/2024    BUN 7 09/27/2024    CO2 22 09/27/2024    TSH 5.14 (H) 05/09/2024  "   INR 1.0 09/13/2024             /77   Pulse 81   Temp 36.7 °C (98.1 °F)   Resp 18   Ht 1.676 m (5' 6\")   Wt 106 kg (234 lb 6.4 oz)   SpO2 98%   BMI 37.83 kg/m²      Physical Exam  Vitals and nursing note reviewed.   Constitutional:       Appearance: She is obese.   HENT:      Head: Normocephalic.      Right Ear: External ear normal.      Left Ear: External ear normal.      Nose: Nose normal.      Mouth/Throat:      Mouth: Mucous membranes are moist.      Pharynx: Oropharynx is clear.   Eyes:      Extraocular Movements: Extraocular movements intact.      Conjunctiva/sclera: Conjunctivae normal.      Pupils: Pupils are equal, round, and reactive to light.   Cardiovascular:      Rate and Rhythm: Normal rate and regular rhythm.      Pulses: Normal pulses.      Heart sounds: Normal heart sounds.   Pulmonary:      Effort: Pulmonary effort is normal.      Breath sounds: Normal breath sounds.   Abdominal:      General: Bowel sounds are normal.      Palpations: Abdomen is soft.   Musculoskeletal:         General: Normal range of motion.      Cervical back: Normal range of motion and neck supple.      Comments: Right lower extremity--surgical incision, edges well approximated no erythema or purulent drainage.    Adequate movement and sensation in toes.  Cap refill < 3 seconds.   Feet:      Comments: Bilateral feet--adequate movement and sensation;  Cap refill < 3 seconds.   Skin:     General: Skin is warm and dry.      Capillary Refill: Capillary refill takes less than 2 seconds.   Neurological:      General: No focal deficit present.      Mental Status: She is alert and oriented to person, place, and time. Mental status is at baseline.      Motor: Weakness present.      Gait: Gait abnormal.   Psychiatric:         Mood and Affect: Mood is anxious and depressed.         Behavior: Behavior normal. Behavior is cooperative.          Assessment/Plan     Ordered BMP, CBC with diff., vitamin B 12 level for " Thursday.    Closed displaced trimalleolar fracture of right ankle, sequela;   Right ankle fracture (right trimalleolar ankle fracture dislocation)   S/p closed reduction with concentric reduction; Right ankle pain;  Right Ankle ORIF Tri-Malleolar Fracture without Fixation of the Posterior Lip, Right Syndesmosis Stabilization:   Right trimalleolar ankle fracture dislocation status post open reduction internal fixation with Dr. Castro on 9/26/24.   Patient tolerated procedure well.  Patient initially presented to [facility] on 9/13/24 with an Right Ankle Fracture.    Surgery was on 9/26/24.  Patient should also elevate her leg.   Continue routine acetaminophen 975 mg PO TID,  ibuprofen PRN and oxycodone PRN for pain.   Restrictions.   Continue weightbearing to her tolerance in her right lower extremity in a walking boot.   Orthopedics ordered a formal referral to physical therapy to begin ankle range of motion.   The patient may steadily transition out of the walking boot as she is able.   Follow up with ortho as recommended, appt.-- 2/25/25 will need three-view x-ray weightbearing right ankle at that time.    Vitamin B 12 deficiency:  Continue vitamin B 12 100 mcg PO every day.  Monitor vitamin B 12 level.    Hypokalemia:  Start potassium chloride 20 meq Po every day for 1 week.  Recheck potassium level.     Asthma; allergic rhinitis:   Continue Advair and fluticasone nasal spray.    IBS; abdominal cramps:  Monitor for symptoms.  Continue dicyclomine routinely.  Continue immodium PRN and Pepto-Bismol PRN.  Continue lactobacillus routinely.     Generalized anxiety disorder;   major depressive disorder; insomnia:  Continue sertraline and trazodone.    Impaired gait and mobility; Generalized muscle weakness:  Plans are for pt. To start weightbearing to her tolerance in her right lower extremity in a walking boot.   Orthopedics ordered a formal referral to physical therapy to begin ankle range of motion.   The patient may  steadily transition out of the walking boot as she is able.   Needs to follow-up with orthopedic doctor Dr. Castro in approximately 2 months.   Patient at the [facility] for skilled services/PT/OT.     Hemiplegia and hemiparesis following cerebral infarction affecting right dominant side;  Continue to work with therapy.         Problem List Items Addressed This Visit       Allergic rhinitis    Closed displaced trimalleolar fracture of right lower leg - Primary    Insomnia    Ankle pain     Other Visit Diagnoses       S/P ORIF (open reduction internal fixation) fracture        H/O reduction of closed fracture        Vitamin B 12 deficiency        Hypokalemia        Asthma, unspecified asthma severity, unspecified whether complicated, unspecified whether persistent ([facility])        Irritable bowel syndrome, unspecified type        Abdominal cramps        Generalized anxiety disorder        Major depressive disorder with current active episode, unspecified depression episode severity, unspecified whether recurrent        Impaired gait and mobility        Generalized muscle weakness        Hemiplegia and hemiparesis following cerebral infarction affecting right dominant side (Multi)                    NADEEM Clement       Electronically Signed By: NADEEM Clement   1/9/25  9:00 PM

## 2025-01-09 VITALS
DIASTOLIC BLOOD PRESSURE: 77 MMHG | OXYGEN SATURATION: 98 % | SYSTOLIC BLOOD PRESSURE: 124 MMHG | TEMPERATURE: 98.1 F | HEIGHT: 66 IN | BODY MASS INDEX: 37.67 KG/M2 | RESPIRATION RATE: 18 BRPM | WEIGHT: 234.4 LBS | HEART RATE: 81 BPM

## 2025-01-10 NOTE — PROGRESS NOTES
Name: Windy Lovelace    YOB: 1990    Code Status: FULL CODE    Chief Complaint:  Follow up on Closed displaced trimalleolar fracture of right ankle, sequela; etc......       HPI   34 year old female with past medical history of stroke with current residual effects;  Migraine headache;   Moyamoya disease; Esophageal reflux;   Exotropia of right eye; History of neurodevelopmental disorder;   Obesity (BMI 30-39.9); and Hiatal hernia      Patient was admitted to [facility] from 9/13/24 to 9/19/24 (6 days)    Patient initially admitted to the [facility] on 9/19/24 for skilled services.    Patient readmitted to [facility] for surgery from 9/26/24 to 9/28/24.    Patient readmitted to the [facility] on 9/28/24 for skilled services.     Diagnoses as follows:    Closed displaced trimalleolar fracture of right ankle, sequela;   Right ankle fracture (right trimalleolar ankle fracture dislocation)   S/p closed reduction with concentric reduction; Right ankle pain;  Right Ankle ORIF Tri-Malleolar Fracture without Fixation of the Posterior Lip, Right Syndesmosis Stabilization:   Right trimalleolar ankle fracture dislocation status post open reduction internal fixation with Dr. Castro on 9/26/24.   Patient tolerated procedure well.  Patient initially presented to [facility] on 9/13/24 with an Right Ankle Fracture.    No acute orthopaedic surgical intervention was initially indicated.   Surgery was on 9/26/24.  Post op recommendations after surgery were nonweightbearing   right lower extremity short leg splint.   Patient should also elevate her leg.   Aspirin 81 mg twice daily starting postop day 1 for DVT prophylaxis to be continued for 4 weeks.  Patient has had a few follow-up appointments with orthopedics Dr. Castro on 10/10/24,  11/26/24, and 12/24/24 at [facility].   On routine acetaminophen 975 mg PO TID,  ibuprofen PRN and oxycodone PRN for pain.   On 12/24/24 visit with orthopedic doctor her  fiberglass cast was taken off.   Doctor commented that she was noncompliant with weightbearing  Restrictions.   Patient has been weightbearing to her tolerance in her right lower extremity in a walking boot.   Orthopedics ordered a formal referral to physical therapy to begin ankle range of motion.   The patient may steadily transition out of the walking boot as she is able.   She is at risk of posttraumatic osteoarthritis.   In 2 months patient needs follow up with ortho and three-view weightbearing right ankle.   Patient has a follow up appt. With ortho scheduled on 2/25/25.   Her oxycodone Rx was renewed last week.  Patient has been taking the oxycodone PRN and it is sufficient pain control.   Patient seen today, she is in bed.  Cooperative.  Mentation at baseline.   No complaints of leg pain today.    Vitamin B 12 deficiency:  Patient states she has vitamin B 12 deficiency.  She has had it for a long time.  She had reported she is supposed to be on Vitamin B 12 injections about a month ago.  At that time we discussed she will be started on oral vitamin B 12 100 mcg PO every day.  On 12/26/24 vitamin B 12 level was 336 wnl.    Hypokalemia:  On 12/26/24 potassium level was 3.4 L  Not on potassium chloride.     Asthma; allergic rhinitis:   On Advair and fluticasone nasal spray.  No reports cough or SOB today.  On fluticasone for allergic rhinitis.   No complaints of runny or stuffy nose.  No headaches or dizziness.    IBS; abdominal cramps:  IBS symptoms and abdominal cramps have improved.  On dicyclomine routinely.  Also on immodium PRN and Pepto-Bismol PRN.  Also on lactobacillus routinely.   GI consult ordered--appt. Needs to be scheduled.   No fevers reported.  No nausea or vomiting.     Generalized anxiety disorder;   major depressive disorder; insomnia:  On sertraline and trazodone.  No complaints of insomnia.     Impaired gait and mobility; Generalized muscle weakness :  Plans are for pt. To start  weightbearing to her tolerance in her right lower extremity in a walking boot.   Orthopedics ordered a formal referral to physical therapy to begin ankle range of motion.   The patient may steadily transition out of the walking boot as she is able.   Needs to follow-up  with orthopedic doctor Dr. Castro in approximately 2 months.   Patient at the [facility] services/PT/OT.     Hemiplegia and hemiparesis following cerebral infarction affecting right dominant side;  History of strokes:  At the [facility] skilled services.                              Reviewed  EMR  Reviewed medical, social, surgical and family history.  Reviewed all current medications and performed medication reconciliation.  Performed prescription drug management.  Reviewed vital signs AND lab results  Reviewed [facility] Documentation  Discussed patient with nursing.                        ROS: 10 point ROS performed; Negative unless noted in HPI.      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), Breast pain (03/21/2024), Candidiasis of skin and nail (06/25/2014), Cellulitis of face (10/10/2013), Headache, unspecified (04/29/2015), Hypomagnesemia (01/23/2014), Other cysts of oral region, not elsewhere classified (06/10/2016), Other enthesopathies, not elsewhere classified (10/29/2015), Other specified abnormal findings of blood chemistry (03/06/2020), Other specified joint disorders, left ankle and foot (11/02/2016), Other specified noninflammatory disorders of vagina (03/20/2018), Pain in right knee (11/12/2018), Pain in right knee (02/19/2018), Pain in right knee (02/20/2019), Pain, unspecified (10/17/2017), Pelvic and perineal pain (01/24/2018), Personal history of diseases of the skin and subcutaneous  tissue (02/24/2016), Personal history of other diseases of the digestive system (10/06/2017), Personal history of other diseases of the digestive system (03/17/2015), Personal history of other diseases of the digestive system (05/24/2016), Personal history of other diseases of the female genital tract (08/31/2015), Personal history of other diseases of the musculoskeletal system and connective tissue (02/04/2014), Personal history of other diseases of the nervous system and sense organs (01/05/2015), Personal history of other diseases of the respiratory system (08/29/2013), Personal history of other diseases of the respiratory system (02/03/2017), Personal history of other drug therapy (12/11/2017), Personal history of other endocrine, nutritional and metabolic disease (05/09/2019), Personal history of other mental and behavioral disorders (10/06/2017), Personal history of transient ischemic attack (TIA), and cerebral infarction without residual deficits (10/06/2017), Pleurodynia (01/23/2014), Pyuria (06/13/2017), Sprain of medial collateral ligament of right knee, initial encounter (11/02/2016), Sprain of unspecified ligament of left ankle, subsequent encounter (10/29/2015), Strain of muscle and tendon of unspecified wall of thorax, initial encounter (02/04/2014), Syncope and collapse (06/13/2017), Unspecified fall, initial encounter (01/23/2014), and Unspecified symptoms and signs involving the genitourinary system (01/24/2018).    SURGICAL HISTORY:   MR head angio w IV contrast (8/18/2015);   US guided percutaneous peritoneal or retroperitoneal fluid collection drainage (4/10/2019);   and US guided abscess drain (4/10/2019).     SOCIAL HISTORY:  She reports that she has never smoked.   She has never used smokeless tobacco.   She reports that she does not drink alcohol and does not use drugs.    Family History   Problem Relation Name Age of Onset    Kidney cancer Mother        Liver cancer Mother        Pancreatic  cancer Father        Glaucoma Other grandmother      Diabetes Other Grandfather           other type with arthropathy, with long term curent use of insulin    Diabetes Other aunt         ALLERGIES:  Bupropion  Ketamine  Augmentin [amoxicillin-pot clavulanate]  Penicillin g,   Penicillins    LABS:  CBC: Date: 10/9/2024 WBC 9.1 Hgb 12.6 hematocrit 39.8 platelets 319    BMP: Date: 10/15/2024 Na 142 K 4 Cr 0.7 BUN 10 glucose 69 Ca 8 GFR 95  CBC: Date: 10/15/2024 WBC 7.7 Hgb 12.2 hematocrit 37.1 platelets 202  Liver function: Date: 10/15/2024 ALT 36 AST 35 alkaline phos.  71 bilirubin 0.4 albumin 3.5 protein 5.9    BMP: Date: 11/6/2024 Na 141 K 3.7 Cr 0.7 BUN 7 glucose 64 Ca 8.4 GFR 95  CBC: Date: 11/6/2024 WBC 6.7 Hgb 13.1 hematocrit 39.9 platelets 244  Liver function: Date: 11/6/2024 ALT 50 AST 42 alkaline phos.  72 bilirubin 0.5 albumin 3.5 protein 5.9      BMP: Date: 11/21/2024 Na 139 K 3.6 Cr 0.6 BUN 8 glucose 81 Ca 9.2   CBC: Date: 11/21/2024 WBC 8.9 Hgb 14.8 hematocrit 43.7 platelets 287    BMP: Date: 12/4/2024 Na 141 K 3.5 Cr 0.8 BUN 11 glucose 81 Ca 9 GFR 81  CBC: Date: 12/4/2024 WBC 8 Hgb 14.8 hematocrit 45.4 platelets 268    BMP: Date: 12/26/2024 Na 140 K 3.4 Cr 0.7 BUN 10 glucose 86 Ca 8.4 GFR 95  CBC: Date: 12/26/2024 WBC 5.9 Hgb 14 hematocrit 43.9 platelets 240  Liver function: Date: 12/26/2024 ALT 42 AST 32 alkaline phos.  76 bilirubin 0.4 albumin 3.7 protein 6    12/26/2024 vitamin B12 336 (range 211-911)             Lab Results   Component Value Date    WBC 10.1 09/28/2024    HGB 11.9 (L) 09/28/2024    HCT 36.5 09/28/2024     09/28/2024    CHOL 152 05/09/2024    TRIG 178 (H) 05/09/2024    HDL 33.6 05/09/2024    ALT 51 (H) 09/14/2024    AST 18 09/14/2024     09/27/2024    K 3.8 09/27/2024     09/27/2024    CREATININE 0.65 09/27/2024    BUN 7 09/27/2024    CO2 22 09/27/2024    TSH 5.14 (H) 05/09/2024    INR 1.0 09/13/2024             /77   Pulse 81   Temp 36.7 °C (98.1 °F)  "  Resp 18   Ht 1.676 m (5' 6\")   Wt 106 kg (234 lb 6.4 oz)   SpO2 98%   BMI 37.83 kg/m²      Physical Exam  Vitals and nursing note reviewed.   Constitutional:       Appearance: She is obese.   HENT:      Head: Normocephalic.      Right Ear: External ear normal.      Left Ear: External ear normal.      Nose: Nose normal.      Mouth/Throat:      Mouth: Mucous membranes are moist.      Pharynx: Oropharynx is clear.   Eyes:      Extraocular Movements: Extraocular movements intact.      Conjunctiva/sclera: Conjunctivae normal.      Pupils: Pupils are equal, round, and reactive to light.   Cardiovascular:      Rate and Rhythm: Normal rate and regular rhythm.      Pulses: Normal pulses.      Heart sounds: Normal heart sounds.   Pulmonary:      Effort: Pulmonary effort is normal.      Breath sounds: Normal breath sounds.   Abdominal:      General: Bowel sounds are normal.      Palpations: Abdomen is soft.   Musculoskeletal:         General: Normal range of motion.      Cervical back: Normal range of motion and neck supple.      Comments: Right lower extremity--surgical incision, edges well approximated no erythema or purulent drainage.    Adequate movement and sensation in toes.  Cap refill < 3 seconds.   Feet:      Comments: Bilateral feet--adequate movement and sensation;  Cap refill < 3 seconds.   Skin:     General: Skin is warm and dry.      Capillary Refill: Capillary refill takes less than 2 seconds.   Neurological:      General: No focal deficit present.      Mental Status: She is alert and oriented to person, place, and time. Mental status is at baseline.      Motor: Weakness present.      Gait: Gait abnormal.   Psychiatric:         Mood and Affect: Mood is anxious and depressed.         Behavior: Behavior normal. Behavior is cooperative.          Assessment/Plan      Ordered BMP, CBC with diff., vitamin B 12 level for Thursday.    Closed displaced trimalleolar fracture of right ankle, sequela;   Right ankle " fracture (right trimalleolar ankle fracture dislocation)   S/p closed reduction with concentric reduction; Right ankle pain;  Right Ankle ORIF Tri-Malleolar Fracture without Fixation of the Posterior Lip, Right Syndesmosis Stabilization:   Right trimalleolar ankle fracture dislocation status post open reduction internal fixation with Dr. Castro on 9/26/24.   Patient tolerated procedure well.  Patient initially presented to [facility] on 9/13/24 with an Right Ankle Fracture.    Surgery was on 9/26/24.  Patient should also elevate her leg.   Continue routine acetaminophen 975 mg PO TID,  ibuprofen PRN and oxycodone PRN for pain.   Restrictions.   Continue weightbearing to her tolerance in her right lower extremity in a walking boot.   Orthopedics ordered a formal referral to physical therapy to begin ankle range of motion.   The patient may steadily transition out of the walking boot as she is able.   Follow up with ortho as recommended, appt.-- 2/25/25 will need three-view x-ray weightbearing right ankle at that time.    Vitamin B 12 deficiency:  Continue vitamin B 12 100 mcg PO every day.  Monitor vitamin B 12 level.    Hypokalemia:  Start potassium chloride 20 meq Po every day for 1 week.  Recheck potassium level.     Asthma; allergic rhinitis:   Continue Advair and fluticasone nasal spray.    IBS; abdominal cramps:  Monitor for symptoms.  Continue dicyclomine routinely.  Continue immodium PRN and Pepto-Bismol PRN.  Continue lactobacillus routinely.     Generalized anxiety disorder;   major depressive disorder; insomnia:  Continue sertraline and trazodone.    Impaired gait and mobility; Generalized muscle weakness:  Plans are for pt. To start weightbearing to her tolerance in her right lower extremity in a walking boot.   Orthopedics ordered a formal referral to physical therapy to begin ankle range of motion.   The patient may steadily transition out of the walking boot as she is able.   Needs to follow-up with  orthopedic doctor Dr. Castro in approximately 2 months.   Patient at the [facility] for skilled services/PT/OT.     Hemiplegia and hemiparesis following cerebral infarction affecting right dominant side;  Continue to work with therapy.         Problem List Items Addressed This Visit       Allergic rhinitis    Closed displaced trimalleolar fracture of right lower leg - Primary    Insomnia    Ankle pain     Other Visit Diagnoses       S/P ORIF (open reduction internal fixation) fracture        H/O reduction of closed fracture        Vitamin B 12 deficiency        Hypokalemia        Asthma, unspecified asthma severity, unspecified whether complicated, unspecified whether persistent ([facility])        Irritable bowel syndrome, unspecified type        Abdominal cramps        Generalized anxiety disorder        Major depressive disorder with current active episode, unspecified depression episode severity, unspecified whether recurrent        Impaired gait and mobility        Generalized muscle weakness        Hemiplegia and hemiparesis following cerebral infarction affecting right dominant side (Multi)                    Paola Kessler, APRN-CNP

## 2025-01-27 ENCOUNTER — NURSING HOME VISIT (OUTPATIENT)
Dept: POST ACUTE CARE | Facility: EXTERNAL LOCATION | Age: 35
End: 2025-01-27
Payer: MEDICARE

## 2025-01-27 DIAGNOSIS — N39.0 UTI DUE TO KLEBSIELLA SPECIES: ICD-10-CM

## 2025-01-27 DIAGNOSIS — I69.351 HEMIPLEGIA AND HEMIPARESIS FOLLOWING CEREBRAL INFARCTION AFFECTING RIGHT DOMINANT SIDE (MULTI): ICD-10-CM

## 2025-01-27 DIAGNOSIS — B96.89 UTI DUE TO KLEBSIELLA SPECIES: ICD-10-CM

## 2025-01-27 DIAGNOSIS — E53.8 VITAMIN B 12 DEFICIENCY: ICD-10-CM

## 2025-01-27 DIAGNOSIS — Z98.890 S/P ORIF (OPEN REDUCTION INTERNAL FIXATION) FRACTURE: ICD-10-CM

## 2025-01-27 DIAGNOSIS — N39.0 ENTEROCOCCUS UTI: ICD-10-CM

## 2025-01-27 DIAGNOSIS — B95.2 ENTEROCOCCUS UTI: ICD-10-CM

## 2025-01-27 DIAGNOSIS — M25.571 RIGHT ANKLE PAIN, UNSPECIFIED CHRONICITY: ICD-10-CM

## 2025-01-27 DIAGNOSIS — Z87.81 H/O REDUCTION OF CLOSED FRACTURE: ICD-10-CM

## 2025-01-27 DIAGNOSIS — N39.0 URINARY TRACT INFECTION WITHOUT HEMATURIA, SITE UNSPECIFIED: Primary | ICD-10-CM

## 2025-01-27 DIAGNOSIS — Z87.81 S/P ORIF (OPEN REDUCTION INTERNAL FIXATION) FRACTURE: ICD-10-CM

## 2025-01-27 DIAGNOSIS — M62.81 GENERALIZED MUSCLE WEAKNESS: ICD-10-CM

## 2025-01-27 DIAGNOSIS — J45.909 ASTHMA, UNSPECIFIED ASTHMA SEVERITY, UNSPECIFIED WHETHER COMPLICATED, UNSPECIFIED WHETHER PERSISTENT (HHS-HCC): ICD-10-CM

## 2025-01-27 DIAGNOSIS — R26.89 IMPAIRED GAIT AND MOBILITY: ICD-10-CM

## 2025-01-27 DIAGNOSIS — F41.1 GENERALIZED ANXIETY DISORDER: ICD-10-CM

## 2025-01-27 DIAGNOSIS — S82.851S CLOSED DISPLACED TRIMALLEOLAR FRACTURE OF RIGHT ANKLE, SEQUELA: ICD-10-CM

## 2025-01-27 DIAGNOSIS — J30.9 ALLERGIC RHINITIS, UNSPECIFIED SEASONALITY, UNSPECIFIED TRIGGER: ICD-10-CM

## 2025-01-27 PROCEDURE — 99309 SBSQ NF CARE MODERATE MDM 30: CPT | Performed by: NURSE PRACTITIONER

## 2025-01-27 NOTE — LETTER
Patient: Windy Lovelace  : 1990    Encounter Date: 2025    Name: Windy Lovelace    YOB: 1990    Code Status: FULL CODE    Chief Complaint:  UTI; etc......       HPI   34 year old female with past medical history of stroke with current residual effects;  Migraine headache;   Moyamoya disease; Esophageal reflux;   Exotropia of right eye; History of neurodevelopmental disorder;   Obesity (BMI 30-39.9); and Hiatal hernia      Patient was admitted to [facility] from 24 to 24 (6 days)    Patient initially admitted to the [facility] on 24 for skilled services.    Patient readmitted to [facility] for surgery from 24 to 24.    Patient readmitted to the [facility] on 24 for skilled services.     Diagnoses as follows:    UTI:  Patient had a UA C & S performed and it was positive for UTI.  Results reported on 25.  It had mixed organisms majority enterococcus faecalis and klebsiella oxtoca, pneumoniae.   Recommended antibiotic was ciprofloxacin.   Ciprofloxacin ordered for 10 days--end date 2/3/25.   Patient seen today.  She denies s/s of dysuria.  No fevers reported.   Reminded patient to drink more water.   No chest pain reported.  No headaches or dizziness.    Closed displaced trimalleolar fracture of right ankle, sequela;   Right ankle fracture (right trimalleolar ankle fracture dislocation)   S/p closed reduction with concentric reduction; Right ankle pain;  Right Ankle ORIF Tri-Malleolar Fracture without Fixation of the Posterior Lip, Right Syndesmosis Stabilization:   Right trimalleolar ankle fracture dislocation status post open reduction internal fixation with Dr. Castro on 24.   Patient tolerated procedure well.  Patient initially presented to [facility] on 24 with an Right Ankle Fracture.    No acute orthopaedic surgical intervention was initially indicated.   Surgery was on 24.  Post op recommendations after surgery  were nonweightbearing   right lower extremity short leg splint.   Patient should also elevate her leg.   Aspirin 81 mg twice daily starting postop day 1 for DVT prophylaxis to be continued for 4 weeks.  Patient has had a few follow-up appointments with orthopedics Dr. Castro on 10/10/24,  11/26/24, and 12/24/24 at [facility].   On routine acetaminophen 975 mg PO TID,  ibuprofen PRN and oxycodone PRN for pain.   On 12/24/24 visit with orthopedic doctor her fiberglass cast was taken off.   Doctor commented that she was noncompliant with weightbearing  Restrictions.   Patient has been weightbearing to her tolerance in her right lower extremity in a walking boot.   Orthopedics ordered a formal referral to physical therapy to begin ankle range of motion.   The patient may steadily transition out of the walking boot as she is able.   She is at risk of posttraumatic osteoarthritis.   Patient needs follow up with ortho and three-view x-ray weightbearing right ankle.   Patient has a follow up appt. With ortho scheduled on 2/25/25.   Patient has been taking the oxycodone PRN med only occasionally she reports.    Asthma; allergic rhinitis:   On Advair and fluticasone nasal spray.  No reports cough or SOB today.  On fluticasone for allergic rhinitis.   No complaints of runny or stuffy nose.  No headaches or dizziness.    Vitamin B 12 deficiency:  Patient states she has vitamin B 12 deficiency.  She has had it for a long time.  She had reported she is supposed to be on Vitamin B 12 injections about a month ago.  At that time we discussed she will be started on oral vitamin B 12 100 mcg PO every day.  On 12/26/24 vitamin B 12 level was 336 wnl.    Generalized anxiety disorder;   major depressive disorder; insomnia:  On sertraline and trazodone.  No complaints of insomnia.     Impaired gait and mobility; Generalized muscle weakness :  Plans are for pt. To start weightbearing to her tolerance in her right lower extremity in a walking  boot.   Orthopedics ordered a formal referral to physical therapy to begin ankle range of motion.   The patient may steadily transition out of the walking boot as she is able.   Needs to follow-up  with orthopedic doctor Dr. Castro in approximately 2 months.   Patient at the [facility] skilled services/PT/OT.     Hemiplegia and hemiparesis following cerebral infarction affecting right dominant side;  History of strokes:  At the [facility] services.                              Reviewed  EMR  Reviewed medical, social, surgical and family history.  Reviewed all current medications and performed medication reconciliation.  Performed prescription drug management.  Reviewed vital signs AND lab results  Reviewed [facility] Documentation  Discussed patient with nursing.                        ROS: 10 point ROS performed; Negative unless noted in HPI.      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), Breast pain (03/21/2024), Candidiasis of skin and nail (06/25/2014), Cellulitis of face (10/10/2013), Headache, unspecified (04/29/2015), Hypomagnesemia (01/23/2014), Other cysts of oral region, not elsewhere classified (06/10/2016), Other enthesopathies, not elsewhere classified (10/29/2015), Other specified abnormal findings of blood chemistry (03/06/2020), Other specified joint disorders, left ankle and foot (11/02/2016), Other specified noninflammatory disorders of vagina (03/20/2018), Pain in right knee (11/12/2018), Pain in right knee (02/19/2018), Pain in right knee (02/20/2019), Pain, unspecified (10/17/2017), Pelvic and perineal pain (01/24/2018), Personal history of diseases of the skin and subcutaneous tissue (02/24/2016), Personal history of other diseases of the digestive  system (10/06/2017), Personal history of other diseases of the digestive system (03/17/2015), Personal history of other diseases of the digestive system (05/24/2016), Personal history of other diseases of the female genital tract (08/31/2015), Personal history of other diseases of the musculoskeletal system and connective tissue (02/04/2014), Personal history of other diseases of the nervous system and sense organs (01/05/2015), Personal history of other diseases of the respiratory system (08/29/2013), Personal history of other diseases of the respiratory system (02/03/2017), Personal history of other drug therapy (12/11/2017), Personal history of other endocrine, nutritional and metabolic disease (05/09/2019), Personal history of other mental and behavioral disorders (10/06/2017), Personal history of transient ischemic attack (TIA), and cerebral infarction without residual deficits (10/06/2017), Pleurodynia (01/23/2014), Pyuria (06/13/2017), Sprain of medial collateral ligament of right knee, initial encounter (11/02/2016), Sprain of unspecified ligament of left ankle, subsequent encounter (10/29/2015), Strain of muscle and tendon of unspecified wall of thorax, initial encounter (02/04/2014), Syncope and collapse (06/13/2017), Unspecified fall, initial encounter (01/23/2014), and Unspecified symptoms and signs involving the genitourinary system (01/24/2018).    SURGICAL HISTORY:   MR head angio w IV contrast (8/18/2015);   US guided percutaneous peritoneal or retroperitoneal fluid collection drainage (4/10/2019);   and US guided abscess drain (4/10/2019).     SOCIAL HISTORY:  She reports that she has never smoked.   She has never used smokeless tobacco.   She reports that she does not drink alcohol and does not use drugs.    Family History   Problem Relation Name Age of Onset   • Kidney cancer Mother       • Liver cancer Mother       • Pancreatic cancer Father       • Glaucoma Other grandmother     • Diabetes Other  "Grandfather           other type with arthropathy, with long term curent use of insulin   • Diabetes Other aunt         ALLERGIES:  Bupropion  Ketamine  Augmentin [amoxicillin-pot clavulanate]  Penicillin g,   Penicillins    LABS:  CBC: Date: 10/9/2024 WBC 9.1 Hgb 12.6 hematocrit 39.8 platelets 319    BMP: Date: 10/15/2024 Na 142 K 4 Cr 0.7 BUN 10 glucose 69 Ca 8 GFR 95  CBC: Date: 10/15/2024 WBC 7.7 Hgb 12.2 hematocrit 37.1 platelets 202  Liver function: Date: 10/15/2024 ALT 36 AST 35 alkaline phos.  71 bilirubin 0.4 albumin 3.5 protein 5.9    BMP: Date: 11/6/2024 Na 141 K 3.7 Cr 0.7 BUN 7 glucose 64 Ca 8.4 GFR 95  CBC: Date: 11/6/2024 WBC 6.7 Hgb 13.1 hematocrit 39.9 platelets 244  Liver function: Date: 11/6/2024 ALT 50 AST 42 alkaline phos.  72 bilirubin 0.5 albumin 3.5 protein 5.9      BMP: Date: 11/21/2024 Na 139 K 3.6 Cr 0.6 BUN 8 glucose 81 Ca 9.2   CBC: Date: 11/21/2024 WBC 8.9 Hgb 14.8 hematocrit 43.7 platelets 287    BMP: Date: 12/4/2024 Na 141 K 3.5 Cr 0.8 BUN 11 glucose 81 Ca 9 GFR 81  CBC: Date: 12/4/2024 WBC 8 Hgb 14.8 hematocrit 45.4 platelets 268    BMP: Date: 12/26/2024 Na 140 K 3.4 Cr 0.7 BUN 10 glucose 86 Ca 8.4 GFR 95  CBC: Date: 12/26/2024 WBC 5.9 Hgb 14 hematocrit 43.9 platelets 240  Liver function: Date: 12/26/2024 ALT 42 AST 32 alkaline phos.  76 bilirubin 0.4 albumin 3.7 protein 6    12/26/2024 vitamin B12 336 (range 211-911)             Lab Results   Component Value Date    WBC 10.1 09/28/2024    HGB 11.9 (L) 09/28/2024    HCT 36.5 09/28/2024     09/28/2024    CHOL 152 05/09/2024    TRIG 178 (H) 05/09/2024    HDL 33.6 05/09/2024    ALT 51 (H) 09/14/2024    AST 18 09/14/2024     09/27/2024    K 3.8 09/27/2024     09/27/2024    CREATININE 0.65 09/27/2024    BUN 7 09/27/2024    CO2 22 09/27/2024    TSH 5.14 (H) 05/09/2024    INR 1.0 09/13/2024             /80   Pulse 85   Temp 36.3 °C (97.4 °F)   Resp 20   Ht 1.676 m (5' 6\")   Wt 101 kg (222 lb 6.4 oz)   " SpO2 98%   BMI 35.90 kg/m²      Physical Exam  Vitals and nursing note reviewed.   Constitutional:       Appearance: She is obese.   HENT:      Head: Normocephalic.      Right Ear: External ear normal.      Left Ear: External ear normal.      Nose: Nose normal.      Mouth/Throat:      Mouth: Mucous membranes are moist.      Pharynx: Oropharynx is clear.   Eyes:      Extraocular Movements: Extraocular movements intact.      Conjunctiva/sclera: Conjunctivae normal.      Pupils: Pupils are equal, round, and reactive to light.   Cardiovascular:      Rate and Rhythm: Normal rate and regular rhythm.      Pulses: Normal pulses.      Heart sounds: Normal heart sounds.   Pulmonary:      Effort: Pulmonary effort is normal.      Breath sounds: Normal breath sounds.   Abdominal:      General: Bowel sounds are normal.      Palpations: Abdomen is soft.   Musculoskeletal:         General: Normal range of motion.      Cervical back: Normal range of motion and neck supple.      Comments: Right lower extremity--surgical incision, edges well approximated no erythema or purulent drainage.    Adequate movement and sensation in toes.  Cap refill < 3 seconds.   Feet:      Comments: Bilateral feet--adequate movement and sensation;  Cap refill < 3 seconds.   Skin:     General: Skin is warm and dry.      Capillary Refill: Capillary refill takes less than 2 seconds.   Neurological:      General: No focal deficit present.      Mental Status: She is alert and oriented to person, place, and time. Mental status is at baseline.      Motor: Weakness present.      Gait: Gait abnormal.   Psychiatric:         Mood and Affect: Mood is anxious and depressed.         Behavior: Behavior normal. Behavior is cooperative.          Assessment/Plan     UTI:  Positive for mixed organisms majority enterococcus faecalis and klebsiella oxtoca, pneumoniae.  Ciprofloxacin ordered for 10 days--end date 2/3/25.   Encourage PO fluids --water.    Closed displaced  trimalleolar fracture of right ankle, sequela;   Right ankle fracture (right trimalleolar ankle fracture dislocation)   S/p closed reduction with concentric reduction; Right ankle pain;  Right Ankle ORIF Tri-Malleolar Fracture without Fixation of the Posterior Lip, Right Syndesmosis Stabilization:   Right trimalleolar ankle fracture dislocation status post open reduction internal fixation with Dr. Castro on 9/26/24.   Patient tolerated procedure well.  Patient initially presented to [facility] on 9/13/24 with an Right Ankle Fracture.    Surgery was on 9/26/24.  Patient should also elevate her leg.   Continue routine acetaminophen 975 mg PO TID,  ibuprofen PRN and oxycodone PRN for pain.   Restrictions.   Continue weightbearing to her tolerance in her right lower extremity in a walking boot.   Orthopedics ordered a formal referral to physical therapy to begin ankle range of motion.   The patient may steadily transition out of the walking boot as she is able.   Follow up with ortho as recommended, appt.-- 2/25/25 will need three-view x-ray weightbearing right ankle at that time.    Asthma; allergic rhinitis:   Continue Advair and fluticasone nasal spray.    Vitamin B 12 deficiency:  Continue vitamin B 12 100 mcg PO every day.  Monitor vitamin B 12 level.    Generalized anxiety disorder;   major depressive disorder; insomnia:  Continue sertraline and trazodone.    Impaired gait and mobility; Generalized muscle weakness:  Plans are for pt. To start weightbearing to her tolerance in her right lower extremity in a walking boot.   Orthopedics ordered a formal referral to physical therapy to begin ankle range of motion.   The patient may steadily transition out of the walking boot as she is able.   Needs to follow-up with orthopedic doctor Dr. Castro in approximately 2 months.   Patient at the [facility] for skilled services/PT/OT.     Hemiplegia and hemiparesis following cerebral infarction affecting right dominant  side;  Continue to work with therapy.         Problem List Items Addressed This Visit       Allergic rhinitis    Closed displaced trimalleolar fracture of right lower leg    Ankle pain     Other Visit Diagnoses       Urinary tract infection without hematuria, site unspecified    -  Primary    Enterococcus UTI        UTI due to Klebsiella species        S/P ORIF (open reduction internal fixation) fracture        H/O reduction of closed fracture        Asthma, unspecified asthma severity, unspecified whether complicated, unspecified whether persistent ([facility])        Vitamin B 12 deficiency        Generalized anxiety disorder        Impaired gait and mobility        Generalized muscle weakness        Hemiplegia and hemiparesis following cerebral infarction affecting right dominant side (Multi)                      NADEEM Clement       Electronically Signed By: NADEEM Clement   1/29/25  7:31 PM

## 2025-01-29 VITALS
TEMPERATURE: 97.4 F | DIASTOLIC BLOOD PRESSURE: 80 MMHG | BODY MASS INDEX: 35.74 KG/M2 | WEIGHT: 222.4 LBS | HEART RATE: 85 BPM | HEIGHT: 66 IN | RESPIRATION RATE: 20 BRPM | SYSTOLIC BLOOD PRESSURE: 118 MMHG | OXYGEN SATURATION: 98 %

## 2025-01-29 NOTE — PROGRESS NOTES
Name: Windy Lovelace    YOB: 1990    Code Status: FULL CODE    Chief Complaint:  UTI; etc......       HPI   34 year old female with past medical history of stroke with current residual effects;  Migraine headache;   Moyamoya disease; Esophageal reflux;   Exotropia of right eye; History of neurodevelopmental disorder;   Obesity (BMI 30-39.9); and Hiatal hernia      Patient was admitted to [facility] from 9/13/24 to 9/19/24 (6 days)    Patient initially admitted to the [facility] on 9/19/24 for skilled services.    Patient readmitted to [facility] for surgery from 9/26/24 to 9/28/24.    Patient readmitted to the [facility] on 9/28/24 for skilled services.     Diagnoses as follows:    UTI:  Patient had a UA C & S performed and it was positive for UTI.  Results reported on 1/24/25.  It had mixed organisms majority enterococcus faecalis and klebsiella oxtoca, pneumoniae.   Recommended antibiotic was ciprofloxacin.   Ciprofloxacin ordered for 10 days--end date 2/3/25.   Patient seen today.  She denies s/s of dysuria.  No fevers reported.   Reminded patient to drink more water.   No chest pain reported.  No headaches or dizziness.    Closed displaced trimalleolar fracture of right ankle, sequela;   Right ankle fracture (right trimalleolar ankle fracture dislocation)   S/p closed reduction with concentric reduction; Right ankle pain;  Right Ankle ORIF Tri-Malleolar Fracture without Fixation of the Posterior Lip, Right Syndesmosis Stabilization:   Right trimalleolar ankle fracture dislocation status post open reduction internal fixation with Dr. Castro on 9/26/24.   Patient tolerated procedure well.  Patient initially presented to [facility] on 9/13/24 with an Right Ankle Fracture.    No acute orthopaedic surgical intervention was initially indicated.   Surgery was on 9/26/24.  Post op recommendations after surgery were nonweightbearing   right lower extremity short leg splint.   Patient should  also elevate her leg.   Aspirin 81 mg twice daily starting postop day 1 for DVT prophylaxis to be continued for 4 weeks.  Patient has had a few follow-up appointments with orthopedics Dr. Castro on 10/10/24,  11/26/24, and 12/24/24 at [facility].   On routine acetaminophen 975 mg PO TID,  ibuprofen PRN and oxycodone PRN for pain.   On 12/24/24 visit with orthopedic doctor her fiberglass cast was taken off.   Doctor commented that she was noncompliant with weightbearing  Restrictions.   Patient has been weightbearing to her tolerance in her right lower extremity in a walking boot.   Orthopedics ordered a formal referral to physical therapy to begin ankle range of motion.   The patient may steadily transition out of the walking boot as she is able.   She is at risk of posttraumatic osteoarthritis.   Patient needs follow up with ortho and three-view x-ray weightbearing right ankle.   Patient has a follow up appt. With ortho scheduled on 2/25/25.   Patient has been taking the oxycodone PRN med only occasionally she reports.    Asthma; allergic rhinitis:   On Advair and fluticasone nasal spray.  No reports cough or SOB today.  On fluticasone for allergic rhinitis.   No complaints of runny or stuffy nose.  No headaches or dizziness.    Vitamin B 12 deficiency:  Patient states she has vitamin B 12 deficiency.  She has had it for a long time.  She had reported she is supposed to be on Vitamin B 12 injections about a month ago.  At that time we discussed she will be started on oral vitamin B 12 100 mcg PO every day.  On 12/26/24 vitamin B 12 level was 336 wnl.    Generalized anxiety disorder;   major depressive disorder; insomnia:  On sertraline and trazodone.  No complaints of insomnia.     Impaired gait and mobility; Generalized muscle weakness :  Plans are for pt. To start weightbearing to her tolerance in her right lower extremity in a walking boot.   Orthopedics ordered a formal referral to physical therapy to begin ankle  range of motion.   The patient may steadily transition out of the walking boot as she is able.   Needs to follow-up  with orthopedic doctor Dr. Castro in approximately 2 months.   Patient at the [facility] services/PT/OT.     Hemiplegia and hemiparesis following cerebral infarction affecting right dominant side;  History of strokes:  At the [facility] services.                              Reviewed  EMR  Reviewed medical, social, surgical and family history.  Reviewed all current medications and performed medication reconciliation.  Performed prescription drug management.  Reviewed vital signs AND lab results  Reviewed [facility] Documentation  Discussed patient with nursing.                        ROS: 10 point ROS performed; Negative unless noted in HPI.      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), Breast pain (03/21/2024), Candidiasis of skin and nail (06/25/2014), Cellulitis of face (10/10/2013), Headache, unspecified (04/29/2015), Hypomagnesemia (01/23/2014), Other cysts of oral region, not elsewhere classified (06/10/2016), Other enthesopathies, not elsewhere classified (10/29/2015), Other specified abnormal findings of blood chemistry (03/06/2020), Other specified joint disorders, left ankle and foot (11/02/2016), Other specified noninflammatory disorders of vagina (03/20/2018), Pain in right knee (11/12/2018), Pain in right knee (02/19/2018), Pain in right knee (02/20/2019), Pain, unspecified (10/17/2017), Pelvic and perineal pain (01/24/2018), Personal history of diseases of the skin and subcutaneous tissue (02/24/2016), Personal history of other diseases of the digestive system (10/06/2017), Personal history of other diseases of the digestive system  (03/17/2015), Personal history of other diseases of the digestive system (05/24/2016), Personal history of other diseases of the female genital tract (08/31/2015), Personal history of other diseases of the musculoskeletal system and connective tissue (02/04/2014), Personal history of other diseases of the nervous system and sense organs (01/05/2015), Personal history of other diseases of the respiratory system (08/29/2013), Personal history of other diseases of the respiratory system (02/03/2017), Personal history of other drug therapy (12/11/2017), Personal history of other endocrine, nutritional and metabolic disease (05/09/2019), Personal history of other mental and behavioral disorders (10/06/2017), Personal history of transient ischemic attack (TIA), and cerebral infarction without residual deficits (10/06/2017), Pleurodynia (01/23/2014), Pyuria (06/13/2017), Sprain of medial collateral ligament of right knee, initial encounter (11/02/2016), Sprain of unspecified ligament of left ankle, subsequent encounter (10/29/2015), Strain of muscle and tendon of unspecified wall of thorax, initial encounter (02/04/2014), Syncope and collapse (06/13/2017), Unspecified fall, initial encounter (01/23/2014), and Unspecified symptoms and signs involving the genitourinary system (01/24/2018).    SURGICAL HISTORY:   MR head angio w IV contrast (8/18/2015);   US guided percutaneous peritoneal or retroperitoneal fluid collection drainage (4/10/2019);   and US guided abscess drain (4/10/2019).     SOCIAL HISTORY:  She reports that she has never smoked.   She has never used smokeless tobacco.   She reports that she does not drink alcohol and does not use drugs.    Family History   Problem Relation Name Age of Onset    Kidney cancer Mother        Liver cancer Mother        Pancreatic cancer Father        Glaucoma Other grandmother      Diabetes Other Grandfather           other type with arthropathy, with long term curent use of  "insulin    Diabetes Other aunt         ALLERGIES:  Bupropion  Ketamine  Augmentin [amoxicillin-pot clavulanate]  Penicillin g,   Penicillins    LABS:  CBC: Date: 10/9/2024 WBC 9.1 Hgb 12.6 hematocrit 39.8 platelets 319    BMP: Date: 10/15/2024 Na 142 K 4 Cr 0.7 BUN 10 glucose 69 Ca 8 GFR 95  CBC: Date: 10/15/2024 WBC 7.7 Hgb 12.2 hematocrit 37.1 platelets 202  Liver function: Date: 10/15/2024 ALT 36 AST 35 alkaline phos.  71 bilirubin 0.4 albumin 3.5 protein 5.9    BMP: Date: 11/6/2024 Na 141 K 3.7 Cr 0.7 BUN 7 glucose 64 Ca 8.4 GFR 95  CBC: Date: 11/6/2024 WBC 6.7 Hgb 13.1 hematocrit 39.9 platelets 244  Liver function: Date: 11/6/2024 ALT 50 AST 42 alkaline phos.  72 bilirubin 0.5 albumin 3.5 protein 5.9      BMP: Date: 11/21/2024 Na 139 K 3.6 Cr 0.6 BUN 8 glucose 81 Ca 9.2   CBC: Date: 11/21/2024 WBC 8.9 Hgb 14.8 hematocrit 43.7 platelets 287    BMP: Date: 12/4/2024 Na 141 K 3.5 Cr 0.8 BUN 11 glucose 81 Ca 9 GFR 81  CBC: Date: 12/4/2024 WBC 8 Hgb 14.8 hematocrit 45.4 platelets 268    BMP: Date: 12/26/2024 Na 140 K 3.4 Cr 0.7 BUN 10 glucose 86 Ca 8.4 GFR 95  CBC: Date: 12/26/2024 WBC 5.9 Hgb 14 hematocrit 43.9 platelets 240  Liver function: Date: 12/26/2024 ALT 42 AST 32 alkaline phos.  76 bilirubin 0.4 albumin 3.7 protein 6    12/26/2024 vitamin B12 336 (range 211-911)             Lab Results   Component Value Date    WBC 10.1 09/28/2024    HGB 11.9 (L) 09/28/2024    HCT 36.5 09/28/2024     09/28/2024    CHOL 152 05/09/2024    TRIG 178 (H) 05/09/2024    HDL 33.6 05/09/2024    ALT 51 (H) 09/14/2024    AST 18 09/14/2024     09/27/2024    K 3.8 09/27/2024     09/27/2024    CREATININE 0.65 09/27/2024    BUN 7 09/27/2024    CO2 22 09/27/2024    TSH 5.14 (H) 05/09/2024    INR 1.0 09/13/2024             /80   Pulse 85   Temp 36.3 °C (97.4 °F)   Resp 20   Ht 1.676 m (5' 6\")   Wt 101 kg (222 lb 6.4 oz)   SpO2 98%   BMI 35.90 kg/m²      Physical Exam  Vitals and nursing note reviewed. "   Constitutional:       Appearance: She is obese.   HENT:      Head: Normocephalic.      Right Ear: External ear normal.      Left Ear: External ear normal.      Nose: Nose normal.      Mouth/Throat:      Mouth: Mucous membranes are moist.      Pharynx: Oropharynx is clear.   Eyes:      Extraocular Movements: Extraocular movements intact.      Conjunctiva/sclera: Conjunctivae normal.      Pupils: Pupils are equal, round, and reactive to light.   Cardiovascular:      Rate and Rhythm: Normal rate and regular rhythm.      Pulses: Normal pulses.      Heart sounds: Normal heart sounds.   Pulmonary:      Effort: Pulmonary effort is normal.      Breath sounds: Normal breath sounds.   Abdominal:      General: Bowel sounds are normal.      Palpations: Abdomen is soft.   Musculoskeletal:         General: Normal range of motion.      Cervical back: Normal range of motion and neck supple.      Comments: Right lower extremity--surgical incision, edges well approximated no erythema or purulent drainage.    Adequate movement and sensation in toes.  Cap refill < 3 seconds.   Feet:      Comments: Bilateral feet--adequate movement and sensation;  Cap refill < 3 seconds.   Skin:     General: Skin is warm and dry.      Capillary Refill: Capillary refill takes less than 2 seconds.   Neurological:      General: No focal deficit present.      Mental Status: She is alert and oriented to person, place, and time. Mental status is at baseline.      Motor: Weakness present.      Gait: Gait abnormal.   Psychiatric:         Mood and Affect: Mood is anxious and depressed.         Behavior: Behavior normal. Behavior is cooperative.          Assessment/Plan      UTI:  Positive for mixed organisms majority enterococcus faecalis and klebsiella oxtoca, pneumoniae.  Ciprofloxacin ordered for 10 days--end date 2/3/25.   Encourage PO fluids --water.    Closed displaced trimalleolar fracture of right ankle, sequela;   Right ankle fracture (right  trimalleolar ankle fracture dislocation)   S/p closed reduction with concentric reduction; Right ankle pain;  Right Ankle ORIF Tri-Malleolar Fracture without Fixation of the Posterior Lip, Right Syndesmosis Stabilization:   Right trimalleolar ankle fracture dislocation status post open reduction internal fixation with Dr. Castro on 9/26/24.   Patient tolerated procedure well.  Patient initially presented to [facility] on 9/13/24 with an Right Ankle Fracture.    Surgery was on 9/26/24.  Patient should also elevate her leg.   Continue routine acetaminophen 975 mg PO TID,  ibuprofen PRN and oxycodone PRN for pain.   Restrictions.   Continue weightbearing to her tolerance in her right lower extremity in a walking boot.   Orthopedics ordered a formal referral to physical therapy to begin ankle range of motion.   The patient may steadily transition out of the walking boot as she is able.   Follow up with ortho as recommended, appt.-- 2/25/25 will need three-view x-ray weightbearing right ankle at that time.    Asthma; allergic rhinitis:   Continue Advair and fluticasone nasal spray.    Vitamin B 12 deficiency:  Continue vitamin B 12 100 mcg PO every day.  Monitor vitamin B 12 level.    Generalized anxiety disorder;   major depressive disorder; insomnia:  Continue sertraline and trazodone.    Impaired gait and mobility; Generalized muscle weakness:  Plans are for pt. To start weightbearing to her tolerance in her right lower extremity in a walking boot.   Orthopedics ordered a formal referral to physical therapy to begin ankle range of motion.   The patient may steadily transition out of the walking boot as she is able.   Needs to follow-up with orthopedic doctor Dr. Castro in approximately 2 months.   Patient at the [facility] for skilled services/PT/OT.     Hemiplegia and hemiparesis following cerebral infarction affecting right dominant side;  Continue to work with therapy.         Problem List Items Addressed  This Visit       Allergic rhinitis    Closed displaced trimalleolar fracture of right lower leg    Ankle pain     Other Visit Diagnoses       Urinary tract infection without hematuria, site unspecified    -  Primary    Enterococcus UTI        UTI due to Klebsiella species        S/P ORIF (open reduction internal fixation) fracture        H/O reduction of closed fracture        Asthma, unspecified asthma severity, unspecified whether complicated, unspecified whether persistent ([facility])        Vitamin B 12 deficiency        Generalized anxiety disorder        Impaired gait and mobility        Generalized muscle weakness        Hemiplegia and hemiparesis following cerebral infarction affecting right dominant side (Multi)                      Paola Kessler, APRN-CNP

## 2025-02-03 ENCOUNTER — NURSING HOME VISIT (OUTPATIENT)
Dept: POST ACUTE CARE | Facility: EXTERNAL LOCATION | Age: 35
End: 2025-02-03
Payer: MEDICARE

## 2025-02-03 DIAGNOSIS — M62.81 GENERALIZED MUSCLE WEAKNESS: ICD-10-CM

## 2025-02-03 DIAGNOSIS — I69.351 HEMIPLEGIA AND HEMIPARESIS FOLLOWING CEREBRAL INFARCTION AFFECTING RIGHT DOMINANT SIDE (MULTI): ICD-10-CM

## 2025-02-03 DIAGNOSIS — Z87.81 S/P ORIF (OPEN REDUCTION INTERNAL FIXATION) FRACTURE: ICD-10-CM

## 2025-02-03 DIAGNOSIS — M25.571 RIGHT ANKLE PAIN, UNSPECIFIED CHRONICITY: ICD-10-CM

## 2025-02-03 DIAGNOSIS — B96.89 UTI DUE TO KLEBSIELLA SPECIES: ICD-10-CM

## 2025-02-03 DIAGNOSIS — J30.9 ALLERGIC RHINITIS, UNSPECIFIED SEASONALITY, UNSPECIFIED TRIGGER: ICD-10-CM

## 2025-02-03 DIAGNOSIS — S82.851S CLOSED DISPLACED TRIMALLEOLAR FRACTURE OF RIGHT ANKLE, SEQUELA: ICD-10-CM

## 2025-02-03 DIAGNOSIS — E53.8 VITAMIN B 12 DEFICIENCY: ICD-10-CM

## 2025-02-03 DIAGNOSIS — K58.9 IRRITABLE BOWEL SYNDROME, UNSPECIFIED TYPE: ICD-10-CM

## 2025-02-03 DIAGNOSIS — N39.0 ENTEROCOCCUS UTI: ICD-10-CM

## 2025-02-03 DIAGNOSIS — N39.0 UTI DUE TO KLEBSIELLA SPECIES: ICD-10-CM

## 2025-02-03 DIAGNOSIS — B95.2 ENTEROCOCCUS UTI: ICD-10-CM

## 2025-02-03 DIAGNOSIS — F41.1 GENERALIZED ANXIETY DISORDER: ICD-10-CM

## 2025-02-03 DIAGNOSIS — Z87.81 H/O REDUCTION OF CLOSED FRACTURE: ICD-10-CM

## 2025-02-03 DIAGNOSIS — Z98.890 S/P ORIF (OPEN REDUCTION INTERNAL FIXATION) FRACTURE: ICD-10-CM

## 2025-02-03 DIAGNOSIS — R23.8 SKIN IRRITATION: ICD-10-CM

## 2025-02-03 DIAGNOSIS — R26.89 IMPAIRED GAIT AND MOBILITY: ICD-10-CM

## 2025-02-03 DIAGNOSIS — J45.909 ASTHMA, UNSPECIFIED ASTHMA SEVERITY, UNSPECIFIED WHETHER COMPLICATED, UNSPECIFIED WHETHER PERSISTENT (HHS-HCC): ICD-10-CM

## 2025-02-03 DIAGNOSIS — N39.0 URINARY TRACT INFECTION WITHOUT HEMATURIA, SITE UNSPECIFIED: Primary | ICD-10-CM

## 2025-02-03 PROCEDURE — 99309 SBSQ NF CARE MODERATE MDM 30: CPT | Performed by: NURSE PRACTITIONER

## 2025-02-03 NOTE — LETTER
Patient: Windy Lovelace  : 1990    Encounter Date: 2025    Name: Windy Lovelace    YOB: 1990    Code Status: FULL CODE    Chief Complaint:  Follow up on UTI; etc......       HPI   34 year old female with past medical history of stroke with current residual effects;  Migraine headache;   Moyamoya disease; Esophageal reflux;   Exotropia of right eye; History of neurodevelopmental disorder;   Obesity (BMI 30-39.9); and Hiatal hernia      Patient was admitted to [facility] from 24 to 24 (6 days)    Patient initially admitted to the [facility] on 24 for skilled services.    Patient readmitted to [facility] for surgery from 24 to 24.    Patient readmitted to the [facility] on 24 for skilled services.     Diagnoses as follows:    UTI:  Patient had a UA C & S performed and it was positive for UTI.  Results reported on 25.  It had mixed organisms majority enterococcus faecalis and klebsiella oxtoca, pneumoniae.   Recommended antibiotic was ciprofloxacin.   Ciprofloxacin ordered for 10 days--end date 2/3/25 today.  No complaints of dysuria today.   Patient seen today.  No fevers reported.   Reminded patient to drink more water.   No chest pain reported.  No headaches or dizziness.    IBS; skin irritation on buttocks/ perineal area:  Has diarrhea intermittently.   On dicyclomine routinely.  Also on immodium PRN and Pepto-Bismol PRN.  Also on lactobacillus routinely.   Patient states when she had a BM she wiped hard and saw a little bit of blood.   Has current orders for barrier cream to bilateral buttocks after each incontinent episode.   No active bleeding noted or reported.   GI consult ordered--appt. Needs to be scheduled.   No fevers reported.  No nausea or vomiting.     Closed displaced trimalleolar fracture of right ankle, sequela;   Right ankle fracture (right trimalleolar ankle fracture dislocation)   S/p closed reduction with  concentric reduction; Right ankle pain;  Right Ankle ORIF Tri-Malleolar Fracture without Fixation of the Posterior Lip, Right Syndesmosis Stabilization:   Right trimalleolar ankle fracture dislocation status post open reduction internal fixation with Dr. Castro on 9/26/24.   Patient tolerated procedure well.  Patient initially presented to [facility] on 9/13/24 with an Right Ankle Fracture.    No acute orthopaedic surgical intervention was initially indicated.   Surgery was on 9/26/24.  Post op recommendations after surgery were nonweightbearing   right lower extremity short leg splint.   Patient should also elevate her leg.   Aspirin 81 mg twice daily starting postop day 1 for DVT prophylaxis to be continued for 4 weeks.  Patient has had a few follow-up appointments with orthopedics Dr. Castro on 10/10/24,  11/26/24, and 12/24/24 at [facility].   On routine acetaminophen 975 mg PO TID,  ibuprofen PRN and oxycodone PRN for pain.   On 12/24/24 visit with orthopedic doctor her fiberglass cast was taken off.   Doctor commented that she was noncompliant with weightbearing  Restrictions.   Patient has been weightbearing to her tolerance in her right lower extremity in a walking boot.   Orthopedics ordered a formal referral to physical therapy to begin ankle range of motion.   The patient may steadily transition out of the walking boot as she is able.   She is at risk of posttraumatic osteoarthritis.   Patient needs follow up with ortho and three-view x-ray weightbearing right ankle.   Patient has a follow up appt. With ortho scheduled on 2/25/25.   Patient has been taking the oxycodone PRN med only occasionally she reports.    Impaired gait and mobility; Generalized muscle weakness :  Plans are for pt. To start weightbearing to her tolerance in her right lower extremity in a walking boot.   Orthopedics ordered a formal referral to physical therapy to begin ankle range of motion.   The patient may steadily transition out of  the walking boot as she is able.   Needs to follow-up  with orthopedic doctor Dr. Castro in approximately 2 months.   Patient at the [facility] services/PT/OT.     Asthma; allergic rhinitis:   On Advair and fluticasone nasal spray.  No reports cough or SOB today.  On fluticasone for allergic rhinitis.   No complaints of runny or stuffy nose.  No headaches or dizziness.    Generalized anxiety disorder;   major depressive disorder; insomnia:  On sertraline and trazodone.  No complaints of insomnia.     Vitamin B 12 deficiency:  Patient states she has vitamin B 12 deficiency.  She has had it for a long time.  She had reported she is supposed to be on Vitamin B 12 injections about a month ago.  At that time we discussed she will be started on oral vitamin B 12 100 mcg PO every day.  On 12/26/24 vitamin B 12 level was 336 wnl.    Hemiplegia and hemiparesis following cerebral infarction affecting right dominant side;  History of strokes:  At the [facility] services.                              Reviewed  EMR  Reviewed medical, social, surgical and family history.  Reviewed all current medications and performed medication reconciliation.  Performed prescription drug management.  Reviewed vital signs AND lab results  Reviewed [facility] Documentation  Discussed patient with nursing.                        ROS: 10 point ROS performed; Negative unless noted in HPI.      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), Breast pain (03/21/2024), Candidiasis of skin and nail (06/25/2014), Cellulitis of face (10/10/2013), Headache, unspecified (04/29/2015), Hypomagnesemia (01/23/2014), Other cysts of oral region, not elsewhere classified (06/10/2016), Other enthesopathies, not  elsewhere classified (10/29/2015), Other specified abnormal findings of blood chemistry (03/06/2020), Other specified joint disorders, left ankle and foot (11/02/2016), Other specified noninflammatory disorders of vagina (03/20/2018), Pain in right knee (11/12/2018), Pain in right knee (02/19/2018), Pain in right knee (02/20/2019), Pain, unspecified (10/17/2017), Pelvic and perineal pain (01/24/2018), Personal history of diseases of the skin and subcutaneous tissue (02/24/2016), Personal history of other diseases of the digestive system (10/06/2017), Personal history of other diseases of the digestive system (03/17/2015), Personal history of other diseases of the digestive system (05/24/2016), Personal history of other diseases of the female genital tract (08/31/2015), Personal history of other diseases of the musculoskeletal system and connective tissue (02/04/2014), Personal history of other diseases of the nervous system and sense organs (01/05/2015), Personal history of other diseases of the respiratory system (08/29/2013), Personal history of other diseases of the respiratory system (02/03/2017), Personal history of other drug therapy (12/11/2017), Personal history of other endocrine, nutritional and metabolic disease (05/09/2019), Personal history of other mental and behavioral disorders (10/06/2017), Personal history of transient ischemic attack (TIA), and cerebral infarction without residual deficits (10/06/2017), Pleurodynia (01/23/2014), Pyuria (06/13/2017), Sprain of medial collateral ligament of right knee, initial encounter (11/02/2016), Sprain of unspecified ligament of left ankle, subsequent encounter (10/29/2015), Strain of muscle and tendon of unspecified wall of thorax, initial encounter (02/04/2014), Syncope and collapse (06/13/2017), Unspecified fall, initial encounter (01/23/2014), and Unspecified symptoms and signs involving the genitourinary system (01/24/2018).    SURGICAL HISTORY:   MR head  angio w IV contrast (8/18/2015);   US guided percutaneous peritoneal or retroperitoneal fluid collection drainage (4/10/2019);   and US guided abscess drain (4/10/2019).     SOCIAL HISTORY:  She reports that she has never smoked.   She has never used smokeless tobacco.   She reports that she does not drink alcohol and does not use drugs.    Family History   Problem Relation Name Age of Onset   • Kidney cancer Mother       • Liver cancer Mother       • Pancreatic cancer Father       • Glaucoma Other grandmother     • Diabetes Other Grandfather           other type with arthropathy, with long term curent use of insulin   • Diabetes Other aunt         ALLERGIES:  Bupropion  Ketamine  Augmentin [amoxicillin-pot clavulanate]  Penicillin g,   Penicillins    LABS:  CBC: Date: 10/9/2024 WBC 9.1 Hgb 12.6 hematocrit 39.8 platelets 319    BMP: Date: 10/15/2024 Na 142 K 4 Cr 0.7 BUN 10 glucose 69 Ca 8 GFR 95  CBC: Date: 10/15/2024 WBC 7.7 Hgb 12.2 hematocrit 37.1 platelets 202  Liver function: Date: 10/15/2024 ALT 36 AST 35 alkaline phos.  71 bilirubin 0.4 albumin 3.5 protein 5.9    BMP: Date: 11/6/2024 Na 141 K 3.7 Cr 0.7 BUN 7 glucose 64 Ca 8.4 GFR 95  CBC: Date: 11/6/2024 WBC 6.7 Hgb 13.1 hematocrit 39.9 platelets 244  Liver function: Date: 11/6/2024 ALT 50 AST 42 alkaline phos.  72 bilirubin 0.5 albumin 3.5 protein 5.9      BMP: Date: 11/21/2024 Na 139 K 3.6 Cr 0.6 BUN 8 glucose 81 Ca 9.2   CBC: Date: 11/21/2024 WBC 8.9 Hgb 14.8 hematocrit 43.7 platelets 287    BMP: Date: 12/4/2024 Na 141 K 3.5 Cr 0.8 BUN 11 glucose 81 Ca 9 GFR 81  CBC: Date: 12/4/2024 WBC 8 Hgb 14.8 hematocrit 45.4 platelets 268    BMP: Date: 12/26/2024 Na 140 K 3.4 Cr 0.7 BUN 10 glucose 86 Ca 8.4 GFR 95  CBC: Date: 12/26/2024 WBC 5.9 Hgb 14 hematocrit 43.9 platelets 240  Liver function: Date: 12/26/2024 ALT 42 AST 32 alkaline phos.  76 bilirubin 0.4 albumin 3.7 protein 6    12/26/2024 vitamin B12 336 (range 211-911)             Lab Results  "  Component Value Date    WBC 10.1 09/28/2024    HGB 11.9 (L) 09/28/2024    HCT 36.5 09/28/2024     09/28/2024    CHOL 152 05/09/2024    TRIG 178 (H) 05/09/2024    HDL 33.6 05/09/2024    ALT 51 (H) 09/14/2024    AST 18 09/14/2024     09/27/2024    K 3.8 09/27/2024     09/27/2024    CREATININE 0.65 09/27/2024    BUN 7 09/27/2024    CO2 22 09/27/2024    TSH 5.14 (H) 05/09/2024    INR 1.0 09/13/2024             /80   Pulse 98   Temp 36.3 °C (97.3 °F)   Resp 20   Ht 1.676 m (5' 6\")   Wt 101 kg (222 lb 6.4 oz)   SpO2 98%   BMI 35.90 kg/m²      Physical Exam  Vitals and nursing note reviewed. Exam conducted with a chaperone present.   Constitutional:       Appearance: She is obese.   HENT:      Head: Normocephalic.      Right Ear: External ear normal.      Left Ear: External ear normal.      Nose: Nose normal.      Mouth/Throat:      Mouth: Mucous membranes are moist.      Pharynx: Oropharynx is clear.   Eyes:      Extraocular Movements: Extraocular movements intact.      Conjunctiva/sclera: Conjunctivae normal.      Pupils: Pupils are equal, round, and reactive to light.   Cardiovascular:      Rate and Rhythm: Normal rate and regular rhythm.      Pulses: Normal pulses.      Heart sounds: Normal heart sounds.   Pulmonary:      Effort: Pulmonary effort is normal.      Breath sounds: Normal breath sounds.   Abdominal:      General: Bowel sounds are normal.      Palpations: Abdomen is soft.   Musculoskeletal:         General: Normal range of motion.      Cervical back: Normal range of motion and neck supple.      Comments: Right lower extremity--surgical incision, edges well approximated no erythema or purulent drainage.    Adequate movement and sensation in toes.  Cap refill < 3 seconds.   Feet:      Comments: Bilateral feet--adequate movement and sensation;  Cap refill < 3 seconds.   Skin:     General: Skin is warm and dry.      Capillary Refill: Capillary refill takes less than 2 seconds. "      Comments: Buttocks:  Slight erythema on bilateral buttocks and perineal area.   Neurological:      General: No focal deficit present.      Mental Status: She is alert and oriented to person, place, and time. Mental status is at baseline.      Motor: Weakness present.      Gait: Gait abnormal.   Psychiatric:         Mood and Affect: Mood is anxious and depressed.         Behavior: Behavior normal. Behavior is cooperative.          Assessment/Plan     UTI:  Positive for mixed organisms majority enterococcus faecalis and klebsiella oxtoca, pneumoniae.  Ciprofloxacin ordered for 10 days--end date 2/3/25 today.  Encourage PO fluids --water.  Monitor for s/s of UTI.    IBS; skin irritation on buttocks/ perineal area:  Monitor for diarrhea.  Continue dicyclomine routinely.  Continue immodium PRN and Pepto-Bismol PRN.  Continue lactobacillus routinely.   Monitor for rectal bleeding.  Staff to continue with current orders for barrier cream to bilateral buttocks after each incontinent episode.   GI consult ordered--appt. Needs to be scheduled.     Closed displaced trimalleolar fracture of right ankle, sequela;   Right ankle fracture (right trimalleolar ankle fracture dislocation)   S/p closed reduction with concentric reduction; Right ankle pain;  Right Ankle ORIF Tri-Malleolar Fracture without Fixation of the Posterior Lip, Right Syndesmosis Stabilization:   Right trimalleolar ankle fracture dislocation status post open reduction internal fixation with Dr. Castro on 9/26/24.   Patient tolerated procedure well.  Patient initially presented to [facility] on 9/13/24 with an Right Ankle Fracture.    Surgery was on 9/26/24.  Patient should also elevate her leg.   Continue routine acetaminophen 975 mg PO TID,  ibuprofen PRN and oxycodone PRN for pain.   Restrictions.   Continue weightbearing to her tolerance in her right lower extremity in a walking boot.   Orthopedics ordered a formal referral to physical therapy to begin  ankle range of motion.   The patient may steadily transition out of the walking boot as she is able.   Follow up with ortho as recommended, appt.-- 2/25/25 will need three-view x-ray weightbearing right ankle at that time.    Impaired gait and mobility; Generalized muscle weakness:  Plans are for pt. To start weightbearing to her tolerance in her right lower extremity in a walking boot.   Orthopedics ordered a formal referral to physical therapy to begin ankle range of motion.   The patient may steadily transition out of the walking boot as she is able.   Needs to follow-up with orthopedic doctor Dr. Castro in approximately 2 months.   Patient at the [facility] for skilled services/PT/OT.     Asthma; allergic rhinitis:   Continue Advair and fluticasone nasal spray.    Generalized anxiety disorder;   major depressive disorder; insomnia:  Continue sertraline and trazodone.    Vitamin B 12 deficiency:  Continue vitamin B 12 100 mcg PO every day.  Monitor vitamin B 12 level.    Hemiplegia and hemiparesis following cerebral infarction affecting right dominant side;  Continue to work with therapy.      Problem List Items Addressed This Visit       Allergic rhinitis    Closed displaced trimalleolar fracture of right lower leg    Ankle pain     Other Visit Diagnoses       Urinary tract infection without hematuria, site unspecified    -  Primary    Enterococcus UTI        UTI due to Klebsiella species        Irritable bowel syndrome, unspecified type        Skin irritation        S/P ORIF (open reduction internal fixation) fracture        H/O reduction of closed fracture        Impaired gait and mobility        Generalized muscle weakness        Asthma, unspecified asthma severity, unspecified whether complicated, unspecified whether persistent (HHS-HCC)        Generalized anxiety disorder        Vitamin B 12 deficiency        Hemiplegia and hemiparesis following cerebral infarction affecting right dominant side  (Multi)                        NADEEM Clement       Electronically Signed By: NADEEM Clement   2/5/25  9:09 PM

## 2025-02-05 VITALS
BODY MASS INDEX: 35.74 KG/M2 | OXYGEN SATURATION: 98 % | DIASTOLIC BLOOD PRESSURE: 80 MMHG | TEMPERATURE: 97.3 F | HEART RATE: 98 BPM | WEIGHT: 222.4 LBS | HEIGHT: 66 IN | RESPIRATION RATE: 20 BRPM | SYSTOLIC BLOOD PRESSURE: 118 MMHG

## 2025-02-06 ENCOUNTER — TELEPHONE (OUTPATIENT)
Dept: PRIMARY CARE | Facility: CLINIC | Age: 35
End: 2025-02-06
Payer: MEDICARE

## 2025-02-06 NOTE — TELEPHONE ENCOUNTER
Can you please add patient onto the schedule sooner at the very least for a 20-minute visit to discuss vitamin B12 deficiency and order blood work

## 2025-02-06 NOTE — PROGRESS NOTES
Name: Windy Lovelace    YOB: 1990    Code Status: FULL CODE    Chief Complaint:  Follow up on UTI; etc......       HPI   34 year old female with past medical history of stroke with current residual effects;  Migraine headache;   Moyamoya disease; Esophageal reflux;   Exotropia of right eye; History of neurodevelopmental disorder;   Obesity (BMI 30-39.9); and Hiatal hernia      Patient was admitted to [facility] from 9/13/24 to 9/19/24 (6 days)    Patient initially admitted to the [facility] on 9/19/24 for skilled services.    Patient readmitted to [facility] for surgery from 9/26/24 to 9/28/24.    Patient readmitted to the [facility] on 9/28/24 for skilled services.     Diagnoses as follows:    UTI:  Patient had a UA C & S performed and it was positive for UTI.  Results reported on 1/24/25.  It had mixed organisms majority enterococcus faecalis and klebsiella oxtoca, pneumoniae.   Recommended antibiotic was ciprofloxacin.   Ciprofloxacin ordered for 10 days--end date 2/3/25 today.  No complaints of dysuria today.   Patient seen today.  No fevers reported.   Reminded patient to drink more water.   No chest pain reported.  No headaches or dizziness.    IBS; skin irritation on buttocks/ perineal area:  Has diarrhea intermittently.   On dicyclomine routinely.  Also on immodium PRN and Pepto-Bismol PRN.  Also on lactobacillus routinely.   Patient states when she had a BM she wiped hard and saw a little bit of blood.   Has current orders for barrier cream to bilateral buttocks after each incontinent episode.   No active bleeding noted or reported.   GI consult ordered--appt. Needs to be scheduled.   No fevers reported.  No nausea or vomiting.     Closed displaced trimalleolar fracture of right ankle, sequela;   Right ankle fracture (right trimalleolar ankle fracture dislocation)   S/p closed reduction with concentric reduction; Right ankle pain;  Right Ankle ORIF Tri-Malleolar Fracture  without Fixation of the Posterior Lip, Right Syndesmosis Stabilization:   Right trimalleolar ankle fracture dislocation status post open reduction internal fixation with Dr. Castro on 9/26/24.   Patient tolerated procedure well.  Patient initially presented to [facility] on 9/13/24 with an Right Ankle Fracture.    No acute orthopaedic surgical intervention was initially indicated.   Surgery was on 9/26/24.  Post op recommendations after surgery were nonweightbearing   right lower extremity short leg splint.   Patient should also elevate her leg.   Aspirin 81 mg twice daily starting postop day 1 for DVT prophylaxis to be continued for 4 weeks.  Patient has had a few follow-up appointments with orthopedics Dr. Castro on 10/10/24,  11/26/24, and 12/24/24 at [facility].   On routine acetaminophen 975 mg PO TID,  ibuprofen PRN and oxycodone PRN for pain.   On 12/24/24 visit with orthopedic doctor her fiberglass cast was taken off.   Doctor commented that she was noncompliant with weightbearing  Restrictions.   Patient has been weightbearing to her tolerance in her right lower extremity in a walking boot.   Orthopedics ordered a formal referral to physical therapy to begin ankle range of motion.   The patient may steadily transition out of the walking boot as she is able.   She is at risk of posttraumatic osteoarthritis.   Patient needs follow up with ortho and three-view x-ray weightbearing right ankle.   Patient has a follow up appt. With ortho scheduled on 2/25/25.   Patient has been taking the oxycodone PRN med only occasionally she reports.    Impaired gait and mobility; Generalized muscle weakness :  Plans are for pt. To start weightbearing to her tolerance in her right lower extremity in a walking boot.   Orthopedics ordered a formal referral to physical therapy to begin ankle range of motion.   The patient may steadily transition out of the walking boot as she is able.   Needs to follow-up  with orthopedic doctor   Gloria in approximately 2 months.   Patient at the [facility]/PT/OT.     Asthma; allergic rhinitis:   On Advair and fluticasone nasal spray.  No reports cough or SOB today.  On fluticasone for allergic rhinitis.   No complaints of runny or stuffy nose.  No headaches or dizziness.    Generalized anxiety disorder;   major depressive disorder; insomnia:  On sertraline and trazodone.  No complaints of insomnia.     Vitamin B 12 deficiency:  Patient states she has vitamin B 12 deficiency.  She has had it for a long time.  She had reported she is supposed to be on Vitamin B 12 injections about a month ago.  At that time we discussed she will be started on oral vitamin B 12 100 mcg PO every day.  On 12/26/24 vitamin B 12 level was 336 wnl.    Hemiplegia and hemiparesis following cerebral infarction affecting right dominant side;  History of strokes:  At the [facility] services.                              Reviewed  EMR  Reviewed medical, social, surgical and family history.  Reviewed all current medications and performed medication reconciliation.  Performed prescription drug management.  Reviewed vital signs AND lab results  Reviewed [facility] Documentation  Discussed patient with nursing.                        ROS: 10 point ROS performed; Negative unless noted in HPI.      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), Breast pain (03/21/2024), Candidiasis of skin and nail (06/25/2014), Cellulitis of face (10/10/2013), Headache, unspecified (04/29/2015), Hypomagnesemia (01/23/2014), Other cysts of oral region, not elsewhere classified (06/10/2016), Other enthesopathies, not elsewhere classified (10/29/2015), Other specified abnormal findings of blood chemistry  (03/06/2020), Other specified joint disorders, left ankle and foot (11/02/2016), Other specified noninflammatory disorders of vagina (03/20/2018), Pain in right knee (11/12/2018), Pain in right knee (02/19/2018), Pain in right knee (02/20/2019), Pain, unspecified (10/17/2017), Pelvic and perineal pain (01/24/2018), Personal history of diseases of the skin and subcutaneous tissue (02/24/2016), Personal history of other diseases of the digestive system (10/06/2017), Personal history of other diseases of the digestive system (03/17/2015), Personal history of other diseases of the digestive system (05/24/2016), Personal history of other diseases of the female genital tract (08/31/2015), Personal history of other diseases of the musculoskeletal system and connective tissue (02/04/2014), Personal history of other diseases of the nervous system and sense organs (01/05/2015), Personal history of other diseases of the respiratory system (08/29/2013), Personal history of other diseases of the respiratory system (02/03/2017), Personal history of other drug therapy (12/11/2017), Personal history of other endocrine, nutritional and metabolic disease (05/09/2019), Personal history of other mental and behavioral disorders (10/06/2017), Personal history of transient ischemic attack (TIA), and cerebral infarction without residual deficits (10/06/2017), Pleurodynia (01/23/2014), Pyuria (06/13/2017), Sprain of medial collateral ligament of right knee, initial encounter (11/02/2016), Sprain of unspecified ligament of left ankle, subsequent encounter (10/29/2015), Strain of muscle and tendon of unspecified wall of thorax, initial encounter (02/04/2014), Syncope and collapse (06/13/2017), Unspecified fall, initial encounter (01/23/2014), and Unspecified symptoms and signs involving the genitourinary system (01/24/2018).    SURGICAL HISTORY:   MR head angio w IV contrast (8/18/2015);   US guided percutaneous peritoneal or retroperitoneal  fluid collection drainage (4/10/2019);   and US guided abscess drain (4/10/2019).     SOCIAL HISTORY:  She reports that she has never smoked.   She has never used smokeless tobacco.   She reports that she does not drink alcohol and does not use drugs.    Family History   Problem Relation Name Age of Onset    Kidney cancer Mother        Liver cancer Mother        Pancreatic cancer Father        Glaucoma Other grandmother      Diabetes Other Grandfather           other type with arthropathy, with long term curent use of insulin    Diabetes Other aunt         ALLERGIES:  Bupropion  Ketamine  Augmentin [amoxicillin-pot clavulanate]  Penicillin g,   Penicillins    LABS:  CBC: Date: 10/9/2024 WBC 9.1 Hgb 12.6 hematocrit 39.8 platelets 319    BMP: Date: 10/15/2024 Na 142 K 4 Cr 0.7 BUN 10 glucose 69 Ca 8 GFR 95  CBC: Date: 10/15/2024 WBC 7.7 Hgb 12.2 hematocrit 37.1 platelets 202  Liver function: Date: 10/15/2024 ALT 36 AST 35 alkaline phos.  71 bilirubin 0.4 albumin 3.5 protein 5.9    BMP: Date: 11/6/2024 Na 141 K 3.7 Cr 0.7 BUN 7 glucose 64 Ca 8.4 GFR 95  CBC: Date: 11/6/2024 WBC 6.7 Hgb 13.1 hematocrit 39.9 platelets 244  Liver function: Date: 11/6/2024 ALT 50 AST 42 alkaline phos.  72 bilirubin 0.5 albumin 3.5 protein 5.9      BMP: Date: 11/21/2024 Na 139 K 3.6 Cr 0.6 BUN 8 glucose 81 Ca 9.2   CBC: Date: 11/21/2024 WBC 8.9 Hgb 14.8 hematocrit 43.7 platelets 287    BMP: Date: 12/4/2024 Na 141 K 3.5 Cr 0.8 BUN 11 glucose 81 Ca 9 GFR 81  CBC: Date: 12/4/2024 WBC 8 Hgb 14.8 hematocrit 45.4 platelets 268    BMP: Date: 12/26/2024 Na 140 K 3.4 Cr 0.7 BUN 10 glucose 86 Ca 8.4 GFR 95  CBC: Date: 12/26/2024 WBC 5.9 Hgb 14 hematocrit 43.9 platelets 240  Liver function: Date: 12/26/2024 ALT 42 AST 32 alkaline phos.  76 bilirubin 0.4 albumin 3.7 protein 6    12/26/2024 vitamin B12 336 (range 211-911)             Lab Results   Component Value Date    WBC 10.1 09/28/2024    HGB 11.9 (L) 09/28/2024    HCT 36.5 09/28/2024    PLT  "294 09/28/2024    CHOL 152 05/09/2024    TRIG 178 (H) 05/09/2024    HDL 33.6 05/09/2024    ALT 51 (H) 09/14/2024    AST 18 09/14/2024     09/27/2024    K 3.8 09/27/2024     09/27/2024    CREATININE 0.65 09/27/2024    BUN 7 09/27/2024    CO2 22 09/27/2024    TSH 5.14 (H) 05/09/2024    INR 1.0 09/13/2024             /80   Pulse 98   Temp 36.3 °C (97.3 °F)   Resp 20   Ht 1.676 m (5' 6\")   Wt 101 kg (222 lb 6.4 oz)   SpO2 98%   BMI 35.90 kg/m²      Physical Exam  Vitals and nursing note reviewed. Exam conducted with a chaperone present.   Constitutional:       Appearance: She is obese.   HENT:      Head: Normocephalic.      Right Ear: External ear normal.      Left Ear: External ear normal.      Nose: Nose normal.      Mouth/Throat:      Mouth: Mucous membranes are moist.      Pharynx: Oropharynx is clear.   Eyes:      Extraocular Movements: Extraocular movements intact.      Conjunctiva/sclera: Conjunctivae normal.      Pupils: Pupils are equal, round, and reactive to light.   Cardiovascular:      Rate and Rhythm: Normal rate and regular rhythm.      Pulses: Normal pulses.      Heart sounds: Normal heart sounds.   Pulmonary:      Effort: Pulmonary effort is normal.      Breath sounds: Normal breath sounds.   Abdominal:      General: Bowel sounds are normal.      Palpations: Abdomen is soft.   Musculoskeletal:         General: Normal range of motion.      Cervical back: Normal range of motion and neck supple.      Comments: Right lower extremity--surgical incision, edges well approximated no erythema or purulent drainage.    Adequate movement and sensation in toes.  Cap refill < 3 seconds.   Feet:      Comments: Bilateral feet--adequate movement and sensation;  Cap refill < 3 seconds.   Skin:     General: Skin is warm and dry.      Capillary Refill: Capillary refill takes less than 2 seconds.      Comments: Buttocks:  Slight erythema on bilateral buttocks and perineal area.   Neurological:      " General: No focal deficit present.      Mental Status: She is alert and oriented to person, place, and time. Mental status is at baseline.      Motor: Weakness present.      Gait: Gait abnormal.   Psychiatric:         Mood and Affect: Mood is anxious and depressed.         Behavior: Behavior normal. Behavior is cooperative.          Assessment/Plan      UTI:  Positive for mixed organisms majority enterococcus faecalis and klebsiella oxtoca, pneumoniae.  Ciprofloxacin ordered for 10 days--end date 2/3/25 today.  Encourage PO fluids --water.  Monitor for s/s of UTI.    IBS; skin irritation on buttocks/ perineal area:  Monitor for diarrhea.  Continue dicyclomine routinely.  Continue immodium PRN and Pepto-Bismol PRN.  Continue lactobacillus routinely.   Monitor for rectal bleeding.  Staff to continue with current orders for barrier cream to bilateral buttocks after each incontinent episode.   GI consult ordered--appt. Needs to be scheduled.     Closed displaced trimalleolar fracture of right ankle, sequela;   Right ankle fracture (right trimalleolar ankle fracture dislocation)   S/p closed reduction with concentric reduction; Right ankle pain;  Right Ankle ORIF Tri-Malleolar Fracture without Fixation of the Posterior Lip, Right Syndesmosis Stabilization:   Right trimalleolar ankle fracture dislocation status post open reduction internal fixation with Dr. Castro on 9/26/24.   Patient tolerated procedure well.  Patient initially presented to [facility] on 9/13/24 with an Right Ankle Fracture.    Surgery was on 9/26/24.  Patient should also elevate her leg.   Continue routine acetaminophen 975 mg PO TID,  ibuprofen PRN and oxycodone PRN for pain.   Restrictions.   Continue weightbearing to her tolerance in her right lower extremity in a walking boot.   Orthopedics ordered a formal referral to physical therapy to begin ankle range of motion.   The patient may steadily transition out of the walking boot as she is able.    Follow up with ortho as recommended, appt.-- 2/25/25 will need three-view x-ray weightbearing right ankle at that time.    Impaired gait and mobility; Generalized muscle weakness:  Plans are for pt. To start weightbearing to her tolerance in her right lower extremity in a walking boot.   Orthopedics ordered a formal referral to physical therapy to begin ankle range of motion.   The patient may steadily transition out of the walking boot as she is able.   Needs to follow-up with orthopedic doctor Dr. Castro in approximately 2 months.   Patient at the [facility] for skilled services/PT/OT.     Asthma; allergic rhinitis:   Continue Advair and fluticasone nasal spray.    Generalized anxiety disorder;   major depressive disorder; insomnia:  Continue sertraline and trazodone.    Vitamin B 12 deficiency:  Continue vitamin B 12 100 mcg PO every day.  Monitor vitamin B 12 level.    Hemiplegia and hemiparesis following cerebral infarction affecting right dominant side;  Continue to work with therapy.      Problem List Items Addressed This Visit       Allergic rhinitis    Closed displaced trimalleolar fracture of right lower leg    Ankle pain     Other Visit Diagnoses       Urinary tract infection without hematuria, site unspecified    -  Primary    Enterococcus UTI        UTI due to Klebsiella species        Irritable bowel syndrome, unspecified type        Skin irritation        S/P ORIF (open reduction internal fixation) fracture        H/O reduction of closed fracture        Impaired gait and mobility        Generalized muscle weakness        Asthma, unspecified asthma severity, unspecified whether complicated, unspecified whether persistent (HHS-HCC)        Generalized anxiety disorder        Vitamin B 12 deficiency        Hemiplegia and hemiparesis following cerebral infarction affecting right dominant side (Multi)                        Paola Kessler, APRN-CNP

## 2025-02-06 NOTE — TELEPHONE ENCOUNTER
Pt is currently in a rehab will talk to  and see if they will order there if not pt is scheduled on 3/24/25

## 2025-02-06 NOTE — TELEPHONE ENCOUNTER
Patient was a previous pt of Dr. Terry. Currently scheduled for a new pt visit for 6/02/2025 with kody. Guardian stated the pt was receiving weekly B12 shots while under Dr. Terry's care. She has not received them since seeing her, and cannot get anyone to write an Rx to do them at home. She's wondering if it's possible to come in for the B12 shots prior to having her new pt visit. Please advise and contact guardicristino Hsu at the number on file.

## 2025-02-19 ENCOUNTER — APPOINTMENT (OUTPATIENT)
Dept: OTOLARYNGOLOGY | Facility: CLINIC | Age: 35
End: 2025-02-19
Payer: MEDICARE

## 2025-02-24 ENCOUNTER — NURSING HOME VISIT (OUTPATIENT)
Dept: POST ACUTE CARE | Facility: EXTERNAL LOCATION | Age: 35
End: 2025-02-24
Payer: MEDICARE

## 2025-02-24 DIAGNOSIS — B96.89 UTI DUE TO KLEBSIELLA SPECIES: ICD-10-CM

## 2025-02-24 DIAGNOSIS — R26.89 IMPAIRED GAIT AND MOBILITY: ICD-10-CM

## 2025-02-24 DIAGNOSIS — R23.8 SKIN IRRITATION: ICD-10-CM

## 2025-02-24 DIAGNOSIS — M62.81 GENERALIZED MUSCLE WEAKNESS: ICD-10-CM

## 2025-02-24 DIAGNOSIS — J30.9 ALLERGIC RHINITIS, UNSPECIFIED SEASONALITY, UNSPECIFIED TRIGGER: ICD-10-CM

## 2025-02-24 DIAGNOSIS — Z75.8 DISCHARGE PLANNING ISSUES: ICD-10-CM

## 2025-02-24 DIAGNOSIS — J45.909 ASTHMA, UNSPECIFIED ASTHMA SEVERITY, UNSPECIFIED WHETHER COMPLICATED, UNSPECIFIED WHETHER PERSISTENT (HHS-HCC): ICD-10-CM

## 2025-02-24 DIAGNOSIS — I69.30 HISTORY OF STROKE WITH CURRENT RESIDUAL EFFECTS: ICD-10-CM

## 2025-02-24 DIAGNOSIS — M25.571 RIGHT ANKLE PAIN, UNSPECIFIED CHRONICITY: ICD-10-CM

## 2025-02-24 DIAGNOSIS — Z98.890 S/P ORIF (OPEN REDUCTION INTERNAL FIXATION) FRACTURE: ICD-10-CM

## 2025-02-24 DIAGNOSIS — S82.851S CLOSED DISPLACED TRIMALLEOLAR FRACTURE OF RIGHT ANKLE, SEQUELA: Primary | ICD-10-CM

## 2025-02-24 DIAGNOSIS — Z87.81 S/P ORIF (OPEN REDUCTION INTERNAL FIXATION) FRACTURE: ICD-10-CM

## 2025-02-24 DIAGNOSIS — N39.0 UTI DUE TO KLEBSIELLA SPECIES: ICD-10-CM

## 2025-02-24 DIAGNOSIS — I69.351 HEMIPLEGIA AND HEMIPARESIS FOLLOWING CEREBRAL INFARCTION AFFECTING RIGHT DOMINANT SIDE (MULTI): ICD-10-CM

## 2025-02-24 DIAGNOSIS — Z87.81 H/O REDUCTION OF CLOSED FRACTURE: ICD-10-CM

## 2025-02-24 DIAGNOSIS — E53.8 VITAMIN B 12 DEFICIENCY: ICD-10-CM

## 2025-02-24 DIAGNOSIS — K58.9 IRRITABLE BOWEL SYNDROME, UNSPECIFIED TYPE: ICD-10-CM

## 2025-02-24 NOTE — LETTER
Patient: Windy Lovelace  : 1990    Encounter Date: 2025    Name: Windy Lovelace    YOB: 1990    Code Status: FULL CODE    Chief Complaint:  Discharge visit;   Closed displaced trimalleolar fracture of right ankle, sequela; etc......       HPI   34 year old female with past medical history of stroke with current residual effects;  Migraine headache;   Moyamoya disease; Esophageal reflux;   Exotropia of right eye; History of neurodevelopmental disorder;   Obesity (BMI 30-39.9); and Hiatal hernia      Patient was admitted to [facility] from 24 to 24 (6 days)    Patient initially admitted to the [facility] on 24 for skilled services.    Patient readmitted to [facility] for surgery from 24 to 24.    Patient readmitted to the [facility] on 24 for skilled services.     Diagnoses as follows:    Closed displaced trimalleolar fracture of right ankle, sequela;   Right ankle fracture (right trimalleolar ankle fracture dislocation)   S/p closed reduction with concentric reduction; Right ankle pain;  Right Ankle ORIF Tri-Malleolar Fracture without Fixation of the Posterior Lip, Right Syndesmosis Stabilization:   Right trimalleolar ankle fracture dislocation status post open reduction internal fixation with Dr. Castro on 24.   Patient tolerated procedure well.  Patient initially presented to [facility] on 24 with an Right Ankle Fracture.    No acute orthopaedic surgical intervention was initially indicated.   Surgery was on 24.  Post op recommendations after surgery were nonweightbearing   right lower extremity short leg splint.   Patient should also elevate her leg.   Was treated with aspirin 81 mg twice daily for 4 weeks.  Patient has had many follow-up appointments with orthopedics Dr. Castro on 10/10/24, 24, and 24 at [facility].   On routine acetaminophen 975 mg PO TID,  ibuprofen PRN and oxycodone PRN for pain.   On  12/24/24 visit with orthopedic doctor her fiberglass cast was taken off.   Patient has been weightbearing to her tolerance in her right lower extremity in a walking boot.   Orthopedics ordered a formal referral to physical therapy to begin ankle range of motion.   The patient may steadily transition out of the walking boot as she is able.   She is at risk of posttraumatic osteoarthritis.   Patient needs follow up with ortho and three-view x-ray weightbearing right ankle.   Patient has a follow up appt. With ortho scheduled on 2/25/25.   Patient has been taking the oxycodone PRN med only occasionally she reports.  Patient states she has increased pain during her therapy sessions.   Patient seen today.  She is in bed.   She states she has an appointment with orthopedic doctor tomorrow and discharging in 2 days.   Patient asking for RX for oxydocone PRN, she is nervous that she will have increased pain with ambulation  And with therapy sessions.   No chest pain.     UTI:  Patient had a UA C & S performed and it was positive for UTI.  Results reported on 1/24/25.  It had mixed organisms majority enterococcus faecalis and klebsiella oxtoca, pneumoniae.   Recommended antibiotic was ciprofloxacin.   Ciprofloxacin ordered for 10 days--end date was 2/3/25.  Patient states he is having some dysuria symptoms again.  No fevers reported.   Reminded patient to drink more water.   No abdominal pain or N/V.     IBS; skin irritation on buttocks/ perineal area:  Has diarrhea intermittently.   On dicyclomine routinely.  Also on immodium PRN and Pepto-Bismol PRN.  Also on lactobacillus routinely.   Has orders for barrier cream on buttocks.  GI consult ordered--appt. Needs to be scheduled.   No fevers reported.  No nausea or vomiting.     Impaired gait and mobility; Generalized muscle weakness :  Plans are for pt. To start weightbearing to her tolerance in her right lower extremity in a walking boot.   Orthopedics ordered a formal  referral to physical therapy to begin ankle range of motion.   The patient may steadily transition out of the walking boot as she is able.   Needs to follow-up  with orthopedic doctor Dr. Castro in approximately 2 months.   Patient at the [facility] services/PT/OT.     Asthma; allergic rhinitis:   On Advair and fluticasone nasal spray.  No reports cough or SOB today.  On fluticasone for allergic rhinitis.   No complaints of runny or stuffy nose.  No headaches or dizziness.    Generalized anxiety disorder;   major depressive disorder; insomnia:  On sertraline and trazodone.  No complaints of insomnia.     Vitamin B 12 deficiency:  Patient states she has vitamin B 12 deficiency.  On oral vitamin B 12 100 mcg PO every day.  On 12/26/24 vitamin B 12 level was 336 wnl.    Hemiplegia and hemiparesis following cerebral infarction affecting right dominant side;  History of strokes:  At the [facility] services.      Discharge Planning Issues:  Patient will need to follow up with PCP after discharge.  Patient will need follow up with orthopedic doctor after discharge.  Patient will need follow up with GI after discharge.  Recommend Home health care PT/OT after discharge.                         Reviewed  EMR  Reviewed medical, social, surgical and family history.  Reviewed all current medications and performed medication reconciliation.  Performed prescription drug management.  Reviewed vital signs AND lab results  Reviewed [facility] Documentation  Discussed patient with nursing and .  Spent greater than 45 minutes on patient visit.                         ROS: 10 point ROS performed; Negative unless noted in HPI.      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  (03/21/2024), Breast pain (03/21/2024), Candidiasis of skin and nail (06/25/2014), Cellulitis of face (10/10/2013), Headache, unspecified (04/29/2015), Hypomagnesemia (01/23/2014), Other cysts of oral region, not elsewhere classified (06/10/2016), Other enthesopathies, not elsewhere classified (10/29/2015), Other specified abnormal findings of blood chemistry (03/06/2020), Other specified joint disorders, left ankle and foot (11/02/2016), Other specified noninflammatory disorders of vagina (03/20/2018), Pain in right knee (11/12/2018), Pain in right knee (02/19/2018), Pain in right knee (02/20/2019), Pain, unspecified (10/17/2017), Pelvic and perineal pain (01/24/2018), Personal history of diseases of the skin and subcutaneous tissue (02/24/2016), Personal history of other diseases of the digestive system (10/06/2017), Personal history of other diseases of the digestive system (03/17/2015), Personal history of other diseases of the digestive system (05/24/2016), Personal history of other diseases of the female genital tract (08/31/2015), Personal history of other diseases of the musculoskeletal system and connective tissue (02/04/2014), Personal history of other diseases of the nervous system and sense organs (01/05/2015), Personal history of other diseases of the respiratory system (08/29/2013), Personal history of other diseases of the respiratory system (02/03/2017), Personal history of other drug therapy (12/11/2017), Personal history of other endocrine, nutritional and metabolic disease (05/09/2019), Personal history of other mental and behavioral disorders (10/06/2017), Personal history of transient ischemic attack (TIA), and cerebral infarction without residual deficits (10/06/2017), Pleurodynia (01/23/2014), Pyuria (06/13/2017), Sprain of medial collateral ligament of right knee, initial encounter (11/02/2016), Sprain of unspecified ligament of left ankle, subsequent encounter (10/29/2015), Strain of muscle  and tendon of unspecified wall of thorax, initial encounter (02/04/2014), Syncope and collapse (06/13/2017), Unspecified fall, initial encounter (01/23/2014), and Unspecified symptoms and signs involving the genitourinary system (01/24/2018).    SURGICAL HISTORY:   MR head angio w IV contrast (8/18/2015);   US guided percutaneous peritoneal or retroperitoneal fluid collection drainage (4/10/2019);   and US guided abscess drain (4/10/2019).     SOCIAL HISTORY:  She reports that she has never smoked.   She has never used smokeless tobacco.   She reports that she does not drink alcohol and does not use drugs.    Family History   Problem Relation Name Age of Onset   • Kidney cancer Mother       • Liver cancer Mother       • Pancreatic cancer Father       • Glaucoma Other grandmother     • Diabetes Other Grandfather           other type with arthropathy, with long term curent use of insulin   • Diabetes Other aunt         ALLERGIES:  Bupropion  Ketamine  Augmentin [amoxicillin-pot clavulanate]  Penicillin g,   Penicillins    LABS:  CBC: Date: 10/9/2024 WBC 9.1 Hgb 12.6 hematocrit 39.8 platelets 319    BMP: Date: 10/15/2024 Na 142 K 4 Cr 0.7 BUN 10 glucose 69 Ca 8 GFR 95  CBC: Date: 10/15/2024 WBC 7.7 Hgb 12.2 hematocrit 37.1 platelets 202  Liver function: Date: 10/15/2024 ALT 36 AST 35 alkaline phos.  71 bilirubin 0.4 albumin 3.5 protein 5.9    BMP: Date: 11/6/2024 Na 141 K 3.7 Cr 0.7 BUN 7 glucose 64 Ca 8.4 GFR 95  CBC: Date: 11/6/2024 WBC 6.7 Hgb 13.1 hematocrit 39.9 platelets 244  Liver function: Date: 11/6/2024 ALT 50 AST 42 alkaline phos.  72 bilirubin 0.5 albumin 3.5 protein 5.9      BMP: Date: 11/21/2024 Na 139 K 3.6 Cr 0.6 BUN 8 glucose 81 Ca 9.2   CBC: Date: 11/21/2024 WBC 8.9 Hgb 14.8 hematocrit 43.7 platelets 287    BMP: Date: 12/4/2024 Na 141 K 3.5 Cr 0.8 BUN 11 glucose 81 Ca 9 GFR 81  CBC: Date: 12/4/2024 WBC 8 Hgb 14.8 hematocrit 45.4 platelets 268    BMP: Date: 12/26/2024 Na 140 K 3.4 Cr 0.7 BUN 10  "glucose 86 Ca 8.4 GFR 95  CBC: Date: 12/26/2024 WBC 5.9 Hgb 14 hematocrit 43.9 platelets 240  Liver function: Date: 12/26/2024 ALT 42 AST 32 alkaline phos.  76 bilirubin 0.4 albumin 3.7 protein 6    12/26/2024 vitamin B12 336 (range 211-911)             Lab Results   Component Value Date    WBC 10.1 09/28/2024    HGB 11.9 (L) 09/28/2024    HCT 36.5 09/28/2024     09/28/2024    CHOL 152 05/09/2024    TRIG 178 (H) 05/09/2024    HDL 33.6 05/09/2024    ALT 51 (H) 09/14/2024    AST 18 09/14/2024     09/27/2024    K 3.8 09/27/2024     09/27/2024    CREATININE 0.65 09/27/2024    BUN 7 09/27/2024    CO2 22 09/27/2024    TSH 5.14 (H) 05/09/2024    INR 1.0 09/13/2024             /82   Pulse 67   Temp 36.1 °C (97 °F)   Resp 18   Ht 1.676 m (5' 6\")   Wt 101 kg (222 lb 6.4 oz)   SpO2 98%   BMI 35.90 kg/m²      Physical Exam  Vitals and nursing note reviewed. Exam conducted with a chaperone present.   Constitutional:       Appearance: She is obese.   HENT:      Head: Normocephalic.      Right Ear: External ear normal.      Left Ear: External ear normal.      Nose: Nose normal.      Mouth/Throat:      Mouth: Mucous membranes are moist.      Pharynx: Oropharynx is clear.   Eyes:      Extraocular Movements: Extraocular movements intact.      Conjunctiva/sclera: Conjunctivae normal.      Pupils: Pupils are equal, round, and reactive to light.   Cardiovascular:      Rate and Rhythm: Normal rate and regular rhythm.      Pulses: Normal pulses.      Heart sounds: Normal heart sounds.   Pulmonary:      Effort: Pulmonary effort is normal.      Breath sounds: Normal breath sounds.   Abdominal:      General: Bowel sounds are normal.      Palpations: Abdomen is soft.   Musculoskeletal:         General: Normal range of motion.      Cervical back: Normal range of motion and neck supple.      Comments: Right lower extremity--surgical incision, edges well approximated no erythema or purulent drainage.    Adequate " movement and sensation in toes.  Cap refill < 3 seconds.   Feet:      Comments: Bilateral feet--adequate movement and sensation;  Cap refill < 3 seconds.   Skin:     General: Skin is warm and dry.      Capillary Refill: Capillary refill takes less than 2 seconds.      Comments: Buttocks:  Slight erythema on bilateral buttocks and perineal area.   Neurological:      General: No focal deficit present.      Mental Status: She is alert and oriented to person, place, and time. Mental status is at baseline.      Motor: Weakness present.      Gait: Gait abnormal.   Psychiatric:         Mood and Affect: Mood is anxious and depressed.         Behavior: Behavior normal. Behavior is cooperative.          Assessment/Plan     Closed displaced trimalleolar fracture of right ankle, sequela;   Right ankle fracture (right trimalleolar ankle fracture dislocation)   S/p closed reduction with concentric reduction; Right ankle pain;  Right Ankle ORIF Tri-Malleolar Fracture without Fixation of the Posterior Lip, Right Syndesmosis Stabilization:   Right trimalleolar ankle fracture dislocation status post open reduction internal fixation with Dr. Castro on 9/26/24.   Patient tolerated procedure well.  Patient initially presented to [facility] on 9/13/24 with an Right Ankle Fracture.    Surgery was on 9/26/24.  Patient has had many follow-up appointments with orthopedics Dr. Castro on 10/10/24, 11/26/24, and 12/24/24 at [facility].   Continue routine acetaminophen 975 mg PO TID,  ibuprofen PRN and oxycodone PRN for pain.   On 12/24/24 visit with orthopedic doctor her fiberglass cast was taken off.   Patient has been weightbearing to her tolerance in her right lower extremity in a walking boot.   Orthopedics ordered a formal referral to physical therapy to begin ankle range of motion.   The patient may steadily transition out of the walking boot as she is able.   She is at risk of posttraumatic osteoarthritis.   Patient has follow up with ortho  and three-view x-ray weightbearing right ankle scheduled for tomorrow.    Follow up with ortho  2/25/25.     UTI:  Urine culture showed mixed organisms majority enterococcus faecalis and klebsiella oxtoca, pneumoniae.   Ciprofloxacin ordered for 10 days--end date was 2/3/25.  Restart ciprofloxacin 500 mg PO BID for 3 days.  Since patient is discharging no time to do another UA C & S.   Recommend follow up with PCP after discharge.     IBS; skin irritation on buttocks/ perineal area:  Continue dicyclomine routinely.  Continue immodium PRN;  Continue Pepto-Bismol and lactobacillus routinely.   Continue barrier cream to bilateral buttocks after each incontinent episode.   Follow up with GI after discharge.    Impaired gait and mobility; Generalized muscle weakness :  Continue weightbearing to her tolerance in her right lower extremity in a walking boot.   Orthopedics ordered a formal referral to physical therapy to begin ankle range of motion.   The patient may steadily transition out of the walking boot as she is able.   Needs to follow-up  with orthopedic doctor Dr. Castro in approximately 2 months.   Patient at the [facility] skilled services/PT/OT.     Asthma; allergic rhinitis:   Continue Advair and fluticasone nasal spray.  Monitor for cough, wheezing, SOB, runny nose.    Generalized anxiety disorder;   major depressive disorder; insomnia:  Continue sertraline and trazodone.  Monitor for insomnia.    Vitamin B 12 deficiency:  Patient states she has vitamin B 12 deficiency.  She has had it for a long time.  She had reported she is supposed to be on Vitamin B 12 injections about a month ago.  On oral vitamin B 12 100 mcg PO every day.  On 12/26/24 vitamin B 12 level was 336 wnl.    Hemiplegia and hemiparesis following cerebral infarction affecting right dominant side;  History of strokes:  At the [facility] [employer] [facility].      Discharge Planning Issues:  Patient will need to follow up  with PCP after discharge.  Patient will need follow up with orthopedic doctor after discharge.  Patient should follow up with GI after discharge.  Recommend Home health care PT/OT after discharge.   Fall prevention strategies.       Problem List Items Addressed This Visit       Allergic rhinitis    History of stroke with current residual effects    Closed displaced trimalleolar fracture of right lower leg - Primary    Ankle pain     Other Visit Diagnoses       S/P ORIF (open reduction internal fixation) fracture        H/O reduction of closed fracture        UTI due to Klebsiella species        Irritable bowel syndrome, unspecified type        Skin irritation        Impaired gait and mobility        Generalized muscle weakness        Asthma, unspecified asthma severity, unspecified whether complicated, unspecified whether persistent ([facility])        Vitamin B 12 deficiency        Hemiplegia and hemiparesis following cerebral infarction affecting right dominant side ([facility])        Discharge planning issues                          NADEEM Clement       Electronically Signed By: NADEEM Clement   2/28/25 12:02 AM

## 2025-02-25 ENCOUNTER — HOSPITAL ENCOUNTER (OUTPATIENT)
Dept: RADIOLOGY | Facility: CLINIC | Age: 35
Discharge: HOME | End: 2025-02-25
Payer: MEDICARE

## 2025-02-25 ENCOUNTER — OFFICE VISIT (OUTPATIENT)
Dept: ORTHOPEDIC SURGERY | Facility: CLINIC | Age: 35
End: 2025-02-25
Payer: MEDICARE

## 2025-02-25 DIAGNOSIS — S82.851S CLOSED DISPLACED TRIMALLEOLAR FRACTURE OF RIGHT ANKLE, SEQUELA: ICD-10-CM

## 2025-02-25 PROCEDURE — 73610 X-RAY EXAM OF ANKLE: CPT | Mod: RT

## 2025-02-25 PROCEDURE — 99213 OFFICE O/P EST LOW 20 MIN: CPT | Performed by: STUDENT IN AN ORGANIZED HEALTH CARE EDUCATION/TRAINING PROGRAM

## 2025-02-25 NOTE — PROGRESS NOTES
ORTHOPAEDIC SURGERY OUTPATIENT PROGRESS NOTE    Chief Complaint:  Right ankle pain    History Of Present Illness  Windy Lovelace is a 34 y.o. female with a past medical history of moyamoya, CVA as well as developmental delay who presented after ground-level fall with a right trimalleolar ankle fracture and dislocation.  The patient was closed reduced by the orthopedic team, I was not on-call at the time the patient's initial presentation was asked by one of my partners to assume care.  The patient was subsequently discharged and return for surgery, unfortunately on return she had been walking on her splint.  She underwent right trimalleolar ankle fracture ORIF without fixation of the posterior lip and with syndesmotic stabilization from 09/26/2024. She has been residing in a SNF and has been compliant with weight-bearing restrictions. Reporting 8/10 pain. Encounter with POA on facetime in the room.    10/20/2024: Patient returns s/p right trimalleolar ankle fracture ORIF without fixation of the posterior lip and with syndesmotic stabilization from 09/26/2024.  She is currently reporting no pain at rest but had 8 out of 10 pain at night.  Her Sister and POA is present on the phone via [employer] in the room.  She has been putting weight down for transfers to the bathroom and while at PT.  She has been residing in a SNF.    11/20/2024: Patient returns s/p right trimalleolar ankle fracture ORIF without fixation of the posterior lip and with syndesmotic stabilization from 09/26/2024.  Patient reports minimal pain at present.  She has continued to put weight down primarily on her toes for ambulation, most often for toileting.  Discussed with POA via [employer].    12/24/2024: Patient returns s/p right trimalleolar ankle fracture ORIF without fixation of the posterior lip and with syndesmotic stabilization from 09/26/2024.  Patient has been somewhat noncompliant with weightbearing restrictions.  She has been ambulating   in her fiberglass cast.  During the course of the visit today, the patient picked out a stable eschar while in the examination room.  I discussed the patient's clinical course as well as findings with the patient's POA via [employer] today.    02/25/2025: Patient returns s/p right trimalleolar ankle fracture ORIF without fixation of the posterior lip and with syndesmotic stabilization from 09/26/2024.  Patient is reporting increased pain at night, she associates this with a suture that she is concerned her skin has grown over.  She has been ambulating with use of a walking boot, she is present with a coordinator today and her POA via [employer]    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    Headache, unspecified 04/29/2015    Severe headache    Hiatal hernia     LFTs abnormal     Mares mares disease     Obesity (BMI 30-39.9)     Personal history of other mental and behavioral disorders 10/06/2017    History of mental retardation    Syncope and collapse 06/13/2017    Syncope and collapse       Surgical History  Recent Surgeries in Orthopaedic Surgery            No cases to display             Social History  Social History     Socioeconomic History    Marital status: Single   Tobacco Use    Smoking status: Never    Smokeless tobacco: Never   Vaping Use    Vaping status: Never Used   Substance and Sexual Activity    Alcohol use: Never    Drug use: Never    Sexual activity: Not Currently     Birth control/protection: I.U.D.     Social Drivers of Health     Financial Resource Strain: Patient Unable To Answer (11/12/2024)    Overall Financial Resource Strain (CARDIA)     Difficulty of Paying Living Expenses: Patient unable to answer   Food Insecurity: Not on File (9/26/2024)    Received from [facility]    Food Insecurity     Food: 0   Transportation Needs: Patient Unable To Answer  (11/12/2024)    PRAPARE - Transportation     Lack of Transportation (Medical): Patient unable to answer     Lack of Transportation (Non-Medical): Patient unable to answer   Physical Activity: Not on File (5/21/2021)    Received from [facility]    Physical Activity     Physical Activity: 0   Stress: Not on File (5/21/2021)    Received from [facility]    Stress     Stress: 0   Social Connections: Not on File (9/12/2024)    Received from [facility]    Social Connections     Connectedness: 0   Recent Concern: Social Connections - Feeling Somewhat Isolated (6/28/2024)    OASIS : Social Isolation     Frequency of experiencing loneliness or isolation: Sometimes   Housing Stability: Patient Unable To Answer (11/12/2024)    Housing Stability Vital Sign     Unable to Pay for Housing in the Last Year: Patient unable to answer     Number of Times Moved in the Last Year: 1     Homeless in the Last Year: Patient unable to answer       Family History  Family History   Problem Relation Name Age of Onset    Kidney cancer Mother      Liver cancer Mother      Pancreatic cancer Father      Glaucoma Other grandmother     Diabetes Other Grandfather         other type with arthropathy, with long term curent use of insulin    Diabetes Other aunt         other type with arthropathy, with long term curent use of insulin        Allergies  Allergies   Allergen Reactions    Bupropion Unknown and Hives    Ketamine Unknown, Anaphylaxis and Rash    Augmentin [Amoxicillin-Pot Clavulanate] Hives    Penicillin G Hives    Penicillins Unknown and Hives       Review of Systems  REVIEW OF SYSTEMS  Constitutional: no unplanned weight loss  Psychiatric: no suicidal ideation  ENT: no vision changes, no sinus problems  Pulmonary: no shortness of breath  Lymphatic: no enlarged lymph nodes  Cardiovascular: no chest pain or shortness of breath  Gastrointestinal: no stomach problems  Genitourinary: no dysuria   Skin: no rashes  Endocrine: no thyroid  problems  Neurological: no headache, no numbness  Hematological: no easy bruising  Musculoskeletal: Right ankle pain     Physical Exam  PHYSICAL EXAMINATION  Constitutional Exam: well developed and well nourished  Psychiatric Exam: alert and oriented, appropriate mood and behavior  Eye Exam: EOMI  Pulmonary Exam: breathing non-labored, no apparent distress  Lymphatic exam: no appreciable lymphadenopathy in the lower extremities  Cardiovascular exam: RRR to peripheral palpation, DP pulses 2+, PT 2+, toes are pink with good capillary refill, no pitting edema  Skin exam: no open lesions, rashes, abrasions or ulcerations  Neurological exam: sensation to light touch intact in both lower extremities in peripheral and dermatomal distributions (except for any abnormalities noted in musculoskeletal exam)    Musculoskeletal exam: Right lower extremity examination.  Patient ankle incisions well-healed without effusion.  Patient is nontender to palpation overlying the fibular plate, there is no obvious retained foreign body or suture material evident.  Patient has pain-free and supple ankle range of motion without crepitus.  Patient has greater than physiologic hindfoot valgus with pes planus arch posture and no significant forefoot deformity noted. Patient has sensation grossly intact to light touch in a saphenous, sural, superficial peroneal, deep peroneal and tibial nerve distribution.  She has intact PF/DF/EHL.  She is 2+ DP/PT pulse palpated.     Last Recorded Vitals  There were no vitals taken for this visit.    Laboratory Results  No results found for this or any previous visit (from the past 24 hours).     Radiology Results  X-ray imaging 3 view weightbearing right ankle reviewed and independently evaluated by me on 02/25/2025 demonstrates no acute fracture or dislocation.  Unchanged alignment following trimalleolar ankle fracture fixation with plate and screws construct of the distal fibula and medial malleolus.  No  interval syndesmotic screw lucency or loosening.    Assessment/Plan:  34 y.o. female with a past medical history of moyamoya, CVA as well as developmental delay s/p right trimalleolar ankle fracture ORIF without fixation of the posterior lip and with syndesmotic stabilization from 09/26/2024.  I would recommend the patient steadily transition out of the walking boot into a regular shoe.  I have no formal restrictions for her, she may continue with physical therapy.  I would plan to see the patient back in 3 months to follow her clinical course closely.  Consideration could be made for hardware removal down the road.  Upon return patient would require three-view weightbearing right ankle.    Timothy Castro MD, MACI  Department of Orthopaedic Surgery  [facility]     The diagnosis and treatment plan were reviewed with the patient. All questions were answered. The patient verbalized understanding of the treatment plan. There were no barriers to understanding identified.    Note dictated with "[employer] software.  Completed without full type editing and sent to avoid delay.

## 2025-02-27 VITALS
DIASTOLIC BLOOD PRESSURE: 82 MMHG | HEART RATE: 67 BPM | SYSTOLIC BLOOD PRESSURE: 122 MMHG | HEIGHT: 66 IN | TEMPERATURE: 97 F | RESPIRATION RATE: 18 BRPM | BODY MASS INDEX: 35.74 KG/M2 | WEIGHT: 222.4 LBS | OXYGEN SATURATION: 98 %

## 2025-02-28 NOTE — PROGRESS NOTES
Name: Windy Lovelace    YOB: 1990    Code Status: FULL CODE    Chief Complaint:  Discharge visit;   Closed displaced trimalleolar fracture of right ankle, sequela; etc......       HPI   34 year old female with past medical history of stroke with current residual effects;  Migraine headache;   Moyamoya disease; Esophageal reflux;   Exotropia of right eye; History of neurodevelopmental disorder;   Obesity (BMI 30-39.9); and Hiatal hernia      Patient was admitted to [facility] from 9/13/24 to 9/19/24 (6 days)    Patient initially admitted to the [facility] on 9/19/24 for skilled services.    Patient readmitted to [facility] for surgery from 9/26/24 to 9/28/24.    Patient readmitted to the [facility] on 9/28/24 for skilled services.     Diagnoses as follows:    Closed displaced trimalleolar fracture of right ankle, sequela;   Right ankle fracture (right trimalleolar ankle fracture dislocation)   S/p closed reduction with concentric reduction; Right ankle pain;  Right Ankle ORIF Tri-Malleolar Fracture without Fixation of the Posterior Lip, Right Syndesmosis Stabilization:   Right trimalleolar ankle fracture dislocation status post open reduction internal fixation with Dr. Castro on 9/26/24.   Patient tolerated procedure well.  Patient initially presented to [facility] on 9/13/24 with an Right Ankle Fracture.    No acute orthopaedic surgical intervention was initially indicated.   Surgery was on 9/26/24.  Post op recommendations after surgery were nonweightbearing   right lower extremity short leg splint.   Patient should also elevate her leg.   Was treated with aspirin 81 mg twice daily for 4 weeks.  Patient has had many follow-up appointments with orthopedics Dr. Castro on 10/10/24, 11/26/24, and 12/24/24 at [facility].   On routine acetaminophen 975 mg PO TID,  ibuprofen PRN and oxycodone PRN for pain.   On 12/24/24 visit with orthopedic doctor her fiberglass cast was taken off.   Patient  has been weightbearing to her tolerance in her right lower extremity in a walking boot.   Orthopedics ordered a formal referral to physical therapy to begin ankle range of motion.   The patient may steadily transition out of the walking boot as she is able.   She is at risk of posttraumatic osteoarthritis.   Patient needs follow up with ortho and three-view x-ray weightbearing right ankle.   Patient has a follow up appt. With ortho scheduled on 2/25/25.   Patient has been taking the oxycodone PRN med only occasionally she reports.  Patient states she has increased pain during her therapy sessions.   Patient seen today.  She is in bed.   She states she has an appointment with orthopedic doctor tomorrow and discharging in 2 days.   Patient asking for RX for oxydocone PRN, she is nervous that she will have increased pain with ambulation  And with therapy sessions.   No chest pain.     UTI:  Patient had a UA C & S performed and it was positive for UTI.  Results reported on 1/24/25.  It had mixed organisms majority enterococcus faecalis and klebsiella oxtoca, pneumoniae.   Recommended antibiotic was ciprofloxacin.   Ciprofloxacin ordered for 10 days--end date was 2/3/25.  Patient states he is having some dysuria symptoms again.  No fevers reported.   Reminded patient to drink more water.   No abdominal pain or N/V.     IBS; skin irritation on buttocks/ perineal area:  Has diarrhea intermittently.   On dicyclomine routinely.  Also on immodium PRN and Pepto-Bismol PRN.  Also on lactobacillus routinely.   Has orders for barrier cream on buttocks.  GI consult ordered--appt. Needs to be scheduled.   No fevers reported.  No nausea or vomiting.     Impaired gait and mobility; Generalized muscle weakness :  Plans are for pt. To start weightbearing to her tolerance in her right lower extremity in a walking boot.   Orthopedics ordered a formal referral to physical therapy to begin ankle range of motion.   The patient may steadily  transition out of the walking boot as she is able.   Needs to follow-up  with orthopedic doctor Dr. Castro in approximately 2 months.   Patient at the [facility] for skilled services/PT/OT.     Asthma; allergic rhinitis:   On Advair and fluticasone nasal spray.  No reports cough or SOB today.  On fluticasone for allergic rhinitis.   No complaints of runny or stuffy nose.  No headaches or dizziness.    Generalized anxiety disorder;   major depressive disorder; insomnia:  On sertraline and trazodone.  No complaints of insomnia.     Vitamin B 12 deficiency:  Patient states she has vitamin B 12 deficiency.  On oral vitamin B 12 100 mcg PO every day.  On 12/26/24 vitamin B 12 level was 336 wnl.    Hemiplegia and hemiparesis following cerebral infarction affecting right dominant side;  History of strokes:  At the [facility] for skilled services.      Discharge Planning Issues:  Patient will need to follow up with PCP after discharge.  Patient will need follow up with orthopedic doctor after discharge.  Patient will need follow up with GI after discharge.  Recommend Home health care PT/OT after discharge.                         Reviewed  EMR  Reviewed medical, social, surgical and family history.  Reviewed all current medications and performed medication reconciliation.  Performed prescription drug management.  Reviewed vital signs AND lab results  Reviewed [facility] Documentation  Discussed patient with nursing and .  Spent greater than 45 minutes on patient visit.                         ROS: 10 point ROS performed; Negative unless noted in HPI.      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), Breast pain (03/21/2024), Candidiasis of skin and nail (06/25/2014), Cellulitis of  face (10/10/2013), Headache, unspecified (04/29/2015), Hypomagnesemia (01/23/2014), Other cysts of oral region, not elsewhere classified (06/10/2016), Other enthesopathies, not elsewhere classified (10/29/2015), Other specified abnormal findings of blood chemistry (03/06/2020), Other specified joint disorders, left ankle and foot (11/02/2016), Other specified noninflammatory disorders of vagina (03/20/2018), Pain in right knee (11/12/2018), Pain in right knee (02/19/2018), Pain in right knee (02/20/2019), Pain, unspecified (10/17/2017), Pelvic and perineal pain (01/24/2018), Personal history of diseases of the skin and subcutaneous tissue (02/24/2016), Personal history of other diseases of the digestive system (10/06/2017), Personal history of other diseases of the digestive system (03/17/2015), Personal history of other diseases of the digestive system (05/24/2016), Personal history of other diseases of the female genital tract (08/31/2015), Personal history of other diseases of the musculoskeletal system and connective tissue (02/04/2014), Personal history of other diseases of the nervous system and sense organs (01/05/2015), Personal history of other diseases of the respiratory system (08/29/2013), Personal history of other diseases of the respiratory system (02/03/2017), Personal history of other drug therapy (12/11/2017), Personal history of other endocrine, nutritional and metabolic disease (05/09/2019), Personal history of other mental and behavioral disorders (10/06/2017), Personal history of transient ischemic attack (TIA), and cerebral infarction without residual deficits (10/06/2017), Pleurodynia (01/23/2014), Pyuria (06/13/2017), Sprain of medial collateral ligament of right knee, initial encounter (11/02/2016), Sprain of unspecified ligament of left ankle, subsequent encounter (10/29/2015), Strain of muscle and tendon of unspecified wall of thorax, initial encounter (02/04/2014), Syncope and collapse  (06/13/2017), Unspecified fall, initial encounter (01/23/2014), and Unspecified symptoms and signs involving the genitourinary system (01/24/2018).    SURGICAL HISTORY:   MR head angio w IV contrast (8/18/2015);   US guided percutaneous peritoneal or retroperitoneal fluid collection drainage (4/10/2019);   and US guided abscess drain (4/10/2019).     SOCIAL HISTORY:  She reports that she has never smoked.   She has never used smokeless tobacco.   She reports that she does not drink alcohol and does not use drugs.    Family History   Problem Relation Name Age of Onset    Kidney cancer Mother        Liver cancer Mother        Pancreatic cancer Father        Glaucoma Other grandmother      Diabetes Other Grandfather           other type with arthropathy, with long term curent use of insulin    Diabetes Other aunt         ALLERGIES:  Bupropion  Ketamine  Augmentin [amoxicillin-pot clavulanate]  Penicillin g,   Penicillins    LABS:  CBC: Date: 10/9/2024 WBC 9.1 Hgb 12.6 hematocrit 39.8 platelets 319    BMP: Date: 10/15/2024 Na 142 K 4 Cr 0.7 BUN 10 glucose 69 Ca 8 GFR 95  CBC: Date: 10/15/2024 WBC 7.7 Hgb 12.2 hematocrit 37.1 platelets 202  Liver function: Date: 10/15/2024 ALT 36 AST 35 alkaline phos.  71 bilirubin 0.4 albumin 3.5 protein 5.9    BMP: Date: 11/6/2024 Na 141 K 3.7 Cr 0.7 BUN 7 glucose 64 Ca 8.4 GFR 95  CBC: Date: 11/6/2024 WBC 6.7 Hgb 13.1 hematocrit 39.9 platelets 244  Liver function: Date: 11/6/2024 ALT 50 AST 42 alkaline phos.  72 bilirubin 0.5 albumin 3.5 protein 5.9      BMP: Date: 11/21/2024 Na 139 K 3.6 Cr 0.6 BUN 8 glucose 81 Ca 9.2   CBC: Date: 11/21/2024 WBC 8.9 Hgb 14.8 hematocrit 43.7 platelets 287    BMP: Date: 12/4/2024 Na 141 K 3.5 Cr 0.8 BUN 11 glucose 81 Ca 9 GFR 81  CBC: Date: 12/4/2024 WBC 8 Hgb 14.8 hematocrit 45.4 platelets 268    BMP: Date: 12/26/2024 Na 140 K 3.4 Cr 0.7 BUN 10 glucose 86 Ca 8.4 GFR 95  CBC: Date: 12/26/2024 WBC 5.9 Hgb 14 hematocrit 43.9 platelets 240  Liver  "function: Date: 12/26/2024 ALT 42 AST 32 alkaline phos.  76 bilirubin 0.4 albumin 3.7 protein 6    12/26/2024 vitamin B12 336 (range 211-911)             Lab Results   Component Value Date    WBC 10.1 09/28/2024    HGB 11.9 (L) 09/28/2024    HCT 36.5 09/28/2024     09/28/2024    CHOL 152 05/09/2024    TRIG 178 (H) 05/09/2024    HDL 33.6 05/09/2024    ALT 51 (H) 09/14/2024    AST 18 09/14/2024     09/27/2024    K 3.8 09/27/2024     09/27/2024    CREATININE 0.65 09/27/2024    BUN 7 09/27/2024    CO2 22 09/27/2024    TSH 5.14 (H) 05/09/2024    INR 1.0 09/13/2024             /82   Pulse 67   Temp 36.1 °C (97 °F)   Resp 18   Ht 1.676 m (5' 6\")   Wt 101 kg (222 lb 6.4 oz)   SpO2 98%   BMI 35.90 kg/m²      Physical Exam  Vitals and nursing note reviewed. Exam conducted with a chaperone present.   Constitutional:       Appearance: She is obese.   HENT:      Head: Normocephalic.      Right Ear: External ear normal.      Left Ear: External ear normal.      Nose: Nose normal.      Mouth/Throat:      Mouth: Mucous membranes are moist.      Pharynx: Oropharynx is clear.   Eyes:      Extraocular Movements: Extraocular movements intact.      Conjunctiva/sclera: Conjunctivae normal.      Pupils: Pupils are equal, round, and reactive to light.   Cardiovascular:      Rate and Rhythm: Normal rate and regular rhythm.      Pulses: Normal pulses.      Heart sounds: Normal heart sounds.   Pulmonary:      Effort: Pulmonary effort is normal.      Breath sounds: Normal breath sounds.   Abdominal:      General: Bowel sounds are normal.      Palpations: Abdomen is soft.   Musculoskeletal:         General: Normal range of motion.      Cervical back: Normal range of motion and neck supple.      Comments: Right lower extremity--surgical incision, edges well approximated no erythema or purulent drainage.    Adequate movement and sensation in toes.  Cap refill < 3 seconds.   Feet:      Comments: Bilateral " feet--adequate movement and sensation;  Cap refill < 3 seconds.   Skin:     General: Skin is warm and dry.      Capillary Refill: Capillary refill takes less than 2 seconds.      Comments: Buttocks:  Slight erythema on bilateral buttocks and perineal area.   Neurological:      General: No focal deficit present.      Mental Status: She is alert and oriented to person, place, and time. Mental status is at baseline.      Motor: Weakness present.      Gait: Gait abnormal.   Psychiatric:         Mood and Affect: Mood is anxious and depressed.         Behavior: Behavior normal. Behavior is cooperative.          Assessment/Plan      Closed displaced trimalleolar fracture of right ankle, sequela;   Right ankle fracture (right trimalleolar ankle fracture dislocation)   S/p closed reduction with concentric reduction; Right ankle pain;  Right Ankle ORIF Tri-Malleolar Fracture without Fixation of the Posterior Lip, Right Syndesmosis Stabilization:   Right trimalleolar ankle fracture dislocation status post open reduction internal fixation with Dr. Castro on 9/26/24.   Patient tolerated procedure well.  Patient initially presented to [facility] on 9/13/24 with an Right Ankle Fracture.    Surgery was on 9/26/24.  Patient has had many follow-up appointments with orthopedics Dr. Castro on 10/10/24, 11/26/24, and 12/24/24 at [facility].   Continue routine acetaminophen 975 mg PO TID,  ibuprofen PRN and oxycodone PRN for pain.   On 12/24/24 visit with orthopedic doctor her fiberglass cast was taken off.   Patient has been weightbearing to her tolerance in her right lower extremity in a walking boot.   Orthopedics ordered a formal referral to physical therapy to begin ankle range of motion.   The patient may steadily transition out of the walking boot as she is able.   She is at risk of posttraumatic osteoarthritis.   Patient has follow up with ortho and three-view x-ray weightbearing right ankle scheduled for tomorrow.    Follow up with  ortho  2/25/25.     UTI:  Urine culture showed mixed organisms majority enterococcus faecalis and klebsiella oxtoca, pneumoniae.   Ciprofloxacin ordered for 10 days--end date was 2/3/25.  Restart ciprofloxacin 500 mg PO BID for 3 days.  Since patient is discharging no time to do another UA C & S.   Recommend follow up with PCP after discharge.     IBS; skin irritation on buttocks/ perineal area:  Continue dicyclomine routinely.  Continue immodium PRN;  Continue Pepto-Bismol and lactobacillus routinely.   Continue barrier cream to bilateral buttocks after each incontinent episode.   Follow up with GI after discharge.    Impaired gait and mobility; Generalized muscle weakness :  Continue weightbearing to her tolerance in her right lower extremity in a walking boot.   Orthopedics ordered a formal referral to physical therapy to begin ankle range of motion.   The patient may steadily transition out of the walking boot as she is able.   Needs to follow-up  with orthopedic doctor Dr. Castro in approximately 2 months.   Patient at the [facility] for skilled services/PT/OT.     Asthma; allergic rhinitis:   Continue Advair and fluticasone nasal spray.  Monitor for cough, wheezing, SOB, runny nose.    Generalized anxiety disorder;   major depressive disorder; insomnia:  Continue sertraline and trazodone.  Monitor for insomnia.    Vitamin B 12 deficiency:  Patient states she has vitamin B 12 deficiency.  She has had it for a long time.  She had reported she is supposed to be on Vitamin B 12 injections about a month ago.  On oral vitamin B 12 100 mcg PO every day.  On 12/26/24 vitamin B 12 level was 336 wnl.    Hemiplegia and hemiparesis following cerebral infarction affecting right dominant side;  History of strokes:  At the [facility] services.      Discharge Planning Issues:  Patient will need to follow up with PCP after discharge.  Patient will need follow up with orthopedic doctor after  discharge.  Patient should follow up with GI after discharge.  Recommend Home health care PT/OT after discharge.   Fall prevention strategies.       Problem List Items Addressed This Visit       Allergic rhinitis    History of stroke with current residual effects    Closed displaced trimalleolar fracture of right lower leg - Primary    Ankle pain     Other Visit Diagnoses       S/P ORIF (open reduction internal fixation) fracture        H/O reduction of closed fracture        UTI due to Klebsiella species        Irritable bowel syndrome, unspecified type        Skin irritation        Impaired gait and mobility        Generalized muscle weakness        Asthma, unspecified asthma severity, unspecified whether complicated, unspecified whether persistent ([facility])        Vitamin B 12 deficiency        Hemiplegia and hemiparesis following cerebral infarction affecting right dominant side (Multi)        Discharge planning issues                          Paola Kessler, APRN-CNP

## 2025-03-18 PROBLEM — J02.9 PHARYNGITIS: Status: RESOLVED | Noted: 2023-03-20 | Resolved: 2025-03-18

## 2025-03-18 PROBLEM — J40 BRONCHITIS: Status: RESOLVED | Noted: 2024-10-24 | Resolved: 2025-03-18

## 2025-03-18 RX ORDER — FLUTICASONE FUROATE AND VILANTEROL TRIFENATATE 100; 25 UG/1; UG/1
POWDER RESPIRATORY (INHALATION)
COMMUNITY
Start: 2024-11-26

## 2025-03-18 RX ORDER — POTASSIUM CHLORIDE 1.5 G/1.58G
POWDER, FOR SOLUTION ORAL
COMMUNITY
Start: 2025-01-06

## 2025-03-18 RX ORDER — PANTOPRAZOLE SODIUM 40 MG/1
TABLET, DELAYED RELEASE ORAL
COMMUNITY
Start: 2025-02-15

## 2025-03-24 ENCOUNTER — APPOINTMENT (OUTPATIENT)
Dept: PRIMARY CARE | Facility: CLINIC | Age: 35
End: 2025-03-24
Payer: MEDICARE

## 2025-03-24 VITALS
SYSTOLIC BLOOD PRESSURE: 128 MMHG | DIASTOLIC BLOOD PRESSURE: 80 MMHG | OXYGEN SATURATION: 98 % | WEIGHT: 220.2 LBS | HEIGHT: 66 IN | BODY MASS INDEX: 35.39 KG/M2 | HEART RATE: 97 BPM

## 2025-03-24 DIAGNOSIS — Z79.899 ON LONG TERM DRUG THERAPY: ICD-10-CM

## 2025-03-24 DIAGNOSIS — Z98.890 H/O ILEOSTOMY: ICD-10-CM

## 2025-03-24 DIAGNOSIS — Z00.00 ROUTINE ADULT HEALTH MAINTENANCE: Primary | ICD-10-CM

## 2025-03-24 DIAGNOSIS — R73.09 BLOOD GLUCOSE ABNORMAL: ICD-10-CM

## 2025-03-24 DIAGNOSIS — J01.00 ACUTE NON-RECURRENT MAXILLARY SINUSITIS: ICD-10-CM

## 2025-03-24 DIAGNOSIS — Z13.220 SCREENING FOR LIPID DISORDERS: ICD-10-CM

## 2025-03-24 DIAGNOSIS — Z13.29 SCREENING FOR THYROID DISORDER: ICD-10-CM

## 2025-03-24 DIAGNOSIS — R74.8 ELEVATED LIVER ENZYMES: ICD-10-CM

## 2025-03-24 DIAGNOSIS — I69.30 HISTORY OF STROKE WITH CURRENT RESIDUAL EFFECTS: ICD-10-CM

## 2025-03-24 DIAGNOSIS — E55.9 VITAMIN D DEFICIENCY: ICD-10-CM

## 2025-03-24 DIAGNOSIS — Z13.220 ENCOUNTER FOR LIPID SCREENING FOR CARDIOVASCULAR DISEASE: ICD-10-CM

## 2025-03-24 DIAGNOSIS — Z13.6 ENCOUNTER FOR LIPID SCREENING FOR CARDIOVASCULAR DISEASE: ICD-10-CM

## 2025-03-24 DIAGNOSIS — F32.2 MAJOR DEPRESSIVE DISORDER, SINGLE EPISODE, SEVERE (MULTI): ICD-10-CM

## 2025-03-24 DIAGNOSIS — Z13.21 ENCOUNTER FOR VITAMIN DEFICIENCY SCREENING: ICD-10-CM

## 2025-03-24 DIAGNOSIS — B36.9 FUNGAL DERMATITIS: ICD-10-CM

## 2025-03-24 DIAGNOSIS — Z13.1 SCREENING FOR DIABETES MELLITUS: ICD-10-CM

## 2025-03-24 DIAGNOSIS — E53.8 B12 DEFICIENCY: ICD-10-CM

## 2025-03-24 PROCEDURE — 96372 THER/PROPH/DIAG INJ SC/IM: CPT | Performed by: NURSE PRACTITIONER

## 2025-03-24 PROCEDURE — 3008F BODY MASS INDEX DOCD: CPT | Performed by: NURSE PRACTITIONER

## 2025-03-24 PROCEDURE — 99214 OFFICE O/P EST MOD 30 MIN: CPT | Performed by: NURSE PRACTITIONER

## 2025-03-24 RX ORDER — MENTHOL/SORBITOL
250 LOZENGE MUCOUS MEMBRANE 2 TIMES DAILY
COMMUNITY
Start: 2025-03-10

## 2025-03-24 RX ORDER — CYANOCOBALAMIN 1000 UG/ML
1000 INJECTION, SOLUTION INTRAMUSCULAR; SUBCUTANEOUS ONCE
Status: COMPLETED | OUTPATIENT
Start: 2025-03-24 | End: 2025-03-24

## 2025-03-24 RX ORDER — NYSTATIN 100000 U/G
CREAM TOPICAL 2 TIMES DAILY
Qty: 30 G | Refills: 1 | Status: SHIPPED | OUTPATIENT
Start: 2025-03-24 | End: 2025-04-03

## 2025-03-24 RX ORDER — PNV NO.95/FERROUS FUM/FOLIC AC 28MG-0.8MG
1 TABLET ORAL DAILY
COMMUNITY
Start: 2025-02-26 | End: 2025-03-27

## 2025-03-24 RX ORDER — DOXYCYCLINE 100 MG/1
100 CAPSULE ORAL 2 TIMES DAILY
Qty: 14 CAPSULE | Refills: 0 | Status: SHIPPED | OUTPATIENT
Start: 2025-03-24 | End: 2025-03-31

## 2025-03-24 RX ADMIN — CYANOCOBALAMIN 1000 MCG: 1000 INJECTION, SOLUTION INTRAMUSCULAR; SUBCUTANEOUS at 14:36

## 2025-03-24 ASSESSMENT — ENCOUNTER SYMPTOMS
PALPITATIONS: 0
MYALGIAS: 0
WEAKNESS: 0
WHEEZING: 0
CONSTIPATION: 0
PHOTOPHOBIA: 1
WOUND: 0
DIFFICULTY URINATING: 0
ADENOPATHY: 0
HEADACHES: 0
FEVER: 0
SEIZURES: 0
BACK PAIN: 0
ABDOMINAL PAIN: 0
ROS GI COMMENTS: SEE HPI
NAUSEA: 0
TROUBLE SWALLOWING: 0
SHORTNESS OF BREATH: 0
EYE PAIN: 0
SLEEP DISTURBANCE: 1
DIZZINESS: 0
COLOR CHANGE: 0
COUGH: 0
ABDOMINAL DISTENTION: 0
ARTHRALGIAS: 1
DIARRHEA: 1
CHILLS: 0
JOINT SWELLING: 0
BRUISES/BLEEDS EASILY: 0
FATIGUE: 0

## 2025-03-24 ASSESSMENT — PATIENT HEALTH QUESTIONNAIRE - PHQ9
2. FEELING DOWN, DEPRESSED OR HOPELESS: NOT AT ALL
SUM OF ALL RESPONSES TO PHQ9 QUESTIONS 1 AND 2: 0
1. LITTLE INTEREST OR PLEASURE IN DOING THINGS: NOT AT ALL

## 2025-03-24 NOTE — PATIENT INSTRUCTIONS
Orders are in place for you to have blood work completed. You may have your blood work completed in this building through [facility] ([address], [place] [address]).  For this location, you may schedule an appointment online or drop-in for walk-in services. [email or web address]  Hours:   Monday - Friday: 7:30 a.m. - 5 p.m. and Saturday: 8 a.m. - 11 a.m.  Phone:  [phone]       You will be treated for a sinus infection. Please take your antibiotics twice daily for 7 days. Notify the office for any persistent/ worsening jaw pain/ infection concerns      Nystatin cream to be applied under abdominal folds, near groin and under breast twice daily as needed for skin irritation      Please arrange for routine follow up in 6 months. You may schedule on-line through [employer] or call our office at [phone].

## 2025-03-24 NOTE — PROGRESS NOTES
"Subjective   Patient ID: Windy Lovelace is a 34 y.o. female who presents for Follow-up (Discuss Vitamin B12).    Patient seen today in order to establish primary care.  She lives alone but has routine caregivers in place through [facility].  She states that she can be left alone for up to several hours a day in order to allow for \"breaks \"but does require assistance with most tasks including going to the bathroom.  Patient is utilizing either a cane or walker due to relatively recent right ankle fracture/surgical repair.  She continues with intermittent right ankle pain but states she takes as needed ibuprofen with good improvement of symptoms.  No recent falls reported.  Patient has a history of moyamoya, CVA as well as developmental delay.  She states that all of her closest family lives out of state but does have a stepfather and some cousins that she is occasionally in touch with.  No reported issues with appetite, staying hydrated or bladder.  Patient reports history of significant abdominal surgeries including ostomy placement with subsequent reversal.  Because of this, patient reports relatively chronic diarrhea.  Patient also reports that the left side of her jaw has been bothering her for several weeks.  Patient states that the area is tender but denies any known dental concerns and she had a relatively recent full dental exam.  Some congestion also present.  Patient denies any fever, chills or other systemic concerns.  She does report some irritation in the WILLIAM area which has been present since she was rehabilitated for her recent fracture.  Patient follows routinely with [facility] services for counseling.  She states that she suffers from social anxiety and also very angry about her physical and mental disabilities.  Patient reports being relatively sedentary and states she enjoys playing video games/staying inside her apartment.  No significant issues with shortness of breath or " chest pain upon exertion.  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  Medications reviewed.  No other acute concerns voiced at this time.        Current Outpatient Medications on File Prior to Visit   Medication Sig Dispense Refill    aspirin 81 mg chewable tablet Chew 1 tablet (81 mg) early in the morning..      Breo Ellipta 100-25 mcg/dose inhaler       cholecalciferol (Vitamin D-3) 50 mcg (2,000 unit) capsule Take 1 capsule (50 mcg) by mouth once daily. 30 capsule 5    cyanocobalamin (Vitamin B-12) 100 mcg tablet Take 1 tablet (100 mcg) by mouth once daily.      dicyclomine (Bentyl) 10 mg capsule Take 1 capsule (10 mg) by mouth 4 times a day as needed (abdominal pain). 120 capsule 2    fluticasone (Flonase) 50 mcg/actuation nasal spray Administer 2 sprays into each nostril once daily as needed for allergies.      fluticasone propion-salmeteroL (Advair Diskus) 100-50 mcg/dose diskus inhaler Inhale 1 puff every 12 hours. Rinse mouth with water after use to reduce aftertaste and incidence of candidiasis. Do not swallow. (Patient taking differently: Inhale 1 puff 2 times a day as needed (wheezing/SOB). Rinse mouth with water after use to reduce aftertaste and incidence of candidiasis. Do not swallow.    Takes as needed) 60 each 5    leg brace (Knee Support Brace) misc 1 Units once daily. 1 each 0    levonorgestrel (Mirena) 21 mcg/24 hours (8 yrs) 52 mg IUD Mirena (52 MG) 20 MCG/24HR IUD   Refills: 0       Active      lidocaine 4 % patch Place 1 patch over 12 hours on the skin once daily. Remove & discard patch within 12 hours or as directed by MD.      omeprazole (PriLOSEC) 20 mg DR capsule Take 1 capsule (20 mg) by mouth once daily. Do not crush or chew.      ondansetron (Zofran) 4 mg/2 mL injection Infuse 2 mL (4 mg) into a venous catheter every 6 hours if needed for nausea or vomiting.      oxyCODONE (Roxicodone) 5 mg immediate release tablet Take 1 tablet (5 mg) by mouth every 6 hours if needed for  severe pain (7 - 10).      pantoprazole (ProtoNix) 40 mg EC tablet       potassium chloride (Klor-Con) 20 mEq packet       prazosin (Minipress) 5 mg capsule Take 1 capsule (5 mg) by mouth once daily at bedtime. Dosage unknown      Probiotic, S.boulardii, 250 mg capsule Take 1 capsule (250 mg) by mouth 2 times a day.      sertraline (Zoloft) 100 mg tablet Take 1 tablet (100 mg) by mouth early in the morning..      traZODone (Desyrel) 100 mg tablet Take 1 tablet (100 mg) by mouth once daily at bedtime. Dosage unknown       No current facility-administered medications on file prior to visit.       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    Headache, unspecified 04/29/2015    Severe headache    Hiatal hernia     LFTs abnormal     Mares mares disease     Obesity (BMI 30-39.9)     Personal history of other mental and behavioral disorders 10/06/2017    History of mental retardation    Syncope and collapse 06/13/2017    Syncope and collapse        Past Surgical History:   Procedure Laterality Date    MR HEAD ANGIO W IV CONTRAST  08/18/2015    [place] HEAD ANGIO W IV CONTRAST [place] [facility] [place]    ORIF ANKLE FRACTURE Right 09/26/2024    Right Ankle ORIF - Right    OTHER SURGICAL HISTORY  06/16/2015    History Of Prior Surgery    OTHER SURGICAL HISTORY  10/06/2017    Excision Of Lesion Face    OTHER SURGICAL HISTORY  07/23/2019    Small bowel resection    US GUIDED ABSCESS DRAIN  04/10/2019    US GUIDED ABSCESS DRAIN [place] [facility]    US GUIDED PERCUTANEOUS PERITONEAL OR RETROPERITONEAL FLUID COLLECTION DRAINAGE  04/10/2019    US GUIDED PERCUTANEOUS PERITONEAL OR RETROPERITONEAL FLUID COLLECTION DRAINAGE [place]2019 [facility] [place]        Family History   Problem Relation Name Age of Onset    Kidney cancer Mother      Liver cancer Mother      Pancreatic cancer Father      Glaucoma  "Other grandmother     Diabetes Other Grandfather         other type with arthropathy, with long term curent use of insulin    Diabetes Other aunt         other type with arthropathy, with long term curent use of insulin        Review of Systems   Constitutional:  Negative for chills, fatigue and fever.   HENT:  Positive for congestion and facial swelling. Negative for dental problem and trouble swallowing.         See HPI   Eyes:  Positive for photophobia. Negative for pain and visual disturbance.        Reports sensitivity to lights and legally blind in one eye   Respiratory:  Negative for cough, shortness of breath and wheezing.    Cardiovascular:  Negative for chest pain, palpitations and leg swelling.   Gastrointestinal:  Positive for diarrhea. Negative for abdominal distention, abdominal pain, constipation and nausea.        See HPI   Endocrine: Negative for cold intolerance and heat intolerance.   Genitourinary:  Negative for difficulty urinating.   Musculoskeletal:  Positive for arthralgias. Negative for back pain, gait problem, joint swelling and myalgias.        History of right ankle fracture with repair. See HPI   Skin:  Negative for color change, pallor, rash and wound.   Allergic/Immunologic: Negative for environmental allergies and food allergies.   Neurological:  Negative for dizziness, seizures, weakness and headaches.   Hematological:  Negative for adenopathy. Does not bruise/bleed easily.   Psychiatric/Behavioral:  Positive for sleep disturbance. Negative for behavioral problems.         Reports social anxiety, PTSD and insomnia   All other systems reviewed and are negative.      Objective   /80   Pulse 97   Ht 1.676 m (5' 6\")   Wt 99.9 kg (220 lb 3.2 oz)   SpO2 98%   BMI 35.54 kg/m²     Physical Exam  Constitutional:       General: She is not in acute distress.     Appearance: Normal appearance. She is not toxic-appearing.   HENT:      Head: Normocephalic and atraumatic.      Right Ear: " Tympanic membrane, ear canal and external ear normal.      Left Ear: Tympanic membrane, ear canal and external ear normal.      Nose:      Left Sinus: Maxillary sinus tenderness present.      Mouth/Throat:      Mouth: Mucous membranes are moist.      Pharynx: Oropharynx is clear.   Eyes:      Extraocular Movements: Extraocular movements intact.      Conjunctiva/sclera: Conjunctivae normal.      Pupils: Pupils are equal, round, and reactive to light.   Cardiovascular:      Rate and Rhythm: Normal rate and regular rhythm.      Pulses: Normal pulses.      Heart sounds: Normal heart sounds. No murmur heard.  Pulmonary:      Effort: Pulmonary effort is normal.      Breath sounds: Normal breath sounds. No wheezing.   Abdominal:      General: Bowel sounds are normal.      Palpations: Abdomen is soft.   Musculoskeletal:         General: No swelling.      Cervical back: Normal range of motion and neck supple.   Skin:     General: Skin is warm and dry.      Findings: Rash present.      Comments: Fungal irritation noted between patient's thighs, under abdomen and self-reported under bilateral breasts.   Neurological:      Mental Status: She is alert. Mental status is at baseline.      Cranial Nerves: No cranial nerve deficit.      Motor: Weakness present.      Comments: Right-sided weakness   Psychiatric:         Mood and Affect: Mood normal.         Behavior: Behavior normal.         Assessment/Plan   Problem List Items Addressed This Visit             ICD-10-CM    Major depressive disorder, single episode, severe (Multi) F32.2     Patient to maintain routine follow-up with [facility] for counseling/psychiatric services.  She is to continue current medication regiment and notify provider for any persistent/worsening mood concerns.         History of stroke with current residual effects I69.30     Late effect of prior CVA from Moyamoya.  No recent neurology follow-up?  OP PT as appropriate.  Patient continues with  24-hour care through [facility]         Relevant Orders    Comprehensive Metabolic Panel (Completed)    CBC and Auto Differential (Completed)    Lipid Panel Non-Fasting (Completed)    Vitamin D deficiency E55.9    Relevant Orders    Vitamin D 25-Hydroxy,Total (for eval of Vitamin D levels) (Completed)    H/O ileostomy Z98.890     Patient reports issues with diarrhea status post multiple bowel surgeries.  She continues with probiotic and Bentyl as needed.  Provider to be notified for any persistent/worsening bowel concerns         Acute non-recurrent maxillary sinusitis J01.00     Due to length and severity of symptoms, will initiate course of oral antibiotics.  Patient encouraged to rest, stay hydrated and notify provider for any persistent/worsening infection concerns.         Relevant Medications    doxycycline (Vibramycin) 100 mg capsule    Fungal dermatitis B36.9     Nystatin cream to be applied to affected site twice daily as needed for skin irritation.  Provider to be notified for any persistent/worsening irritation/skin concerns         Relevant Medications    nystatin (Mycostatin) cream    Routine adult health maintenance - Primary Z00.00     Routine blood work ordered today for assessment purposes.  Will continue to monitor as appropriate         Relevant Orders    Comprehensive Metabolic Panel (Completed)    TSH with reflex to Free T4 if abnormal (Completed)    CBC and Auto Differential (Completed)     Other Visit Diagnoses         Codes    Screening for diabetes mellitus     Z13.1    Relevant Orders    Hemoglobin A1C (Completed)    Blood glucose abnormal     R73.09    Relevant Orders    Hemoglobin A1C (Completed)    Screening for thyroid disorder     Z13.29    Relevant Orders    TSH with reflex to Free T4 if abnormal (Completed)    Encounter for lipid screening for cardiovascular disease     Z13.220, Z13.6    Relevant Orders    Lipid Panel Non-Fasting (Completed)    Encounter for vitamin  deficiency screening     Z13.21    Relevant Orders    Vitamin D 25-Hydroxy,Total (for eval of Vitamin D levels) (Completed)    On long term drug therapy     Z79.899    Relevant Orders    Vitamin D 25-Hydroxy,Total (for eval of Vitamin D levels) (Completed)    Screening for lipid disorders     Z13.220    Relevant Orders    Lipid Panel Non-Fasting (Completed)    B12 deficiency     E53.8    Relevant Medications    cyanocobalamin (Vitamin B-12) injection 1,000 mcg (Completed)    Other Relevant Orders    Vitamin B12 (Completed)    Elevated liver enzymes     R74.8    Relevant Orders    Hepatic function panel

## 2025-03-25 ENCOUNTER — TELEPHONE (OUTPATIENT)
Dept: PRIMARY CARE | Facility: CLINIC | Age: 35
End: 2025-03-25
Payer: MEDICARE

## 2025-03-25 LAB
25(OH)D3+25(OH)D2 SERPL-MCNC: 22 NG/ML (ref 30–100)
ALBUMIN SERPL-MCNC: 4.6 G/DL (ref 3.6–5.1)
ALP SERPL-CCNC: 109 U/L (ref 31–125)
ALT SERPL-CCNC: 73 U/L (ref 6–29)
ANION GAP SERPL CALCULATED.4IONS-SCNC: 11 MMOL/L (CALC) (ref 7–17)
AST SERPL-CCNC: 39 U/L (ref 10–30)
BASOPHILS # BLD AUTO: 58 CELLS/UL (ref 0–200)
BASOPHILS NFR BLD AUTO: 0.6 %
BILIRUB SERPL-MCNC: 0.5 MG/DL (ref 0.2–1.2)
BUN SERPL-MCNC: 14 MG/DL (ref 7–25)
CALCIUM SERPL-MCNC: 8.7 MG/DL (ref 8.6–10.2)
CHLORIDE SERPL-SCNC: 112 MMOL/L (ref 98–110)
CHOLEST SERPL-MCNC: 145 MG/DL
CHOLEST/HDLC SERPL: 3.8 (CALC)
CO2 SERPL-SCNC: 16 MMOL/L (ref 20–32)
CREAT SERPL-MCNC: 0.74 MG/DL (ref 0.5–0.97)
EGFRCR SERPLBLD CKD-EPI 2021: 107 ML/MIN/1.73M2
EOSINOPHIL # BLD AUTO: 240 CELLS/UL (ref 15–500)
EOSINOPHIL NFR BLD AUTO: 2.5 %
ERYTHROCYTE [DISTWIDTH] IN BLOOD BY AUTOMATED COUNT: 14.1 % (ref 11–15)
EST. AVERAGE GLUCOSE BLD GHB EST-MCNC: 97 MG/DL
EST. AVERAGE GLUCOSE BLD GHB EST-SCNC: 5.4 MMOL/L
GLUCOSE SERPL-MCNC: 74 MG/DL (ref 65–139)
HBA1C MFR BLD: 5 % OF TOTAL HGB
HCT VFR BLD AUTO: 45.8 % (ref 35–45)
HDLC SERPL-MCNC: 38 MG/DL
HGB BLD-MCNC: 15.1 G/DL (ref 11.7–15.5)
LDLC SERPL CALC-MCNC: 80 MG/DL (CALC)
LYMPHOCYTES # BLD AUTO: 2794 CELLS/UL (ref 850–3900)
LYMPHOCYTES NFR BLD AUTO: 29.1 %
MCH RBC QN AUTO: 28.8 PG (ref 27–33)
MCHC RBC AUTO-ENTMCNC: 33 G/DL (ref 32–36)
MCV RBC AUTO: 87.4 FL (ref 80–100)
MONOCYTES # BLD AUTO: 461 CELLS/UL (ref 200–950)
MONOCYTES NFR BLD AUTO: 4.8 %
NEUTROPHILS # BLD AUTO: 6048 CELLS/UL (ref 1500–7800)
NEUTROPHILS NFR BLD AUTO: 63 %
NONHDLC SERPL-MCNC: 107 MG/DL (CALC)
PLATELET # BLD AUTO: 392 THOUSAND/UL (ref 140–400)
PMV BLD REES-ECKER: 10.5 FL (ref 7.5–12.5)
POTASSIUM SERPL-SCNC: 5.1 MMOL/L (ref 3.5–5.3)
PROT SERPL-MCNC: 7.1 G/DL (ref 6.1–8.1)
RBC # BLD AUTO: 5.24 MILLION/UL (ref 3.8–5.1)
SODIUM SERPL-SCNC: 139 MMOL/L (ref 135–146)
TRIGL SERPL-MCNC: 178 MG/DL
TSH SERPL-ACNC: 3.97 MIU/L
VIT B12 SERPL-MCNC: 288 PG/ML (ref 200–1100)
WBC # BLD AUTO: 9.6 THOUSAND/UL (ref 3.8–10.8)

## 2025-03-25 NOTE — TELEPHONE ENCOUNTER
Aracelis was informed of results and states that pt is not taking her meds like she should and is only drinking dr. Cole

## 2025-03-25 NOTE — TELEPHONE ENCOUNTER
----- Message from Lavinia Walker sent at 3/25/2025  8:52 AM EDT -----  Can you please update patient's JOAOA, Aracelis regarding most recent blood work results?  Vitamin D levels were found to be insufficient.  Per review of medications, the vitamin D supplement is listed.  Can you please ensure the patient is taking this daily?  If so, we will need to increase her dosage.  Thyroid levels, blood counts, kidney function and hemoglobin A1c were all stable.  Some liver enzymes were found to be mildly elevated.  It is encouraged that patient avoid acetaminophen use as she is already not drinking alcohol and increase fluid intake.  Orders are placed to have blood work rechecked in 1 month for surveillance purposes.  Vitamin B12 levels were found to be in the low normal range.  She can continue with monthly vitamin B12 injections in office as previously doing so.  Please notify the office for any additional questions or concerns.

## 2025-03-26 PROBLEM — L70.9 ACNE: Status: RESOLVED | Noted: 2023-03-20 | Resolved: 2025-03-26

## 2025-03-26 PROBLEM — R10.12 ABDOMINAL PAIN, LEFT UPPER QUADRANT: Status: RESOLVED | Noted: 2023-03-20 | Resolved: 2025-03-26

## 2025-03-26 PROBLEM — R63.4 ABNORMAL WEIGHT LOSS: Status: RESOLVED | Noted: 2023-03-20 | Resolved: 2025-03-26

## 2025-03-26 PROBLEM — M54.50 LOW BACK PAIN: Status: RESOLVED | Noted: 2024-10-24 | Resolved: 2025-03-26

## 2025-03-26 PROBLEM — M54.9 CHRONIC BACK PAIN: Status: RESOLVED | Noted: 2023-03-20 | Resolved: 2025-03-26

## 2025-03-26 PROBLEM — M25.372 INSTABILITY OF LEFT ANKLE JOINT: Status: RESOLVED | Noted: 2023-03-20 | Resolved: 2025-03-26

## 2025-03-26 PROBLEM — R39.9 SYMPTOMS INVOLVING URINARY SYSTEM: Status: RESOLVED | Noted: 2023-03-20 | Resolved: 2025-03-26

## 2025-03-26 PROBLEM — Z93.2 S/P ILEOSTOMY (MULTI): Status: RESOLVED | Noted: 2019-09-18 | Resolved: 2025-03-26

## 2025-03-26 PROBLEM — S82.891A CLOSED FRACTURE OF RIGHT ANKLE, INITIAL ENCOUNTER: Status: RESOLVED | Noted: 2024-09-13 | Resolved: 2025-03-26

## 2025-03-26 PROBLEM — R30.0 DYSURIA: Status: RESOLVED | Noted: 2023-03-20 | Resolved: 2025-03-26

## 2025-03-26 PROBLEM — K59.00 CONSTIPATION: Status: RESOLVED | Noted: 2023-03-20 | Resolved: 2025-03-26

## 2025-03-26 PROBLEM — K21.9 ESOPHAGEAL REFLUX: Status: RESOLVED | Noted: 2023-03-20 | Resolved: 2025-03-26

## 2025-03-26 PROBLEM — R10.2 PAIN, PELVIC, FEMALE: Status: RESOLVED | Noted: 2023-03-20 | Resolved: 2025-03-26

## 2025-03-26 PROBLEM — S93.04XA ANKLE DISLOCATION, RIGHT, INITIAL ENCOUNTER: Status: RESOLVED | Noted: 2024-09-15 | Resolved: 2025-03-26

## 2025-03-26 PROBLEM — N89.8 VAGINAL DISCHARGE: Status: RESOLVED | Noted: 2023-03-20 | Resolved: 2025-03-26

## 2025-03-26 PROBLEM — K90.829 SHORT BOWEL SYNDROME: Status: RESOLVED | Noted: 2024-10-24 | Resolved: 2025-03-26

## 2025-03-26 PROBLEM — M79.672 LEFT FOOT PAIN: Status: RESOLVED | Noted: 2023-03-20 | Resolved: 2025-03-26

## 2025-03-26 PROBLEM — R10.13 EPIGASTRIC PAIN: Status: RESOLVED | Noted: 2023-03-20 | Resolved: 2025-03-26

## 2025-03-26 PROBLEM — M79.673 PAIN OF FOOT: Status: RESOLVED | Noted: 2023-03-20 | Resolved: 2025-03-26

## 2025-03-26 PROBLEM — H61.119 ACQUIRED EAR DEFORMITY: Status: RESOLVED | Noted: 2024-07-03 | Resolved: 2025-03-26

## 2025-03-26 PROBLEM — M25.551 PAIN OF RIGHT HIP JOINT: Status: RESOLVED | Noted: 2023-03-20 | Resolved: 2025-03-26

## 2025-03-26 PROBLEM — G89.29 CHRONIC BACK PAIN: Status: RESOLVED | Noted: 2023-03-20 | Resolved: 2025-03-26

## 2025-03-26 PROBLEM — M25.369 INSTABILITY OF KNEE JOINT: Status: RESOLVED | Noted: 2023-03-20 | Resolved: 2025-03-26

## 2025-03-26 PROBLEM — R29.898 WEAKNESS OF HAND: Status: RESOLVED | Noted: 2023-03-20 | Resolved: 2025-03-26

## 2025-03-26 PROBLEM — M25.511 CHRONIC RIGHT SHOULDER PAIN: Status: RESOLVED | Noted: 2023-03-20 | Resolved: 2025-03-26

## 2025-03-26 PROBLEM — M79.601 CHRONIC PAIN OF RIGHT UPPER EXTREMITY: Status: RESOLVED | Noted: 2024-10-24 | Resolved: 2025-03-26

## 2025-03-26 PROBLEM — M25.572 LEFT ANKLE PAIN: Status: RESOLVED | Noted: 2023-03-20 | Resolved: 2025-03-26

## 2025-03-26 PROBLEM — R19.7 DIARRHEA: Status: RESOLVED | Noted: 2024-06-12 | Resolved: 2025-03-26

## 2025-03-26 PROBLEM — M25.539 PAIN IN WRIST: Status: RESOLVED | Noted: 2023-03-20 | Resolved: 2025-03-26

## 2025-03-26 PROBLEM — M25.531 RIGHT WRIST PAIN: Status: RESOLVED | Noted: 2023-03-20 | Resolved: 2025-03-26

## 2025-03-26 PROBLEM — G89.29 CHRONIC RIGHT SHOULDER PAIN: Status: RESOLVED | Noted: 2023-03-20 | Resolved: 2025-03-26

## 2025-03-26 PROBLEM — S83.419A KNEE MCL SPRAIN: Status: RESOLVED | Noted: 2023-03-20 | Resolved: 2025-03-26

## 2025-03-26 PROBLEM — S83.90XA KNEE SPRAIN: Status: RESOLVED | Noted: 2023-03-20 | Resolved: 2025-03-26

## 2025-03-26 PROBLEM — S69.91XA INJURY OF FINGER OF RIGHT HAND: Status: RESOLVED | Noted: 2023-03-20 | Resolved: 2025-03-26

## 2025-03-26 PROBLEM — M19.90 OSTEOARTHRITIS: Status: RESOLVED | Noted: 2023-03-20 | Resolved: 2025-03-26

## 2025-03-26 PROBLEM — R11.10 INTRACTABLE VOMITING: Status: RESOLVED | Noted: 2023-03-20 | Resolved: 2025-03-26

## 2025-03-26 PROBLEM — S82.851A CLOSED DISPLACED TRIMALLEOLAR FRACTURE OF RIGHT ANKLE, INITIAL ENCOUNTER: Status: RESOLVED | Noted: 2024-09-27 | Resolved: 2025-03-26

## 2025-03-26 PROBLEM — M79.641 PAIN OF RIGHT HAND: Status: RESOLVED | Noted: 2023-03-20 | Resolved: 2025-03-26

## 2025-03-26 PROBLEM — R05.3 CHRONIC COUGH: Status: RESOLVED | Noted: 2023-03-20 | Resolved: 2025-03-26

## 2025-03-26 PROBLEM — R10.84 GENERALIZED ABDOMINAL PAIN: Status: RESOLVED | Noted: 2023-03-20 | Resolved: 2025-03-26

## 2025-03-26 PROBLEM — G89.29 CHRONIC PAIN OF RIGHT UPPER EXTREMITY: Status: RESOLVED | Noted: 2024-10-24 | Resolved: 2025-03-26

## 2025-03-26 PROBLEM — K90.829 SHORT GUT SYNDROME: Status: RESOLVED | Noted: 2023-03-20 | Resolved: 2025-03-26

## 2025-03-26 PROBLEM — M25.579 ANKLE PAIN: Status: RESOLVED | Noted: 2024-10-24 | Resolved: 2025-03-26

## 2025-03-26 PROBLEM — Q17.8 SPLIT EAR LOBE: Status: RESOLVED | Noted: 2024-07-03 | Resolved: 2025-03-26

## 2025-03-26 ASSESSMENT — ENCOUNTER SYMPTOMS: FACIAL SWELLING: 1

## 2025-03-26 NOTE — ASSESSMENT & PLAN NOTE
Patient reports issues with diarrhea status post multiple bowel surgeries.  She continues with probiotic and Bentyl as needed.  Provider to be notified for any persistent/worsening bowel concerns

## 2025-03-26 NOTE — ASSESSMENT & PLAN NOTE
Nystatin cream to be applied to affected site twice daily as needed for skin irritation.  Provider to be notified for any persistent/worsening irritation/skin concerns

## 2025-03-26 NOTE — ASSESSMENT & PLAN NOTE
Patient to maintain routine follow-up with [facility] for counseling/psychiatric services.  She is to continue current medication regiment and notify provider for any persistent/worsening mood concerns.

## 2025-03-26 NOTE — ASSESSMENT & PLAN NOTE
Late effect of prior CVA from Moyamoya.  No recent neurology follow-up?  OP PT as appropriate.  Patient continues with 24-hour care through [facility]

## 2025-03-26 NOTE — ASSESSMENT & PLAN NOTE
Due to length and severity of symptoms, will initiate course of oral antibiotics.  Patient encouraged to rest, stay hydrated and notify provider for any persistent/worsening infection concerns.

## 2025-03-26 NOTE — ASSESSMENT & PLAN NOTE
Routine blood work ordered today for assessment purposes.  Will continue to monitor as appropriate

## 2025-03-27 DIAGNOSIS — E53.8 B12 DEFICIENCY: Primary | ICD-10-CM

## 2025-03-27 RX ORDER — PNV NO.95/FERROUS FUM/FOLIC AC 28MG-0.8MG
100 TABLET ORAL DAILY
Qty: 30 TABLET | Refills: 0 | Status: SHIPPED | OUTPATIENT
Start: 2025-03-27

## 2025-03-28 DIAGNOSIS — F43.10 POST-TRAUMATIC STRESS DISORDER, UNSPECIFIED: ICD-10-CM

## 2025-03-28 RX ORDER — TRAZODONE HYDROCHLORIDE 100 MG/1
100 TABLET ORAL NIGHTLY
Qty: 30 TABLET | Refills: 2 | Status: SHIPPED | OUTPATIENT
Start: 2025-03-28

## 2025-03-28 RX ORDER — SERTRALINE HYDROCHLORIDE 100 MG/1
100 TABLET, FILM COATED ORAL DAILY
Qty: 30 TABLET | Refills: 2 | Status: SHIPPED | OUTPATIENT
Start: 2025-03-28

## 2025-03-28 RX ORDER — PRAZOSIN HYDROCHLORIDE 5 MG/1
5 CAPSULE ORAL NIGHTLY
Qty: 30 CAPSULE | Refills: 2 | Status: SHIPPED | OUTPATIENT
Start: 2025-03-28

## 2025-03-31 ENCOUNTER — HOSPITAL ENCOUNTER (EMERGENCY)
Facility: HOSPITAL | Age: 35
Discharge: HOME | End: 2025-04-01
Payer: MEDICARE

## 2025-03-31 VITALS
TEMPERATURE: 99.3 F | RESPIRATION RATE: 19 BRPM | DIASTOLIC BLOOD PRESSURE: 88 MMHG | HEART RATE: 75 BPM | HEIGHT: 65 IN | OXYGEN SATURATION: 99 % | WEIGHT: 222 LBS | SYSTOLIC BLOOD PRESSURE: 128 MMHG | BODY MASS INDEX: 36.99 KG/M2

## 2025-03-31 DIAGNOSIS — R68.84 JAW PAIN: Primary | ICD-10-CM

## 2025-03-31 DIAGNOSIS — K03.81 CRACKED TOOTH: ICD-10-CM

## 2025-03-31 PROCEDURE — 2500000004 HC RX 250 GENERAL PHARMACY W/ HCPCS (ALT 636 FOR OP/ED): Performed by: PHYSICIAN ASSISTANT

## 2025-03-31 PROCEDURE — 99284 EMERGENCY DEPT VISIT MOD MDM: CPT

## 2025-03-31 PROCEDURE — 96372 THER/PROPH/DIAG INJ SC/IM: CPT | Performed by: PHYSICIAN ASSISTANT

## 2025-03-31 PROCEDURE — 2500000001 HC RX 250 WO HCPCS SELF ADMINISTERED DRUGS (ALT 637 FOR MEDICARE OP): Performed by: PHYSICIAN ASSISTANT

## 2025-03-31 RX ORDER — CEFDINIR 300 MG/1
300 CAPSULE ORAL ONCE
Status: COMPLETED | OUTPATIENT
Start: 2025-03-31 | End: 2025-03-31

## 2025-03-31 RX ORDER — KETOROLAC TROMETHAMINE 30 MG/ML
30 INJECTION, SOLUTION INTRAMUSCULAR; INTRAVENOUS ONCE
Status: COMPLETED | OUTPATIENT
Start: 2025-03-31 | End: 2025-03-31

## 2025-03-31 RX ORDER — HYDROCODONE BITARTRATE AND ACETAMINOPHEN 5; 325 MG/1; MG/1
1 TABLET ORAL ONCE
Status: COMPLETED | OUTPATIENT
Start: 2025-03-31 | End: 2025-03-31

## 2025-03-31 RX ADMIN — HYDROCODONE BITARTRATE AND ACETAMINOPHEN 1 TABLET: 5; 325 TABLET ORAL at 23:50

## 2025-03-31 RX ADMIN — CEFDINIR 300 MG: 300 CAPSULE ORAL at 23:50

## 2025-03-31 RX ADMIN — KETOROLAC TROMETHAMINE 30 MG: 30 INJECTION, SOLUTION INTRAMUSCULAR at 23:49

## 2025-03-31 ASSESSMENT — PAIN - FUNCTIONAL ASSESSMENT: PAIN_FUNCTIONAL_ASSESSMENT: 0-10

## 2025-03-31 ASSESSMENT — PAIN SCALES - GENERAL
PAINLEVEL_OUTOF10: 6
PAINLEVEL_OUTOF10: 6

## 2025-03-31 ASSESSMENT — PAIN DESCRIPTION - FREQUENCY: FREQUENCY: CONSTANT/CONTINUOUS

## 2025-03-31 ASSESSMENT — PAIN DESCRIPTION - PAIN TYPE: TYPE: ACUTE PAIN

## 2025-03-31 ASSESSMENT — PAIN DESCRIPTION - ORIENTATION: ORIENTATION: LEFT

## 2025-03-31 ASSESSMENT — PAIN DESCRIPTION - DESCRIPTORS: DESCRIPTORS: SQUEEZING

## 2025-03-31 ASSESSMENT — PAIN DESCRIPTION - LOCATION: LOCATION: FACE

## 2025-04-01 RX ORDER — DIAZEPAM 5 MG/1
5 TABLET ORAL NIGHTLY
Qty: 5 TABLET | Refills: 0 | Status: SHIPPED | OUTPATIENT
Start: 2025-04-01

## 2025-04-01 RX ORDER — CEFDINIR 300 MG/1
300 CAPSULE ORAL 2 TIMES DAILY
Qty: 14 CAPSULE | Refills: 0 | Status: SHIPPED | OUTPATIENT
Start: 2025-04-01 | End: 2025-04-08

## 2025-04-01 NOTE — ED PROVIDER NOTES
Chief Complaint   Patient presents with    Facial Pain    Jaw Pain    Headache     HPI:   Windy Lovelace is an 34 y.o. female complicated PMH including moyamoya, CVA, developmental delay who presents to the ED with caregiver for evaluation of left sided jaw pain and pressure that radiates into her head.  She said symptoms have been occurring for the past 5 weeks, caregiver says past 2 weeks.  She saw her PCP about a week ago and was started on doxycycline for sinusitis.  She has 1 pill left.  Does not seem to be helping.  Caregiver notes that pain seems to be most pronounced when patient wakes up.  Thinks maybe she might be grinding her teeth or tensing her jaw when she sleeps.  Patient took 2 doses of Advil today.  Last dose 1800.  Says that is not helping with pain.  Currently rates pain 8/10.  Pain is aggravated by touch, chewing.  No relieving factors.  She denies any ear pain, sore throat, neck pain or stiffness, chest pain, shortness of breath, fever or chills.  Denies chance of pregnancy.  Has not seen her dentist recently.    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    Headache, unspecified 04/29/2015    Severe headache    Hiatal hernia     LFTs abnormal     Mares mares disease     Obesity (BMI 30-39.9)     Personal history of other mental and behavioral disorders 10/06/2017    History of mental retardation    Syncope and collapse 06/13/2017    Syncope and collapse     Past Surgical History:   Procedure Laterality Date    MR HEAD ANGIO W IV CONTRAST  08/18/2015    MR HEAD ANGIO W IV CONTRAST 8/18/2015 [facility] ANCILLARY LEGACY    ORIF ANKLE FRACTURE Right 09/26/2024    Right  Ankle ORIF - Right    OTHER SURGICAL HISTORY  06/16/2015    History Of Prior Surgery    OTHER SURGICAL HISTORY  10/06/2017    Excision Of Lesion Face    OTHER SURGICAL HISTORY  07/23/2019    Small bowel resection    US GUIDED ABSCESS DRAIN  04/10/2019    US GUIDED ABSCESS DRAIN 4/10/2019 [facility] [place]    US GUIDED PERCUTANEOUS PERITONEAL OR RETROPERITONEAL FLUID COLLECTION DRAINAGE  04/10/2019    US GUIDED PERCUTANEOUS PERITONEAL OR RETROPERITONEAL FLUID COLLECTION DRAINAGE 4/10/2019 [facility] [place] [facility]     Family History   Problem Relation Name Age of Onset    Kidney cancer Mother      Liver cancer Mother      Pancreatic cancer Father      Glaucoma Other grandmother     Diabetes Other Grandfather         other type with arthropathy, with long term curent use of insulin    Diabetes Other aunt         other type with arthropathy, with long term curent use of insulin        Physical Exam  Vitals and nursing note reviewed.   Constitutional:       General: She is not in acute distress.     Appearance: Normal appearance. She is not ill-appearing or toxic-appearing.      Comments: Elevated BMI   HENT:      Head: Normocephalic and atraumatic.        Right Ear: External ear normal.      Left Ear: External ear normal.      Mouth/Throat:      Mouth: Mucous membranes are moist.      Dentition: Dental caries present. No dental abscesses.      Tongue: No lesions.      Pharynx: Oropharynx is clear. Uvula midline. No uvula swelling.      Tonsils: No tonsillar exudate or tonsillar abscesses. 0 on the right. 0 on the left.     Eyes:      Pupils: Pupils are equal, round, and reactive to light.   Cardiovascular:      Rate and Rhythm: Normal rate and regular rhythm.      Pulses: Normal pulses.      Heart sounds: Normal heart sounds.   Pulmonary:      Effort: Pulmonary effort is normal. No respiratory distress.      Breath sounds: Normal breath sounds.   Abdominal:      Tenderness: There is no rebound.   Musculoskeletal:          General: No deformity or signs of injury. Normal range of motion.      Cervical back: Normal range of motion.   Lymphadenopathy:      Cervical: No cervical adenopathy.   Skin:     General: Skin is warm and dry.      Findings: No bruising.   Neurological:      General: No focal deficit present.      Mental Status: She is alert.      GCS: GCS eye subscore is 4. GCS verbal subscore is 5. GCS motor subscore is 6.      Cranial Nerves: No cranial nerve deficit.     VS: As documented in the triage note and EMR flowsheet from this visit were reviewed.    External Records Reviewed: I reviewed recent and relevant outside records including: Reviewed PCP note 3/24/2025.  Patient seen for follow-up for vitamin deficiency.  Did discuss left-sided jaw pain times weeks.  Had recent dental exam.  Diagnosed with sinusitis and given prescription for Doxy twice daily x 7 days due to penicillin allergy.    Medical Decision Making:   ED Course as of 04/01/25 0553   Mon Mar 31, 2025   2303 Vitals Reviewed: Temp 99.3 Fahrenheit.  Normotensive.  Not tachycardic nor tachypneic. No hypoxia.   [KA]   7566 Patient is a 34-year-old female who presents to the ED for evaluation of left-sided jaw pain.  On exam she has tenderness to palpation over the left TMJ.  Also has cracked tooth #19.  Teeth numbers 17 and 18 are missing.  No appreciable dental abscess.  Patent oropharynx.  Tongue normal.  No Parker's angina nor peritonsillar abscess.  No cervical lymphadenopathy, otitis media, mastoiditis.  Patient to be given Norco, IM Toradol in the ED.  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  I spoke with patient's guardian over the phone.  She is concerned that this is infection.  Feel be reasonable to try course of cefdinir rather than Doxy.  Advised patient not to take her last pill of Doxy.  She says her allergic reaction to penicillins was a rash as a child.  Denies anaphylactic reaction.  Guardian concurs.  Patient to be given first dose of cefdinir in  the ED. [KA]   Tue Apr 01, 2025   0014 Patient reports improvement in symptoms following medication administration.  Ran and interaction check with patient's daily medications, alprazolam, tramadol and Norco.  Is recommended not to give patient tramadol.  Alprazolam had fewer interactions than Norco.  Will send patient home with a few tablets of alprazolam to be used nightly.  Will also send home with cefdinir. She is going to see her dentist as soon as possible.  Will place referral to ENT.  Advised follow-up with PCP in 2 to 5 days and return to ED for any new or worsening symptoms.  They are agreeable. [KA]      ED Course User Index  [KA] Kimberly Peacock PA-C         Diagnoses as of 04/01/25 0553   Jaw pain   Cracked tooth   Escalation of Care: Appropriate for outpatient management     Counseling: Spoke with the patient and discussed today´s findings, in addition to providing specific details for the plan of care and expected course.  Patient was given the opportunity to ask questions.    Discussed return precautions and importance of follow-up.  Advised to follow-up with ENT, PCP, dentist.  Advised to return to the ED for changing or worsening symptoms, new symptoms, complaint specific precautions, and precautions listed on the discharge paperwork.  Educated on the common potential side effects of medications prescribed.    I advised the patient that the emergency evaluation and treatment provided today doesn't end their need for medical care. It is very important that they follow-up with their primary care provider or other specialist as instructed.    The plan of care was mutually agreed upon with the patient. The patient and/or family were given the opportunity to ask questions. All questions asked today in the ED were answered to the best of my ability with today's information.    I specifically advised the patient to return to the ED for changing or worsening symptoms, worrisome new symptoms, or for any  complaint specific precautions listed on the discharge paperwork.    This patient was cared for in the setting of nationwide stress on resources and staffing.    This report was transcribed using voice recognition software.  Every effort was made to ensure accuracy, however, inadvertently computerized transcription errors may be present.       Kimberly Peacock PA-C  04/01/25 0553

## 2025-04-01 NOTE — ED TRIAGE NOTES
Patient arrived to ED from home with c/o facial/jaw pain from a sinus infection. Patient reports being on the last dose of antib. Patient reports pain is still there. Patient denies any other issues. Patient stable at this time.

## 2025-04-01 NOTE — ED TRIAGE NOTES
This patient was seen in triage.     Vitals are noted.      HPI:  Patient is a 34-year-old female with past medical history of moyamoya, CVA, developmental delay presenting to the emergency department for left facial pain that has been persistent, is currently being treated for a sinus infection, has 1 day left of her doxycycline.  States her symptoms have been ongoing for the last 5 weeks.  No fevers, chills, no purulent drainage from the nose, no congestion.  Is tender on palpation to left side of the face and scalp    Focused PE:  Gen: Well-appearing, not in acute distress  Cardiovascular: Regular rate, normal rhythm, no murmur, no gallop  Respiratory: No adventitious lung sounds auscultated.  Abdomen: No reproducible abdominal tenderness upon palpation,  physical exam may be limited by patient positioning sitting up in a chair  Neuro:  Alert and Oriented, speech clear and coherent              For the remainder of the patient's workup and ED course, please see the main ED provider note.  We discussed need for diagnostic testing including lab studies and imaging.  We also discussed that they may be asked to wait in the waiting room while these test are pending.  They understand that if they choose to leave without having the testing completed or resulted that we cannot rule out acute life-threatening illnesses and the risks involved to lead to worsening condition, permanent disability or even death.

## 2025-04-01 NOTE — DISCHARGE INSTRUCTIONS
Please stop taking the left of the doxycycline and changed to cefdinir twice daily x 7 days.  Complete full course of antibiotics.  Begin nightly diazepam.  You have been given a short course of the medication.  If it works you can talk to one of your other doctors about prescribing it.  Follow-up with your PCP within the next 2 to 5 days.  Recommend seeing dentist as soon as possible.  Follow-up with ENT at soonest possible appointment.  This sometimes takes a while.  Return to ER for any new or worsening symptoms such as temperature greater than 100.4 while on antibiotics, inability to swallow, neck pain or stiffness or anything else concerning to you.

## 2025-04-16 ENCOUNTER — APPOINTMENT (OUTPATIENT)
Dept: OTOLARYNGOLOGY | Facility: CLINIC | Age: 35
End: 2025-04-16
Payer: MEDICARE

## 2025-04-16 ENCOUNTER — OFFICE VISIT (OUTPATIENT)
Dept: URGENT CARE | Age: 35
End: 2025-04-16
Payer: MEDICARE

## 2025-04-16 VITALS
SYSTOLIC BLOOD PRESSURE: 135 MMHG | HEIGHT: 66 IN | TEMPERATURE: 97.3 F | OXYGEN SATURATION: 98 % | WEIGHT: 222 LBS | DIASTOLIC BLOOD PRESSURE: 95 MMHG | HEART RATE: 73 BPM | BODY MASS INDEX: 35.68 KG/M2

## 2025-04-16 DIAGNOSIS — N89.8 ITCHING IN THE VAGINAL AREA: ICD-10-CM

## 2025-04-16 DIAGNOSIS — L29.0 RECTAL ITCHING: ICD-10-CM

## 2025-04-16 DIAGNOSIS — B37.31 VAGINA, CANDIDIASIS: Primary | ICD-10-CM

## 2025-04-16 LAB
POC APPEARANCE, URINE: CLEAR
POC BILIRUBIN, URINE: NEGATIVE
POC BLOOD, URINE: ABNORMAL
POC COLOR, URINE: ABNORMAL
POC GLUCOSE, URINE: NEGATIVE MG/DL
POC KETONES, URINE: NEGATIVE MG/DL
POC LEUKOCYTES, URINE: NEGATIVE
POC NITRITE,URINE: NEGATIVE
POC PH, URINE: 6 PH
POC PROTEIN, URINE: ABNORMAL MG/DL
POC SPECIFIC GRAVITY, URINE: >=1.03
POC UROBILINOGEN, URINE: 0.2 EU/DL
PREGNANCY TEST URINE, POC: NEGATIVE

## 2025-04-16 RX ORDER — FLUCONAZOLE 150 MG/1
150 TABLET ORAL ONCE
Qty: 1 TABLET | Refills: 0 | Status: SHIPPED | OUTPATIENT
Start: 2025-04-16 | End: 2025-04-16

## 2025-04-16 RX ORDER — NYSTATIN 100000 [USP'U]/G
1 POWDER TOPICAL 2 TIMES DAILY
Qty: 60 G | Refills: 0 | Status: SHIPPED | OUTPATIENT
Start: 2025-04-16 | End: 2026-04-16

## 2025-04-16 ASSESSMENT — ENCOUNTER SYMPTOMS
DIZZINESS: 0
SHORTNESS OF BREATH: 0
ABDOMINAL PAIN: 0
DIFFICULTY URINATING: 0
HEMATURIA: 0
HEADACHES: 0
COUGH: 0
FREQUENCY: 0
FLANK PAIN: 0
DIARRHEA: 0
DYSURIA: 0
CHILLS: 0
VOMITING: 0
NAUSEA: 0
COLOR CHANGE: 0
FEVER: 0

## 2025-04-16 NOTE — PROGRESS NOTES
Subjective   Patient ID: Windy Lovelace is a 34 y.o. female. They present today with a chief complaint of Vaginal Itching (Pt states [id number] vaginal and rectal itchy/burning ).    History of Present Illness  34-year-old female with a medical history of, but not limited to, CVA with right-sided residual and asthma presents to the clinic today for evaluation of vaginal itching and burning.  Patient also states her rectal area is itching and burning as well.  Patient states that she was just on an antibiotic for a dental infection.  Patient denies any recent GI infections.  Denies urinary complaints.  Patient is not sexually active and has not been since 2018.  Denies hematuria, flank pain, nausea, vomiting, abdominal pain, vaginal complaints.  Denies fever, chills.      Vaginal Itching  Associated symptoms: no abdominal pain, no chest pain, no cough, no diarrhea, no fever, no headaches, no nausea, no rash, no shortness of breath and no vomiting        Past Medical History  Allergies as of 04/16/2025 - Reviewed 04/16/2025   Allergen Reaction Noted    Bupropion Unknown and Hives 02/03/2003    Ketamine Unknown, Anaphylaxis, and Rash 02/03/2003    Augmentin [amoxicillin-pot clavulanate] Hives 03/20/2023    Penicillins Hives, Unknown, and Other 02/24/2016       Prescriptions Prior to Admission[1]     Medical History[2]    Surgical History[3]     reports that she has never smoked. She has never used smokeless tobacco. She reports that she does not drink alcohol and does not use drugs.    Review of Systems  Review of Systems   Constitutional:  Negative for chills and fever.   Respiratory:  Negative for cough and shortness of breath.    Cardiovascular:  Negative for chest pain.   Gastrointestinal:  Negative for abdominal pain, diarrhea, nausea and vomiting.   Genitourinary:  Negative for difficulty urinating, dysuria, flank pain, frequency, genital sores, hematuria, menstrual problem, pelvic pain, urgency, vaginal  "bleeding and vaginal pain.        Vaginal itching, rectal itching   Skin:  Negative for color change and rash.   Neurological:  Negative for dizziness and headaches.   All other systems reviewed and are negative.                                 Objective    Vitals:    04/16/25 0944   BP: (!) 135/95   Pulse: 73   Temp: 36.3 °C (97.3 °F)   SpO2: 98%   Weight: 101 kg (222 lb)   Height: 1.676 m (5' 6\")     No LMP recorded. (Menstrual status: IUD).    Physical Exam  Vitals and nursing note reviewed. Exam conducted with a chaperone present.   Constitutional:       Appearance: Normal appearance.   HENT:      Head: Normocephalic and atraumatic.   Eyes:      Conjunctiva/sclera: Conjunctivae normal.   Cardiovascular:      Rate and Rhythm: Normal rate and regular rhythm.      Pulses: Normal pulses.      Heart sounds: Normal heart sounds.   Pulmonary:      Effort: Pulmonary effort is normal.      Breath sounds: Normal breath sounds.   Abdominal:      General: Bowel sounds are normal.      Palpations: Abdomen is soft.      Tenderness: There is no abdominal tenderness. There is no right CVA tenderness or left CVA tenderness.   Genitourinary:     Vagina: Vaginal discharge present.      Cervix: Normal.      Comments: Area surrounding anus is erythematous and excoriated likely secondary to itching.  No signs and symptoms of bacterial infection at this time.  Musculoskeletal:         General: Normal range of motion.      Cervical back: Neck supple.   Skin:     General: Skin is warm and dry.   Neurological:      General: No focal deficit present.      Mental Status: She is alert.         Procedures    Point of Care Test & Imaging Results from this visit  Results for orders placed or performed in visit on 04/16/25   POCT pregnancy, urine manually resulted   Result Value Ref Range    Preg Test, Ur Negative Negative   POCT UA Automated manually resulted   Result Value Ref Range    POC Color, Urine Dark Zoie (A) Straw, Yellow, " Light-Yellow    POC Appearance, Urine Clear Clear    POC Glucose, Urine NEGATIVE NEGATIVE mg/dl    POC Bilirubin, Urine NEGATIVE NEGATIVE    POC Ketones, Urine NEGATIVE NEGATIVE mg/dl    POC Specific Gravity, Urine >=1.030 1.005 - 1.035    POC Blood, Urine MODERATE (2+) (A) NEGATIVE    POC PH, Urine 6.0 No Reference Range Established PH    POC Protein, Urine TRACE (A) NEGATIVE mg/dl    POC Urobilinogen, Urine 0.2 0.2, 1.0 EU/DL    Poc Nitrite, Urine NEGATIVE NEGATIVE    POC Leukocytes, Urine NEGATIVE NEGATIVE      Imaging  No results found.    Cardiology, Vascular, and Other Imaging  No other imaging results found for the past 2 days      Diagnostic study results (if any) were reviewed by Kristin L Schoenlein, APRN-CNP.    Assessment/Plan   Allergies, medications, history, and pertinent labs/EKGs/Imaging reviewed by Kristin L Schoenlein, APRN-CNP.     Medical Decision Making  VSS, NAD, Nontoxic appearing.  Chaperone present.  Physical exam as documented above.  UA positive for 2+ blood, otherwise unremarkable.  A BV/yeast swab will be sent out.  Will defer sending urine culture out for now as patient is asymptomatic.    Symptoms, history, and exam are consistent with: Vaginitis and rectal itching.  Will do p.o. fluconazole and nystatin powder.  I discussed this with her aide who brought her in.    Differential Dx include, however, are not limited to: UTI, vaginitis, STI, acute abdomen    I have a low suspicion for any acute pathologies requiring emergent evaluation and further workup at this time.  I believe patient is safe to discharge home with a low threshold for emergency room as discussed during visit.  We discussed close follow-up with primary care provider/pediatrician. Supportive care discussed.  Medication(s) profile of OTC and Rxed medication(s) if prescribed was (were) reviewed.  All questions answered and addressed.  Patient verbalized understanding.      Orders and Diagnoses  Diagnoses and all orders  for this visit:  Vagina, candidiasis  -     fluconazole (Diflucan) 150 mg tablet; Take 1 tablet (150 mg) by mouth 1 time for 1 dose.  Itching in the vaginal area  -     POCT pregnancy, urine manually resulted  -     POCT UA Automated manually resulted  -     fluconazole (Diflucan) 150 mg tablet; Take 1 tablet (150 mg) by mouth 1 time for 1 dose.  Rectal itching  -     nystatin (Mycostatin) 100,000 unit/gram powder; Apply 1 Application topically 2 times a day.      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    Headache, unspecified 04/29/2015    Severe headache    Hiatal hernia     LFTs abnormal     Mares mares disease     Obesity (BMI 30-39.9)     Personal history of other mental and behavioral disorders 10/06/2017    History of mental retardation    Syncope and collapse 06/13/2017    Syncope and collapse   [3]   Past Surgical History:  Procedure Laterality Date    MR HEAD ANGIO W IV CONTRAST  08/18/2015    [place] HEAD ANGIO W IV CONTRAST [place] [facility] [place]    ORIF ANKLE FRACTURE Right 09/26/2024    Right Ankle ORIF - Right    OTHER SURGICAL HISTORY  06/16/2015    History Of Prior Surgery    OTHER SURGICAL HISTORY  10/06/2017    Excision Of Lesion Face    OTHER SURGICAL HISTORY  07/23/2019    Small bowel resection    US GUIDED ABSCESS DRAIN  04/10/2019    US GUIDED ABSCESS DRAIN [place] [facility] [place]    US GUIDED PERCUTANEOUS PERITONEAL OR RETROPERITONEAL FLUID COLLECTION DRAINAGE  04/10/2019    US GUIDED PERCUTANEOUS PERITONEAL OR RETROPERITONEAL FLUID COLLECTION DRAINAGE [place] [facility] [place]

## 2025-04-16 NOTE — PATIENT INSTRUCTIONS
Thank you for letting me care for you today.  You have been seen today for a yeast infection and excoriation of your rectal area due to itching  Take medications as directed  Please follow up with your primary care provider/pediatrician as directed.   If one is needed, please call [phone].  Please seek care in emergency room for red flags as discussed during visit.

## 2025-04-17 LAB — BV SCORE VAG QL: NORMAL

## 2025-04-18 DIAGNOSIS — E53.8 B12 DEFICIENCY: ICD-10-CM

## 2025-04-18 RX ORDER — PNV NO.95/FERROUS FUM/FOLIC AC 28MG-0.8MG
100 TABLET ORAL DAILY
Qty: 90 TABLET | Refills: 1 | Status: SHIPPED | OUTPATIENT
Start: 2025-04-18

## 2025-04-24 DIAGNOSIS — R79.89 OTHER SPECIFIED ABNORMAL FINDINGS OF BLOOD CHEMISTRY: ICD-10-CM

## 2025-04-24 DIAGNOSIS — Z00.00 ROUTINE ADULT HEALTH MAINTENANCE: Primary | ICD-10-CM

## 2025-04-24 RX ORDER — ACETAMINOPHEN 500 MG
TABLET ORAL
Qty: 90 CAPSULE | Refills: 1 | Status: SHIPPED | OUTPATIENT
Start: 2025-04-24 | End: 2025-10-21

## 2025-04-24 RX ORDER — MENTHOL/SORBITOL
LOZENGE MUCOUS MEMBRANE
Qty: 90 CAPSULE | Refills: 1 | Status: SHIPPED | OUTPATIENT
Start: 2025-04-24 | End: 2025-10-21

## 2025-04-25 DIAGNOSIS — R74.8 ELEVATED LIVER ENZYMES: ICD-10-CM

## 2025-05-27 ENCOUNTER — APPOINTMENT (OUTPATIENT)
Dept: ORTHOPEDIC SURGERY | Facility: CLINIC | Age: 35
End: 2025-05-27
Payer: MEDICARE

## 2025-05-29 PROBLEM — J39.2 DISORDER OF PHARYNX: Status: ACTIVE | Noted: 2023-03-20

## 2025-05-29 PROBLEM — I69.359 UNILATERAL PARALYSIS AS LATE EFFECT OF CEREBROVASCULAR ACCIDENT (CVA) (MULTI): Status: ACTIVE | Noted: 2024-09-19

## 2025-05-29 PROBLEM — Z98.890 POSTOPERATIVE STATE: Status: RESOLVED | Noted: 2019-09-11 | Resolved: 2025-05-29

## 2025-05-29 PROBLEM — R26.2 DIFFICULTY WALKING: Status: ACTIVE | Noted: 2024-09-19

## 2025-05-29 PROBLEM — R31.9 GENITOURINARY BLEEDING: Status: RESOLVED | Noted: 2023-03-20 | Resolved: 2025-05-29

## 2025-05-29 PROBLEM — H61.119: Status: ACTIVE | Noted: 2024-07-03
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
## 2025-05-29 PROBLEM — R87.69 ABNORMAL CYTOLOGICAL FINDINGS IN FEMALE GENITAL ORGANS: Status: ACTIVE | Noted: 2023-03-20

## 2025-05-29 PROBLEM — M62.81 MUSCLE WEAKNESS: Status: ACTIVE | Noted: 2024-09-19

## 2025-05-29 PROBLEM — J01.00 ACUTE NON-RECURRENT MAXILLARY SINUSITIS: Status: RESOLVED | Noted: 2025-03-24 | Resolved: 2025-05-29

## 2025-05-29 PROBLEM — H50.10 MONOCULAR EXOTROPIA: Status: ACTIVE | Noted: 2021-08-31

## 2025-05-29 PROBLEM — T50.905A ADVERSE EFFECT OF DRUG: Status: RESOLVED | Noted: 2023-03-20 | Resolved: 2025-05-29

## 2025-05-29 PROBLEM — N39.0 URINARY TRACT INFECTIOUS DISEASE: Status: RESOLVED | Noted: 2023-03-20 | Resolved: 2025-05-29

## 2025-05-29 PROBLEM — S82.90XA CLOSED FRACTURE OF LOWER LEG: Status: RESOLVED | Noted: 2024-09-13 | Resolved: 2025-05-29

## 2025-05-29 PROBLEM — R55 SYNCOPE AND COLLAPSE: Status: ACTIVE | Noted: 2024-09-19

## 2025-05-29 PROBLEM — S49.90XA INJURY OF UPPER EXTREMITY: Status: RESOLVED | Noted: 2023-03-20 | Resolved: 2025-05-29

## 2025-05-29 PROBLEM — H52.10 MYOPIA: Status: ACTIVE | Noted: 2021-08-31

## 2025-05-29 PROBLEM — Z93.2 ILEOSTOMY PRESENT (MULTI): Status: ACTIVE | Noted: 2019-09-18

## 2025-05-29 PROBLEM — F41.1 GENERALIZED ANXIETY DISORDER: Status: ACTIVE | Noted: 2021-05-28

## 2025-05-29 RX ORDER — NAPROXEN 500 MG/1
TABLET ORAL
COMMUNITY
Start: 2025-04-04

## 2025-06-02 ENCOUNTER — HOME HEALTH ADMISSION (OUTPATIENT)
Dept: HOME HEALTH SERVICES | Facility: HOME HEALTH | Age: 35
End: 2025-06-02
Payer: MEDICARE

## 2025-06-02 ENCOUNTER — APPOINTMENT (OUTPATIENT)
Dept: PRIMARY CARE | Facility: CLINIC | Age: 35
End: 2025-06-02
Payer: MEDICARE

## 2025-06-02 ENCOUNTER — DOCUMENTATION (OUTPATIENT)
Dept: HOME HEALTH SERVICES | Facility: HOME HEALTH | Age: 35
End: 2025-06-02

## 2025-06-02 VITALS
SYSTOLIC BLOOD PRESSURE: 130 MMHG | OXYGEN SATURATION: 98 % | DIASTOLIC BLOOD PRESSURE: 78 MMHG | WEIGHT: 222 LBS | HEART RATE: 72 BPM | BODY MASS INDEX: 35.83 KG/M2

## 2025-06-02 DIAGNOSIS — Z13.6 ENCOUNTER FOR LIPID SCREENING FOR CARDIOVASCULAR DISEASE: ICD-10-CM

## 2025-06-02 DIAGNOSIS — Z13.29 SCREENING FOR THYROID DISORDER: ICD-10-CM

## 2025-06-02 DIAGNOSIS — Z13.21 ENCOUNTER FOR VITAMIN DEFICIENCY SCREENING: ICD-10-CM

## 2025-06-02 DIAGNOSIS — E53.8 VITAMIN B12 DEFICIENCY: ICD-10-CM

## 2025-06-02 DIAGNOSIS — E55.9 VITAMIN D DEFICIENCY: ICD-10-CM

## 2025-06-02 DIAGNOSIS — Z13.1 SCREENING FOR DIABETES MELLITUS: ICD-10-CM

## 2025-06-02 DIAGNOSIS — Z00.00 ROUTINE GENERAL MEDICAL EXAMINATION AT HEALTH CARE FACILITY: ICD-10-CM

## 2025-06-02 DIAGNOSIS — G47.00 INSOMNIA, UNSPECIFIED TYPE: ICD-10-CM

## 2025-06-02 DIAGNOSIS — G40.909 NONINTRACTABLE EPILEPSY WITHOUT STATUS EPILEPTICUS, UNSPECIFIED EPILEPSY TYPE (MULTI): ICD-10-CM

## 2025-06-02 DIAGNOSIS — Z98.890 H/O ILEOSTOMY: ICD-10-CM

## 2025-06-02 DIAGNOSIS — Z79.899 ON LONG TERM DRUG THERAPY: ICD-10-CM

## 2025-06-02 DIAGNOSIS — Z13.220 ENCOUNTER FOR LIPID SCREENING FOR CARDIOVASCULAR DISEASE: ICD-10-CM

## 2025-06-02 DIAGNOSIS — S82.851S CLOSED DISPLACED TRIMALLEOLAR FRACTURE OF RIGHT ANKLE, SEQUELA: ICD-10-CM

## 2025-06-02 DIAGNOSIS — S93.04XA ANKLE DISLOCATION, RIGHT, INITIAL ENCOUNTER: ICD-10-CM

## 2025-06-02 DIAGNOSIS — I69.30 HISTORY OF STROKE WITH CURRENT RESIDUAL EFFECTS: ICD-10-CM

## 2025-06-02 DIAGNOSIS — Z00.00 ROUTINE ADULT HEALTH MAINTENANCE: Primary | ICD-10-CM

## 2025-06-02 DIAGNOSIS — Z13.220 SCREENING FOR CHOLESTEROL LEVEL: ICD-10-CM

## 2025-06-02 DIAGNOSIS — R73.09 BLOOD GLUCOSE ABNORMAL: ICD-10-CM

## 2025-06-02 DIAGNOSIS — F32.2 MAJOR DEPRESSIVE DISORDER, SINGLE EPISODE, SEVERE (MULTI): ICD-10-CM

## 2025-06-02 PROCEDURE — G0439 PPPS, SUBSEQ VISIT: HCPCS | Performed by: NURSE PRACTITIONER

## 2025-06-02 PROCEDURE — 99214 OFFICE O/P EST MOD 30 MIN: CPT | Performed by: NURSE PRACTITIONER

## 2025-06-02 RX ORDER — CYANOCOBALAMIN 1000 UG/ML
1000 INJECTION, SOLUTION INTRAMUSCULAR; SUBCUTANEOUS ONCE
Status: SHIPPED | OUTPATIENT
Start: 2025-06-02

## 2025-06-02 RX ORDER — MELATONIN 3 MG
3-6 CAPSULE ORAL NIGHTLY PRN
Qty: 60 CAPSULE | Refills: 1 | Status: SHIPPED | OUTPATIENT
Start: 2025-06-02 | End: 2025-07-02

## 2025-06-02 RX ORDER — LIDOCAINE 560 MG/1
1 PATCH PERCUTANEOUS; TOPICAL; TRANSDERMAL DAILY
Qty: 30 PATCH | Refills: 2 | Status: SHIPPED | OUTPATIENT
Start: 2025-06-02 | End: 2025-08-31

## 2025-06-02 ASSESSMENT — ENCOUNTER SYMPTOMS
DIFFICULTY URINATING: 0
COLOR CHANGE: 0
WEAKNESS: 0
COUGH: 0
ARTHRALGIAS: 1
NAUSEA: 0
BRUISES/BLEEDS EASILY: 0
HEADACHES: 0
FATIGUE: 0
LOSS OF SENSATION IN FEET: 1
SLEEP DISTURBANCE: 1
SEIZURES: 0
WOUND: 0
BACK PAIN: 0
CHILLS: 0
MYALGIAS: 0
FEVER: 0
DEPRESSION: 1
TROUBLE SWALLOWING: 0
SHORTNESS OF BREATH: 0
ABDOMINAL PAIN: 0
CONSTIPATION: 0
ROS GI COMMENTS: SEE HPI
JOINT SWELLING: 0
ADENOPATHY: 0
OCCASIONAL FEELINGS OF UNSTEADINESS: 0
PALPITATIONS: 0
DIZZINESS: 0
WHEEZING: 0
EYE PAIN: 0
ABDOMINAL DISTENTION: 0
PHOTOPHOBIA: 1
DIARRHEA: 1

## 2025-06-02 ASSESSMENT — ACTIVITIES OF DAILY LIVING (ADL)
GROCERY_SHOPPING: NEEDS ASSISTANCE
BATHING: NEEDS ASSISTANCE
DOING_HOUSEWORK: NEEDS ASSISTANCE
TAKING_MEDICATION: NEEDS ASSISTANCE
DRESSING: INDEPENDENT
MANAGING_FINANCES: NEEDS ASSISTANCE

## 2025-06-02 ASSESSMENT — PATIENT HEALTH QUESTIONNAIRE - PHQ9
1. LITTLE INTEREST OR PLEASURE IN DOING THINGS: NOT AT ALL
SUM OF ALL RESPONSES TO PHQ9 QUESTIONS 1 AND 2: 1
2. FEELING DOWN, DEPRESSED OR HOPELESS: SEVERAL DAYS

## 2025-06-02 NOTE — HH CARE COORDINATION
Home Care received a Referral for Physical Therapy. We have processed the referral for a Start of Care on 6/4/25.     If you have any questions or concerns, please feel free to contact us at [phone]. Follow the prompts, enter your five digit zip code, and you will be directed to your care team on [facility].

## 2025-06-02 NOTE — PATIENT INSTRUCTIONS
Orders are in place for you to have fasting blood work completed. Please do not eat or drink 8 hours prior to having your blood drawn.   You may have your blood work completed in this building through [facility] ([address]  [address], [place] [address]).  For this location, you may schedule an appointment online or drop-in for walk-in services. [email or web address]  Hours:   Monday - Friday: 7:30 a.m. - 5 p.m. and Saturday: 8 a.m. - 11 a.m.  Phone:  [phone]       Please arrange for routine follow up in 3 months. You may schedule on-line through [employer] or call our office at [phone].

## 2025-06-02 NOTE — PROGRESS NOTES
Subjective   Patient ID: Windy Lovelace is a 34 y.o. female who presents for Medicare Annual Wellness Visit Subsequent (Pt states med issues says can't take pills anymore. Pt states that she looses feeling in feet when using the bathroom).    Patient seen today for routine follow-up, medication management and to complete an annual Medicare wellness exam.  Patient's sister, Aracelis, was on speaker phone during entire visit with the patient's consent.  She lives alone but has routine caregivers in place through [facility]. Patient requires assistance with most tasks including going to the bathroom.  Patient is utilizing either a cane or walker due to relatively recent right ankle fracture/surgical repair.  She continues with intermittent right ankle pain and is interested in resuming physical therapy services at home.  No recent falls reported.  Patient has a history of moyamoya, CVA as well as developmental delay.  She states that all of her closest family lives out of state but does have a stepfather and some cousins that she is occasionally in touch with.  Patient admits to not taking any of her scheduled medications routinely since March.  She voices frustration about expectation of daily pills and would like to switch to either patches or injections if possible.  Patient is agreeable for continued vitamin B12 injections but will have to touch base with clinical pharmacy team for additional recommendations.  Also encouraged patient to follow-up with psychiatry for additional medications as well as anxiety/depression do not be appear to be well-controlled off of SSRIs.  No reported issues with appetite, staying hydrated or bladder.  Patient reports history of significant abdominal surgeries including ostomy placement with subsequent reversal.  Because of this, patient reports relatively chronic diarrhea. Patient follows routinely with [facility] services for counseling but is currently looking  for a new psychiatrist.  She states that she suffers from social anxiety and also very angry about her physical and mental disabilities.  Patient reports being relatively sedentary and states she enjoys playing video games/staying inside her apartment.  No significant issues with shortness of breath or chest pain upon exertion.  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  Medications reviewed.  No other acute concerns voiced at this time.            Current Outpatient Medications on File Prior to Visit   Medication Sig Dispense Refill    aspirin 81 mg chewable tablet Chew 1 tablet (81 mg) early in the morning..      dicyclomine (Bentyl) 10 mg capsule Take 1 capsule (10 mg) by mouth 4 times a day as needed (abdominal pain). 120 capsule 2    fluticasone (Flonase) 50 mcg/actuation nasal spray Administer 2 sprays into each nostril once daily as needed for allergies.      fluticasone propion-salmeteroL (Advair Diskus) 100-50 mcg/dose diskus inhaler Inhale 1 puff every 12 hours. Rinse mouth with water after use to reduce aftertaste and incidence of candidiasis. Do not swallow. (Patient taking differently: Inhale 1 puff 2 times a day as needed (wheezing/SOB). Rinse mouth with water after use to reduce aftertaste and incidence of candidiasis. Do not swallow.    Takes as needed) 60 each 5    leg brace (Knee Support Brace) misc 1 Units once daily. 1 each 0    levonorgestrel (Mirena) 21 mcg/24 hours (8 yrs) 52 mg IUD Mirena (52 MG) 20 MCG/24HR IUD   Refills: 0       Active      naproxen (Naprosyn) 500 mg tablet TAKE 1 TABLET BY MOUTH EVERY 12 HOURS WITH FOOD AS NEEDED FOR PAIN      nystatin (Mycostatin) 100,000 unit/gram powder Apply 1 Application topically 2 times a day. 60 g 0    ondansetron (Zofran) 4 mg/2 mL injection Infuse 2 mL (4 mg) into a venous catheter every 6 hours if needed for nausea or vomiting.      oxyCODONE (Roxicodone) 5 mg immediate release tablet Take 1 tablet (5 mg) by mouth every 6 hours if  needed for severe pain (7 - 10).      [DISCONTINUED] lidocaine 4 % patch Place 1 patch over 12 hours on the skin once daily. Remove & discard patch within 12 hours or as directed by MD.      [DISCONTINUED] prazosin (Minipress) 5 mg capsule Take 1 Capsule by mouth nightly at bedtime 30 capsule 2    Breo Ellipta 100-25 mcg/dose inhaler  (Patient not taking: Reported on 6/2/2025)      cholecalciferol (Vitamin D-3) 50 mcg (2,000 units) capsule take one capsile by mouth once daily (Patient not taking: Reported on 6/2/2025) 90 capsule 1    cyanocobalamin (Vitamin B-12) 100 mcg tablet take 1 tablet by mouth once daily (Patient not taking: Reported on 6/2/2025) 90 tablet 1    diazePAM (Valium) 5 mg tablet Take 1 tablet (5 mg) by mouth once daily at bedtime. (Patient not taking: Reported on 6/2/2025) 5 tablet 0    omeprazole (PriLOSEC) 20 mg DR capsule Take 1 capsule (20 mg) by mouth once daily. Do not crush or chew. (Patient not taking: Reported on 6/2/2025)      pantoprazole (ProtoNix) 40 mg EC tablet  (Patient not taking: Reported on 6/2/2025)      potassium chloride (Klor-Con) 20 mEq packet  (Patient not taking: Reported on 6/2/2025)      saccharomyces boulardii (Probiotic, S.boulardii,) 250 mg capsule Take1 capsule by mouth once daily (Patient not taking: Reported on 6/2/2025) 90 capsule 1    sertraline (Zoloft) 100 mg tablet TAKE ONE TABLET BY MOUTH ONCE DAILY (Patient not taking: Reported on 6/2/2025) 30 tablet 2    traZODone (Desyrel) 100 mg tablet TAKE ONE TABLET BY MOUTH NIGHTLY AT BEDTIME (Patient not taking: Reported on 6/2/2025) 30 tablet 2     No current facility-administered medications on file prior to visit.       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    Fall 09/19/2024    Headache, unspecified 04/29/2015    Severe headache    Hiatal hernia     Injury of upper extremity 03/20/2023    LFTs  abnormal     Mares mares disease     Obesity (BMI 30-39.9)     Personal history of other mental and behavioral disorders 10/06/2017    History of mental retardation    Postoperative state 09/11/2019    Syncope and collapse 06/13/2017    Syncope and collapse    Urinary tract infectious disease 03/20/2023        Past Surgical History:   Procedure Laterality Date    MR HEAD ANGIO W IV CONTRAST  08/18/2015    MR HEAD ANGIO W IV CONTRAST 8/18/2015 [facility] [place] [facility]    ORIF ANKLE FRACTURE Right 09/26/2024    Right Ankle ORIF - Right    OTHER SURGICAL HISTORY  06/16/2015    History Of Prior Surgery    OTHER SURGICAL HISTORY  10/06/2017    Excision Of Lesion Face    OTHER SURGICAL HISTORY  07/23/2019    Small bowel resection    US GUIDED ABSCESS DRAIN  04/10/2019    US GUIDED ABSCESS DRAIN 4/10/2019 [facility] [place] [facility]    US GUIDED PERCUTANEOUS PERITONEAL OR RETROPERITONEAL FLUID COLLECTION DRAINAGE  04/10/2019    US GUIDED PERCUTANEOUS PERITONEAL OR RETROPERITONEAL FLUID COLLECTION DRAINAGE 4/10/2019 [facility] [place] [facility]        Family History   Problem Relation Name Age of Onset    Kidney cancer Mother      Liver cancer Mother      Pancreatic cancer Father      Glaucoma Other grandmother     Diabetes Other Grandfather         other type with arthropathy, with long term curent use of insulin    Diabetes Other aunt         other type with arthropathy, with long term curent use of insulin        Review of Systems   Constitutional:  Negative for chills, fatigue and fever.   HENT:  Negative for dental problem and trouble swallowing.    Eyes:  Positive for photophobia. Negative for pain and visual disturbance.        Reports sensitivity to lights and legally blind in one eye   Respiratory:  Negative for cough, shortness of breath and wheezing.    Cardiovascular:  Negative for chest pain, palpitations and leg swelling.   Gastrointestinal:  Positive for diarrhea. Negative for abdominal distention, abdominal pain, constipation  and nausea.        See HPI   Endocrine: Negative for cold intolerance and heat intolerance.   Genitourinary:  Negative for difficulty urinating.   Musculoskeletal:  Positive for arthralgias. Negative for back pain, gait problem, joint swelling and myalgias.        History of right ankle fracture with repair. See HPI   Skin:  Negative for color change, pallor, rash and wound.   Allergic/Immunologic: Negative for environmental allergies and food allergies.   Neurological:  Negative for dizziness, seizures, weakness and headaches.   Hematological:  Negative for adenopathy. Does not bruise/bleed easily.   Psychiatric/Behavioral:  Positive for sleep disturbance. Negative for behavioral problems.         Reports social anxiety, PTSD and insomnia   All other systems reviewed and are negative.      Objective   /78 (BP Location: Right arm, Patient Position: Sitting)   Pulse 72   Wt 101 kg (222 lb)   SpO2 98%   BMI 35.83 kg/m²     Physical Exam  Constitutional:       General: She is not in acute distress.     Appearance: Normal appearance. She is not toxic-appearing.   HENT:      Head: Normocephalic and atraumatic.      Right Ear: External ear normal.      Left Ear: External ear normal.      Nose:      Left Sinus: Maxillary sinus tenderness present.      Mouth/Throat:      Mouth: Mucous membranes are moist.      Pharynx: Oropharynx is clear.   Eyes:      Extraocular Movements: Extraocular movements intact.      Conjunctiva/sclera: Conjunctivae normal.   Cardiovascular:      Rate and Rhythm: Normal rate and regular rhythm.      Pulses: Normal pulses.      Heart sounds: Normal heart sounds. No murmur heard.  Pulmonary:      Effort: Pulmonary effort is normal.      Breath sounds: Normal breath sounds. No wheezing.   Abdominal:      General: Bowel sounds are normal.      Palpations: Abdomen is soft.   Musculoskeletal:         General: No swelling.      Cervical back: Normal range of motion and neck supple.   Skin:      General: Skin is warm and dry.   Neurological:      Mental Status: She is alert. Mental status is at baseline.      Cranial Nerves: No cranial nerve deficit.      Motor: Weakness present.      Comments: Right-sided weakness   Psychiatric:         Mood and Affect: Mood normal.         Behavior: Behavior normal.         Assessment/Plan   Problem List Items Addressed This Visit           ICD-10-CM    Major depressive disorder, single episode, severe (Multi) F32.2    Patient to maintain routine follow-up with mental health services for continued counseling and medication recommendations.  Will discuss with clinical pharmacy team regarding medication alternatives for anxiety and depression is patient is no longer interested in taking oral medication.  Limited support system reported.  Provider to be notified for any persistent/worsening mood concerns         History of stroke with current residual effects I69.30    Late effect of prior CVA from Moyamoya.  No recent neurology follow-up?  OP PT as appropriate.  Patient continues with 24-hour care through [facility]         Relevant Orders    Referral to Home Health    Vitamin D deficiency E55.9    Relevant Orders    Vitamin D 25-Hydroxy,Total (for eval of Vitamin D levels)    H/O ileostomy Z98.890    Patient reports issues with diarrhea status post multiple bowel surgeries.  She continues with probiotic and Bentyl as needed.  Provider to be notified for any persistent/worsening bowel concerns         Epilepsy G40.909    Remote history.  Off of all antiepileptic medications.         Closed displaced trimalleolar fracture of right lower leg S82.851A    Patient is requesting physical therapy to be reinitiated due to persistent mobility/discomfort issues following right lower extremity fracture.  Physical therapy will help to reduce pain and fall risk in addition to increasing mobility and strength         Insomnia G47.00    Relevant Medications    melatonin 3 mg  capsule    Routine adult health maintenance - Primary Z00.00    Routine blood work ordered today for assessment purposes.  Will continue to monitor as appropriate         Relevant Orders    Hemoglobin A1C    Comprehensive Metabolic Panel    TSH with reflex to Free T4 if abnormal    Lipid Panel    Vitamin D 25-Hydroxy,Total (for eval of Vitamin D levels)    CBC and Auto Differential     Other Visit Diagnoses         Codes      Vitamin B12 deficiency     E53.8    Relevant Medications    cyanocobalamin (Vitamin B-12) injection 1,000 mcg    Other Relevant Orders    CBC and Auto Differential    Vitamin B12      Screening for diabetes mellitus     Z13.1    Relevant Orders    Hemoglobin A1C      Blood glucose abnormal     R73.09    Relevant Orders    Hemoglobin A1C      Screening for thyroid disorder     Z13.29    Relevant Orders    TSH with reflex to Free T4 if abnormal      Screening for cholesterol level     Z13.220    Relevant Orders    Lipid Panel      Encounter for lipid screening for cardiovascular disease     Z13.220, Z13.6    Relevant Orders    Lipid Panel      Encounter for vitamin deficiency screening     Z13.21    Relevant Orders    Hemoglobin A1C    Vitamin D 25-Hydroxy,Total (for eval of Vitamin D levels)      On long term drug therapy     Z79.899    Relevant Orders    Hemoglobin A1C    Vitamin D 25-Hydroxy,Total (for eval of Vitamin D levels)      Ankle dislocation, right, initial encounter     S93.04XA    Relevant Medications    lidocaine 4 % patch    Other Relevant Orders    Referral to Home Health      Routine general medical examination at health care facility     Z00.00    Relevant Orders    1 Year Follow Up In Primary Care - Wellness Exam

## 2025-06-04 PROBLEM — R53.83 FATIGUE: Status: RESOLVED | Noted: 2023-03-20 | Resolved: 2025-06-04

## 2025-06-04 PROBLEM — R87.69 ABNORMAL CYTOLOGICAL FINDINGS IN FEMALE GENITAL ORGANS: Status: RESOLVED | Noted: 2023-03-20 | Resolved: 2025-06-04

## 2025-06-04 PROBLEM — R51.9 HEADACHE: Status: RESOLVED | Noted: 2023-03-20 | Resolved: 2025-06-04

## 2025-06-04 PROBLEM — Z93.2 ILEOSTOMY PRESENT (MULTI): Status: RESOLVED | Noted: 2019-09-18 | Resolved: 2025-06-04

## 2025-06-04 PROBLEM — R26.2 DIFFICULTY WALKING: Status: RESOLVED | Noted: 2024-09-19 | Resolved: 2025-06-04

## 2025-06-04 PROBLEM — R69 DISORDER: Status: RESOLVED | Noted: 2023-03-20 | Resolved: 2025-06-04

## 2025-06-04 PROBLEM — R55 SYNCOPE AND COLLAPSE: Status: RESOLVED | Noted: 2024-09-19 | Resolved: 2025-06-04

## 2025-06-04 PROBLEM — R53.1 WEAKNESS GENERALIZED: Status: RESOLVED | Noted: 2023-03-20 | Resolved: 2025-06-04

## 2025-06-05 NOTE — ASSESSMENT & PLAN NOTE
Patient is requesting physical therapy to be reinitiated due to persistent mobility/discomfort issues following right lower extremity fracture.  Physical therapy will help to reduce pain and fall risk in addition to increasing mobility and strength

## 2025-06-05 NOTE — ASSESSMENT & PLAN NOTE
Patient to maintain routine follow-up with mental health services for continued counseling and medication recommendations.  Will discuss with clinical pharmacy team regarding medication alternatives for anxiety and depression is patient is no longer interested in taking oral medication.  Limited support system reported.  Provider to be notified for any persistent/worsening mood concerns

## 2025-06-05 NOTE — ASSESSMENT & PLAN NOTE
Late effect of prior CVA from Moyamoya.  No recent neurology follow-up?  OP PT as appropriate.  Patient continues with 24-hour care through [facility]

## 2025-06-09 ENCOUNTER — TELEPHONE (OUTPATIENT)
Dept: HOME HEALTH SERVICES | Facility: HOME HEALTH | Age: 35
End: 2025-06-09
Payer: MEDICARE

## 2025-06-09 ENCOUNTER — TELEPHONE (OUTPATIENT)
Dept: PRIMARY CARE | Facility: CLINIC | Age: 35
End: 2025-06-09
Payer: MEDICARE

## 2025-06-09 NOTE — TELEPHONE ENCOUNTER
Hello,    Your recent home care referral for Windy Lovelace has been made a Non Admit with  Home Care due to Inability to Contact Patient. Staff has called all available numbers and contacts for this patient over a 3-4 day period, left messages, and received no return call. If you have further questions, feel free to reach out to our office at [phone].     Thank you,   [facility]

## 2025-06-11 ENCOUNTER — TELEPHONE (OUTPATIENT)
Dept: PRIMARY CARE | Facility: CLINIC | Age: 35
End: 2025-06-11
Payer: MEDICARE

## 2025-06-11 DIAGNOSIS — G47.00 INSOMNIA, UNSPECIFIED TYPE: Primary | ICD-10-CM

## 2025-06-11 NOTE — TELEPHONE ENCOUNTER
[facility] is requesting an updated Rx for the Melatonin 3 mg capsule.  The capsule is backordered and they are requesting the tablets.

## 2025-06-12 RX ORDER — TALC
3 POWDER (GRAM) TOPICAL DAILY
Qty: 30 TABLET | Refills: 2 | Status: SHIPPED | OUTPATIENT
Start: 2025-06-12 | End: 2025-09-10

## 2025-06-13 ENCOUNTER — TELEPHONE (OUTPATIENT)
Dept: OBSTETRICS AND GYNECOLOGY | Facility: CLINIC | Age: 35
End: 2025-06-13
Payer: MEDICARE

## 2025-06-16 ENCOUNTER — TELEPHONE (OUTPATIENT)
Dept: PRIMARY CARE | Facility: CLINIC | Age: 35
End: 2025-06-16
Payer: MEDICARE

## 2025-06-16 DIAGNOSIS — F41.1 ANXIETY STATE: ICD-10-CM

## 2025-06-16 DIAGNOSIS — F32.2 MAJOR DEPRESSIVE DISORDER, SINGLE EPISODE, SEVERE (MULTI): Primary | ICD-10-CM

## 2025-06-16 NOTE — TELEPHONE ENCOUNTER
Left message asking Aracelis to call office for appt   Problem: Patient Care Overview (Adult)  Goal: Plan of Care Review  Outcome: Ongoing (interventions implemented as appropriate)    09/05/17 0634   Coping/Psychosocial Response Interventions   Plan Of Care Reviewed With patient   Patient Care Overview   Progress improving   Outcome Evaluation   Outcome Summary/Follow up Plan VSS. RESTED AT LONG  INTERVALS OVERNIGHT AFTER RECEIVING ROUTINE MEDS AND PAIN PILL AT HS.         Problem: Manic Behavior/Episode (Adult)  Goal: Mood Equilibrium  Outcome: Ongoing (interventions implemented as appropriate)

## 2025-06-16 NOTE — TELEPHONE ENCOUNTER
Patient is currently not taking any oral medications and would prefer patches or injections.  I spoke at length with patient's POA/sister, Aracelis and agreed to trial Emsam patch for depression/anxiety.  Can you please touch base with patient and schedule nurse visit for next week as sister requested patient come in for education regarding where to and how to place medicated patches.  She should bring her prescription with her for this appointment.  If patient does not answer, he may need to call her Sister Aracelis to schedule

## 2025-06-17 ENCOUNTER — APPOINTMENT (OUTPATIENT)
Dept: ORTHOPEDIC SURGERY | Facility: CLINIC | Age: 35
End: 2025-06-17
Payer: MEDICARE

## 2025-06-17 ENCOUNTER — HOSPITAL ENCOUNTER (OUTPATIENT)
Dept: RADIOLOGY | Facility: CLINIC | Age: 35
Discharge: HOME | End: 2025-06-17
Payer: MEDICARE

## 2025-06-17 DIAGNOSIS — S82.851D CLOSED DISPLACED TRIMALLEOLAR FRACTURE OF RIGHT ANKLE WITH ROUTINE HEALING, SUBSEQUENT ENCOUNTER: Primary | ICD-10-CM

## 2025-06-17 DIAGNOSIS — M25.571 CHRONIC PAIN OF RIGHT ANKLE: ICD-10-CM

## 2025-06-17 DIAGNOSIS — M67.01 CONTRACTURE OF RIGHT ACHILLES TENDON: ICD-10-CM

## 2025-06-17 DIAGNOSIS — G89.29 CHRONIC PAIN OF RIGHT ANKLE: ICD-10-CM

## 2025-06-17 DIAGNOSIS — S82.851S CLOSED DISPLACED TRIMALLEOLAR FRACTURE OF RIGHT ANKLE, SEQUELA: ICD-10-CM

## 2025-06-17 PROCEDURE — 73610 X-RAY EXAM OF ANKLE: CPT | Mod: RIGHT SIDE | Performed by: RADIOLOGY

## 2025-06-17 PROCEDURE — 73610 X-RAY EXAM OF ANKLE: CPT | Mod: RT

## 2025-06-17 PROCEDURE — 99214 OFFICE O/P EST MOD 30 MIN: CPT | Performed by: STUDENT IN AN ORGANIZED HEALTH CARE EDUCATION/TRAINING PROGRAM

## 2025-06-17 ASSESSMENT — PAIN DESCRIPTION - DESCRIPTORS: DESCRIPTORS: ACHING;DULL;DISCOMFORT

## 2025-06-17 ASSESSMENT — PAIN - FUNCTIONAL ASSESSMENT: PAIN_FUNCTIONAL_ASSESSMENT: 0-10

## 2025-06-17 ASSESSMENT — PAIN SCALES - GENERAL: PAINLEVEL_OUTOF10: 6

## 2025-06-18 NOTE — PROGRESS NOTES
ORTHOPAEDIC SURGERY OUTPATIENT PROGRESS NOTE    Chief Complaint:  Right ankle pain    History Of Present Illness  Windy Lovelace is a 34 y.o. female with a past medical history of moyamoya, CVA as well as developmental delay who presented after ground-level fall with a right trimalleolar ankle fracture and dislocation.  The patient was closed reduced by the orthopedic team, I was not on-call at the time the patient's initial presentation was asked by one of my partners to assume care.  The patient was subsequently discharged and return for surgery, unfortunately on return she had been walking on her splint.  She underwent right trimalleolar ankle fracture ORIF without fixation of the posterior lip and with syndesmotic stabilization from 09/26/2024. She has been residing in a SNF and has been compliant with weight-bearing restrictions. Reporting 8/10 pain. Encounter with POA on facetime in the room.    10/20/2024: Patient returns s/p right trimalleolar ankle fracture ORIF without fixation of the posterior lip and with syndesmotic stabilization from 09/26/2024.  She is currently reporting no pain at rest but had 8 out of 10 pain at night.  Her Sister and POA is present on the phone via [employer] in the room.  She has been putting weight down for transfers to the bathroom and while at PT.  She has been residing in a SNF.    11/20/2024: Patient returns s/p right trimalleolar ankle fracture ORIF without fixation of the posterior lip and with syndesmotic stabilization from 09/26/2024.  Patient reports minimal pain at present.  She has continued to put weight down primarily on her toes for ambulation, most often for toileting.  Discussed with POA via [employer].    12/24/2024: Patient returns s/p right trimalleolar ankle fracture ORIF without fixation of the posterior lip and with syndesmotic stabilization from 09/26/2024.  Patient has been somewhat noncompliant with weightbearing restrictions.  She has been ambulating   in her fiberglass cast.  During the course of the visit today, the patient picked out a stable eschar while in the examination room.  I discussed the patient's clinical course as well as findings with the patient's POA via [employer] today.    02/25/2025: Patient returns s/p right trimalleolar ankle fracture ORIF without fixation of the posterior lip and with syndesmotic stabilization from 09/26/2024.  Patient is reporting increased pain at night, she associates this with a suture that she is concerned her skin has grown over.  She has been ambulating with use of a walking boot, she is present with a coordinator today and her POA via [employer].    06/17/2025: Patient returns s/p right trimalleolar ankle fracture ORIF without fixation of the posterior lip and with syndesmotic stabilization from 09/26/2024.  Patient continues to note pain in the right ankle, particularly at night.  She reports a stabbing pain in the sensation that her screws are digging into her skin.  She has transitioned out of the walking boot.  She is present with her POAAracelis via video chat today.    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    Fall 09/19/2024    Headache, unspecified 04/29/2015    Severe headache    Hiatal hernia     Injury of upper extremity 03/20/2023    LFTs abnormal     Mares mares disease     Obesity (BMI 30-39.9)     Personal history of other mental and behavioral disorders 10/06/2017    History of mental retardation    Postoperative state 09/11/2019    Syncope and collapse 06/13/2017    Syncope and collapse    Urinary tract infectious disease 03/20/2023       Surgical History  Recent Surgeries in Orthopaedic Surgery            No cases to display             Social History  Social History     Socioeconomic History    Marital status: Single   Tobacco Use    Smoking status: Never     Smokeless tobacco: Never   Vaping Use    Vaping status: Never Used   Substance and Sexual Activity    Alcohol use: Never    Drug use: Never    Sexual activity: Not Currently     Birth control/protection: I.U.D.     Social Drivers of Health     Financial Resource Strain: Patient Unable To Answer (11/12/2024)    Overall Financial Resource Strain (CARDIA)     Difficulty of Paying Living Expenses: Patient unable to answer   Food Insecurity: Not on File (9/26/2024)    Received from [facility]    Food Insecurity     Food: 0   Transportation Needs: Patient Unable To Answer (11/12/2024)    PRAPARE - Transportation     Lack of Transportation (Medical): Patient unable to answer     Lack of Transportation (Non-Medical): Patient unable to answer   Physical Activity: Not on File (5/21/2021)    Received from [facility]    Physical Activity     Physical Activity: 0   Stress: Not on File (5/21/2021)    Received from [facility]    Stress     Stress: 0   Social Connections: Not on File (9/12/2024)    Received from [facility]    Social Connections     Connectedness: 0   Recent Concern: Social Connections - Feeling Somewhat Isolated (6/28/2024)    OASIS : Social Isolation     Frequency of experiencing loneliness or isolation: Sometimes   Housing Stability: Patient Unable To Answer (11/12/2024)    Housing Stability Vital Sign     Unable to Pay for Housing in the Last Year: Patient unable to answer     Number of Times Moved in the Last Year: 1     Homeless in the Last Year: Patient unable to answer       Family History  Family History   Problem Relation Name Age of Onset    Kidney cancer Mother      Liver cancer Mother      Pancreatic cancer Father      Glaucoma Other grandmother     Diabetes Other Grandfather         other type with arthropathy, with long term curent use of insulin    Diabetes Other aunt         other type with arthropathy, with long term curent use of insulin        Allergies  Allergies   Allergen Reactions    Bupropion Unknown and  Hives    Ketamine Unknown, Anaphylaxis and Rash    Augmentin [Amoxicillin-Pot Clavulanate] Hives    Penicillins Hives, Other and Unknown       Review of Systems  REVIEW OF SYSTEMS  Constitutional: no unplanned weight loss  Psychiatric: no suicidal ideation  ENT: no vision changes, no sinus problems  Pulmonary: no shortness of breath  Lymphatic: no enlarged lymph nodes  Cardiovascular: no chest pain or shortness of breath  Gastrointestinal: no stomach problems  Genitourinary: no dysuria   Skin: no rashes  Endocrine: no thyroid problems  Neurological: no headache, no numbness  Hematological: no easy bruising  Musculoskeletal: Right ankle pain     Physical Exam  PHYSICAL EXAMINATION  Constitutional Exam: well developed and well nourished  Psychiatric Exam: alert and oriented, appropriate mood and behavior  Eye Exam: EOMI  Pulmonary Exam: breathing non-labored, no apparent distress  Lymphatic exam: no appreciable lymphadenopathy in the lower extremities  Cardiovascular exam: RRR to peripheral palpation, DP pulses 2+, PT 2+, toes are pink with good capillary refill, no pitting edema  Skin exam: no open lesions, rashes, abrasions or ulcerations  Neurological exam: sensation to light touch intact in both lower extremities in peripheral and dermatomal distributions (except for any abnormalities noted in musculoskeletal exam)    Musculoskeletal exam: Right lower extremity examination.  Patient medial and lateral ankle incisions well-healed.  Minimal tenderness to palpation overlying the fibular plate as well as the medial malleolus plate/screw.  Patient has restricted ankle range of motion, secondary to increased tone which is present bilaterally.  With distraction patient has relatively pain-free and supple ankle range of motion without crepitus. Patient has greater than physiologic hindfoot valgus with pes planus arch posture and no significant forefoot deformity noted. Patient has sensation grossly intact to light touch  in a saphenous, sural, superficial peroneal, deep peroneal and tibial nerve distribution.  She has intact PF/DF/EHL.  She is 2+ DP/PT pulse palpated.     Last Recorded Vitals  There were no vitals taken for this visit.    Laboratory Results  No results found for this or any previous visit (from the past 24 hours).     Radiology Results  X-ray imaging 3 view weightbearing right ankle reviewed and independently evaluated by me on 6/17/2025 demonstrates maintained alignment following distal fibula plate and screw fixation as well as medial malleolar plate and screw fixation as well as tricortical syndesmotic screw stabilization.  There is subtle lucency about the syndesmotic screws, comparable to prior imaging.    Assessment/Plan:  34 y.o. female with a past medical history of moyamoya, CVA as well as developmental delay s/p right trimalleolar ankle fracture ORIF without fixation of the posterior lip and with syndesmotic stabilization from 09/26/2024.  I had a long discussion with the patient as well as her POA Aracelis regarding treatment options.  Given the patient's ongoing pain and concern regarding hardware, consideration could certainly be made for hardware removal.  Regarding her ongoing weightbearing pain, discussed possibility of MRI right ankle to rule out possibility of osteochondral lesion contributing to persistent pain.  I am concerned that the patient would require sedation for MRI, Aracelis was in agreement.  I will therefore recommend that we proceed with an image guided right ankle injection, in an effort to more completely evaluate the patient's pain.  If she has a positive response, would likely recommend proceeding with right ankle arthroscopy, SANJEEV, possible MARISABEL  We had previously discussed the possibility of requiring conversion to arthrodesis down the road.  The patient and Naro are going to take some time to consider their options and I will plan to see them back following completion of injection for  discussion regarding next treatment steps.    Timothy Castro MD, MACI  Department of Orthopaedic Surgery  [facility]     The diagnosis and treatment plan were reviewed with the patient. All questions were answered. The patient verbalized understanding of the treatment plan. There were no barriers to understanding identified.    Note dictated with [employer] software.  Completed without full type editing and sent to avoid delay.

## 2025-07-11 ENCOUNTER — APPOINTMENT (OUTPATIENT)
Dept: PRIMARY CARE | Facility: CLINIC | Age: 35
End: 2025-07-11
Payer: MEDICARE

## 2025-07-11 DIAGNOSIS — E53.8 VITAMIN B12 DEFICIENCY: ICD-10-CM

## 2025-07-11 PROCEDURE — 96372 THER/PROPH/DIAG INJ SC/IM: CPT | Performed by: FAMILY MEDICINE

## 2025-07-11 RX ORDER — CYANOCOBALAMIN 1000 UG/ML
1000 INJECTION, SOLUTION INTRAMUSCULAR; SUBCUTANEOUS ONCE
Status: COMPLETED | OUTPATIENT
Start: 2025-07-11 | End: 2025-07-11

## 2025-07-11 RX ADMIN — CYANOCOBALAMIN 1000 MCG: 1000 INJECTION, SOLUTION INTRAMUSCULAR; SUBCUTANEOUS at 16:33

## 2025-07-15 ENCOUNTER — APPOINTMENT (OUTPATIENT)
Dept: ORTHOPEDIC SURGERY | Facility: CLINIC | Age: 35
End: 2025-07-15
Payer: MEDICARE

## 2025-07-17 ENCOUNTER — APPOINTMENT (OUTPATIENT)
Dept: OBSTETRICS AND GYNECOLOGY | Facility: CLINIC | Age: 35
End: 2025-07-17
Payer: MEDICARE

## 2025-07-17 VITALS
WEIGHT: 223 LBS | BODY MASS INDEX: 35.84 KG/M2 | DIASTOLIC BLOOD PRESSURE: 86 MMHG | SYSTOLIC BLOOD PRESSURE: 132 MMHG | HEIGHT: 66 IN

## 2025-07-17 DIAGNOSIS — Z12.4 CERVICAL CANCER SCREENING: ICD-10-CM

## 2025-07-17 DIAGNOSIS — Z01.419 WELL WOMAN EXAM: Primary | ICD-10-CM

## 2025-07-17 DIAGNOSIS — Z30.431 IUD CHECK UP: ICD-10-CM

## 2025-07-17 PROCEDURE — 3008F BODY MASS INDEX DOCD: CPT | Performed by: OBSTETRICS & GYNECOLOGY

## 2025-07-17 PROCEDURE — 99395 PREV VISIT EST AGE 18-39: CPT | Performed by: OBSTETRICS & GYNECOLOGY

## 2025-07-17 PROCEDURE — 1036F TOBACCO NON-USER: CPT | Performed by: OBSTETRICS & GYNECOLOGY

## 2025-07-17 ASSESSMENT — ENCOUNTER SYMPTOMS
CONSTITUTIONAL NEGATIVE: 1
RESPIRATORY NEGATIVE: 1
CARDIOVASCULAR NEGATIVE: 1
NEUROLOGICAL NEGATIVE: 1
MUSCULOSKELETAL NEGATIVE: 1
PSYCHIATRIC NEGATIVE: 1
GASTROINTESTINAL NEGATIVE: 1

## 2025-07-17 NOTE — PATIENT INSTRUCTIONS
Routine Gynecologic Visit:  You were seen today for a routine gyn visit with normal findings on exam  Your pap smear had normal cells in 2024, so you were not due for a pap smear today. You should still continue to get annual breast and pelvic exams in the office.  Continue self breast exams/monitoring at home and call for appointment in the office if there are ever masses, skin discoloration, dimpling/puckering of the skin, or nipple drainage when you are not pregnant  We discussed contraception today and your IUD is in place without issue. Continue to use condoms with any new partners to protect against STD's and have routine STD screening done at your gynecologic visits if you have had a new partner in the last year or have new symptoms of pelvic pain, discharge, vulvar rashes. STD screening was not done today  If you are having any gynecologic issues in the next year you should call the office. [phone] ([place]) or [phone] ([place])    Gynecologic Visit for Breast Pain:  You were seen today in office for pain in your breast  Breast pain may be due to cysts, dense tissue, lactating, fibrocystic breast disease, nerve/muscle pain, and benign or malignant tumors  A mammogram was not indicated today  You can use Tylenol, warm/cool compresses, and Evening Primrose Oil. Avoid/cut back on caffeine and alcohol.  If you have any issues or symptoms are not improving, please call the office at [phone] ([place]) or [phone] [place]

## 2025-07-17 NOTE — PROGRESS NOTES
"Subjective   Windy Lovelace is a 34 y.o. female who is here for a routine exam. Patient is amenorrheic on IUD. Cyclic symptoms include none. No intermenstrual bleeding, spotting, or discharge. Patient is not sexually active.    Current contraception: IUD  History of abnormal Pap smear: no  Family history of uterine or ovarian cancer: no  Regular self breast exam: yes  History of abnormal mammogram: no  Family history of breast cancer: no  History of abnormal lipids: no  Menstrual History:  OB History          0    Para   0    Term   0       0    AB   0    Living   0         SAB   0    IAB   0    Ectopic   0    Multiple   0    Live Births   0                  No LMP recorded. (Menstrual status: IUD).         Review of Systems   Constitutional: Negative.    Respiratory: Negative.     Cardiovascular: Negative.    Gastrointestinal: Negative.    Genitourinary: Negative.    Musculoskeletal: Negative.    Neurological: Negative.    Psychiatric/Behavioral: Negative.         Objective   /86   Ht 1.676 m (5' 6\")   Wt 101 kg (223 lb)   BMI 35.99 kg/m²     General:   alert, appears stated age, and cooperative   Heart: regular rate and rhythm, S1, S2 normal, no murmur, click, rub or gallop   Lungs: clear to auscultation bilaterally   Abdomen: soft, non-tender, without masses or organomegaly   Pelvic: Vulva normal in appearance without discoloration, rashes, lesions. Urethral meatus normal in appearance, without masses. Vagina is negative for blood, discharge, lesions. Uterus is small, mobile, non tender. There is no cervical motion tenderness, adnexal masses/tenderness. Perineum and anus with normal architecture and without rashes, lesions, discoloration.     Breast: No masses, skin changes, discoloration, tenderness, or nipple drainage bilaterally     Neck: Normal ROM. Thyroid normal size. No masses/tenderness     Lymph: No LAD   Psych: Normal mood/affect     Assessment/Plan   Problem List Items " Addressed This Visit    None  Visit Diagnoses         Well woman exam    -  Primary      Cervical cancer screening          IUD check up             1. Pelvic/breast exam wnl, counseled on breast awareness/self exam  2. Pap NIL/neg 2024.  3. IUD in place without complication  4. Counseled on safe sex practices including condom use. Offered screening for GN/CT, HIV, Syphilis. Patient not sexually active, deferred    RTO 1 year  Brinda Ibrahim,

## 2025-07-29 ENCOUNTER — OFFICE VISIT (OUTPATIENT)
Dept: ORTHOPEDIC SURGERY | Facility: HOSPITAL | Age: 35
End: 2025-07-29
Payer: MEDICARE

## 2025-07-29 ENCOUNTER — HOSPITAL ENCOUNTER (OUTPATIENT)
Dept: RADIOLOGY | Facility: EXTERNAL LOCATION | Age: 35
Discharge: HOME | End: 2025-07-29

## 2025-07-29 DIAGNOSIS — G89.29 CHRONIC PAIN OF RIGHT ANKLE: ICD-10-CM

## 2025-07-29 DIAGNOSIS — M25.571 CHRONIC PAIN OF RIGHT ANKLE: ICD-10-CM

## 2025-07-29 PROCEDURE — 20606 DRAIN/INJ JOINT/BURSA W/US: CPT | Mod: RT | Performed by: FAMILY MEDICINE

## 2025-07-29 PROCEDURE — 99213 OFFICE O/P EST LOW 20 MIN: CPT | Mod: 25 | Performed by: FAMILY MEDICINE

## 2025-07-29 PROCEDURE — 2500000004 HC RX 250 GENERAL PHARMACY W/ HCPCS (ALT 636 FOR OP/ED): Mod: JZ | Performed by: FAMILY MEDICINE

## 2025-07-29 PROCEDURE — 99214 OFFICE O/P EST MOD 30 MIN: CPT | Performed by: FAMILY MEDICINE

## 2025-07-29 RX ORDER — METHYLPREDNISOLONE ACETATE 40 MG/ML
80 INJECTION, SUSPENSION INTRA-ARTICULAR; INTRALESIONAL; INTRAMUSCULAR; SOFT TISSUE
Status: COMPLETED | OUTPATIENT
Start: 2025-07-29 | End: 2025-07-29

## 2025-07-29 RX ORDER — LIDOCAINE HYDROCHLORIDE 10 MG/ML
2 INJECTION, SOLUTION INFILTRATION; PERINEURAL
Status: COMPLETED | OUTPATIENT
Start: 2025-07-29 | End: 2025-07-29

## 2025-07-29 RX ORDER — ROPIVACAINE HYDROCHLORIDE 5 MG/ML
2 INJECTION, SOLUTION EPIDURAL; INFILTRATION; PERINEURAL
Status: COMPLETED | OUTPATIENT
Start: 2025-07-29 | End: 2025-07-29

## 2025-07-29 RX ADMIN — METHYLPREDNISOLONE ACETATE 80 MG: 40 INJECTION, SUSPENSION INTRA-ARTICULAR; INTRALESIONAL; INTRAMUSCULAR; INTRASYNOVIAL; SOFT TISSUE at 17:07

## 2025-07-29 RX ADMIN — ROPIVACAINE HYDROCHLORIDE 2 ML: 5 INJECTION, SOLUTION EPIDURAL; INFILTRATION; PERINEURAL at 17:07

## 2025-07-29 RX ADMIN — LIDOCAINE HYDROCHLORIDE 2 ML: 10 INJECTION, SOLUTION INFILTRATION; PERINEURAL at 17:07

## 2025-07-29 ASSESSMENT — PAIN SCALES - GENERAL: PAINLEVEL_OUTOF10: 2

## 2025-07-29 ASSESSMENT — PAIN - FUNCTIONAL ASSESSMENT: PAIN_FUNCTIONAL_ASSESSMENT: 0-10

## 2025-07-29 NOTE — PROGRESS NOTES
M Inj/Asp: R ankle on 7/29/2025 5:07 PM  Indications: pain  Details: 21 G needle, ultrasound-guided anterolateral approach  Medications: 80 mg methylPREDNISolone acetate 40 mg/mL; 2 mL lidocaine 10 mg/mL (1 %); 2 mL ropivacaine 5 mg/mL (0.5 %)  Outcome: tolerated well, no immediate complications  Procedure, treatment alternatives, risks and benefits explained, specific risks discussed. Consent was given by the patient. Immediately prior to procedure a time out was called to verify the correct patient, procedure, equipment, support staff and site/side marked as required. Patient was prepped and draped in the usual sterile fashion.            Sports Medicine Office Note    Today's Date:  07/29/2025     HPI: Windy Lovelace is a 34 y.o. female with disability due to CVA and developmental delay who presents today for possible cortisone injection into the right ankle joint upon referral by Dr. Castro.    Today, 07/29/2025, she has a history of recent right trimalleolar ankle fracture dislocation with ORIF currently under the care of Dr. Castro.  Fracture is healed well and she is now having joint pain.  She was sent over for an intra-articular cortisone injection.  She has not had this before.  She is looking for chronic analgesia.  She has no problems with the opposite ankle.    She has no other complaints.     Physical Examination:     The RIGHT ankle is without obvious signs of acute bony deformity, swelling, erythema or ecchymosis. There is no tenderness to the medial joint line. There is no tenderness to the lateral joint line. There is no bony crepitus or step-off. Active range of motion is full, pain-free and symmetrical. Passive range of motion is full, pain-free and symmetrical. Instability test are negative with anterior drawer, talar tilt and eversion stress tests. Agustin's test and Homans sign are negative. Strength is normal compared to the opposite ankle. The opposite ankle is otherwise normal and  stable. Gait is pain-free and tandem.    Imaging:  === 06/17/25 ===  XR ANKLE 3+ VIEWS RIGHT  - Impression -  Lucencies surrounding the syndesmotic screws which may indicate  underlying loosening or less likely infection.  Signed by: Gene Brown 6/18/2025 6:28 PM    Procedure:  After consent was obtained, the anterior lateral right ankle was prepped in a sterile fashion. Ultrasound guidance was used to help insure proper needle placement into the tibiotalar joint, decrease patient discomfort, and decrease collateral damage. The joint was visualized and Depo-Medrol 80 mg with lidocaine 2 mL & ropivacaine 2 mL were injected without any complications. Ultrasound images were saved on an internal file for later reference. The patient tolerated the procedure well and the area was cleaned and bandaged.    Visit Diagnoses   1. Chronic pain of right ankle  Point of Care Ultrasound          Assessment and Plan:  We reviewed the exam and x-ray findings and discussed the conservative and surgical treatment options. We agreed to a diagnostic and hopefully therapeutic cortisone injection into the right ankle/tibiotalar joint.  She tolerated this well. Activity modifications were reviewed.  She will keep any scheduled follow-up with Dr. Castro. I will see her back as needed.    **This note was dictated using Dragon speech recognition software and was not corrected for spelling or grammatical errors**.    Aaron Guerra MD  Sports Medicine Specialist  [facility] [place]

## 2025-07-29 NOTE — LETTER
July 29, 2025     Lavinia Walker, APRN-CNP  [address]  [address]  [facility] [address]    Patient: Windy Lovelace   YOB: 1990   Date of Visit: 7/29/2025       Dear Dr. Lavinia Walker, APRN-CNP:    Thank you for referring Windy Lovelace to me for evaluation. Below are my notes for this consultation.  If you have questions, please do not hesitate to call me. I look forward to following your patient along with you.       Sincerely,     Aaron Guerra MD      CC: Timothy Castro MD  ______________________________________________________________________________________    M Inj/Asp: R ankle on 7/29/2025 5:07 PM  Indications: pain  Details: 21 G needle, ultrasound-guided anterolateral approach  Medications: 80 mg methylPREDNISolone acetate 40 mg/mL; 2 mL lidocaine 10 mg/mL (1 %); 2 mL ropivacaine 5 mg/mL (0.5 %)  Outcome: tolerated well, no immediate complications  Procedure, treatment alternatives, risks and benefits explained, specific risks discussed. Consent was given by the patient. Immediately prior to procedure a time out was called to verify the correct patient, procedure, equipment, support staff and site/side marked as required. Patient was prepped and draped in the usual sterile fashion.            Sports Medicine Office Note    Today's Date:  07/29/2025     HPI: Windy Lovelace is a 34 y.o. female with disability due to CVA and developmental delay who presents today for possible cortisone injection into the right ankle joint upon referral by Dr. Castro.    Today, 07/29/2025, she has a history of recent right trimalleolar ankle fracture dislocation with ORIF currently under the care of Dr. Castro.  Fracture is healed well and she is now having joint pain.  She was sent over for an intra-articular cortisone injection.  She has not had this before.  She is looking for chronic analgesia.  She has no problems with the opposite ankle.    She has no other complaints.     Physical  Examination:     The RIGHT ankle is without obvious signs of acute bony deformity, swelling, erythema or ecchymosis. There is no tenderness to the medial joint line. There is no tenderness to the lateral joint line. There is no bony crepitus or step-off. Active range of motion is full, pain-free and symmetrical. Passive range of motion is full, pain-free and symmetrical. Instability test are negative with anterior drawer, talar tilt and eversion stress tests. Agustin's test and Homans sign are negative. Strength is normal compared to the opposite ankle. The opposite ankle is otherwise normal and stable. Gait is pain-free and tandem.    Imaging:  === 06/17/25 ===  XR ANKLE 3+ VIEWS RIGHT  - Impression -  Lucencies surrounding the syndesmotic screws which may indicate  underlying loosening or less likely infection.  Signed by: Gene Brown 6/18/2025 6:28 PM    Procedure:  After consent was obtained, the anterior lateral right ankle was prepped in a sterile fashion. Ultrasound guidance was used to help insure proper needle placement into the tibiotalar joint, decrease patient discomfort, and decrease collateral damage. The joint was visualized and Depo-Medrol 80 mg with lidocaine 2 mL & ropivacaine 2 mL were injected without any complications. Ultrasound images were saved on an internal file for later reference. The patient tolerated the procedure well and the area was cleaned and bandaged.    Visit Diagnoses   1. Chronic pain of right ankle  Point of Care Ultrasound          Assessment and Plan:  We reviewed the exam and x-ray findings and discussed the conservative and surgical treatment options. We agreed to a diagnostic and hopefully therapeutic cortisone injection into the right ankle/tibiotalar joint.  She tolerated this well. Activity modifications were reviewed.  She will keep any scheduled follow-up with Dr. Castro. I will see her back as needed.    **This note was dictated using Dragon speech recognition  software and was not corrected for spelling or grammatical errors**.    Aaron Guerra MD  Sports Medicine Specialist  [facility] [place]

## 2025-08-07 ENCOUNTER — APPOINTMENT (OUTPATIENT)
Dept: PRIMARY CARE | Facility: CLINIC | Age: 35
End: 2025-08-07
Payer: MEDICARE

## 2025-08-07 DIAGNOSIS — E53.8 VITAMIN B12 DEFICIENCY: Primary | ICD-10-CM

## 2025-08-07 PROCEDURE — 96372 THER/PROPH/DIAG INJ SC/IM: CPT | Performed by: NURSE PRACTITIONER

## 2025-08-07 RX ORDER — CYANOCOBALAMIN 1000 UG/ML
1000 INJECTION, SOLUTION INTRAMUSCULAR; SUBCUTANEOUS ONCE
Status: COMPLETED | OUTPATIENT
Start: 2025-08-07 | End: 2025-08-07

## 2025-08-07 RX ADMIN — CYANOCOBALAMIN 1000 MCG: 1000 INJECTION, SOLUTION INTRAMUSCULAR; SUBCUTANEOUS at 10:47

## 2025-08-26 ENCOUNTER — APPOINTMENT (OUTPATIENT)
Dept: ORTHOPEDIC SURGERY | Facility: CLINIC | Age: 35
End: 2025-08-26
Payer: MEDICARE

## 2025-08-26 DIAGNOSIS — M67.01 CONTRACTURE OF RIGHT ACHILLES TENDON: ICD-10-CM

## 2025-08-26 DIAGNOSIS — S82.851D CLOSED DISPLACED TRIMALLEOLAR FRACTURE OF RIGHT ANKLE WITH ROUTINE HEALING, SUBSEQUENT ENCOUNTER: ICD-10-CM

## 2025-08-26 DIAGNOSIS — M25.571 CHRONIC PAIN OF RIGHT ANKLE: Primary | ICD-10-CM

## 2025-08-26 DIAGNOSIS — G89.29 CHRONIC PAIN OF RIGHT ANKLE: Primary | ICD-10-CM

## 2025-08-26 PROCEDURE — L1902 AFO ANKLE GAUNTLET PRE OTS: HCPCS | Performed by: STUDENT IN AN ORGANIZED HEALTH CARE EDUCATION/TRAINING PROGRAM

## 2025-08-26 PROCEDURE — 99212 OFFICE O/P EST SF 10 MIN: CPT

## 2025-08-26 PROCEDURE — 99213 OFFICE O/P EST LOW 20 MIN: CPT | Performed by: STUDENT IN AN ORGANIZED HEALTH CARE EDUCATION/TRAINING PROGRAM

## 2025-08-26 ASSESSMENT — PAIN - FUNCTIONAL ASSESSMENT: PAIN_FUNCTIONAL_ASSESSMENT: 0-10

## 2025-08-26 ASSESSMENT — PAIN DESCRIPTION - DESCRIPTORS: DESCRIPTORS: ACHING;SHARP;SHOOTING;STABBING

## 2025-08-26 ASSESSMENT — PAIN SCALES - GENERAL: PAINLEVEL_OUTOF10: 5 - MODERATE PAIN

## 2025-09-09 ENCOUNTER — APPOINTMENT (OUTPATIENT)
Dept: PRIMARY CARE | Facility: CLINIC | Age: 35
End: 2025-09-09
Payer: MEDICARE

## 2025-10-09 ENCOUNTER — APPOINTMENT (OUTPATIENT)
Dept: PRIMARY CARE | Facility: CLINIC | Age: 35
End: 2025-10-09
Payer: MEDICARE

## 2026-02-24 ENCOUNTER — APPOINTMENT (OUTPATIENT)
Dept: ORTHOPEDIC SURGERY | Facility: CLINIC | Age: 36
End: 2026-02-24
Payer: MEDICARE

## (undated) DEVICE — Device

## (undated) DEVICE — DRESSING, GAUZE, 16 PLY, 4 X 4 IN, STERILE

## (undated) DEVICE — DRESSING, GAUZE, PETROLATUM, PATCH, XEROFORM, 1 X 8 IN, STERILE

## (undated) DEVICE — COVER, C-ARM W/CLIPS, OEC GE

## (undated) DEVICE — SOLUTION, IRRIGATION, SODIUM CHLORIDE 0.9%, 1000 ML, POUR BOTTLE

## (undated) DEVICE — SUTURE, ETHILON, 3/0, FS1, 18 IN, BLK MONO

## (undated) DEVICE — TOWEL, SURGICAL, NEURO, O/R, 16 X 26, BLUE, STERILE

## (undated) DEVICE — BANDAGE, COFLEX, 6 X 5 YDS, FOAM TAN, STERILE, LF

## (undated) DEVICE — PADDING, WEBRIL, UNDERCAST, STERILE, 6 IN

## (undated) DEVICE — GLOVE, SURGICAL, PROTEXIS PI ORTHO, 7.5, PF, LF

## (undated) DEVICE — SUTURE, VICRYL, 3-0, 27 IN, CT-2, UNDYED